# Patient Record
Sex: FEMALE | Race: WHITE | URBAN - METROPOLITAN AREA
[De-identification: names, ages, dates, MRNs, and addresses within clinical notes are randomized per-mention and may not be internally consistent; named-entity substitution may affect disease eponyms.]

---

## 2017-06-19 ENCOUNTER — OUTPATIENT (OUTPATIENT)
Dept: OUTPATIENT SERVICES | Facility: HOSPITAL | Age: 77
LOS: 1 days | Discharge: HOME | End: 2017-06-19

## 2017-06-28 DIAGNOSIS — I27.82 CHRONIC PULMONARY EMBOLISM: ICD-10-CM

## 2017-06-28 DIAGNOSIS — Z79.01 LONG TERM (CURRENT) USE OF ANTICOAGULANTS: ICD-10-CM

## 2017-07-03 ENCOUNTER — OUTPATIENT (OUTPATIENT)
Dept: OUTPATIENT SERVICES | Facility: HOSPITAL | Age: 77
LOS: 1 days | Discharge: HOME | End: 2017-07-03

## 2017-07-03 DIAGNOSIS — I27.82 CHRONIC PULMONARY EMBOLISM: ICD-10-CM

## 2017-07-03 DIAGNOSIS — Z79.01 LONG TERM (CURRENT) USE OF ANTICOAGULANTS: ICD-10-CM

## 2017-07-24 ENCOUNTER — OUTPATIENT (OUTPATIENT)
Dept: OUTPATIENT SERVICES | Facility: HOSPITAL | Age: 77
LOS: 1 days | Discharge: HOME | End: 2017-07-24

## 2017-07-24 DIAGNOSIS — I27.82 CHRONIC PULMONARY EMBOLISM: ICD-10-CM

## 2017-07-24 DIAGNOSIS — Z79.01 LONG TERM (CURRENT) USE OF ANTICOAGULANTS: ICD-10-CM

## 2017-08-14 ENCOUNTER — OUTPATIENT (OUTPATIENT)
Dept: OUTPATIENT SERVICES | Facility: HOSPITAL | Age: 77
LOS: 1 days | Discharge: HOME | End: 2017-08-14

## 2017-08-14 DIAGNOSIS — Z79.01 LONG TERM (CURRENT) USE OF ANTICOAGULANTS: ICD-10-CM

## 2017-08-14 DIAGNOSIS — I27.82 CHRONIC PULMONARY EMBOLISM: ICD-10-CM

## 2017-09-11 ENCOUNTER — OUTPATIENT (OUTPATIENT)
Dept: OUTPATIENT SERVICES | Facility: HOSPITAL | Age: 77
LOS: 1 days | Discharge: HOME | End: 2017-09-11

## 2017-09-11 DIAGNOSIS — Z79.01 LONG TERM (CURRENT) USE OF ANTICOAGULANTS: ICD-10-CM

## 2017-09-11 DIAGNOSIS — I27.82 CHRONIC PULMONARY EMBOLISM: ICD-10-CM

## 2017-10-11 ENCOUNTER — OUTPATIENT (OUTPATIENT)
Dept: OUTPATIENT SERVICES | Facility: HOSPITAL | Age: 77
LOS: 1 days | Discharge: HOME | End: 2017-10-11

## 2017-10-11 DIAGNOSIS — Z79.01 LONG TERM (CURRENT) USE OF ANTICOAGULANTS: ICD-10-CM

## 2017-10-11 DIAGNOSIS — I27.82 CHRONIC PULMONARY EMBOLISM: ICD-10-CM

## 2017-10-23 ENCOUNTER — OUTPATIENT (OUTPATIENT)
Dept: OUTPATIENT SERVICES | Facility: HOSPITAL | Age: 77
LOS: 1 days | Discharge: HOME | End: 2017-10-23

## 2017-10-23 DIAGNOSIS — Z79.01 LONG TERM (CURRENT) USE OF ANTICOAGULANTS: ICD-10-CM

## 2017-10-23 DIAGNOSIS — I27.82 CHRONIC PULMONARY EMBOLISM: ICD-10-CM

## 2017-11-13 ENCOUNTER — OUTPATIENT (OUTPATIENT)
Dept: OUTPATIENT SERVICES | Facility: HOSPITAL | Age: 77
LOS: 1 days | Discharge: HOME | End: 2017-11-13

## 2017-11-13 DIAGNOSIS — Z79.01 LONG TERM (CURRENT) USE OF ANTICOAGULANTS: ICD-10-CM

## 2017-11-13 DIAGNOSIS — I27.82 CHRONIC PULMONARY EMBOLISM: ICD-10-CM

## 2017-12-16 ENCOUNTER — OUTPATIENT (OUTPATIENT)
Dept: OUTPATIENT SERVICES | Facility: HOSPITAL | Age: 77
LOS: 1 days | Discharge: HOME | End: 2017-12-16

## 2017-12-16 DIAGNOSIS — I27.82 CHRONIC PULMONARY EMBOLISM: ICD-10-CM

## 2017-12-16 DIAGNOSIS — Z79.01 LONG TERM (CURRENT) USE OF ANTICOAGULANTS: ICD-10-CM

## 2017-12-21 ENCOUNTER — OUTPATIENT (OUTPATIENT)
Dept: OUTPATIENT SERVICES | Facility: HOSPITAL | Age: 77
LOS: 1 days | Discharge: HOME | End: 2017-12-21

## 2017-12-21 DIAGNOSIS — Z79.01 LONG TERM (CURRENT) USE OF ANTICOAGULANTS: ICD-10-CM

## 2017-12-21 DIAGNOSIS — I27.82 CHRONIC PULMONARY EMBOLISM: ICD-10-CM

## 2017-12-27 ENCOUNTER — EMERGENCY (EMERGENCY)
Facility: HOSPITAL | Age: 77
LOS: 0 days | Discharge: HOME | End: 2017-12-28
Admitting: INTERNAL MEDICINE

## 2017-12-27 DIAGNOSIS — Z86.711 PERSONAL HISTORY OF PULMONARY EMBOLISM: ICD-10-CM

## 2017-12-27 DIAGNOSIS — Z79.01 LONG TERM (CURRENT) USE OF ANTICOAGULANTS: ICD-10-CM

## 2017-12-27 DIAGNOSIS — D64.9 ANEMIA, UNSPECIFIED: ICD-10-CM

## 2017-12-27 DIAGNOSIS — I10 ESSENTIAL (PRIMARY) HYPERTENSION: ICD-10-CM

## 2018-01-03 ENCOUNTER — OUTPATIENT (OUTPATIENT)
Dept: OUTPATIENT SERVICES | Facility: HOSPITAL | Age: 78
LOS: 1 days | Discharge: HOME | End: 2018-01-03

## 2018-01-03 DIAGNOSIS — I27.82 CHRONIC PULMONARY EMBOLISM: ICD-10-CM

## 2018-01-03 DIAGNOSIS — Z79.01 LONG TERM (CURRENT) USE OF ANTICOAGULANTS: ICD-10-CM

## 2018-01-19 ENCOUNTER — OUTPATIENT (OUTPATIENT)
Dept: OUTPATIENT SERVICES | Facility: HOSPITAL | Age: 78
LOS: 1 days | Discharge: HOME | End: 2018-01-19

## 2018-01-19 DIAGNOSIS — Z79.01 LONG TERM (CURRENT) USE OF ANTICOAGULANTS: ICD-10-CM

## 2018-01-19 DIAGNOSIS — I27.82 CHRONIC PULMONARY EMBOLISM: ICD-10-CM

## 2018-01-29 ENCOUNTER — OUTPATIENT (OUTPATIENT)
Dept: OUTPATIENT SERVICES | Facility: HOSPITAL | Age: 78
LOS: 1 days | Discharge: HOME | End: 2018-01-29

## 2018-01-29 DIAGNOSIS — Z79.01 LONG TERM (CURRENT) USE OF ANTICOAGULANTS: ICD-10-CM

## 2018-01-29 DIAGNOSIS — I27.82 CHRONIC PULMONARY EMBOLISM: ICD-10-CM

## 2018-02-12 ENCOUNTER — OUTPATIENT (OUTPATIENT)
Dept: OUTPATIENT SERVICES | Facility: HOSPITAL | Age: 78
LOS: 1 days | Discharge: HOME | End: 2018-02-12

## 2018-02-12 DIAGNOSIS — I27.82 CHRONIC PULMONARY EMBOLISM: ICD-10-CM

## 2018-02-12 DIAGNOSIS — Z79.01 LONG TERM (CURRENT) USE OF ANTICOAGULANTS: ICD-10-CM

## 2018-03-05 ENCOUNTER — OUTPATIENT (OUTPATIENT)
Dept: OUTPATIENT SERVICES | Facility: HOSPITAL | Age: 78
LOS: 1 days | Discharge: HOME | End: 2018-03-05

## 2018-03-05 DIAGNOSIS — I27.82 CHRONIC PULMONARY EMBOLISM: ICD-10-CM

## 2018-03-05 DIAGNOSIS — Z79.01 LONG TERM (CURRENT) USE OF ANTICOAGULANTS: ICD-10-CM

## 2018-03-26 ENCOUNTER — OUTPATIENT (OUTPATIENT)
Dept: OUTPATIENT SERVICES | Facility: HOSPITAL | Age: 78
LOS: 1 days | Discharge: HOME | End: 2018-03-26

## 2018-03-26 DIAGNOSIS — Z79.01 LONG TERM (CURRENT) USE OF ANTICOAGULANTS: ICD-10-CM

## 2018-03-26 DIAGNOSIS — I27.82 CHRONIC PULMONARY EMBOLISM: ICD-10-CM

## 2018-04-18 ENCOUNTER — OUTPATIENT (OUTPATIENT)
Dept: OUTPATIENT SERVICES | Facility: HOSPITAL | Age: 78
LOS: 1 days | Discharge: HOME | End: 2018-04-18

## 2018-04-18 DIAGNOSIS — Z79.01 LONG TERM (CURRENT) USE OF ANTICOAGULANTS: ICD-10-CM

## 2018-04-18 DIAGNOSIS — I27.82 CHRONIC PULMONARY EMBOLISM: ICD-10-CM

## 2018-05-14 ENCOUNTER — OUTPATIENT (OUTPATIENT)
Dept: OUTPATIENT SERVICES | Facility: HOSPITAL | Age: 78
LOS: 1 days | Discharge: HOME | End: 2018-05-14

## 2018-05-14 DIAGNOSIS — I27.82 CHRONIC PULMONARY EMBOLISM: ICD-10-CM

## 2018-05-14 DIAGNOSIS — Z79.01 LONG TERM (CURRENT) USE OF ANTICOAGULANTS: ICD-10-CM

## 2018-06-11 ENCOUNTER — OUTPATIENT (OUTPATIENT)
Dept: OUTPATIENT SERVICES | Facility: HOSPITAL | Age: 78
LOS: 1 days | Discharge: HOME | End: 2018-06-11

## 2018-06-11 DIAGNOSIS — I27.82 CHRONIC PULMONARY EMBOLISM: ICD-10-CM

## 2018-06-11 DIAGNOSIS — Z79.01 LONG TERM (CURRENT) USE OF ANTICOAGULANTS: ICD-10-CM

## 2018-06-20 ENCOUNTER — OUTPATIENT (OUTPATIENT)
Dept: OUTPATIENT SERVICES | Facility: HOSPITAL | Age: 78
LOS: 1 days | Discharge: HOME | End: 2018-06-20

## 2018-06-20 DIAGNOSIS — Z79.01 LONG TERM (CURRENT) USE OF ANTICOAGULANTS: ICD-10-CM

## 2018-06-20 DIAGNOSIS — I27.82 CHRONIC PULMONARY EMBOLISM: ICD-10-CM

## 2018-07-03 ENCOUNTER — OUTPATIENT (OUTPATIENT)
Dept: OUTPATIENT SERVICES | Facility: HOSPITAL | Age: 78
LOS: 1 days | Discharge: HOME | End: 2018-07-03

## 2018-07-03 DIAGNOSIS — I27.82 CHRONIC PULMONARY EMBOLISM: ICD-10-CM

## 2018-07-03 DIAGNOSIS — Z79.01 LONG TERM (CURRENT) USE OF ANTICOAGULANTS: ICD-10-CM

## 2018-07-17 ENCOUNTER — OUTPATIENT (OUTPATIENT)
Dept: OUTPATIENT SERVICES | Facility: HOSPITAL | Age: 78
LOS: 1 days | Discharge: HOME | End: 2018-07-17

## 2018-07-17 DIAGNOSIS — Z79.01 LONG TERM (CURRENT) USE OF ANTICOAGULANTS: ICD-10-CM

## 2018-07-17 DIAGNOSIS — I27.82 CHRONIC PULMONARY EMBOLISM: ICD-10-CM

## 2018-07-31 ENCOUNTER — OUTPATIENT (OUTPATIENT)
Dept: OUTPATIENT SERVICES | Facility: HOSPITAL | Age: 78
LOS: 1 days | Discharge: HOME | End: 2018-07-31

## 2018-07-31 DIAGNOSIS — I27.82 CHRONIC PULMONARY EMBOLISM: ICD-10-CM

## 2018-07-31 DIAGNOSIS — Z79.01 LONG TERM (CURRENT) USE OF ANTICOAGULANTS: ICD-10-CM

## 2018-08-13 ENCOUNTER — OUTPATIENT (OUTPATIENT)
Dept: OUTPATIENT SERVICES | Facility: HOSPITAL | Age: 78
LOS: 1 days | Discharge: HOME | End: 2018-08-13

## 2018-08-13 DIAGNOSIS — R10.9 UNSPECIFIED ABDOMINAL PAIN: ICD-10-CM

## 2018-08-20 ENCOUNTER — OUTPATIENT (OUTPATIENT)
Dept: OUTPATIENT SERVICES | Facility: HOSPITAL | Age: 78
LOS: 1 days | Discharge: HOME | End: 2018-08-20

## 2018-08-20 DIAGNOSIS — I27.82 CHRONIC PULMONARY EMBOLISM: ICD-10-CM

## 2018-08-20 DIAGNOSIS — Z79.01 LONG TERM (CURRENT) USE OF ANTICOAGULANTS: ICD-10-CM

## 2018-09-17 ENCOUNTER — OUTPATIENT (OUTPATIENT)
Dept: OUTPATIENT SERVICES | Facility: HOSPITAL | Age: 78
LOS: 1 days | Discharge: HOME | End: 2018-09-17

## 2018-09-17 DIAGNOSIS — I27.82 CHRONIC PULMONARY EMBOLISM: ICD-10-CM

## 2018-09-17 DIAGNOSIS — Z79.01 LONG TERM (CURRENT) USE OF ANTICOAGULANTS: ICD-10-CM

## 2018-09-17 LAB
POCT INR: 2.1 RATIO — HIGH (ref 0.9–1.2)
POCT PT: 25.6 SEC — HIGH (ref 10–13.4)

## 2018-10-01 ENCOUNTER — OUTPATIENT (OUTPATIENT)
Dept: OUTPATIENT SERVICES | Facility: HOSPITAL | Age: 78
LOS: 1 days | Discharge: HOME | End: 2018-10-01

## 2018-10-01 DIAGNOSIS — I27.82 CHRONIC PULMONARY EMBOLISM: ICD-10-CM

## 2018-10-01 DIAGNOSIS — Z79.01 LONG TERM (CURRENT) USE OF ANTICOAGULANTS: ICD-10-CM

## 2018-10-01 LAB
POCT INR: 3.1 RATIO — HIGH (ref 0.9–1.2)
POCT PT: 37.5 SEC — HIGH (ref 10–13.4)

## 2018-10-22 ENCOUNTER — OUTPATIENT (OUTPATIENT)
Dept: OUTPATIENT SERVICES | Facility: HOSPITAL | Age: 78
LOS: 1 days | Discharge: HOME | End: 2018-10-22

## 2018-10-22 DIAGNOSIS — Z79.01 LONG TERM (CURRENT) USE OF ANTICOAGULANTS: ICD-10-CM

## 2018-10-22 DIAGNOSIS — I27.82 CHRONIC PULMONARY EMBOLISM: ICD-10-CM

## 2018-10-22 LAB
POCT INR: 3.5 RATIO — HIGH (ref 0.9–1.2)
POCT PT: 39 SEC — HIGH (ref 10–13.4)

## 2018-11-19 ENCOUNTER — OUTPATIENT (OUTPATIENT)
Dept: OUTPATIENT SERVICES | Facility: HOSPITAL | Age: 78
LOS: 1 days | Discharge: HOME | End: 2018-11-19

## 2018-11-19 DIAGNOSIS — Z79.01 LONG TERM (CURRENT) USE OF ANTICOAGULANTS: ICD-10-CM

## 2018-11-19 DIAGNOSIS — I27.82 CHRONIC PULMONARY EMBOLISM: ICD-10-CM

## 2018-11-19 LAB
POCT INR: 7.1 RATIO — CRITICAL HIGH (ref 0.9–1.2)
POCT PT: 85.6 SEC — HIGH (ref 10–13.4)

## 2018-11-21 ENCOUNTER — OUTPATIENT (OUTPATIENT)
Dept: OUTPATIENT SERVICES | Facility: HOSPITAL | Age: 78
LOS: 1 days | Discharge: HOME | End: 2018-11-21

## 2018-11-21 DIAGNOSIS — Z79.01 LONG TERM (CURRENT) USE OF ANTICOAGULANTS: ICD-10-CM

## 2018-11-21 DIAGNOSIS — I27.82 CHRONIC PULMONARY EMBOLISM: ICD-10-CM

## 2018-11-21 LAB
POCT INR: 6.1 RATIO — CRITICAL HIGH (ref 0.9–1.2)
POCT PT: 73.5 SEC — HIGH (ref 10–13.4)

## 2018-11-23 ENCOUNTER — OUTPATIENT (OUTPATIENT)
Dept: OUTPATIENT SERVICES | Facility: HOSPITAL | Age: 78
LOS: 1 days | Discharge: HOME | End: 2018-11-23

## 2018-11-23 DIAGNOSIS — Z79.01 LONG TERM (CURRENT) USE OF ANTICOAGULANTS: ICD-10-CM

## 2018-11-23 DIAGNOSIS — I27.82 CHRONIC PULMONARY EMBOLISM: ICD-10-CM

## 2018-11-23 LAB
POCT INR: 3.4 RATIO — HIGH (ref 0.9–1.2)
POCT PT: 40.7 SEC — HIGH (ref 10–13.4)

## 2018-11-30 ENCOUNTER — OUTPATIENT (OUTPATIENT)
Dept: OUTPATIENT SERVICES | Facility: HOSPITAL | Age: 78
LOS: 1 days | Discharge: HOME | End: 2018-11-30

## 2018-11-30 DIAGNOSIS — I27.82 CHRONIC PULMONARY EMBOLISM: ICD-10-CM

## 2018-11-30 DIAGNOSIS — Z79.01 LONG TERM (CURRENT) USE OF ANTICOAGULANTS: ICD-10-CM

## 2018-11-30 LAB
POCT INR: 3.2 RATIO — HIGH (ref 0.9–1.2)
POCT PT: 38.1 SEC — HIGH (ref 10–13.4)

## 2018-12-14 ENCOUNTER — OUTPATIENT (OUTPATIENT)
Dept: OUTPATIENT SERVICES | Facility: HOSPITAL | Age: 78
LOS: 1 days | Discharge: HOME | End: 2018-12-14

## 2018-12-14 DIAGNOSIS — Z79.01 LONG TERM (CURRENT) USE OF ANTICOAGULANTS: ICD-10-CM

## 2018-12-14 DIAGNOSIS — I27.82 CHRONIC PULMONARY EMBOLISM: ICD-10-CM

## 2018-12-14 LAB
POCT INR: 3 RATIO — HIGH (ref 0.9–1.2)
POCT PT: 36.1 SEC — HIGH (ref 10–13.4)

## 2019-01-07 ENCOUNTER — OUTPATIENT (OUTPATIENT)
Dept: OUTPATIENT SERVICES | Facility: HOSPITAL | Age: 79
LOS: 1 days | Discharge: HOME | End: 2019-01-07

## 2019-01-07 DIAGNOSIS — Z79.01 LONG TERM (CURRENT) USE OF ANTICOAGULANTS: ICD-10-CM

## 2019-01-07 DIAGNOSIS — I48.91 UNSPECIFIED ATRIAL FIBRILLATION: ICD-10-CM

## 2019-01-07 LAB
POCT INR: 2.6 RATIO — HIGH (ref 0.9–1.2)
POCT PT: 31.6 SEC — HIGH (ref 10–13.4)

## 2019-02-04 ENCOUNTER — OUTPATIENT (OUTPATIENT)
Dept: OUTPATIENT SERVICES | Facility: HOSPITAL | Age: 79
LOS: 1 days | Discharge: HOME | End: 2019-02-04

## 2019-02-04 DIAGNOSIS — I48.91 UNSPECIFIED ATRIAL FIBRILLATION: ICD-10-CM

## 2019-02-04 DIAGNOSIS — Z79.01 LONG TERM (CURRENT) USE OF ANTICOAGULANTS: ICD-10-CM

## 2019-02-04 LAB
POCT INR: 2.6 RATIO — HIGH (ref 0.9–1.2)
POCT PT: 30.9 SEC — HIGH (ref 10–13.4)

## 2019-03-04 ENCOUNTER — OUTPATIENT (OUTPATIENT)
Dept: OUTPATIENT SERVICES | Facility: HOSPITAL | Age: 79
LOS: 1 days | Discharge: HOME | End: 2019-03-04

## 2019-03-04 DIAGNOSIS — Z79.01 LONG TERM (CURRENT) USE OF ANTICOAGULANTS: ICD-10-CM

## 2019-03-04 DIAGNOSIS — I48.91 UNSPECIFIED ATRIAL FIBRILLATION: ICD-10-CM

## 2019-03-04 LAB
POCT INR: 3.3 RATIO — HIGH (ref 0.9–1.2)
POCT PT: 39.7 SEC — HIGH (ref 10–13.4)

## 2019-04-01 ENCOUNTER — OUTPATIENT (OUTPATIENT)
Dept: OUTPATIENT SERVICES | Facility: HOSPITAL | Age: 79
LOS: 1 days | Discharge: HOME | End: 2019-04-01

## 2019-04-01 DIAGNOSIS — Z79.01 LONG TERM (CURRENT) USE OF ANTICOAGULANTS: ICD-10-CM

## 2019-04-01 DIAGNOSIS — I48.91 UNSPECIFIED ATRIAL FIBRILLATION: ICD-10-CM

## 2019-04-01 LAB
POCT INR: 2.9 RATIO — HIGH (ref 0.9–1.2)
POCT PT: 35.4 SEC — HIGH (ref 10–13.4)

## 2019-04-29 ENCOUNTER — OUTPATIENT (OUTPATIENT)
Dept: OUTPATIENT SERVICES | Facility: HOSPITAL | Age: 79
LOS: 1 days | Discharge: HOME | End: 2019-04-29

## 2019-04-29 DIAGNOSIS — Z79.01 LONG TERM (CURRENT) USE OF ANTICOAGULANTS: ICD-10-CM

## 2019-04-29 DIAGNOSIS — I48.91 UNSPECIFIED ATRIAL FIBRILLATION: ICD-10-CM

## 2019-04-29 LAB
POCT INR: 2.3 RATIO — HIGH (ref 0.9–1.2)
POCT PT: 27.8 SEC — HIGH (ref 10–13.4)

## 2019-05-22 ENCOUNTER — OUTPATIENT (OUTPATIENT)
Dept: OUTPATIENT SERVICES | Facility: HOSPITAL | Age: 79
LOS: 1 days | Discharge: HOME | End: 2019-05-22

## 2019-05-22 DIAGNOSIS — I48.91 UNSPECIFIED ATRIAL FIBRILLATION: ICD-10-CM

## 2019-05-22 DIAGNOSIS — Z79.01 LONG TERM (CURRENT) USE OF ANTICOAGULANTS: ICD-10-CM

## 2019-06-17 ENCOUNTER — OUTPATIENT (OUTPATIENT)
Dept: OUTPATIENT SERVICES | Facility: HOSPITAL | Age: 79
LOS: 1 days | Discharge: HOME | End: 2019-06-17

## 2019-06-17 DIAGNOSIS — I48.91 UNSPECIFIED ATRIAL FIBRILLATION: ICD-10-CM

## 2019-06-17 DIAGNOSIS — Z79.01 LONG TERM (CURRENT) USE OF ANTICOAGULANTS: ICD-10-CM

## 2019-06-17 LAB
POCT INR: 2.4 RATIO — HIGH (ref 0.9–1.2)
POCT PT: 29 SEC — HIGH (ref 10–13.4)

## 2019-07-17 ENCOUNTER — OUTPATIENT (OUTPATIENT)
Dept: OUTPATIENT SERVICES | Facility: HOSPITAL | Age: 79
LOS: 1 days | Discharge: HOME | End: 2019-07-17

## 2019-07-17 DIAGNOSIS — Z79.01 LONG TERM (CURRENT) USE OF ANTICOAGULANTS: ICD-10-CM

## 2019-07-17 DIAGNOSIS — I48.91 UNSPECIFIED ATRIAL FIBRILLATION: ICD-10-CM

## 2019-08-14 ENCOUNTER — OUTPATIENT (OUTPATIENT)
Dept: OUTPATIENT SERVICES | Facility: HOSPITAL | Age: 79
LOS: 1 days | Discharge: HOME | End: 2019-08-14

## 2019-08-14 DIAGNOSIS — I48.91 UNSPECIFIED ATRIAL FIBRILLATION: ICD-10-CM

## 2019-08-14 DIAGNOSIS — Z79.01 LONG TERM (CURRENT) USE OF ANTICOAGULANTS: ICD-10-CM

## 2019-08-14 LAB
POCT INR: 2.3 RATIO — HIGH (ref 0.9–1.2)
POCT PT: 27.3 SEC — HIGH (ref 10–13.4)

## 2019-09-11 ENCOUNTER — OUTPATIENT (OUTPATIENT)
Dept: OUTPATIENT SERVICES | Facility: HOSPITAL | Age: 79
LOS: 1 days | Discharge: HOME | End: 2019-09-11

## 2019-09-11 DIAGNOSIS — Z79.01 LONG TERM (CURRENT) USE OF ANTICOAGULANTS: ICD-10-CM

## 2019-09-11 DIAGNOSIS — I48.91 UNSPECIFIED ATRIAL FIBRILLATION: ICD-10-CM

## 2019-09-23 ENCOUNTER — OUTPATIENT (OUTPATIENT)
Dept: OUTPATIENT SERVICES | Facility: HOSPITAL | Age: 79
LOS: 1 days | Discharge: HOME | End: 2019-09-23

## 2019-09-23 DIAGNOSIS — I48.91 UNSPECIFIED ATRIAL FIBRILLATION: ICD-10-CM

## 2019-09-23 DIAGNOSIS — Z79.01 LONG TERM (CURRENT) USE OF ANTICOAGULANTS: ICD-10-CM

## 2019-09-23 LAB
POCT INR: 3.8 RATIO — HIGH (ref 0.9–1.2)
POCT PT: 45.9 SEC — HIGH (ref 10–13.4)

## 2019-10-09 ENCOUNTER — OUTPATIENT (OUTPATIENT)
Dept: OUTPATIENT SERVICES | Facility: HOSPITAL | Age: 79
LOS: 1 days | Discharge: HOME | End: 2019-10-09

## 2019-10-09 DIAGNOSIS — I48.91 UNSPECIFIED ATRIAL FIBRILLATION: ICD-10-CM

## 2019-10-09 DIAGNOSIS — Z79.01 LONG TERM (CURRENT) USE OF ANTICOAGULANTS: ICD-10-CM

## 2019-11-06 ENCOUNTER — OUTPATIENT (OUTPATIENT)
Dept: OUTPATIENT SERVICES | Facility: HOSPITAL | Age: 79
LOS: 1 days | Discharge: HOME | End: 2019-11-06

## 2019-11-06 DIAGNOSIS — I48.91 UNSPECIFIED ATRIAL FIBRILLATION: ICD-10-CM

## 2019-11-06 DIAGNOSIS — Z79.01 LONG TERM (CURRENT) USE OF ANTICOAGULANTS: ICD-10-CM

## 2019-12-09 ENCOUNTER — OUTPATIENT (OUTPATIENT)
Dept: OUTPATIENT SERVICES | Facility: HOSPITAL | Age: 79
LOS: 1 days | Discharge: HOME | End: 2019-12-09

## 2019-12-09 DIAGNOSIS — Z79.01 LONG TERM (CURRENT) USE OF ANTICOAGULANTS: ICD-10-CM

## 2019-12-09 DIAGNOSIS — I48.91 UNSPECIFIED ATRIAL FIBRILLATION: ICD-10-CM

## 2019-12-09 LAB
POCT INR: 3.2 RATIO — HIGH (ref 0.9–1.2)
POCT PT: 38.5 SEC — HIGH (ref 10–13.4)

## 2019-12-27 ENCOUNTER — OUTPATIENT (OUTPATIENT)
Dept: OUTPATIENT SERVICES | Facility: HOSPITAL | Age: 79
LOS: 1 days | Discharge: HOME | End: 2019-12-27
Payer: MEDICARE

## 2019-12-27 DIAGNOSIS — R05 COUGH: ICD-10-CM

## 2019-12-27 PROCEDURE — 71046 X-RAY EXAM CHEST 2 VIEWS: CPT | Mod: 26

## 2019-12-30 ENCOUNTER — OUTPATIENT (OUTPATIENT)
Dept: OUTPATIENT SERVICES | Facility: HOSPITAL | Age: 79
LOS: 1 days | Discharge: HOME | End: 2019-12-30

## 2019-12-30 DIAGNOSIS — I48.91 UNSPECIFIED ATRIAL FIBRILLATION: ICD-10-CM

## 2019-12-30 DIAGNOSIS — Z79.01 LONG TERM (CURRENT) USE OF ANTICOAGULANTS: ICD-10-CM

## 2019-12-30 LAB
POCT INR: 5 RATIO — HIGH (ref 0.9–1.2)
POCT PT: 60.8 SEC — HIGH (ref 10–13.4)

## 2020-01-02 ENCOUNTER — OUTPATIENT (OUTPATIENT)
Dept: OUTPATIENT SERVICES | Facility: HOSPITAL | Age: 80
LOS: 1 days | Discharge: HOME | End: 2020-01-02

## 2020-01-02 DIAGNOSIS — I48.91 UNSPECIFIED ATRIAL FIBRILLATION: ICD-10-CM

## 2020-01-02 DIAGNOSIS — Z79.01 LONG TERM (CURRENT) USE OF ANTICOAGULANTS: ICD-10-CM

## 2020-01-02 LAB
POCT INR: 3.1 RATIO — HIGH (ref 0.9–1.2)
POCT PT: 37 SEC — HIGH (ref 10–13.4)

## 2020-01-10 ENCOUNTER — OUTPATIENT (OUTPATIENT)
Dept: OUTPATIENT SERVICES | Facility: HOSPITAL | Age: 80
LOS: 1 days | Discharge: HOME | End: 2020-01-10

## 2020-01-10 DIAGNOSIS — I48.91 UNSPECIFIED ATRIAL FIBRILLATION: ICD-10-CM

## 2020-01-10 DIAGNOSIS — Z79.01 LONG TERM (CURRENT) USE OF ANTICOAGULANTS: ICD-10-CM

## 2020-01-10 LAB
POCT INR: 4.2 RATIO — HIGH (ref 0.9–1.2)
POCT PT: 50.4 SEC — HIGH (ref 10–13.4)

## 2020-01-14 ENCOUNTER — OUTPATIENT (OUTPATIENT)
Dept: OUTPATIENT SERVICES | Facility: HOSPITAL | Age: 80
LOS: 1 days | Discharge: HOME | End: 2020-01-14

## 2020-01-14 DIAGNOSIS — I48.91 UNSPECIFIED ATRIAL FIBRILLATION: ICD-10-CM

## 2020-01-14 DIAGNOSIS — Z79.01 LONG TERM (CURRENT) USE OF ANTICOAGULANTS: ICD-10-CM

## 2020-01-14 LAB
POCT INR: 2.9 RATIO — HIGH (ref 0.9–1.2)
POCT PT: 34.9 SEC — HIGH (ref 10–13.4)

## 2020-01-27 ENCOUNTER — OUTPATIENT (OUTPATIENT)
Dept: OUTPATIENT SERVICES | Facility: HOSPITAL | Age: 80
LOS: 1 days | Discharge: HOME | End: 2020-01-27

## 2020-01-27 DIAGNOSIS — I48.91 UNSPECIFIED ATRIAL FIBRILLATION: ICD-10-CM

## 2020-01-27 DIAGNOSIS — Z79.01 LONG TERM (CURRENT) USE OF ANTICOAGULANTS: ICD-10-CM

## 2020-01-27 LAB
POCT INR: 3.8 RATIO — HIGH (ref 0.9–1.2)
POCT PT: 45.7 SEC — HIGH (ref 10–13.4)

## 2020-02-03 ENCOUNTER — OUTPATIENT (OUTPATIENT)
Dept: OUTPATIENT SERVICES | Facility: HOSPITAL | Age: 80
LOS: 1 days | Discharge: HOME | End: 2020-02-03

## 2020-02-03 DIAGNOSIS — Z79.01 LONG TERM (CURRENT) USE OF ANTICOAGULANTS: ICD-10-CM

## 2020-02-03 DIAGNOSIS — I48.91 UNSPECIFIED ATRIAL FIBRILLATION: ICD-10-CM

## 2020-02-03 LAB
POCT INR: 3.7 RATIO — HIGH (ref 0.9–1.2)
POCT PT: 43.9 SEC — HIGH (ref 10–13.4)

## 2020-02-14 ENCOUNTER — OUTPATIENT (OUTPATIENT)
Dept: OUTPATIENT SERVICES | Facility: HOSPITAL | Age: 80
LOS: 1 days | Discharge: HOME | End: 2020-02-14

## 2020-02-14 DIAGNOSIS — I48.91 UNSPECIFIED ATRIAL FIBRILLATION: ICD-10-CM

## 2020-02-14 DIAGNOSIS — Z79.01 LONG TERM (CURRENT) USE OF ANTICOAGULANTS: ICD-10-CM

## 2020-02-14 LAB
POCT INR: 3.1 RATIO — HIGH (ref 0.9–1.2)
POCT PT: 37.5 SEC — HIGH (ref 10–13.4)

## 2020-03-02 ENCOUNTER — OUTPATIENT (OUTPATIENT)
Dept: OUTPATIENT SERVICES | Facility: HOSPITAL | Age: 80
LOS: 1 days | Discharge: HOME | End: 2020-03-02

## 2020-03-02 DIAGNOSIS — Z79.01 LONG TERM (CURRENT) USE OF ANTICOAGULANTS: ICD-10-CM

## 2020-03-02 DIAGNOSIS — I48.91 UNSPECIFIED ATRIAL FIBRILLATION: ICD-10-CM

## 2020-03-02 LAB
POCT INR: 2.9 RATIO — HIGH (ref 0.9–1.2)
POCT PT: 25.2 SEC — HIGH (ref 10–13.4)

## 2020-04-02 ENCOUNTER — OUTPATIENT (OUTPATIENT)
Dept: OUTPATIENT SERVICES | Facility: HOSPITAL | Age: 80
LOS: 1 days | Discharge: HOME | End: 2020-04-02

## 2020-04-02 DIAGNOSIS — Z79.01 LONG TERM (CURRENT) USE OF ANTICOAGULANTS: ICD-10-CM

## 2020-04-02 DIAGNOSIS — I48.91 UNSPECIFIED ATRIAL FIBRILLATION: ICD-10-CM

## 2020-04-02 LAB
POCT INR: 3.9 RATIO — HIGH (ref 0.9–1.2)
POCT PT: 47.3 SEC — HIGH (ref 10–13.4)

## 2020-04-16 ENCOUNTER — OUTPATIENT (OUTPATIENT)
Dept: OUTPATIENT SERVICES | Facility: HOSPITAL | Age: 80
LOS: 1 days | Discharge: HOME | End: 2020-04-16

## 2020-04-16 DIAGNOSIS — I48.91 UNSPECIFIED ATRIAL FIBRILLATION: ICD-10-CM

## 2020-04-16 DIAGNOSIS — Z79.01 LONG TERM (CURRENT) USE OF ANTICOAGULANTS: ICD-10-CM

## 2020-04-16 LAB
POCT INR: 2.6 RATIO — HIGH (ref 0.9–1.2)
POCT PT: 31.7 SEC — HIGH (ref 10–13.4)

## 2020-05-18 ENCOUNTER — OUTPATIENT (OUTPATIENT)
Dept: OUTPATIENT SERVICES | Facility: HOSPITAL | Age: 80
LOS: 1 days | Discharge: HOME | End: 2020-05-18

## 2020-05-18 DIAGNOSIS — I48.91 UNSPECIFIED ATRIAL FIBRILLATION: ICD-10-CM

## 2020-05-18 DIAGNOSIS — Z79.01 LONG TERM (CURRENT) USE OF ANTICOAGULANTS: ICD-10-CM

## 2020-05-18 LAB
POCT INR: 3.2 RATIO — HIGH (ref 0.9–1.2)
POCT PT: 28.9 SEC — HIGH (ref 10–13.4)

## 2020-06-22 ENCOUNTER — OUTPATIENT (OUTPATIENT)
Dept: OUTPATIENT SERVICES | Facility: HOSPITAL | Age: 80
LOS: 1 days | Discharge: HOME | End: 2020-06-22

## 2020-06-22 DIAGNOSIS — I48.91 UNSPECIFIED ATRIAL FIBRILLATION: ICD-10-CM

## 2020-06-22 DIAGNOSIS — Z79.01 LONG TERM (CURRENT) USE OF ANTICOAGULANTS: ICD-10-CM

## 2020-06-22 LAB
POCT INR: 3.5 RATIO — HIGH (ref 0.9–1.2)
POCT PT: 42.3 SEC — HIGH (ref 10–13.4)

## 2020-07-20 ENCOUNTER — OUTPATIENT (OUTPATIENT)
Dept: OUTPATIENT SERVICES | Facility: HOSPITAL | Age: 80
LOS: 1 days | Discharge: HOME | End: 2020-07-20

## 2020-07-20 DIAGNOSIS — I48.91 UNSPECIFIED ATRIAL FIBRILLATION: ICD-10-CM

## 2020-07-20 DIAGNOSIS — Z79.01 LONG TERM (CURRENT) USE OF ANTICOAGULANTS: ICD-10-CM

## 2020-07-20 LAB
POCT INR: 4.3 RATIO — HIGH (ref 0.9–1.2)
POCT PT: 51.5 SEC — HIGH (ref 10–13.4)

## 2020-08-03 ENCOUNTER — OUTPATIENT (OUTPATIENT)
Dept: OUTPATIENT SERVICES | Facility: HOSPITAL | Age: 80
LOS: 1 days | Discharge: HOME | End: 2020-08-03

## 2020-08-03 DIAGNOSIS — Z79.01 LONG TERM (CURRENT) USE OF ANTICOAGULANTS: ICD-10-CM

## 2020-08-03 DIAGNOSIS — I48.91 UNSPECIFIED ATRIAL FIBRILLATION: ICD-10-CM

## 2020-08-03 PROBLEM — Z00.00 ENCOUNTER FOR PREVENTIVE HEALTH EXAMINATION: Status: ACTIVE | Noted: 2020-08-03

## 2020-08-03 LAB
INR PPP: 4.5 RATIO
POCT INR: 4.5 RATIO — HIGH (ref 0.9–1.2)
POCT PT: 53.5 SEC — HIGH (ref 10–13.4)
POCT-PROTHROMBIN TIME: 53.5 SECS
QUALITY CONTROL: YES

## 2020-08-07 ENCOUNTER — RECORD ABSTRACTING (OUTPATIENT)
Age: 80
End: 2020-08-07

## 2020-08-07 DIAGNOSIS — Z78.9 OTHER SPECIFIED HEALTH STATUS: ICD-10-CM

## 2020-08-07 DIAGNOSIS — Z86.718 PERSONAL HISTORY OF OTHER VENOUS THROMBOSIS AND EMBOLISM: ICD-10-CM

## 2020-08-07 DIAGNOSIS — Z86.711 PERSONAL HISTORY OF PULMONARY EMBOLISM: ICD-10-CM

## 2020-08-07 DIAGNOSIS — M06.9 RHEUMATOID ARTHRITIS, UNSPECIFIED: ICD-10-CM

## 2020-08-07 DIAGNOSIS — I10 ESSENTIAL (PRIMARY) HYPERTENSION: ICD-10-CM

## 2020-08-07 RX ORDER — METOPROLOL TARTRATE 100 MG/1
100 TABLET, FILM COATED ORAL
Refills: 0 | Status: ACTIVE | COMMUNITY

## 2020-08-07 RX ORDER — CICLOPIROX 80 MG/ML
SOLUTION TOPICAL
Refills: 0 | Status: ACTIVE | COMMUNITY

## 2020-08-07 RX ORDER — HYDROCHLOROTHIAZIDE 12.5 MG/1
12.5 TABLET ORAL DAILY
Refills: 0 | Status: ACTIVE | COMMUNITY

## 2020-08-07 RX ORDER — WARFARIN 2.5 MG/1
2.5 TABLET ORAL
Refills: 0 | Status: ACTIVE | COMMUNITY

## 2020-08-07 RX ORDER — LACTOBACILLUS RHAMNOSUS GG 10B CELL
CAPSULE ORAL
Refills: 0 | Status: ACTIVE | COMMUNITY

## 2020-08-07 RX ORDER — FERROUS SULFATE 325(65) MG
325 (65 FE) TABLET ORAL
Refills: 0 | Status: ACTIVE | COMMUNITY

## 2020-08-07 RX ORDER — LOSARTAN POTASSIUM 100 MG/1
100 TABLET, FILM COATED ORAL
Refills: 0 | Status: ACTIVE | COMMUNITY

## 2020-08-17 ENCOUNTER — OUTPATIENT (OUTPATIENT)
Dept: OUTPATIENT SERVICES | Facility: HOSPITAL | Age: 80
LOS: 1 days | Discharge: HOME | End: 2020-08-17

## 2020-08-17 ENCOUNTER — APPOINTMENT (OUTPATIENT)
Dept: MEDICATION MANAGEMENT | Facility: CLINIC | Age: 80
End: 2020-08-17

## 2020-08-17 VITALS — HEART RATE: 64 BPM | OXYGEN SATURATION: 96 % | RESPIRATION RATE: 16 BRPM

## 2020-08-17 DIAGNOSIS — Z79.01 LONG TERM (CURRENT) USE OF ANTICOAGULANTS: ICD-10-CM

## 2020-08-17 DIAGNOSIS — I48.91 UNSPECIFIED ATRIAL FIBRILLATION: ICD-10-CM

## 2020-08-17 LAB
INR PPP: 2.2 RATIO
POCT-PROTHROMBIN TIME: 26.4 SECS
QUALITY CONTROL: YES

## 2020-09-08 ENCOUNTER — APPOINTMENT (OUTPATIENT)
Dept: MEDICATION MANAGEMENT | Facility: CLINIC | Age: 80
End: 2020-09-08

## 2020-09-08 ENCOUNTER — OUTPATIENT (OUTPATIENT)
Dept: OUTPATIENT SERVICES | Facility: HOSPITAL | Age: 80
LOS: 1 days | Discharge: HOME | End: 2020-09-08

## 2020-09-08 VITALS — BODY MASS INDEX: 29.88 KG/M2 | HEIGHT: 64 IN | TEMPERATURE: 97 F | WEIGHT: 175 LBS

## 2020-09-08 DIAGNOSIS — I48.91 UNSPECIFIED ATRIAL FIBRILLATION: ICD-10-CM

## 2020-09-08 DIAGNOSIS — Z79.01 LONG TERM (CURRENT) USE OF ANTICOAGULANTS: ICD-10-CM

## 2020-09-08 LAB
INR PPP: 3 RATIO
POCT-PROTHROMBIN TIME: 42.8 SECS
QUALITY CONTROL: YES

## 2020-09-29 ENCOUNTER — OUTPATIENT (OUTPATIENT)
Dept: OUTPATIENT SERVICES | Facility: HOSPITAL | Age: 80
LOS: 1 days | Discharge: HOME | End: 2020-09-29

## 2020-09-29 ENCOUNTER — APPOINTMENT (OUTPATIENT)
Dept: MEDICATION MANAGEMENT | Facility: CLINIC | Age: 80
End: 2020-09-29

## 2020-09-29 VITALS — BODY MASS INDEX: 29.88 KG/M2 | WEIGHT: 175 LBS | TEMPERATURE: 97.3 F | HEIGHT: 64 IN

## 2020-09-29 DIAGNOSIS — Z79.01 LONG TERM (CURRENT) USE OF ANTICOAGULANTS: ICD-10-CM

## 2020-09-29 DIAGNOSIS — I48.91 UNSPECIFIED ATRIAL FIBRILLATION: ICD-10-CM

## 2020-09-29 LAB
INR PPP: 2.6 RATIO
POCT-PROTHROMBIN TIME: 31.7 SECS
QUALITY CONTROL: YES

## 2020-10-27 ENCOUNTER — OUTPATIENT (OUTPATIENT)
Dept: OUTPATIENT SERVICES | Facility: HOSPITAL | Age: 80
LOS: 1 days | Discharge: HOME | End: 2020-10-27

## 2020-10-27 ENCOUNTER — APPOINTMENT (OUTPATIENT)
Dept: MEDICATION MANAGEMENT | Facility: CLINIC | Age: 80
End: 2020-10-27

## 2020-10-27 VITALS — HEART RATE: 66 BPM | RESPIRATION RATE: 16 BRPM | OXYGEN SATURATION: 96 %

## 2020-10-27 DIAGNOSIS — I48.91 UNSPECIFIED ATRIAL FIBRILLATION: ICD-10-CM

## 2020-10-27 DIAGNOSIS — Z79.01 LONG TERM (CURRENT) USE OF ANTICOAGULANTS: ICD-10-CM

## 2020-10-27 LAB
INR PPP: 2.4 RATIO
POCT-PROTHROMBIN TIME: 28.2 SECS
QUALITY CONTROL: YES

## 2020-11-24 ENCOUNTER — APPOINTMENT (OUTPATIENT)
Dept: MEDICATION MANAGEMENT | Facility: CLINIC | Age: 80
End: 2020-11-24

## 2020-11-24 ENCOUNTER — OUTPATIENT (OUTPATIENT)
Dept: OUTPATIENT SERVICES | Facility: HOSPITAL | Age: 80
LOS: 1 days | Discharge: HOME | End: 2020-11-24

## 2020-11-24 VITALS — HEART RATE: 66 BPM | OXYGEN SATURATION: 96 % | RESPIRATION RATE: 16 BRPM

## 2020-11-24 DIAGNOSIS — Z79.01 LONG TERM (CURRENT) USE OF ANTICOAGULANTS: ICD-10-CM

## 2020-11-24 DIAGNOSIS — I48.91 UNSPECIFIED ATRIAL FIBRILLATION: ICD-10-CM

## 2020-11-24 LAB
INR PPP: 2.5 RATIO
POCT-PROTHROMBIN TIME: 30.2 SECS
QUALITY CONTROL: YES

## 2020-12-29 ENCOUNTER — OUTPATIENT (OUTPATIENT)
Dept: OUTPATIENT SERVICES | Facility: HOSPITAL | Age: 80
LOS: 1 days | Discharge: HOME | End: 2020-12-29

## 2020-12-29 ENCOUNTER — APPOINTMENT (OUTPATIENT)
Dept: MEDICATION MANAGEMENT | Facility: CLINIC | Age: 80
End: 2020-12-29

## 2020-12-29 VITALS — RESPIRATION RATE: 16 BRPM | OXYGEN SATURATION: 97 %

## 2020-12-29 DIAGNOSIS — Z79.01 LONG TERM (CURRENT) USE OF ANTICOAGULANTS: ICD-10-CM

## 2020-12-29 DIAGNOSIS — I48.91 UNSPECIFIED ATRIAL FIBRILLATION: ICD-10-CM

## 2020-12-29 LAB
INR PPP: 2.1 RATIO
POCT-PROTHROMBIN TIME: 25.3 SECS
QUALITY CONTROL: YES

## 2021-01-05 ENCOUNTER — APPOINTMENT (OUTPATIENT)
Dept: MEDICATION MANAGEMENT | Facility: CLINIC | Age: 81
End: 2021-01-05

## 2021-01-05 ENCOUNTER — OUTPATIENT (OUTPATIENT)
Dept: OUTPATIENT SERVICES | Facility: HOSPITAL | Age: 81
LOS: 1 days | Discharge: HOME | End: 2021-01-05

## 2021-01-05 VITALS — OXYGEN SATURATION: 97 % | RESPIRATION RATE: 16 BRPM | HEART RATE: 66 BPM

## 2021-01-05 DIAGNOSIS — Z79.01 LONG TERM (CURRENT) USE OF ANTICOAGULANTS: ICD-10-CM

## 2021-01-05 DIAGNOSIS — I48.91 UNSPECIFIED ATRIAL FIBRILLATION: ICD-10-CM

## 2021-01-05 LAB
INR PPP: 2.6 RATIO
POCT-PROTHROMBIN TIME: 31.8 SECS
QUALITY CONTROL: YES

## 2021-02-08 ENCOUNTER — OUTPATIENT (OUTPATIENT)
Dept: OUTPATIENT SERVICES | Facility: HOSPITAL | Age: 81
LOS: 1 days | Discharge: HOME | End: 2021-02-08

## 2021-02-08 ENCOUNTER — APPOINTMENT (OUTPATIENT)
Dept: MEDICATION MANAGEMENT | Facility: CLINIC | Age: 81
End: 2021-02-08

## 2021-02-08 VITALS — RESPIRATION RATE: 16 BRPM | HEART RATE: 64 BPM | OXYGEN SATURATION: 94 %

## 2021-02-08 DIAGNOSIS — I48.91 UNSPECIFIED ATRIAL FIBRILLATION: ICD-10-CM

## 2021-02-08 DIAGNOSIS — Z79.01 LONG TERM (CURRENT) USE OF ANTICOAGULANTS: ICD-10-CM

## 2021-02-08 LAB
INR PPP: 2.3 RATIO
POCT-PROTHROMBIN TIME: 27.8 SECS
QUALITY CONTROL: YES

## 2021-03-03 ENCOUNTER — APPOINTMENT (OUTPATIENT)
Dept: MEDICATION MANAGEMENT | Facility: CLINIC | Age: 81
End: 2021-03-03

## 2021-03-03 ENCOUNTER — OUTPATIENT (OUTPATIENT)
Dept: OUTPATIENT SERVICES | Facility: HOSPITAL | Age: 81
LOS: 1 days | Discharge: HOME | End: 2021-03-03

## 2021-03-03 VITALS — RESPIRATION RATE: 16 BRPM | HEART RATE: 64 BPM | OXYGEN SATURATION: 95 %

## 2021-03-03 DIAGNOSIS — Z79.01 LONG TERM (CURRENT) USE OF ANTICOAGULANTS: ICD-10-CM

## 2021-03-03 DIAGNOSIS — I48.91 UNSPECIFIED ATRIAL FIBRILLATION: ICD-10-CM

## 2021-03-03 LAB
INR PPP: 3.1 RATIO
POCT-PROTHROMBIN TIME: 37.5 SECS
QUALITY CONTROL: YES

## 2021-03-31 ENCOUNTER — APPOINTMENT (OUTPATIENT)
Dept: MEDICATION MANAGEMENT | Facility: CLINIC | Age: 81
End: 2021-03-31

## 2021-03-31 ENCOUNTER — OUTPATIENT (OUTPATIENT)
Dept: OUTPATIENT SERVICES | Facility: HOSPITAL | Age: 81
LOS: 1 days | Discharge: HOME | End: 2021-03-31

## 2021-03-31 VITALS — HEART RATE: 68 BPM | RESPIRATION RATE: 16 BRPM | OXYGEN SATURATION: 98 %

## 2021-03-31 DIAGNOSIS — I48.91 UNSPECIFIED ATRIAL FIBRILLATION: ICD-10-CM

## 2021-03-31 DIAGNOSIS — Z79.01 LONG TERM (CURRENT) USE OF ANTICOAGULANTS: ICD-10-CM

## 2021-03-31 LAB
INR PPP: 2.8 RATIO
POCT-PROTHROMBIN TIME: 34 SECS
QUALITY CONTROL: YES

## 2021-05-04 ENCOUNTER — APPOINTMENT (OUTPATIENT)
Dept: MEDICATION MANAGEMENT | Facility: CLINIC | Age: 81
End: 2021-05-04

## 2021-05-04 ENCOUNTER — OUTPATIENT (OUTPATIENT)
Dept: OUTPATIENT SERVICES | Facility: HOSPITAL | Age: 81
LOS: 1 days | Discharge: HOME | End: 2021-05-04

## 2021-05-04 DIAGNOSIS — Z79.01 LONG TERM (CURRENT) USE OF ANTICOAGULANTS: ICD-10-CM

## 2021-05-04 DIAGNOSIS — R79.1 ABNORMAL COAGULATION PROFILE: ICD-10-CM

## 2021-05-04 DIAGNOSIS — I48.91 UNSPECIFIED ATRIAL FIBRILLATION: ICD-10-CM

## 2021-05-04 LAB
INR PPP: 3 RATIO
POCT-PROTHROMBIN TIME: 35.7 SECS
QUALITY CONTROL: YES

## 2021-06-15 ENCOUNTER — OUTPATIENT (OUTPATIENT)
Dept: OUTPATIENT SERVICES | Facility: HOSPITAL | Age: 81
LOS: 1 days | Discharge: HOME | End: 2021-06-15

## 2021-06-15 ENCOUNTER — APPOINTMENT (OUTPATIENT)
Age: 81
End: 2021-06-15

## 2021-06-15 VITALS — HEART RATE: 68 BPM | OXYGEN SATURATION: 98 % | RESPIRATION RATE: 16 BRPM

## 2021-06-15 DIAGNOSIS — I48.91 UNSPECIFIED ATRIAL FIBRILLATION: ICD-10-CM

## 2021-06-15 DIAGNOSIS — Z79.01 LONG TERM (CURRENT) USE OF ANTICOAGULANTS: ICD-10-CM

## 2021-06-15 LAB
INR PPP: 3.9 RATIO
POCT-PROTHROMBIN TIME: 46.8 SECS
QUALITY CONTROL: YES

## 2021-06-15 RX ORDER — WARFARIN 2.5 MG/1
2.5 TABLET ORAL DAILY
Qty: 90 | Refills: 3 | Status: ACTIVE | COMMUNITY
Start: 2021-06-15 | End: 1900-01-01

## 2021-07-20 ENCOUNTER — APPOINTMENT (OUTPATIENT)
Dept: MEDICATION MANAGEMENT | Facility: CLINIC | Age: 81
End: 2021-07-20

## 2021-07-20 ENCOUNTER — OUTPATIENT (OUTPATIENT)
Dept: OUTPATIENT SERVICES | Facility: HOSPITAL | Age: 81
LOS: 1 days | Discharge: HOME | End: 2021-07-20

## 2021-07-20 VITALS — HEART RATE: 76 BPM | RESPIRATION RATE: 16 BRPM | OXYGEN SATURATION: 94 %

## 2021-07-20 DIAGNOSIS — Z79.01 LONG TERM (CURRENT) USE OF ANTICOAGULANTS: ICD-10-CM

## 2021-07-20 DIAGNOSIS — I48.91 UNSPECIFIED ATRIAL FIBRILLATION: ICD-10-CM

## 2021-07-20 LAB
INR PPP: 3.6 RATIO
POCT-PROTHROMBIN TIME: 43.1 SECS
QUALITY CONTROL: YES

## 2021-08-23 ENCOUNTER — OUTPATIENT (OUTPATIENT)
Dept: OUTPATIENT SERVICES | Facility: HOSPITAL | Age: 81
LOS: 1 days | Discharge: HOME | End: 2021-08-23

## 2021-08-23 ENCOUNTER — APPOINTMENT (OUTPATIENT)
Dept: MEDICATION MANAGEMENT | Facility: CLINIC | Age: 81
End: 2021-08-23

## 2021-08-23 VITALS — OXYGEN SATURATION: 94 % | HEART RATE: 65 BPM | RESPIRATION RATE: 18 BRPM

## 2021-08-23 DIAGNOSIS — I48.91 UNSPECIFIED ATRIAL FIBRILLATION: ICD-10-CM

## 2021-08-23 DIAGNOSIS — M32.9 SYSTEMIC LUPUS ERYTHEMATOSUS, UNSPECIFIED: ICD-10-CM

## 2021-08-23 DIAGNOSIS — D68.52 PROTHROMBIN GENE MUTATION: ICD-10-CM

## 2021-08-23 DIAGNOSIS — Z79.01 ENCOUNTER FOR THERAPEUTIC DRUG LVL MONITORING: ICD-10-CM

## 2021-08-23 DIAGNOSIS — Z79.01 LONG TERM (CURRENT) USE OF ANTICOAGULANTS: ICD-10-CM

## 2021-08-23 DIAGNOSIS — Z51.81 ENCOUNTER FOR THERAPEUTIC DRUG LVL MONITORING: ICD-10-CM

## 2021-08-23 LAB
INR PPP: 3.6 RATIO
POCT-PROTHROMBIN TIME: 43.7 SECS
QUALITY CONTROL: YES

## 2021-09-03 ENCOUNTER — INPATIENT (INPATIENT)
Facility: HOSPITAL | Age: 81
LOS: 18 days | Discharge: NURSING, REHAB OR LONG-TERM CARE | End: 2021-09-22
Attending: INTERNAL MEDICINE | Admitting: INTERNAL MEDICINE
Payer: MEDICARE

## 2021-09-03 VITALS
RESPIRATION RATE: 18 BRPM | SYSTOLIC BLOOD PRESSURE: 105 MMHG | TEMPERATURE: 103 F | DIASTOLIC BLOOD PRESSURE: 57 MMHG | OXYGEN SATURATION: 96 % | HEART RATE: 115 BPM

## 2021-09-03 DIAGNOSIS — Z90.710 ACQUIRED ABSENCE OF BOTH CERVIX AND UTERUS: Chronic | ICD-10-CM

## 2021-09-03 DIAGNOSIS — Z90.89 ACQUIRED ABSENCE OF OTHER ORGANS: Chronic | ICD-10-CM

## 2021-09-03 LAB
ALBUMIN SERPL ELPH-MCNC: 3.3 G/DL — LOW (ref 3.5–5.2)
ALP SERPL-CCNC: 85 U/L — SIGNIFICANT CHANGE UP (ref 30–115)
ALT FLD-CCNC: 12 U/L — SIGNIFICANT CHANGE UP (ref 0–41)
ANION GAP SERPL CALC-SCNC: 19 MMOL/L — HIGH (ref 7–14)
APTT BLD: 47.9 SEC — HIGH (ref 27–39.2)
AST SERPL-CCNC: 46 U/L — HIGH (ref 0–41)
BASE EXCESS BLDV CALC-SCNC: -5.1 MMOL/L — LOW (ref -2–3)
BASOPHILS # BLD AUTO: 0.02 K/UL — SIGNIFICANT CHANGE UP (ref 0–0.2)
BASOPHILS NFR BLD AUTO: 0.1 % — SIGNIFICANT CHANGE UP (ref 0–1)
BILIRUB SERPL-MCNC: 0.8 MG/DL — SIGNIFICANT CHANGE UP (ref 0.2–1.2)
BUN SERPL-MCNC: 55 MG/DL — HIGH (ref 10–20)
CA-I SERPL-SCNC: 1.06 MMOL/L — LOW (ref 1.15–1.33)
CALCIUM SERPL-MCNC: 7.7 MG/DL — LOW (ref 8.5–10.1)
CHLORIDE SERPL-SCNC: 97 MMOL/L — LOW (ref 98–110)
CO2 SERPL-SCNC: 17 MMOL/L — SIGNIFICANT CHANGE UP (ref 17–32)
CREAT SERPL-MCNC: 3.8 MG/DL — HIGH (ref 0.7–1.5)
EOSINOPHIL # BLD AUTO: 0 K/UL — SIGNIFICANT CHANGE UP (ref 0–0.7)
EOSINOPHIL NFR BLD AUTO: 0 % — SIGNIFICANT CHANGE UP (ref 0–8)
GAS PNL BLDV: 130 MMOL/L — LOW (ref 136–145)
GAS PNL BLDV: SIGNIFICANT CHANGE UP
GLUCOSE SERPL-MCNC: 105 MG/DL — HIGH (ref 70–99)
HCO3 BLDV-SCNC: 19 MMOL/L — LOW (ref 22–29)
HCT VFR BLD CALC: 36.8 % — LOW (ref 37–47)
HCT VFR BLDA CALC: 40 % — SIGNIFICANT CHANGE UP (ref 34.5–46.5)
HGB BLD CALC-MCNC: 13.4 G/DL — SIGNIFICANT CHANGE UP (ref 11.7–16.1)
HGB BLD-MCNC: 11.5 G/DL — LOW (ref 12–16)
IMM GRANULOCYTES NFR BLD AUTO: 0.9 % — HIGH (ref 0.1–0.3)
INR BLD: 5.51 RATIO — CRITICAL HIGH (ref 0.65–1.3)
LACTATE BLDV-MCNC: 1.5 MMOL/L — SIGNIFICANT CHANGE UP (ref 0.5–2)
LYMPHOCYTES # BLD AUTO: 19.2 % — LOW (ref 20.5–51.1)
LYMPHOCYTES # BLD AUTO: 3.13 K/UL — SIGNIFICANT CHANGE UP (ref 1.2–3.4)
MCHC RBC-ENTMCNC: 28.6 PG — SIGNIFICANT CHANGE UP (ref 27–31)
MCHC RBC-ENTMCNC: 31.3 G/DL — LOW (ref 32–37)
MCV RBC AUTO: 91.5 FL — SIGNIFICANT CHANGE UP (ref 81–99)
MONOCYTES # BLD AUTO: 1.39 K/UL — HIGH (ref 0.1–0.6)
MONOCYTES NFR BLD AUTO: 8.5 % — SIGNIFICANT CHANGE UP (ref 1.7–9.3)
NEUTROPHILS # BLD AUTO: 11.61 K/UL — HIGH (ref 1.4–6.5)
NEUTROPHILS NFR BLD AUTO: 71.3 % — SIGNIFICANT CHANGE UP (ref 42.2–75.2)
NRBC # BLD: 0 /100 WBCS — SIGNIFICANT CHANGE UP (ref 0–0)
NT-PROBNP SERPL-SCNC: HIGH PG/ML (ref 0–300)
PCO2 BLDV: 31 MMHG — LOW (ref 39–42)
PH BLDV: 7.39 — SIGNIFICANT CHANGE UP (ref 7.32–7.43)
PLATELET # BLD AUTO: 147 K/UL — SIGNIFICANT CHANGE UP (ref 130–400)
PO2 BLDV: 25 MMHG — SIGNIFICANT CHANGE UP
POTASSIUM BLDV-SCNC: 4.2 MMOL/L — SIGNIFICANT CHANGE UP (ref 3.5–5.1)
POTASSIUM SERPL-MCNC: 4.3 MMOL/L — SIGNIFICANT CHANGE UP (ref 3.5–5)
POTASSIUM SERPL-SCNC: 4.3 MMOL/L — SIGNIFICANT CHANGE UP (ref 3.5–5)
PROT SERPL-MCNC: 5.8 G/DL — LOW (ref 6–8)
PROTHROM AB SERPL-ACNC: >40 SEC — HIGH (ref 9.95–12.87)
RBC # BLD: 4.02 M/UL — LOW (ref 4.2–5.4)
RBC # FLD: 13.4 % — SIGNIFICANT CHANGE UP (ref 11.5–14.5)
SAO2 % BLDV: 41.9 % — SIGNIFICANT CHANGE UP
SODIUM SERPL-SCNC: 133 MMOL/L — LOW (ref 135–146)
WBC # BLD: 16.3 K/UL — HIGH (ref 4.8–10.8)
WBC # FLD AUTO: 16.3 K/UL — HIGH (ref 4.8–10.8)

## 2021-09-03 PROCEDURE — 71045 X-RAY EXAM CHEST 1 VIEW: CPT | Mod: 26

## 2021-09-03 PROCEDURE — 93010 ELECTROCARDIOGRAM REPORT: CPT

## 2021-09-03 PROCEDURE — 99285 EMERGENCY DEPT VISIT HI MDM: CPT | Mod: GC

## 2021-09-03 RX ORDER — SODIUM CHLORIDE 9 MG/ML
1000 INJECTION INTRAMUSCULAR; INTRAVENOUS; SUBCUTANEOUS ONCE
Refills: 0 | Status: COMPLETED | OUTPATIENT
Start: 2021-09-03 | End: 2021-09-03

## 2021-09-03 RX ORDER — METRONIDAZOLE 500 MG
500 TABLET ORAL ONCE
Refills: 0 | Status: COMPLETED | OUTPATIENT
Start: 2021-09-03 | End: 2021-09-03

## 2021-09-03 RX ORDER — VANCOMYCIN HCL 1 G
1000 VIAL (EA) INTRAVENOUS ONCE
Refills: 0 | Status: COMPLETED | OUTPATIENT
Start: 2021-09-03 | End: 2021-09-03

## 2021-09-03 RX ORDER — ACETAMINOPHEN 500 MG
650 TABLET ORAL ONCE
Refills: 0 | Status: COMPLETED | OUTPATIENT
Start: 2021-09-03 | End: 2021-09-03

## 2021-09-03 RX ORDER — CEFEPIME 1 G/1
1000 INJECTION, POWDER, FOR SOLUTION INTRAMUSCULAR; INTRAVENOUS ONCE
Refills: 0 | Status: COMPLETED | OUTPATIENT
Start: 2021-09-03 | End: 2021-09-03

## 2021-09-03 RX ORDER — SODIUM CHLORIDE 9 MG/ML
1000 INJECTION, SOLUTION INTRAVENOUS ONCE
Refills: 0 | Status: COMPLETED | OUTPATIENT
Start: 2021-09-03 | End: 2021-09-03

## 2021-09-03 RX ADMIN — SODIUM CHLORIDE 1000 MILLILITER(S): 9 INJECTION, SOLUTION INTRAVENOUS at 23:50

## 2021-09-03 RX ADMIN — Medication 650 MILLIGRAM(S): at 21:35

## 2021-09-03 RX ADMIN — CEFEPIME 100 MILLIGRAM(S): 1 INJECTION, POWDER, FOR SOLUTION INTRAMUSCULAR; INTRAVENOUS at 23:50

## 2021-09-03 RX ADMIN — SODIUM CHLORIDE 1000 MILLILITER(S): 9 INJECTION INTRAMUSCULAR; INTRAVENOUS; SUBCUTANEOUS at 22:40

## 2021-09-03 RX ADMIN — Medication 100 MILLIGRAM(S): at 23:50

## 2021-09-03 RX ADMIN — Medication 250 MILLIGRAM(S): at 23:30

## 2021-09-03 NOTE — ED PROVIDER NOTE - OBJECTIVE STATEMENT
80 y/o F pmhx significant for Essential HTN & hx of PE s/p Warfarin presents to ED from Home (lives alone) w/ x3 days of diarrhea & Weakness. Pt denying f/c/n/v. No chest pain. +Exertional PEDERSEN. Left sided abdominal pain, constant, x3days. No hx of abdominal surgeries.

## 2021-09-03 NOTE — ED PROVIDER NOTE - ATTENDING CONTRIBUTION TO CARE
diarrhea with weakness for 3 days intermittent 3 episodes daily watery stool no blood, no pain with diarrhea. no vomiting but nausea, today weakness was worse so was sent to the ed. found to be febrile in ed. on exam she has soft abd, lungs cta, heart nml. plan is to obtain labs to evaluate for spesis. obtain ct scan.

## 2021-09-03 NOTE — ED ADULT NURSE NOTE - OBJECTIVE STATEMENT
Patient reports she stared feeling weak, tired, diarrhea with some pain to the left flank radiating up to the back , chills on Sunday, slight HA on Monday, denies cough, PEDERSEN, - nausea, - vomiting. Family concerned, patient live alone and brought to the ED. Patient did not realize she was febrile ubntil triage.

## 2021-09-03 NOTE — ED ADULT TRIAGE NOTE - CHIEF COMPLAINT QUOTE
pt c/o diarrhea and weakness for past 3 days. pt c/o diarrhea and weakness, for past 3 days. fever in ED pt c/o diarrhea and weakness, cough for past 3 days. fever in ED pt c/o diarrhea and weakness, cough for past 3 days. fever in ED    yuli (daughter) [phone]

## 2021-09-03 NOTE — ED ADULT NURSE NOTE - NSICDXPASTSURGICALHX_GEN_ALL_CORE_FT
PAST SURGICAL HISTORY:  H/O: hysterectomy     S/P tonsillectomy      PAST SURGICAL HISTORY:  H/O: hysterectomy     History of cholecystectomy     S/P tonsillectomy

## 2021-09-03 NOTE — ED PROVIDER NOTE - PHYSICAL EXAMINATION
CONSTITUTIONAL: Well-developed; well-nourished;   SKIN: warm, dry  HEAD: Normocephalic; atraumatic.  EYES: no conjunctival injection. PERRL.   ENT: No nasal discharge; airway clear.  NECK: Supple; non tender.  CARD: Afib  RESP: No wheezes, rales or rhonchi.  ABD: LLQ ttp no guarding  EXT: Normal ROM.  No clubbing, cyanosis or edema.   LYMPH: No acute cervical adenopathy.  NEURO: Alert, oriented, grossly unremarkable  PSYCH: Cooperative, appropriate.

## 2021-09-03 NOTE — ED ADULT NURSE NOTE - NSICDXPASTMEDICALHX_GEN_ALL_CORE_FT
PAST MEDICAL HISTORY:  Anemia requiring transfusions     H/O prothrombin mutation     HTN (hypertension)     Pulmonary embolism      PAST MEDICAL HISTORY:  Anemia requiring transfusions     Deep vein thrombosis (DVT)     H/O prothrombin mutation     HTN (hypertension)     Pulmonary embolism

## 2021-09-03 NOTE — ED PROVIDER NOTE - NS ED ROS FT
Eyes:  No visual changes, eye pain or discharge.  ENMT:  No hearing changes, pain, no sore throat or runny nose, no difficulty swallowing  Cardiac:  No chest pain, SOB or edema. No chest pain with exertion.  Respiratory:  No cough or respiratory distress. No hemoptysis. No history of asthma or RAD.  GI:  see HPI  :  No dysuria, frequency or burning.  MS:  see HPI  Neuro:  No headache.  No LOC.  Skin:  No skin rash.   Endocrine: No history of thyroid disease or diabetes.

## 2021-09-03 NOTE — ED PROVIDER NOTE - CARE PLAN
Principal Discharge DX:	Sepsis secondary to UTI  Secondary Diagnosis:	MAGDALENO (acute kidney injury)  Secondary Diagnosis:	Supratherapeutic INR   1

## 2021-09-03 NOTE — ED ADULT NURSE NOTE - NSIMPLEMENTINTERV_GEN_ALL_ED
Implemented All Fall with Harm Risk Interventions:  [place] to call system. Call bell, personal items and telephone within reach. Instruct patient to call for assistance. Room bathroom lighting operational. Non-slip footwear when patient is off stretcher. Physically safe environment: no spills, clutter or unnecessary equipment. Stretcher in lowest position, wheels locked, appropriate side rails in place. Provide visual cue, wrist band, yellow gown, etc. Monitor gait and stability. Monitor for mental status changes and reorient to person, place, and time. Review medications for side effects contributing to fall risk. Reinforce activity limits and safety measures with patient and family. Provide visual clues: red socks.

## 2021-09-03 NOTE — ED PROVIDER NOTE - NSICDXPASTMEDICALHX_GEN_ALL_CORE_FT
PAST MEDICAL HISTORY:  Anemia requiring transfusions     H/O prothrombin mutation     HTN (hypertension)     Pulmonary embolism

## 2021-09-04 DIAGNOSIS — Z90.49 ACQUIRED ABSENCE OF OTHER SPECIFIED PARTS OF DIGESTIVE TRACT: Chronic | ICD-10-CM

## 2021-09-04 DIAGNOSIS — I26.99 OTHER PULMONARY EMBOLISM WITHOUT ACUTE COR PULMONALE: ICD-10-CM

## 2021-09-04 LAB
APPEARANCE UR: ABNORMAL
APTT BLD: 55.6 SEC — HIGH (ref 27–39.2)
BACTERIA # UR AUTO: ABNORMAL
BILIRUB UR-MCNC: NEGATIVE — SIGNIFICANT CHANGE UP
COLOR SPEC: YELLOW — SIGNIFICANT CHANGE UP
COMMENT - URINE: SIGNIFICANT CHANGE UP
DIFF PNL FLD: ABNORMAL
EPI CELLS # UR: 4 /HPF — SIGNIFICANT CHANGE UP (ref 0–5)
GLUCOSE UR QL: NEGATIVE — SIGNIFICANT CHANGE UP
HYALINE CASTS # UR AUTO: 5 /LPF — SIGNIFICANT CHANGE UP (ref 0–7)
KETONES UR-MCNC: NEGATIVE — SIGNIFICANT CHANGE UP
LEUKOCYTE ESTERASE UR-ACNC: ABNORMAL
NITRITE UR-MCNC: NEGATIVE — SIGNIFICANT CHANGE UP
OSMOLALITY SERPL: 302 MOS/KG — HIGH (ref 280–301)
PH UR: 6 — SIGNIFICANT CHANGE UP (ref 5–8)
PROT UR-MCNC: ABNORMAL
RAPID RVP RESULT: SIGNIFICANT CHANGE UP
RBC CASTS # UR COMP ASSIST: 40 /HPF — HIGH (ref 0–4)
SARS-COV-2 RNA SPEC QL NAA+PROBE: SIGNIFICANT CHANGE UP
SP GR SPEC: 1.01 — SIGNIFICANT CHANGE UP (ref 1.01–1.03)
TROPONIN T SERPL-MCNC: 0.04 NG/ML — CRITICAL HIGH
TROPONIN T SERPL-MCNC: 0.07 NG/ML — CRITICAL HIGH
UROBILINOGEN FLD QL: SIGNIFICANT CHANGE UP
WBC UR QL: 224 /HPF — HIGH (ref 0–5)

## 2021-09-04 PROCEDURE — 74176 CT ABD & PELVIS W/O CONTRAST: CPT | Mod: 26,MA

## 2021-09-04 PROCEDURE — 71250 CT THORAX DX C-: CPT | Mod: 26,MA

## 2021-09-04 RX ORDER — ONDANSETRON 8 MG/1
4 TABLET, FILM COATED ORAL EVERY 8 HOURS
Refills: 0 | Status: DISCONTINUED | OUTPATIENT
Start: 2021-09-04 | End: 2021-09-22

## 2021-09-04 RX ORDER — HEPARIN SODIUM 5000 [USP'U]/ML
1400 INJECTION INTRAVENOUS; SUBCUTANEOUS
Qty: 25000 | Refills: 0 | Status: DISCONTINUED | OUTPATIENT
Start: 2021-09-04 | End: 2021-09-05

## 2021-09-04 RX ORDER — CEFEPIME 1 G/1
1000 INJECTION, POWDER, FOR SOLUTION INTRAMUSCULAR; INTRAVENOUS ONCE
Refills: 0 | Status: COMPLETED | OUTPATIENT
Start: 2021-09-04 | End: 2021-09-04

## 2021-09-04 RX ORDER — SODIUM CHLORIDE 9 MG/ML
500 INJECTION, SOLUTION INTRAVENOUS ONCE
Refills: 0 | Status: COMPLETED | OUTPATIENT
Start: 2021-09-04 | End: 2021-09-04

## 2021-09-04 RX ORDER — PANTOPRAZOLE SODIUM 20 MG/1
40 TABLET, DELAYED RELEASE ORAL
Refills: 0 | Status: DISCONTINUED | OUTPATIENT
Start: 2021-09-04 | End: 2021-09-22

## 2021-09-04 RX ORDER — CEFEPIME 1 G/1
1000 INJECTION, POWDER, FOR SOLUTION INTRAMUSCULAR; INTRAVENOUS EVERY 24 HOURS
Refills: 0 | Status: DISCONTINUED | OUTPATIENT
Start: 2021-09-05 | End: 2021-09-05

## 2021-09-04 RX ORDER — CHLORHEXIDINE GLUCONATE 213 G/1000ML
1 SOLUTION TOPICAL
Refills: 0 | Status: DISCONTINUED | OUTPATIENT
Start: 2021-09-04 | End: 2021-09-22

## 2021-09-04 RX ORDER — HEPARIN SODIUM 5000 [USP'U]/ML
5000 INJECTION INTRAVENOUS; SUBCUTANEOUS EVERY 8 HOURS
Refills: 0 | Status: DISCONTINUED | OUTPATIENT
Start: 2021-09-04 | End: 2021-09-04

## 2021-09-04 RX ORDER — CEFEPIME 1 G/1
INJECTION, POWDER, FOR SOLUTION INTRAMUSCULAR; INTRAVENOUS
Refills: 0 | Status: DISCONTINUED | OUTPATIENT
Start: 2021-09-04 | End: 2021-09-05

## 2021-09-04 RX ORDER — VANCOMYCIN HCL 1 G
VIAL (EA) INTRAVENOUS
Refills: 0 | Status: DISCONTINUED | OUTPATIENT
Start: 2021-09-04 | End: 2021-09-05

## 2021-09-04 RX ORDER — SODIUM CHLORIDE 9 MG/ML
1000 INJECTION INTRAMUSCULAR; INTRAVENOUS; SUBCUTANEOUS
Refills: 0 | Status: DISCONTINUED | OUTPATIENT
Start: 2021-09-04 | End: 2021-09-05

## 2021-09-04 RX ORDER — NOREPINEPHRINE BITARTRATE/D5W 8 MG/250ML
0.03 PLASTIC BAG, INJECTION (ML) INTRAVENOUS
Qty: 8 | Refills: 0 | Status: DISCONTINUED | OUTPATIENT
Start: 2021-09-04 | End: 2021-09-05

## 2021-09-04 RX ORDER — PHYTONADIONE (VIT K1) 5 MG
1 TABLET ORAL ONCE
Refills: 0 | Status: COMPLETED | OUTPATIENT
Start: 2021-09-04 | End: 2021-09-04

## 2021-09-04 RX ORDER — ACETAMINOPHEN 500 MG
650 TABLET ORAL EVERY 6 HOURS
Refills: 0 | Status: DISCONTINUED | OUTPATIENT
Start: 2021-09-04 | End: 2021-09-22

## 2021-09-04 RX ORDER — VANCOMYCIN HCL 1 G
1000 VIAL (EA) INTRAVENOUS ONCE
Refills: 0 | Status: COMPLETED | OUTPATIENT
Start: 2021-09-04 | End: 2021-09-04

## 2021-09-04 RX ORDER — NOREPINEPHRINE BITARTRATE/D5W 8 MG/250ML
0.05 PLASTIC BAG, INJECTION (ML) INTRAVENOUS
Qty: 8 | Refills: 0 | Status: DISCONTINUED | OUTPATIENT
Start: 2021-09-04 | End: 2021-09-04

## 2021-09-04 RX ORDER — LANOLIN ALCOHOL/MO/W.PET/CERES
3 CREAM (GRAM) TOPICAL AT BEDTIME
Refills: 0 | Status: DISCONTINUED | OUTPATIENT
Start: 2021-09-04 | End: 2021-09-22

## 2021-09-04 RX ORDER — SENNA PLUS 8.6 MG/1
2 TABLET ORAL AT BEDTIME
Refills: 0 | Status: DISCONTINUED | OUTPATIENT
Start: 2021-09-04 | End: 2021-09-06

## 2021-09-04 RX ADMIN — Medication 650 MILLIGRAM(S): at 23:45

## 2021-09-04 RX ADMIN — CEFEPIME 100 MILLIGRAM(S): 1 INJECTION, POWDER, FOR SOLUTION INTRAMUSCULAR; INTRAVENOUS at 15:01

## 2021-09-04 RX ADMIN — Medication 250 MILLIGRAM(S): at 15:02

## 2021-09-04 RX ADMIN — Medication 1 MILLIGRAM(S): at 02:03

## 2021-09-04 RX ADMIN — SODIUM CHLORIDE 500 MILLILITER(S): 9 INJECTION, SOLUTION INTRAVENOUS at 02:10

## 2021-09-04 RX ADMIN — Medication 7.44 MICROGRAM(S)/KG/MIN: at 00:29

## 2021-09-04 RX ADMIN — HEPARIN SODIUM 5000 UNIT(S): 5000 INJECTION INTRAVENOUS; SUBCUTANEOUS at 13:32

## 2021-09-04 NOTE — H&P ADULT - ASSESSMENT
IMPRESSION:  Septic Shock 2/2 UTI vs PNA s/p Levophed  Generalized Lymphadenopathy  Elevated Troponin  MAGDALENO on CKD  Hyponatremia  HO PE  HO Essential HTN    PLAN:    CNS: Avoid CNS depressants    HEENT: Oral care    PULMONARY: HOB @ 45 degrees.  3L NC. Wean O2 as tolerated.       CARDIOVASCULAR: Avoid volume overload.  FU Echo. Trend Troponin. Holding Home BP meds.     GI: GI prophylaxis.  Bowel regimen.     RENAL: Follow up lytes. Correct as needed. MAGDALENO on CKD. NS @ 75/cc. Monitor for volume overload    INFECTIOUS DISEASE: FU Ucx & Bcx. FU ID    HEMATOLOGICAL:  Hold home Warfarin. Start on Heparin gtt. Monitor PTT.     ENDOCRINE:  Follow up FS.    Step down monitoring    FULL CODE    PT OT    IMPRESSION:  Septic Shock 2/2 UTI vs PNA s/p Levophed  Generalized Lymphadenopathy  Elevated Troponin  CHF??  MAGDALENO on CKD  Hyponatremia  HO PE  HO Essential HTN    PLAN:    CNS: Avoid CNS depressants    HEENT: Oral care    PULMONARY: HOB @ 45 degrees.  3L NC. Wean O2 as tolerated.       CARDIOVASCULAR: Avoid volume overload.  FU Echo. Trend Troponin. Holding Home BP meds.     GI: GI prophylaxis.  Bowel regimen.     RENAL: Follow up lytes. Correct as needed. MAGDALENO on CKD. FU US Renal. NS @100/cc. Monitor for volume overload    INFECTIOUS DISEASE: FU Procal, Ucx & Bcx. FU ID. Start Vanc/Cefepime (renally dosed)    HEMATOLOGICAL:  Hold home Warfarin. Start on Heparin gtt. Monitor PTT.     ENDOCRINE:  Follow up FS.    Monitor in Stepdown    FULL CODE    PT

## 2021-09-04 NOTE — H&P ADULT - NSHPPHYSICALEXAM_GEN_ALL_CORE
PHYSICAL EXAM:  GENERAL: NAD, lying in bed comfortably  HEAD:  Atraumatic, Normocephalic  EYES: Sclera clear  NECK: Supple, No JVD  CHEST/LUNG: Clear to auscultation bilaterally; Unlabored respirations  HEART: Regular rate and rhythm; No murmurs, rubs, or gallops  ABDOMEN: Soft, Nontender, Nondistended.   EXTREMITIES: No edema  NERVOUS SYSTEM:  Alert & Oriented X3, speech clear. No deficits   MSK: FROM all 4 extremities, full and equal strength  SKIN: No rashes or lesions

## 2021-09-04 NOTE — H&P ADULT - HISTORY OF PRESENT ILLNESS
82 y/o F pmhx significant for Essential HTN & hx of PE s/p Warfarin presents to ED from Home (lives alone) w/ x3 days of diarrhea & Weakness;     ED:   - Triage: BP was 75/49 that did not respond to IV boluses (LR 2.5L total) s/p Levophed;  - VS: , Temp 103F, BP 75/49  - Labs: WBC 16, INR 5.5, Na 133, Cr 3.8, Tn 0.07, BNP 32888, UA(+)  - CXR: Bilateral opacities/left pleural effusion. Bilateral hilar adenopathy.   - CT Chest/Abd/Pelvis:   ·	New mediastinal, axillary, hilar, supraclavicular, retroperitoneal, and bilateral inguinal lymphadenopathy as above. Findings highly suspicious for neoplastic process.Left lower lobe lobe bronchus filling defects, with left lung lower lobe consolidative opacification.   ·	Additional bilateral bilateral patchy opacities. Findings consistent with pneumonia;  ·	Bilateral pulmonary nodular opacities.     Admit to Stepdown for Septic Shock;

## 2021-09-04 NOTE — H&P ADULT - NSHPLABSRESULTS_GEN_ALL_CORE
11.5   16.30 )-----------( 147      ( 03 Sep 2021 22:30 )             36.8       09-03    133<L>  |  97<L>  |  55<H>  ----------------------------<  105<H>  4.3   |  17  |  3.8<H>    Ca    7.7<L>      03 Sep 2021 22:30    TPro  5.8<L>  /  Alb  3.3<L>  /  TBili  0.8  /  DBili  x   /  AST  46<H>  /  ALT  12  /  AlkPhos  85  09-03              PT/INR - ( 03 Sep 2021 22:30 )   PT: >40.00 sec;   INR: 5.51 ratio         PTT - ( 03 Sep 2021 22:30 )  PTT:47.9 sec

## 2021-09-04 NOTE — H&P ADULT - ATTENDING COMMENTS
Pt seen and examined independently. Daughter at bedside  82 y/o woman with PMH of prothrombin gene mutation and PE on lifelong anticoagulation with coumadin, HTN and chronic diarrhea (possibly due to IBS) presented with weakness and worsening watery diarrhea. In the ED, she was diagnosed with septic shock, MAGDALENO, pneumonia and extensive LAD.  She feels better today and is now off levophed. Denies SOB at rest, chest pain, abdominal pain, N/V  + mild cough.  No fever now    PE VS /73 (off levophed) P95 R20 T afebrile Tmax 100.5  T in   pulse ox 97% on 2L NC  general - NAD in bed  HEENT - anicteric, MMM  chest - decreased bs b/l  CVS - RRR  abdomen - soft, NT, ND, +BS  extremities - no edema LE  left arm with hematoma  neuro - awake, alert, cooperative    INR: 4.64:   Magnesium, Serum: 2.1 mg/dL (09.05.21 @ 04:30)   Comprehensive Metabolic Panel (09.05.21 @ 04:30)   Sodium, Serum: 139 mmol/L   Potassium, Serum: 3.8 mmol/L   Chloride, Serum: 107 mmol/L   Carbon Dioxide, Serum: 16 mmol/L   Anion Gap, Serum: 16 mmol/L   Blood Urea Nitrogen, Serum: 55 mg/dL   Creatinine, Serum: 3.3 mg/dL   Glucose, Serum: 106 mg/dL   Calcium, Total Serum: 7.5 mg/dL   Protein Total, Serum: 4.6 g/dL   Albumin, Serum: 2.5 g/dL   Bilirubin Total, Serum: 0.7 mg/dL   Alkaline Phosphatase, Serum: 93 U/L   Aspartate Aminotransferase (AST/SGOT): 42 U/L   Alanine Aminotransferase (ALT/SGPT): 17 U/L   eGFR if Non : 12: Interpretative comment     Complete Blood Count + Automated Diff (09.05.21 @ 04:30)   WBC Count: 12.97 K/uL   RBC Count: 3.55 M/uL   Hemoglobin: 10.1 g/dL   Hematocrit: 32.7 %   Mean Cell Volume: 92.1 fL   Mean Cell Hemoglobin: 28.5 pg   Mean Cell Hemoglobin Conc: 30.9 g/dL   Red Cell Distrib Width: 13.7 %   Platelet Count - Automated: 141 K/uL   Troponin T, Serum: 0.04: Critical value: ng/mL (09.05.21 @ 00:25)   Procalcitonin, Serum: 6.63: Note:   Serum Pro-Brain Natriuretic Peptide: 95289 pg/mL (09.03.21 @ 22:30)     CT scan report reviewed    Septic shock present on admission due to pneumonia (coverage for GNR and aspiration)  UTI  - ID and pulmonary evals appreciated  - Zosyn per Pulm  - f/u blood and urine cultures, nasal MRSA swab, urine for Legionella and Strep  - now off levophed - monitor BP  - check LDH, HIV  - continue IVF  - possible bronch later this week  - stepdown monitoring  - guarded prognosis/ full code status    Extensive LAD on CT scan - rule out lymphoma, other malignancy - HemeOnc eval    MAGDALENO - ?baseline renal function - Cr 1.23 in 2017 - find out from PMD  possibly prerenal due to diarrhea and sepsis - rule out ATN due to shock  no hydronephrosis on CT scan  I's and O's   continue IVF, calculate FeUrea (pt on HCTZ at home)  renal eval if not improving    h/o PE and prothrombin gene mutation - hold INR - supratherapeutic - start heparin IV when INR <2    Chronic diarrhea - worsened recently - check stool for C diff and GI PCR - if negative, then can give Imodium prn    Elevated troponin could be due to demand ischemia and/or MAGDALENO - trending down - check ECHO      PROGRESS NOTE HANDOFF    Pending: f/u all cultures, labs in AM, HemeOnc eval, possible bronchoscopy this week    Family discussion: with daughter at bedside today    Disposition: from home Pt seen and examined independently. Daughter at bedside  80 y/o woman with PMH of prothrombin gene mutation and PE on lifelong anticoagulation with coumadin, HTN and chronic diarrhea (possibly due to IBS) presented with weakness and worsening watery diarrhea. In the ED, she was diagnosed with septic shock, MAGDALENO, pneumonia and extensive LAD.  She feels better today and is now off levophed. Denies SOB at rest, chest pain, abdominal pain, N/V  + mild cough.  No fever now    PE VS /73 (off levophed) P95 R20 T afebrile Tmax 100.5  T in   pulse ox 97% on 2L NC  general - NAD in bed  HEENT - anicteric, MMM  chest - decreased bs b/l  CVS - RRR  abdomen - soft, NT, ND, +BS  extremities - no edema LE  left arm with hematoma  neuro - awake, alert, cooperative    INR: 4.64:   Magnesium, Serum: 2.1 mg/dL (09.05.21 @ 04:30)   Comprehensive Metabolic Panel (09.05.21 @ 04:30)   Sodium, Serum: 139 mmol/L   Potassium, Serum: 3.8 mmol/L   Chloride, Serum: 107 mmol/L   Carbon Dioxide, Serum: 16 mmol/L   Anion Gap, Serum: 16 mmol/L   Blood Urea Nitrogen, Serum: 55 mg/dL   Creatinine, Serum: 3.3 mg/dL   Glucose, Serum: 106 mg/dL   Calcium, Total Serum: 7.5 mg/dL   Protein Total, Serum: 4.6 g/dL   Albumin, Serum: 2.5 g/dL   Bilirubin Total, Serum: 0.7 mg/dL   Alkaline Phosphatase, Serum: 93 U/L   Aspartate Aminotransferase (AST/SGOT): 42 U/L   Alanine Aminotransferase (ALT/SGPT): 17 U/L   eGFR if Non : 12: Interpretative comment     Complete Blood Count + Automated Diff (09.05.21 @ 04:30)   WBC Count: 12.97 K/uL   RBC Count: 3.55 M/uL   Hemoglobin: 10.1 g/dL   Hematocrit: 32.7 %   Mean Cell Volume: 92.1 fL   Mean Cell Hemoglobin: 28.5 pg   Mean Cell Hemoglobin Conc: 30.9 g/dL   Red Cell Distrib Width: 13.7 %   Platelet Count - Automated: 141 K/uL   Troponin T, Serum: 0.04: Critical value: ng/mL (09.05.21 @ 00:25)   Procalcitonin, Serum: 6.63: Note:   Serum Pro-Brain Natriuretic Peptide: 23666 pg/mL (09.03.21 @ 22:30)     CT scan report reviewed    Septic shock present on admission due to pneumonia (coverage for GNR and aspiration)  UTI  - ID and pulmonary evals appreciated  - Zosyn per Pulm  - f/u blood and urine cultures, nasal MRSA swab, urine for Legionella and Strep  - now off levophed - monitor BP  - check LDH, HIV  - continue IVF  - possible bronch later this week  - stepdown monitoring  - guarded prognosis/ full code status    Extensive LAD on CT scan - rule out lymphoma, other malignancy - HemeOnc eval  ?IR biopsy of left axillary Lymph node    MAGDALENO - ?baseline renal function - Cr 1.23 in 2017 - find out from PMD  possibly prerenal due to diarrhea and sepsis - rule out ATN due to shock  no hydronephrosis on CT scan  I's and O's   continue IVF, calculate FeUrea (pt on HCTZ at home)  renal eval if not improving    h/o PE and prothrombin gene mutation - hold INR - supratherapeutic - start heparin IV when INR <2    Chronic diarrhea - worsened recently - check stool for C diff and GI PCR - if negative, then can give Imodium prn    Elevated troponin could be due to demand ischemia and/or MAGDALENO - trending down - check ECHO      PROGRESS NOTE HANDOFF    Pending: f/u all cultures, labs in AM, HemeOnc eval, possible bronchoscopy this week    Family discussion: with daughter at bedside today    Disposition: from home

## 2021-09-05 LAB
ALBUMIN SERPL ELPH-MCNC: 2.5 G/DL — LOW (ref 3.5–5.2)
ALP SERPL-CCNC: 93 U/L — SIGNIFICANT CHANGE UP (ref 30–115)
ALT FLD-CCNC: 17 U/L — SIGNIFICANT CHANGE UP (ref 0–41)
ANION GAP SERPL CALC-SCNC: 16 MMOL/L — HIGH (ref 7–14)
APTT BLD: 143.3 SEC — CRITICAL HIGH (ref 27–39.2)
APTT BLD: 96.2 SEC — CRITICAL HIGH (ref 27–39.2)
AST SERPL-CCNC: 42 U/L — HIGH (ref 0–41)
BASOPHILS # BLD AUTO: 0.01 K/UL — SIGNIFICANT CHANGE UP (ref 0–0.2)
BASOPHILS NFR BLD AUTO: 0.1 % — SIGNIFICANT CHANGE UP (ref 0–1)
BILIRUB SERPL-MCNC: 0.7 MG/DL — SIGNIFICANT CHANGE UP (ref 0.2–1.2)
BUN SERPL-MCNC: 55 MG/DL — HIGH (ref 10–20)
CALCIUM SERPL-MCNC: 7.5 MG/DL — LOW (ref 8.5–10.1)
CHLORIDE SERPL-SCNC: 107 MMOL/L — SIGNIFICANT CHANGE UP (ref 98–110)
CO2 SERPL-SCNC: 16 MMOL/L — LOW (ref 17–32)
CREAT SERPL-MCNC: 3.3 MG/DL — HIGH (ref 0.7–1.5)
CULTURE RESULTS: SIGNIFICANT CHANGE UP
EOSINOPHIL # BLD AUTO: 0.06 K/UL — SIGNIFICANT CHANGE UP (ref 0–0.7)
EOSINOPHIL NFR BLD AUTO: 0.5 % — SIGNIFICANT CHANGE UP (ref 0–8)
GLUCOSE SERPL-MCNC: 106 MG/DL — HIGH (ref 70–99)
HCT VFR BLD CALC: 32.7 % — LOW (ref 37–47)
HGB BLD-MCNC: 10.1 G/DL — LOW (ref 12–16)
IMM GRANULOCYTES NFR BLD AUTO: 0.8 % — HIGH (ref 0.1–0.3)
LYMPHOCYTES # BLD AUTO: 19.7 % — LOW (ref 20.5–51.1)
LYMPHOCYTES # BLD AUTO: 2.55 K/UL — SIGNIFICANT CHANGE UP (ref 1.2–3.4)
MAGNESIUM SERPL-MCNC: 2.1 MG/DL — SIGNIFICANT CHANGE UP (ref 1.8–2.4)
MCHC RBC-ENTMCNC: 28.5 PG — SIGNIFICANT CHANGE UP (ref 27–31)
MCHC RBC-ENTMCNC: 30.9 G/DL — LOW (ref 32–37)
MCV RBC AUTO: 92.1 FL — SIGNIFICANT CHANGE UP (ref 81–99)
MONOCYTES # BLD AUTO: 1.01 K/UL — HIGH (ref 0.1–0.6)
MONOCYTES NFR BLD AUTO: 7.8 % — SIGNIFICANT CHANGE UP (ref 1.7–9.3)
MRSA PCR RESULT.: SIGNIFICANT CHANGE UP
NEUTROPHILS # BLD AUTO: 9.24 K/UL — HIGH (ref 1.4–6.5)
NEUTROPHILS NFR BLD AUTO: 71.1 % — SIGNIFICANT CHANGE UP (ref 42.2–75.2)
NRBC # BLD: 0 /100 WBCS — SIGNIFICANT CHANGE UP (ref 0–0)
PLATELET # BLD AUTO: 141 K/UL — SIGNIFICANT CHANGE UP (ref 130–400)
POTASSIUM SERPL-MCNC: 3.8 MMOL/L — SIGNIFICANT CHANGE UP (ref 3.5–5)
POTASSIUM SERPL-SCNC: 3.8 MMOL/L — SIGNIFICANT CHANGE UP (ref 3.5–5)
PROCALCITONIN SERPL-MCNC: 6.63 NG/ML — HIGH (ref 0.02–0.1)
PROT SERPL-MCNC: 4.6 G/DL — LOW (ref 6–8)
RBC # BLD: 3.55 M/UL — LOW (ref 4.2–5.4)
RBC # FLD: 13.7 % — SIGNIFICANT CHANGE UP (ref 11.5–14.5)
S AUREUS DNA NOSE QL NAA+PROBE: SIGNIFICANT CHANGE UP
SODIUM SERPL-SCNC: 139 MMOL/L — SIGNIFICANT CHANGE UP (ref 135–146)
SPECIMEN SOURCE: SIGNIFICANT CHANGE UP
TROPONIN T SERPL-MCNC: 0.04 NG/ML — CRITICAL HIGH
WBC # BLD: 12.97 K/UL — HIGH (ref 4.8–10.8)
WBC # FLD AUTO: 12.97 K/UL — HIGH (ref 4.8–10.8)

## 2021-09-05 PROCEDURE — 99223 1ST HOSP IP/OBS HIGH 75: CPT

## 2021-09-05 PROCEDURE — 76770 US EXAM ABDO BACK WALL COMP: CPT | Mod: 26

## 2021-09-05 PROCEDURE — 99222 1ST HOSP IP/OBS MODERATE 55: CPT

## 2021-09-05 RX ORDER — PIPERACILLIN AND TAZOBACTAM 4; .5 G/20ML; G/20ML
3.38 INJECTION, POWDER, LYOPHILIZED, FOR SOLUTION INTRAVENOUS ONCE
Refills: 0 | Status: COMPLETED | OUTPATIENT
Start: 2021-09-05 | End: 2021-09-05

## 2021-09-05 RX ORDER — PIPERACILLIN AND TAZOBACTAM 4; .5 G/20ML; G/20ML
3.38 INJECTION, POWDER, LYOPHILIZED, FOR SOLUTION INTRAVENOUS EVERY 12 HOURS
Refills: 0 | Status: DISCONTINUED | OUTPATIENT
Start: 2021-09-05 | End: 2021-09-15

## 2021-09-05 RX ORDER — SODIUM CHLORIDE 9 MG/ML
1000 INJECTION, SOLUTION INTRAVENOUS
Refills: 0 | Status: DISCONTINUED | OUTPATIENT
Start: 2021-09-05 | End: 2021-09-07

## 2021-09-05 RX ADMIN — PANTOPRAZOLE SODIUM 40 MILLIGRAM(S): 20 TABLET, DELAYED RELEASE ORAL at 06:19

## 2021-09-05 RX ADMIN — CHLORHEXIDINE GLUCONATE 1 APPLICATION(S): 213 SOLUTION TOPICAL at 06:19

## 2021-09-05 RX ADMIN — SODIUM CHLORIDE 75 MILLILITER(S): 9 INJECTION, SOLUTION INTRAVENOUS at 12:42

## 2021-09-05 RX ADMIN — Medication 650 MILLIGRAM(S): at 00:01

## 2021-09-05 RX ADMIN — PIPERACILLIN AND TAZOBACTAM 200 GRAM(S): 4; .5 INJECTION, POWDER, LYOPHILIZED, FOR SOLUTION INTRAVENOUS at 16:36

## 2021-09-05 RX ADMIN — PIPERACILLIN AND TAZOBACTAM 25 GRAM(S): 4; .5 INJECTION, POWDER, LYOPHILIZED, FOR SOLUTION INTRAVENOUS at 21:06

## 2021-09-05 NOTE — CONSULT NOTE ADULT - ASSESSMENT
Impression:  Septic Shock due to UTI vs PNA Levophed  Generalized Lymphadenopathy  MAGDALENO on CKD  HO PE on coumadin      PLAN:    CNS: Avoid CNS depressants    HEENT: Oral care    PULMONARY: HOB @ 45 degrees.  2L NC. Wean O2 as tolerated.       CARDIOVASCULAR: Avoid volume overload.  FU Echo. hold Home BP meds.     GI: GI prophylaxis.  Bowel regimen.     RENAL: Follow up lytes. Correct as needed. MAGDALENO on CKD. FU US Renal. NS @100/cc. Monitor for volume overload    INFECTIOUS DISEASE:  pending Ucx . FU ID. c/w vancomycin and cefepime. nasal MRSA, urine legionella and streptococcal antigen    HEMATOLOGICAL:  Hold home Warfarin. Start on Heparin gtt. Monitor PTT. Hem-onc eval    ENDOCRINE:  Follow up FS.    Monitor in Stepdown    FULL CODE     Impression:  Septic Shock due to UTI vs PNA Levophed  Generalized Lymphadenopathy  MAGDALENO on CKD  HO PE on coumadin  Supratherapeutic INR    PLAN:    CNS: Avoid CNS depressants    HEENT: Oral care    PULMONARY: HOB @ 45 degrees.  2L NC. Wean O2 as tolerated.       CARDIOVASCULAR: Avoid volume overload.  FU Echo. hold Home BP meds.     GI: GI prophylaxis.  send stool for cdiff     RENAL: Follow up lytes. Correct as needed. MAGDALENO on CKD. FU US Renal. NS @100/cc. Monitor for volume overload    INFECTIOUS DISEASE:  pending Ucx . FU ID. c/w vancomycin and cefepime. nasal MRSA, urine legionella and streptococcal antigen    HEMATOLOGICAL:  Hold home Warfarin. Hold heparin. Repeat INR in AM. Hem-onc eval    ENDOCRINE:  Follow up FS.    Monitor in Stepdown    FULL CODE     Impression:  Sepsis POA   Septic Shocks   UTI / PNA  Generalized Lymphadenopathy possible lymphoma   MAGDALENO on CKD  HO PE on coumadin  Supra therapeutic INR  LLL atelectasis infiltrate with possible endobronchial filling defect      PLAN:    CNS: Avoid CNS depressants    HEENT: Oral care    PULMONARY: HOB @ 45 degrees.  2L NC. Wean O2 as tolerated.  Pulmonary toilet.  Nebs Q4 PRN.  Possible bronchoscopy next week when stable     CARDIOVASCULAR: Avoid volume overload.  FU Echo. Hold Home BP meds. Wean Levophed     GI: GI prophylaxis.  Send stool for cdiff     RENAL: Follow up lytes. Correct as needed. MAGDALENO on CKD. FU US Renal. NS @75/cc. Monitor for volume overload    INFECTIOUS DISEASE:  pending Ucx . FU ID. Zosyn for now . nasal MRSA, urine legionella and streptococcal antigen    HEMATOLOGICAL:  Hold home Warfarin. Hold heparin. Repeat INR in AM. Hem-onc eval    ENDOCRINE:  Follow up FS.    Monitor in Stepdown    FULL CODE

## 2021-09-05 NOTE — CONSULT NOTE ADULT - SUBJECTIVE AND OBJECTIVE BOX
Patient is a 81y old  Female who presents with a chief complaint of 3 days of Weakness & Diarrhea; (04 Sep 2021 13:30)      HPI:  80 y/o F pmhx significant for Essential HTN & hx of PE s/p Warfarin presents to ED from Home (lives alone) w/ x3 days of diarrhea & Weakness;     ED:   - Triage: BP was 75/49 that did not respond to IV boluses (LR 2.5L total) s/p Levophed;  - VS: , Temp 103F, BP 75/49  - Labs: WBC 16, INR 5.5, Na 133, Cr 3.8, Tn 0.07, BNP 50361, UA(+)  - CXR: Bilateral opacities/left pleural effusion. Bilateral hilar adenopathy.   - CT Chest/Abd/Pelvis:   ·	New mediastinal, axillary, hilar, supraclavicular, retroperitoneal, and bilateral inguinal lymphadenopathy as above. Findings highly suspicious for neoplastic process.Left lower lobe lobe bronchus filling defects, with left lung lower lobe consolidative opacification.   ·	Additional bilateral bilateral patchy opacities. Findings consistent with pneumonia;  ·	Bilateral pulmonary nodular opacities.     Admit to Stepdown for Septic Shock;      (04 Sep 2021 13:30)      PAST MEDICAL & SURGICAL HISTORY:  H/O prothrombin mutation    Pulmonary embolism    HTN (hypertension)    Anemia requiring transfusions    Deep vein thrombosis (DVT)    S/P tonsillectomy    H/O: hysterectomy    History of cholecystectomy        Occupational hx:    Social hx:    FAMILY HISTORY:  No pertinent family history in first degree relatives    :  No known cardiovacular family hisotry     ROS:  See HPI     Allergies    No Known Allergies    Intolerances          PHYSICAL EXAM    ICU Vital Signs Last 24 Hrs  T(C): 36 (05 Sep 2021 04:00), Max: 38.1 (04 Sep 2021 17:51)  T(F): 96.8 (05 Sep 2021 04:00), Max: 100.5 (04 Sep 2021 17:51)  HR: 75 (05 Sep 2021 08:07) (67 - 104)  BP: 104/61 (05 Sep 2021 08:07) (97/62 - 124/51)  BP(mean): 76 (04 Sep 2021 15:43) (74 - 76)  ABP: --  ABP(mean): --  RR: 18 (05 Sep 2021 08:07) (18 - 18)  SpO2: 98% (05 Sep 2021 08:41) (96% - 99%)      CONSTITUTIONAL:  Well nourished.  NAD    ENT:   Airway patent,   No thrush    EYES:   Clear bilaterally,   pupils equal,   round and reactive to light.    CARDIAC:   Normal rate,   regular rhythm.    no edema      CAROTID:   normal systolic impulse  no bruits    RESPIRATORY:   No wheezing  Normal chest expansion  Not tachypneic,  No use of accessory muscles    GASTROINTESTINAL:  Abdomen soft,   non-tender,   no guarding,   + BS    MUSCULOSKELETAL:   range of motion is not limited,  no clubbing, cyanosis    NEUROLOGICAL:   Alert and oriented   no motor deficits.    SKIN:   Skin normal color for race,   No evidence of rash.    PSYCHIATRIC:   normal mood and affect.   no apparent risk to self or others.      21 @ 07:01  -  21 @ 07:00  --------------------------------------------------------  IN:    Heparin: 126 mL    IV PiggyBack: 300 mL    Norepinephrine: 0.2 mL    sodium chloride 0.9%: 800 mL  Total IN: 1226.2 mL    OUT:  Total OUT: 0 mL    Total NET: 1226.2 mL          LABS:                          11.5   16.30 )-----------( 147      ( 03 Sep 2021 22:30 )             36.8                                               03    133<L>  |  97<L>  |  55<H>  ----------------------------<  105<H>  4.3   |  17  |  3.8<H>    Ca    7.7<L>      03 Sep 2021 22:30    TPro  5.8<L>  /  Alb  3.3<L>  /  TBili  0.8  /  DBili  x   /  AST  46<H>  /  ALT  12  /  AlkPhos  85  0903      PT/INR - ( 03 Sep 2021 22:30 )   PT: >40.00 sec;   INR: 5.51 ratio         PTT - ( 04 Sep 2021 22:37 )  PTT:96.2 sec                                       Urinalysis Basic - ( 04 Sep 2021 02:30 )    Color: Yellow / Appearance: Turbid / S.014 / pH: x  Gluc: x / Ketone: Negative  / Bili: Negative / Urobili: <2 mg/dL   Blood: x / Protein: 100 mg/dL / Nitrite: Negative   Leuk Esterase: Large / RBC: 40 /HPF /  /HPF   Sq Epi: x / Non Sq Epi: 4 /HPF / Bacteria: Moderate        CARDIAC MARKERS ( 05 Sep 2021 00:25 )  x     / 0.04 ng/mL / x     / x     / x      CARDIAC MARKERS ( 04 Sep 2021 16:50 )  x     / 0.04 ng/mL / x     / x     / x      CARDIAC MARKERS ( 03 Sep 2021 22:30 )  x     / 0.07 ng/mL / x     / x     / x                                                LIVER FUNCTIONS - ( 03 Sep 2021 22:30 )  Alb: 3.3 g/dL / Pro: 5.8 g/dL / ALK PHOS: 85 U/L / ALT: 12 U/L / AST: 46 U/L / GGT: x                                                  Culture - Blood (collected 03 Sep 2021 21:30)  Source: .Blood Blood-Peripheral  Preliminary Report (05 Sep 2021 02:01):    No growth to date.    Culture - Blood (collected 03 Sep 2021 21:30)  Source: .Blood Blood-Peripheral  Preliminary Report (05 Sep 2021 02:01):    No growth to date.                                                                                           X-Rays                                                                                     ECHO    MEDICATIONS  (STANDING):  cefepime   IVPB      cefepime   IVPB 1000 milliGRAM(s) IV Intermittent every 24 hours  chlorhexidine 4% Liquid 1 Application(s) Topical <User Schedule>  heparin  Infusion 1400 Unit(s)/Hr (14 mL/Hr) IV Continuous <Continuous>  norepinephrine Infusion 0.03 MICROgram(s)/kG/Min (4.47 mL/Hr) IV Continuous <Continuous>  pantoprazole    Tablet 40 milliGRAM(s) Oral before breakfast  senna 2 Tablet(s) Oral at bedtime  sodium chloride 0.9%. 1000 milliLiter(s) (100 mL/Hr) IV Continuous <Continuous>  vancomycin  IVPB        MEDICATIONS  (PRN):  acetaminophen   Tablet .. 650 milliGRAM(s) Oral every 6 hours PRN Temp greater or equal to 38.5C (101.3F), Mild Pain (1 - 3)  aluminum hydroxide/magnesium hydroxide/simethicone Suspension 30 milliLiter(s) Oral every 4 hours PRN Dyspepsia  melatonin 3 milliGRAM(s) Oral at bedtime PRN Insomnia  ondansetron Injectable 4 milliGRAM(s) IV Push every 8 hours PRN Nausea and/or Vomiting         Patient is a 81y old  Female who presents with a chief complaint of 3 days of Weakness & Diarrhea; (04 Sep 2021 13:30)      HPI:  82 y/o F pmhx significant for Essential HTN & hx of PE s/p Warfarin presents to ED from Home (lives alone) w/ x3 days of diarrhea & Weakness;     ED:   - Triage: BP was 75/49 that did not respond to IV boluses (LR 2.5L total) s/p Levophed;  - VS: , Temp 103F, BP 75/49  - Labs: WBC 16, INR 5.5, Na 133, Cr 3.8, Tn 0.07, BNP 88265, UA(+)  - CXR: Bilateral opacities/left pleural effusion. Bilateral hilar adenopathy.   - CT Chest/Abd/Pelvis:   ·	New mediastinal, axillary, hilar, supraclavicular, retroperitoneal, and bilateral inguinal lymphadenopathy as above. Findings highly suspicious for neoplastic process.Left lower lobe lobe bronchus filling defects, with left lung lower lobe consolidative opacification.   ·	Additional bilateral bilateral patchy opacities. Findings consistent with pneumonia;  ·	Bilateral pulmonary nodular opacities.     Admit to Stepdown for Septic Shock;      (04 Sep 2021 13:30)      PAST MEDICAL & SURGICAL HISTORY:  H/O prothrombin mutation    Pulmonary embolism    HTN (hypertension)    Anemia requiring transfusions    Deep vein thrombosis (DVT)    S/P tonsillectomy    H/O: hysterectomy    History of cholecystectomy        Occupational hx:    Social hx: ex smoker. 1 ppd for 12 year, 12 py. quit 47 years ago. no drug or alcohol use    FAMILY HISTORY:  No pertinent family history in first degree relatives    :  No known cardiovacular family hisotry     ROS:  See HPI     Allergies    No Known Allergies    Intolerances          PHYSICAL EXAM    ICU Vital Signs Last 24 Hrs  T(C): 36 (05 Sep 2021 04:00), Max: 38.1 (04 Sep 2021 17:51)  T(F): 96.8 (05 Sep 2021 04:00), Max: 100.5 (04 Sep 2021 17:51)  HR: 75 (05 Sep 2021 08:07) (67 - 104)  BP: 104/61 (05 Sep 2021 08:07) (97/62 - 124/51)  BP(mean): 76 (04 Sep 2021 15:43) (74 - 76)  ABP: --  ABP(mean): --  RR: 18 (05 Sep 2021 08:07) (18 - 18)  SpO2: 98% (05 Sep 2021 08:41) (96% - 99%)      CONSTITUTIONAL:  Well nourished.  NAD    ENT:   Airway patent,   No thrush    EYES:   Clear bilaterally,   pupils equal,   round and reactive to light.    CARDIAC:   Normal rate,   regular rhythm.    no edema      CAROTID:   normal systolic impulse  no bruits    RESPIRATORY:   No wheezing  Normal chest expansion  Not tachypneic,  No use of accessory muscles    GASTROINTESTINAL:  Abdomen soft,   non-tender,   no guarding,   + BS    MUSCULOSKELETAL:   range of motion is not limited,  no clubbing, cyanosis    NEUROLOGICAL:   Alert and oriented   no motor deficits.    SKIN:   Skin normal color for race,   No evidence of rash.    PSYCHIATRIC:   normal mood and affect.   no apparent risk to self or others.      21 @ 07:01  -  21 @ 07:00  --------------------------------------------------------  IN:    Heparin: 126 mL    IV PiggyBack: 300 mL    Norepinephrine: 0.2 mL    sodium chloride 0.9%: 800 mL  Total IN: 1226.2 mL    OUT:  Total OUT: 0 mL    Total NET: 1226.2 mL          LABS:                          11.5   16.30 )-----------( 147      ( 03 Sep 2021 22:30 )             36.8                                                   133<L>  |  97<L>  |  55<H>  ----------------------------<  105<H>  4.3   |  17  |  3.8<H>    Ca    7.7<L>      03 Sep 2021 22:30    TPro  5.8<L>  /  Alb  3.3<L>  /  TBili  0.8  /  DBili  x   /  AST  46<H>  /  ALT  12  /  AlkPhos  85  09-03      PT/INR - ( 03 Sep 2021 22:30 )   PT: >40.00 sec;   INR: 5.51 ratio         PTT - ( 04 Sep 2021 22:37 )  PTT:96.2 sec                                       Urinalysis Basic - ( 04 Sep 2021 02:30 )    Color: Yellow / Appearance: Turbid / S.014 / pH: x  Gluc: x / Ketone: Negative  / Bili: Negative / Urobili: <2 mg/dL   Blood: x / Protein: 100 mg/dL / Nitrite: Negative   Leuk Esterase: Large / RBC: 40 /HPF /  /HPF   Sq Epi: x / Non Sq Epi: 4 /HPF / Bacteria: Moderate        CARDIAC MARKERS ( 05 Sep 2021 00:25 )  x     / 0.04 ng/mL / x     / x     / x      CARDIAC MARKERS ( 04 Sep 2021 16:50 )  x     / 0.04 ng/mL / x     / x     / x      CARDIAC MARKERS ( 03 Sep 2021 22:30 )  x     / 0.07 ng/mL / x     / x     / x                                                LIVER FUNCTIONS - ( 03 Sep 2021 22:30 )  Alb: 3.3 g/dL / Pro: 5.8 g/dL / ALK PHOS: 85 U/L / ALT: 12 U/L / AST: 46 U/L / GGT: x                                                  Culture - Blood (collected 03 Sep 2021 21:30)  Source: .Blood Blood-Peripheral  Preliminary Report (05 Sep 2021 02:01):    No growth to date.    Culture - Blood (collected 03 Sep 2021 21:30)  Source: .Blood Blood-Peripheral  Preliminary Report (05 Sep 2021 02:01):    No growth to date.                                                                                           X-Rays                                                                                     ECHO    MEDICATIONS  (STANDING):  cefepime   IVPB      cefepime   IVPB 1000 milliGRAM(s) IV Intermittent every 24 hours  chlorhexidine 4% Liquid 1 Application(s) Topical <User Schedule>  heparin  Infusion 1400 Unit(s)/Hr (14 mL/Hr) IV Continuous <Continuous>  norepinephrine Infusion 0.03 MICROgram(s)/kG/Min (4.47 mL/Hr) IV Continuous <Continuous>  pantoprazole    Tablet 40 milliGRAM(s) Oral before breakfast  senna 2 Tablet(s) Oral at bedtime  sodium chloride 0.9%. 1000 milliLiter(s) (100 mL/Hr) IV Continuous <Continuous>  vancomycin  IVPB        MEDICATIONS  (PRN):  acetaminophen   Tablet .. 650 milliGRAM(s) Oral every 6 hours PRN Temp greater or equal to 38.5C (101.3F), Mild Pain (1 - 3)  aluminum hydroxide/magnesium hydroxide/simethicone Suspension 30 milliLiter(s) Oral every 4 hours PRN Dyspepsia  melatonin 3 milliGRAM(s) Oral at bedtime PRN Insomnia  ondansetron Injectable 4 milliGRAM(s) IV Push every 8 hours PRN Nausea and/or Vomiting

## 2021-09-05 NOTE — CONSULT NOTE ADULT - ATTENDING COMMENTS
Impression:  Sepsis POA   Septic Shocks   UTI / PNA   Generalized Lymphadenopathy possible lymphoma   MAGDALENO on CKD  HO PE on coumadin  Supra therapeutic INR  LLL atelectasis infiltrate with possible endobronchial filling defect    Plan as outlined above

## 2021-09-05 NOTE — CONSULT NOTE ADULT - SUBJECTIVE AND OBJECTIVE BOX
JOEY KNAPP  81y, Female  Allergy: No Known Allergies      CHIEF COMPLAINT: 3 days of Weakness & Diarrhea; (04 Sep 2021 13:30)      LOS  1d    HPI:  82 y/o F pmhx significant for Essential HTN & hx of PE s/p Warfarin presents to ED from Home (lives alone) w/ x3 days of diarrhea & Weakness;    ED:  - Triage: BP was 75/49 that did not respond to IV boluses (LR 2.5L total) s/p Levophed;  - VS: , Temp 103F, BP 75/49  - Labs: WBC 16, INR 5.5, Na 133, Cr 3.8, Tn 0.07, BNP 37994, UA(+)  - CXR: Bilateral opacities/left pleural effusion. Bilateral hilar adenopathy.  - CT Chest/Abd/Pelvis:  · New mediastinal, axillary, hilar, supraclavicular, retroperitoneal, and bilateral inguinal lymphadenopathy as above. Findings highly suspicious for neoplastic process.Left lower lobe lobe bronchus filling defects, with left lung lower lobe consolidative opacification.  · Additional bilateral bilateral patchy opacities. Findings consistent with pneumonia;  · Bilateral pulmonary nodular opacities.    Admit to Stepdown for Septic Shock;    INFECTIOUS DISEASE HISTORY:  History as above.  CT Abd/pelvis with mediastinal, axilar, hillar, RP, and inguinal LAD  Also noted left lower lobe bromchus filling defect with left lung consolidation.  Presents with weakness. She has been having diarrhea for about a week, but says she gets diarrhea on and off.   Denies any night sweats or weight loss.   Currently with dry cough. Denies any hemoptysis or pleuritic chest pain.   Denies any abdominal pain, nausea, vomiting.   No new rashes or joint pains.   Was recently seen by primary care provider and was told she was in good health.     Born in [place], denies any recent travel.   No pets at home.         PAST MEDICAL & SURGICAL HISTORY:  H/O prothrombin mutation    Pulmonary embolism    HTN (hypertension)    Anemia requiring transfusions    Deep vein thrombosis (DVT)    S/P tonsillectomy    H/O: hysterectomy    History of cholecystectomy        FAMILY HISTORY  No pertinent family history in first degree relatives        SOCIAL HISTORY  Social History:        ROS  General: Denies rigors, nightsweats  HEENT: Denies headache, rhinorrhea, sore throat, eye pain  CV: Denies CP, palpitations  PULM: Denies wheezing, hemoptysis  GI: Denies hematemesis, hematochezia, melena  : Denies discharge, hematuria  MSK: Denies arthralgias, myalgias  SKIN: Denies rash, lesions  NEURO: Denies paresthesias, weakness  PSYCH: Denies depression, anxiety    VITALS:  T(F): 100.5, Max: 100.5 (21 @ 17:51)  HR: 101  BP: 105/58  RR: 18Vital Signs Last 24 Hrs  T(C): 38.1 (04 Sep 2021 23:56), Max: 38.1 (04 Sep 2021 17:51)  T(F): 100.5 (04 Sep 2021 23:56), Max: 100.5 (04 Sep 2021 17:51)  HR: 101 (04 Sep 2021 23:56) (67 - 104)  BP: 105/58 (04 Sep 2021 23:56) (97/62 - 126/66)  BP(mean): 76 (04 Sep 2021 15:43) (74 - 76)  RR: 18 (04 Sep 2021 23:56) (18 - 18)  SpO2: 96% (04 Sep 2021 23:56) (96% - 99%)    PHYSICAL EXAM:  Gen: NAD, resting in bed  HEENT: Normocephalic, atraumatic  Neck: supple, no lymphadenopathy  CV: Regular rate & regular rhythm  Lungs: decreased BS at bases, no fremitus  Abdomen: Soft, BS present  Ext: Warm, well perfused  Neuro: non focal, awake  Skin: no rash, no erythema  Lines: no phlebitis    TESTS & MEASUREMENTS:                        11.5  16.30 )-----------( 147      ( 03 Sep 2021 22:30 )             36.8        133<L>  |  97<L>  |  55<H>  ----------------------------<  105<H>  4.3   |  17  |  3.8<H>    Ca    7.7<L>      03 Sep 2021 22:30    TPro  5.8<L>  /  Alb  3.3<L>  /  TBili  0.8  /  DBili  x   /  AST  46<H>  /  ALT  12  /  AlkPhos  85  09-03      LIVER FUNCTIONS - ( 03 Sep 2021 22:30 )  Alb: 3.3 g/dL / Pro: 5.8 g/dL / ALK PHOS: 85 U/L / ALT: 12 U/L / AST: 46 U/L / GGT: x          Urinalysis Basic - ( 04 Sep 2021 02:30 )    Color: Yellow / Appearance: Turbid / S.014 / pH: x  Gluc: x / Ketone: Negative  / Bili: Negative / Urobili: <2 mg/dL  Blood: x / Protein: 100 mg/dL / Nitrite: Negative  Leuk Esterase: Large / RBC: 40 /HPF /  /HPF  Sq Epi: x / Non Sq Epi: 4 /HPF / Bacteria: Moderate        Culture - Blood (collected 21 @ 21:30)  Source: .Blood Blood-Peripheral  Preliminary Report (21 @ 02:01):    No growth to date.    Culture - Blood (collected 21 @ 21:30)  Source: .Blood Blood-Peripheral  Preliminary Report (21 @ 02:01):    No growth to date.        Blood Gas Venous - Lactate: 1.50 mmol/L (21 @ 20:20)      INFECTIOUS DISEASES TESTING  Procalcitonin, Serum: 6.63 ng/mL (21 @ 16:50)      RADIOLOGY & ADDITIONAL TESTS:  I have personally reviewed the last Chest xray  CXR      CT  CT Abdomen and Pelvis No Cont:  EXAM:  CT CHEST        EXAM:  CT ABDOMEN AND PELVIS            PROCEDURE DATE:  2021            INTERPRETATION:  CLINICAL HISTORY / REASON FOR EXAM: Left lower quadrant abdominal pain, weakness, fever.    TECHNIQUE: Contiguous axial CT imageswere obtained from the thoracic inlet to the pubic symphysis without intravenous contrast. Oral contrast was not administered. Coronal, sagittal and 3D/MIP reformatted images are also submitted.    COMPARISON CT: CT abdomen and pelvis 2013, CT chest 10/15/2015.      FINDINGS:    CHEST:    LUNGS, PLEURA, AND AIRWAYS: Small left pleural effusion. Left lower lobar bronchus filling defects, with consolidative opacification of the left lower lobe. Bilateral upper lobe and right lower lobe mosaic attenuation. Bilateral pulmonary nodular opacities, measuring up to 0.4 cm (4-288), with additional larger opacities/nodules measuring 1.3 cm in the right lower lobe. Unchanged left lung calcifications. No pneumothorax. Evaluation for pulmonary nodules limited by patient condition.    MEDIASTINUM/THORACIC NODES: Bilateral axillary, supraclavicular, and mediastinal lymphadenopathy, measuring up to 1.3 x 2.6 cm in the left axilla. Bilateral hilar bulky lymphadenopathy. Moderate hiatal hernia    HEART/GREAT VESSELS: No pericardial effusion. Heart size unremarkable. Coronary artery and thoracic aorta atherosclerotic calcifications. Normal caliber thoracic aorta.      ABDOMEN/PELVIS:    HEPATOBILIARY: Postcholecystectomy.    SPLEEN: Unremarkable.    PANCREAS: Unremarkable.    ADRENAL GLANDS: Left adrenal gland thickening. Right adrenal gland is unremarkable.    KIDNEYS: No hydronephrosis bilaterally.    ABDOMINOPELVIC NODES: Enlarged retroperitoneal and bilateral inguinal lymph nodes, for example 2.1 x 2.5 cm left retroperitoneal para-aortic lymph node (3-64), and 1.5 x 2.2 cm left inguinal lymph node (3-113).    PELVIC ORGANS: Post hysterectomy. Limited evaluation of nondistended urinary bladder.    PERITONEUM/MESENTERY/BOWEL: Scattered colonic diverticula without diverticulitis. No bowel obstruction, ascites or intraperitoneal free air. Normal caliber appendix.    BONES/SOFT TISSUES: No acute osseous abnormality. Multilevel degenerative changes of spine. Trace L1-L2 retrolisthesis, trace L2-3 retrolisthesis, grade 1 L4-5 anterolisthesis.    VASCULAR: Calcified atherosclerotic disease of normal caliber abdominal aorta.      IMPRESSION:    New mediastinal, axillary, hilar, supraclavicular, retroperitoneal, and bilateral inguinal lymphadenopathy as above. Findings highly suspicious for neoplastic process such as lymphoma, however consider less likely but possible inflammatory etiologies in patient with history of rheumatoid arthritis. Oncology workup recommended.    Left lower lobe lobe bronchus filling defects, with left lung lower lobe consolidative opacification. Additional bilateral bilateral patchy opacities. Findings consistent with pneumonia; correlate for aspiration.    Bilateral pulmonary nodular opacities. Findings may be inflammatory, infectious or neoplastic in etiology. Follow-up CT after treatment recommended.    --- End of Report ---            BENJI CONNELLY MD; Resident Radiologist  This document has been electronically signed.  AYDE GONZALEZ MD; Attending Radiologist  This document has been electronically signed. Sep  4 2021  6:31AM (21 @ 04:11)      CARDIOLOGY TESTING  12 Lead ECG:  Ventricular Rate 98 BPM    Atrial Rate 90 BPM    QRS Duration 88 ms    Q-T Interval 372 ms    QTC Calculation(Bazett) 474 ms    R Axis 240 degrees    T Axis 50 degrees    Diagnosis Line Sinus rhythm with 1st degree A-V block  Left axis deviation  Right ventricular hypertrophy  Abnormal ECG    Confirmed by Kim Jang MD ([id number]) on 2021 7:48:48 AM (21 @ 20:25)      MEDICATIONS  cefepime   IVPB    cefepime   IVPB 1000 IV Intermittent every 24 hours  chlorhexidine 4% Liquid 1 Topical <User Schedule>  heparin  Infusion 1400 IV Continuous <Continuous>  norepinephrine Infusion 0.03 IV Continuous <Continuous>  pantoprazole    Tablet 40 Oral before breakfast  senna 2 Oral at bedtime  sodium chloride 0.9%. 1000 IV Continuous <Continuous>  vancomycin  IVPB        Weight  Weight (kg): 79.4 (21 @ 20:49)    ANTIBIOTICS:  cefepime   IVPB      cefepime   IVPB 1000 milliGRAM(s) IV Intermittent every 24 hours  vancomycin  IVPB          ALLERGIES:  No Known Allergies

## 2021-09-05 NOTE — CONSULT NOTE ADULT - ASSESSMENT
ASSESSMENT  80 y/o F pmhx significant for Essential HTN & hx of PE s/p Warfarin presents to ED from Home (lives alone) w/ x3 days of diarrhea & Weakness who presents with septic shock    IMPRESSION  #Septic Shock secondary to UTI/Aspiration vs GN pneumonia  - < from: CT Abdomen and Pelvis No Cont (09.04.21 @ 04:11):  New mediastinal, axillary, hilar, supraclavicular, retroperitoneal, and bilateral inguinal lymphadenopathy as above. Findings highly suspicious for neoplastic process such as lymphoma, however consider less likely but possible inflammatory etiologies in patient with history of rheumatoid arthritis. Oncology workup recommended.  Left lower lobe lobe bronchus filling defects, with left lung lower lobe consolidative opacification. Additional bilateral bilateral patchy opacities. Findings consistent with pneumonia; correlate for aspiration. Bilateral pulmonary nodular opacities. Findings may be inflammatory, infectious or neoplastic in etiology. Follow-up CT after treatment recommended.    #MAGDALENO  #Diarrhea  #Diffuse Lymphadenopathy     #Hx of PE on Warfarin  #Hyponatremia  #Obesity BMI (kg/m2): 30  #DM  #Abx allergy:      RECOMMENDATIONS  - follow-up blood cultures   - check MRSA nares, if negative can stop vancomycin   - continue cefepime 1g q 24 hours for now although no known pseudomonas risk factors   - check urine legionella  - check LDH   - screen for HIV   - if with diarrhea, check GI PCR   - work-up of diffuse LAD per primary -- infectious differential includes TB, NTM, Salmonellosis, bartonellosis, endemic fungi although low suspicion for these entities     Please call or message on Microsoft Teams if with any questions.  Spectra [phone]     ASSESSMENT  80 y/o F pmhx significant for Essential HTN & hx of PE s/p Warfarin presents to ED from Home (lives alone) w/ x3 days of diarrhea & Weakness who presents with septic shock    IMPRESSION  #Septic Shock secondary to Aspiration vs GN pneumonia  - < from: CT Abdomen and Pelvis No Cont (09.04.21 @ 04:11):  New mediastinal, axillary, hilar, supraclavicular, retroperitoneal, and bilateral inguinal lymphadenopathy as above. Findings highly suspicious for neoplastic process such as lymphoma, however consider less likely but possible inflammatory etiologies in patient with history of rheumatoid arthritis. Oncology workup recommended.  Left lower lobe lobe bronchus filling defects, with left lung lower lobe consolidative opacification. Additional bilateral bilateral patchy opacities. Findings consistent with pneumonia; correlate for aspiration. Bilateral pulmonary nodular opacities. Findings may be inflammatory, infectious or neoplastic in etiology. Follow-up CT after treatment recommended.    #MAGDALENO  #Diarrhea  #Diffuse Lymphadenopathy     #Hx of PE on Warfarin  #Hyponatremia  #Obesity BMI (kg/m2): 30  #DM  #Abx allergy:      RECOMMENDATIONS  - follow-up blood cultures   - check MRSA nares, if negative can stop vancomycin   - continue cefepime 1g q 24 hours for now although no known pseudomonas risk factors   - check urine legionella  - check LDH   - screen for HIV   - if with diarrhea, check GI PCR   - work-up of diffuse LAD per primary -- infectious differential includes TB, NTM, Salmonellosis, bartonellosis, endemic fungi although low suspicion for these entities     Please call or message on Microsoft Teams if with any questions.  Spectra [phone]

## 2021-09-06 LAB
ALBUMIN SERPL ELPH-MCNC: 2.4 G/DL — LOW (ref 3.5–5.2)
ALP SERPL-CCNC: 93 U/L — SIGNIFICANT CHANGE UP (ref 30–115)
ALT FLD-CCNC: 13 U/L — SIGNIFICANT CHANGE UP (ref 0–41)
ANION GAP SERPL CALC-SCNC: 14 MMOL/L — SIGNIFICANT CHANGE UP (ref 7–14)
APTT BLD: 37.8 SEC — SIGNIFICANT CHANGE UP (ref 27–39.2)
APTT BLD: 39 SEC — SIGNIFICANT CHANGE UP (ref 27–39.2)
AST SERPL-CCNC: 22 U/L — SIGNIFICANT CHANGE UP (ref 0–41)
BASOPHILS # BLD AUTO: 0.01 K/UL — SIGNIFICANT CHANGE UP (ref 0–0.2)
BASOPHILS NFR BLD AUTO: 0.1 % — SIGNIFICANT CHANGE UP (ref 0–1)
BILIRUB SERPL-MCNC: 0.8 MG/DL — SIGNIFICANT CHANGE UP (ref 0.2–1.2)
BUN SERPL-MCNC: 54 MG/DL — HIGH (ref 10–20)
CALCIUM SERPL-MCNC: 7.7 MG/DL — LOW (ref 8.5–10.1)
CHLORIDE SERPL-SCNC: 107 MMOL/L — SIGNIFICANT CHANGE UP (ref 98–110)
CO2 SERPL-SCNC: 17 MMOL/L — SIGNIFICANT CHANGE UP (ref 17–32)
CREAT SERPL-MCNC: 2.9 MG/DL — HIGH (ref 0.7–1.5)
EOSINOPHIL # BLD AUTO: 0.23 K/UL — SIGNIFICANT CHANGE UP (ref 0–0.7)
EOSINOPHIL NFR BLD AUTO: 2.5 % — SIGNIFICANT CHANGE UP (ref 0–8)
GLUCOSE SERPL-MCNC: 99 MG/DL — SIGNIFICANT CHANGE UP (ref 70–99)
HCT VFR BLD CALC: 30.3 % — LOW (ref 37–47)
HGB BLD-MCNC: 9.7 G/DL — LOW (ref 12–16)
IMM GRANULOCYTES NFR BLD AUTO: 0.5 % — HIGH (ref 0.1–0.3)
INR BLD: 4.72 RATIO — HIGH (ref 0.65–1.3)
INR BLD: 4.8 RATIO — HIGH (ref 0.65–1.3)
LDH SERPL L TO P-CCNC: 210 — SIGNIFICANT CHANGE UP (ref 50–242)
LYMPHOCYTES # BLD AUTO: 1.86 K/UL — SIGNIFICANT CHANGE UP (ref 1.2–3.4)
LYMPHOCYTES # BLD AUTO: 20.2 % — LOW (ref 20.5–51.1)
MAGNESIUM SERPL-MCNC: 1.9 MG/DL — SIGNIFICANT CHANGE UP (ref 1.8–2.4)
MCHC RBC-ENTMCNC: 28.7 PG — SIGNIFICANT CHANGE UP (ref 27–31)
MCHC RBC-ENTMCNC: 32 G/DL — SIGNIFICANT CHANGE UP (ref 32–37)
MCV RBC AUTO: 89.6 FL — SIGNIFICANT CHANGE UP (ref 81–99)
MONOCYTES # BLD AUTO: 0.79 K/UL — HIGH (ref 0.1–0.6)
MONOCYTES NFR BLD AUTO: 8.6 % — SIGNIFICANT CHANGE UP (ref 1.7–9.3)
NEUTROPHILS # BLD AUTO: 6.28 K/UL — SIGNIFICANT CHANGE UP (ref 1.4–6.5)
NEUTROPHILS NFR BLD AUTO: 68.1 % — SIGNIFICANT CHANGE UP (ref 42.2–75.2)
NRBC # BLD: 0 /100 WBCS — SIGNIFICANT CHANGE UP (ref 0–0)
PLATELET # BLD AUTO: 160 K/UL — SIGNIFICANT CHANGE UP (ref 130–400)
POTASSIUM SERPL-MCNC: 3.3 MMOL/L — LOW (ref 3.5–5)
POTASSIUM SERPL-SCNC: 3.3 MMOL/L — LOW (ref 3.5–5)
PROT SERPL-MCNC: 4.3 G/DL — LOW (ref 6–8)
PROTHROM AB SERPL-ACNC: >40 SEC — HIGH (ref 9.95–12.87)
PROTHROM AB SERPL-ACNC: >40 SEC — HIGH (ref 9.95–12.87)
RBC # BLD: 3.38 M/UL — LOW (ref 4.2–5.4)
RBC # FLD: 13.6 % — SIGNIFICANT CHANGE UP (ref 11.5–14.5)
S PNEUM AG UR QL: NEGATIVE — SIGNIFICANT CHANGE UP
SODIUM SERPL-SCNC: 138 MMOL/L — SIGNIFICANT CHANGE UP (ref 135–146)
WBC # BLD: 9.22 K/UL — SIGNIFICANT CHANGE UP (ref 4.8–10.8)
WBC # FLD AUTO: 9.22 K/UL — SIGNIFICANT CHANGE UP (ref 4.8–10.8)

## 2021-09-06 PROCEDURE — 93010 ELECTROCARDIOGRAM REPORT: CPT

## 2021-09-06 PROCEDURE — 99232 SBSQ HOSP IP/OBS MODERATE 35: CPT

## 2021-09-06 PROCEDURE — 99233 SBSQ HOSP IP/OBS HIGH 50: CPT

## 2021-09-06 RX ORDER — POTASSIUM CHLORIDE 20 MEQ
40 PACKET (EA) ORAL ONCE
Refills: 0 | Status: COMPLETED | OUTPATIENT
Start: 2021-09-06 | End: 2021-09-06

## 2021-09-06 RX ORDER — SODIUM CHLORIDE 9 MG/ML
500 INJECTION, SOLUTION INTRAVENOUS ONCE
Refills: 0 | Status: COMPLETED | OUTPATIENT
Start: 2021-09-06 | End: 2021-09-06

## 2021-09-06 RX ADMIN — Medication 40 MILLIEQUIVALENT(S): at 11:59

## 2021-09-06 RX ADMIN — PIPERACILLIN AND TAZOBACTAM 25 GRAM(S): 4; .5 INJECTION, POWDER, LYOPHILIZED, FOR SOLUTION INTRAVENOUS at 06:09

## 2021-09-06 RX ADMIN — PIPERACILLIN AND TAZOBACTAM 25 GRAM(S): 4; .5 INJECTION, POWDER, LYOPHILIZED, FOR SOLUTION INTRAVENOUS at 17:05

## 2021-09-06 RX ADMIN — SODIUM CHLORIDE 75 MILLILITER(S): 9 INJECTION, SOLUTION INTRAVENOUS at 02:48

## 2021-09-06 RX ADMIN — CHLORHEXIDINE GLUCONATE 1 APPLICATION(S): 213 SOLUTION TOPICAL at 05:33

## 2021-09-06 RX ADMIN — SODIUM CHLORIDE 1000 MILLILITER(S): 9 INJECTION, SOLUTION INTRAVENOUS at 11:27

## 2021-09-06 RX ADMIN — PANTOPRAZOLE SODIUM 40 MILLIGRAM(S): 20 TABLET, DELAYED RELEASE ORAL at 07:22

## 2021-09-06 NOTE — PROGRESS NOTE ADULT - SUBJECTIVE AND OBJECTIVE BOX
Patient is a 81y old  Female who presents with a chief complaint of 3 days of Weakness & Diarrhea; (05 Sep 2021 09:44)        Over Night Events: off pressors. on NC. Endorses improvement         ROS:     All ROS are negative except HPI         PHYSICAL EXAM    ICU Vital Signs Last 24 Hrs  T(C): 36.1 (06 Sep 2021 07:46), Max: 37.7 (05 Sep 2021 17:14)  T(F): 97 (06 Sep 2021 07:46), Max: 99.9 (05 Sep 2021 17:14)  HR: 110 (06 Sep 2021 07:46) (92 - 110)  BP: 92/58 (06 Sep 2021 07:46) (87/51 - 113/73)  BP(mean): 72 (06 Sep 2021 05:00) (72 - 79)  ABP: --  ABP(mean): --  RR: 18 (06 Sep 2021 07:46) (18 - 18)  SpO2: 97% (06 Sep 2021 09:10) (97% - 100%)      CONSTITUTIONAL:  Well nourished.  NAD    ENT:   Airway patent,   Mouth with normal mucosa.   No thrush    EYES:   Pupils equal,   Round and reactive to light.    CARDIAC:   Normal rate,   Regular rhythm.    No edema        RESPIRATORY:   mild wheeze    GASTROINTESTINAL:  Abdomen soft,   Non-tender,   No guarding,   + BS    MUSCULOSKELETAL:   Range of motion is not limited,  No clubbing, cyanosis    NEUROLOGICAL:   Alert and oriented   No motor  deficits.    SKIN:   Skin normal color for race,   Warm and dry and intact.   No evidence of rash.          09-05-21 @ 07:01  -  09-06-21 @ 07:00  --------------------------------------------------------  IN:    Lactated Ringers: 525 mL    Oral Fluid: 560 mL  Total IN: 1085 mL    OUT:    Voided (mL): 500 mL  Total OUT: 500 mL    Total NET: 585 mL          LABS:                            9.7    9.22  )-----------( 160      ( 06 Sep 2021 05:49 )             30.3                                               09-06    138  |  107  |  54<H>  ----------------------------<  99  3.3<L>   |  17  |  2.9<H>    Ca    7.7<L>      06 Sep 2021 05:49  Mg     1.9     09-06    TPro  4.3<L>  /  Alb  2.4<L>  /  TBili  0.8  /  DBili  x   /  AST  22  /  ALT  13  /  AlkPhos  93  09-06      PT/INR - ( 05 Sep 2021 20:00 )   PT: >40.00 sec;   INR: 4.72 ratio         PTT - ( 06 Sep 2021 05:49 )  PTT:37.8 sec                                           CARDIAC MARKERS ( 05 Sep 2021 00:25 )  x     / 0.04 ng/mL / x     / x     / x      CARDIAC MARKERS ( 04 Sep 2021 16:50 )  x     / 0.04 ng/mL / x     / x     / x                                                LIVER FUNCTIONS - ( 06 Sep 2021 05:49 )  Alb: 2.4 g/dL / Pro: 4.3 g/dL / ALK PHOS: 93 U/L / ALT: 13 U/L / AST: 22 U/L / GGT: x                                                  Culture - Urine (collected 04 Sep 2021 02:30)  Source: Clean Catch Clean Catch (Midstream)  Final Report (05 Sep 2021 14:54):    >=3 organisms. Probable collection contamination.    Culture - Blood (collected 03 Sep 2021 21:30)  Source: .Blood Blood-Peripheral  Preliminary Report (05 Sep 2021 02:01):    No growth to date.    Culture - Blood (collected 03 Sep 2021 21:30)  Source: .Blood Blood-Peripheral  Preliminary Report (05 Sep 2021 02:01):    No growth to date.                                                                                           MEDICATIONS  (STANDING):  chlorhexidine 4% Liquid 1 Application(s) Topical <User Schedule>  lactated ringers. 1000 milliLiter(s) (75 mL/Hr) IV Continuous <Continuous>  pantoprazole    Tablet 40 milliGRAM(s) Oral before breakfast  piperacillin/tazobactam IVPB.. 3.375 Gram(s) IV Intermittent every 12 hours  senna 2 Tablet(s) Oral at bedtime    MEDICATIONS  (PRN):  acetaminophen   Tablet .. 650 milliGRAM(s) Oral every 6 hours PRN Temp greater or equal to 38.5C (101.3F), Mild Pain (1 - 3)  aluminum hydroxide/magnesium hydroxide/simethicone Suspension 30 milliLiter(s) Oral every 4 hours PRN Dyspepsia  melatonin 3 milliGRAM(s) Oral at bedtime PRN Insomnia  ondansetron Injectable 4 milliGRAM(s) IV Push every 8 hours PRN Nausea and/or Vomiting      New X-rays reviewed:                                                                                  ECHO    CXR interpreted by me:  B/L infiltrates . Left > Right

## 2021-09-06 NOTE — CONSULT NOTE ADULT - SUBJECTIVE AND OBJECTIVE BOX
Patient is a 81y old  Female who presents with a chief complaint of 3 days of Weakness & Diarrhea; (05 Sep 2021 09:44)    HPI:  82 y/o F pmhx significant for Essential HTN & hx of PE s/p Warfarin presents to ED from Home (lives alone) w/ x3 days of diarrhea & Weakness;   ED:   - Triage: BP was 75/49 that did not respond to IV boluses (LR 2.5L total) s/p Levophed;  - VS: , Temp 103F, BP 75/49  - Labs: WBC 16, INR 5.5, Na 133, Cr 3.8, Tn 0.07, BNP 37366, UA(+)  - CXR: Bilateral opacities/left pleural effusion. Bilateral hilar adenopathy.   - CT Chest/Abd/Pelvis:   ·	New mediastinal, axillary, hilar, supraclavicular, retroperitoneal, and bilateral inguinal lymphadenopathy as above. Findings highly suspicious for neoplastic process.Left lower lobe lobe bronchus filling defects, with left lung lower lobe consolidative opacification.   ·	Additional bilateral bilateral patchy opacities. Findings consistent with pneumonia;  ·	Bilateral pulmonary nodular opacities.   Admit to Stepdown for Septic Shock;    (04 Sep 2021 13:30)     HEMATOLOGY HISTORY:       PAST MEDICAL & SURGICAL HISTORY:  H/O prothrombin mutation  Pulmonary embolism  HTN (hypertension)  Anemia requiring transfusions  Deep vein thrombosis (DVT)  S/P tonsillectomy  H/O: hysterectomy  History of cholecystectomy    SOCIAL HISTORY:    FAMILY HISTORY:  No pertinent family history in first degree relatives      Allergies  No Known Allergies  Intolerances      ROS:  Negative except for:      HOME MEDICATIONS:  hydroCHLOROthiazide 12.5 mg oral tablet: 1 tab(s) orally once a day (03 Sep 2021 21:05)  losartan 100 mg oral tablet: 1 tab(s) orally once a day (03 Sep 2021 21:05)  metoprolol succinate 100 mg oral tablet, extended release: 1 tab(s) orally once a day (03 Sep 2021 21:05)  warfarin 2.5 mg oral tablet:  (03 Sep 2021 21:05)      Vital Signs Last 24 Hrs  T(C): 37.1 (06 Sep 2021 05:00), Max: 37.7 (05 Sep 2021 17:14)  T(F): 98.8 (06 Sep 2021 05:00), Max: 99.9 (05 Sep 2021 17:14)  HR: 100 (06 Sep 2021 05:00) (75 - 100)  BP: 96/59 (06 Sep 2021 05:00) (87/51 - 113/73)  BP(mean): 72 (06 Sep 2021 05:00) (72 - 79)  RR: 18 (05 Sep 2021 08:07) (18 - 18)  SpO2: 98% (06 Sep 2021 05:00) (97% - 100%)    PHYSICAL EXAM  General: adult in NAD  HEENT: clear oropharynx, anicteric sclera, pink conjunctiva  Neck: supple  CV: normal S1/S2 with no murmur rubs or gallops  Lungs: positive air movement b/l ant lungs,clear to auscultation, no wheezes, no rales  Abdomen: soft non-tender non-distended, no hepatosplenomegaly  Ext: no clubbing cyanosis or edema  Skin: no rashes and no petechiae  Neuro: alert and oriented X 4, no focal deficits    MEDICATIONS  (STANDING):  chlorhexidine 4% Liquid 1 Application(s) Topical <User Schedule>  lactated ringers. 1000 milliLiter(s) (75 mL/Hr) IV Continuous <Continuous>  pantoprazole    Tablet 40 milliGRAM(s) Oral before breakfast  piperacillin/tazobactam IVPB.. 3.375 Gram(s) IV Intermittent every 12 hours  senna 2 Tablet(s) Oral at bedtime    MEDICATIONS  (PRN):  acetaminophen   Tablet .. 650 milliGRAM(s) Oral every 6 hours PRN Temp greater or equal to 38.5C (101.3F), Mild Pain (1 - 3)  aluminum hydroxide/magnesium hydroxide/simethicone Suspension 30 milliLiter(s) Oral every 4 hours PRN Dyspepsia  melatonin 3 milliGRAM(s) Oral at bedtime PRN Insomnia  ondansetron Injectable 4 milliGRAM(s) IV Push every 8 hours PRN Nausea and/or Vomiting      LABS:                          10.1   12.97 )-----------( 141      ( 05 Sep 2021 04:30 )             32.7         Mean Cell Volume : 92.1 fL  Mean Cell Hemoglobin : 28.5 pg  Mean Cell Hemoglobin Concentration : 30.9 g/dL  Auto Neutrophil # : 9.24 K/uL  Auto Lymphocyte # : 2.55 K/uL  Auto Monocyte # : 1.01 K/uL  Auto Eosinophil # : 0.06 K/uL  Auto Basophil # : 0.01 K/uL  Auto Neutrophil % : 71.1 %  Auto Lymphocyte % : 19.7 %  Auto Monocyte % : 7.8 %  Auto Eosinophil % : 0.5 %  Auto Basophil % : 0.1 %      Serial CBC's  09-05 @ 04:30  Hct-32.7 / Hgb-10.1 / Plat-141 / RBC-3.55 / WBC-12.97  Serial CBC's  09-03 @ 22:30  Hct-36.8 / Hgb-11.5 / Plat-147 / RBC-4.02 / WBC-16.30      09-05    139  |  107  |  55<H>  ----------------------------<  106<H>  3.8   |  16<L>  |  3.3<H>    Ca    7.5<L>      05 Sep 2021 04:30  Mg     2.1     09-05    TPro  4.6<L>  /  Alb  2.5<L>  /  TBili  0.7  /  DBili  x   /  AST  42<H>  /  ALT  17  /  AlkPhos  93  09-05      PT/INR - ( 05 Sep 2021 20:00 )   PT: >40.00 sec;   INR: 4.72 ratio    PTT - ( 05 Sep 2021 20:00 )  PTT:39.0 sec      Culture - Urine (collected 04 Sep 2021 02:30)  Source: Clean Catch Clean Catch (Midstream)  Final Report (05 Sep 2021 14:54):    >=3 organisms. Probable collection contamination.    Culture - Blood (collected 03 Sep 2021 21:30)  Source: .Blood Blood-Peripheral  Preliminary Report (05 Sep 2021 02:01):    No growth to date.    Culture - Blood (collected 03 Sep 2021 21:30)  Source: .Blood Blood-Peripheral  Preliminary Report (05 Sep 2021 02:01):    No growth to date.      RADIOLOGY & ADDITIONAL STUDIES:    < from: CT Chest, Abdomen and Pelvis No Cont (09.04.21 @ 04:11) >  COMPARISON CT: CT abdomen and pelvis 4/30/2013, CT chest 10/15/2015.      FINDINGS:    CHEST:    LUNGS, PLEURA, AND AIRWAYS: Small left pleural effusion. Left lower lobar bronchus filling defects, with consolidative opacification of the left lower lobe. Bilateral upper lobe and right lower lobe mosaic attenuation. Bilateral pulmonary nodular opacities, measuring up to 0.4 cm (4-288), with additional larger opacities/nodules measuring 1.3 cm in the right lower lobe. Unchanged left lung calcifications. No pneumothorax. Evaluation for pulmonary nodules limited by patient condition.    MEDIASTINUM/THORACIC NODES: Bilateral axillary, supraclavicular, and mediastinal lymphadenopathy, measuring up to 1.3 x 2.6 cm in the left axilla. Bilateral hilar bulky lymphadenopathy. Moderate hiatal hernia    HEART/GREAT VESSELS: No pericardial effusion. Heart size unremarkable. Coronary artery and thoracic aorta atherosclerotic calcifications. Normal caliber thoracic aorta.      ABDOMEN/PELVIS:    HEPATOBILIARY: Postcholecystectomy.    SPLEEN: Unremarkable.    PANCREAS: Unremarkable.    ADRENAL GLANDS: Left adrenal gland thickening. Right adrenal gland is unremarkable.    KIDNEYS: No hydronephrosis bilaterally.    ABDOMINOPELVIC NODES: Enlarged retroperitoneal and bilateral inguinal lymph nodes, for example 2.1 x 2.5 cm left retroperitoneal para-aortic lymph node (3-64), and 1.5 x 2.2 cm left inguinal lymph node (3-113).    PELVIC ORGANS: Post hysterectomy. Limited evaluation of nondistended urinary bladder.    PERITONEUM/MESENTERY/BOWEL: Scattered colonic diverticula without diverticulitis. No bowel obstruction, ascites or intraperitoneal free air. Normal caliber appendix.    BONES/SOFT TISSUES: No acute osseous abnormality. Multilevel degenerative changes of spine. Trace L1-L2 retrolisthesis, trace L2-3 retrolisthesis, grade 1 L4-5 anterolisthesis.    VASCULAR: Calcified atherosclerotic disease of normal caliber abdominal aorta.      IMPRESSION:    New mediastinal, axillary, hilar, supraclavicular, retroperitoneal, and bilateral inguinal lymphadenopathy as above. Findings highly suspicious for neoplastic process such as lymphoma, however consider less likely but possible inflammatory etiologies in patient with history of rheumatoid arthritis. Oncology workup recommended.    Left lower lobe lobe bronchus filling defects, with left lung lower lobe consolidative opacification. Additional bilateral bilateral patchy opacities. Findings consistent with pneumonia; correlate for aspiration.    Bilateral pulmonary nodular opacities. Findings may be inflammatory, infectious or neoplastic in etiology. Follow-up CT after treatment recommended.    --- End of Report ---

## 2021-09-06 NOTE — PROGRESS NOTE ADULT - ASSESSMENT
Septic shock present on admission due to pneumonia (coverage for GNR and aspiration)  UTI  - ID and pulmonary evals appreciated  - Zosyn per Pulm  - f/u blood and urine cultures, nasal MRSA swab, urine for Legionella and Strep  - now off levophed - monitor BP  - check LDH, HIV  - continue IVF  - possible bronch later this week  - stepdown monitoring  - guarded prognosis/ full code status    Extensive LAD on CT scan - rule out lymphoma, other malignancy - HemeOnc eval  ?IR biopsy of left axillary Lymph node    MAGDALENO - ?baseline renal function - Cr 1.23 in 2017 - find out from PMD  possibly prerenal due to diarrhea and sepsis - rule out ATN due to shock  no hydronephrosis on CT scan  I's and O's   continue IVF, calculate FeUrea (pt on HCTZ at home)  renal eval if not improving    h/o PE and prothrombin gene mutation - hold INR - supratherapeutic - start heparin IV when INR <2    Chronic diarrhea - worsened recently - check stool for C diff and GI PCR - if negative, then can give Imodium prn    Elevated troponin could be due to demand ischemia and/or MAGDALENO - trending down - check ECHO      PROGRESS NOTE HANDOFF    Pending: f/u all cultures, labs in AM, HemeOnc eval, possible bronchoscopy this week    Family discussion: with daughter at bedside today    Disposition: from home.      #Septic shock present on admission likely due to pneumonia   -CXR on admission showed b/l opacities with L pleural effusion   - ID and pulmonary evals appreciated   - Zosyn per Pulm  - f/u blood and urine cultures, nasal MRSA swab, urine for Legionella and Strep  - now off levophed - monitor BP  - check LDH, HIV  - continue IVF  - possible bronch later this week  - stepdown monitoring  - guarded prognosis/ full code status    Extensive LAD on CT scan - rule out lymphoma, other malignancy - HemeOnc eval  ?IR biopsy of left axillary Lymph node    MAGDALENO - ?baseline renal function - Cr 1.23 in 2017 - find out from PMD  possibly prerenal due to diarrhea and sepsis - rule out ATN due to shock  no hydronephrosis on CT scan  I's and O's   continue IVF, calculate FeUrea (pt on HCTZ at home)  renal eval if not improving    h/o PE and prothrombin gene mutation - hold INR - supratherapeutic - start heparin IV when INR <2    Chronic diarrhea - worsened recently - check stool for C diff and GI PCR - if negative, then can give Imodium prn    Elevated troponin could be due to demand ischemia and/or MAGDALENO - trending down - check ECHO      PROGRESS NOTE HANDOFF    Pending: f/u all cultures, labs in AM, HemeOn Dr STARKS- (private), possible bronchoscopy this week        Disposition: from home.      #Septic shock present on admission likely due to pneumonia   -CXR on admission showed b/l opacities with L pleural effusion   - ID and pulmonary evals appreciated   - Zosyn per Pulm  - f/u blood and urine cultures, nasal MRSA swab, urine for Legionella and Strep  - now off levophed - monitor BP  - check LDH, HIV  - continue IVF  - possible bronch later this week  - stepdown monitoring  - guarded prognosis/ full code status    Extensive LAD on CT scan -  - rule out lymphoma, other malignancy - HemeOnc eval(Rossi Garcia)  ?IR biopsy of left axillary Lymph node    MAGDALENO - ?baseline renal function - Cr 1.23 in 2017 - find out from PMD  possibly prerenal due to diarrhea and sepsis - rule out ATN due to shock  no hydronephrosis on CT scan  I's and O's   continue IVF, calculate FeUrea (pt on HCTZ at home)  renal eval if not improving    #h/o PE and prothrombin gene mutation   - hold INR - supratherapeutic - start heparin IV when INR <2    #Chronic diarrhea   - worsened recently - check stool for C diff and GI PCR - if negative, then can give Imodium prn    #Troponinemia   - could be due to demand ischemia and/or MAGDALENO - trending down   - check ECHO      PROGRESS NOTE HANDOFF    Pending: f/u all cultures, labs in AM, HemeOnnatali STARKS- (private), possible bronchoscopy this week        Disposition: from home.

## 2021-09-06 NOTE — PROGRESS NOTE ADULT - ASSESSMENT
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 In the ED, she was diagnosed with septic shock, MAGDALENO, pneumonia and extensive LAD.    1. Septic shock present on admission due to pneumonia (coverage for GNR and aspiration)  UTI  - ID and pulmonary evals appreciated  - Zosyn per Pulm  - blood culture so far negative, urine culture > 3 organisms  - nasal MRSA swab - negative  - f/u urine for Legionella and Strep  - now off levophed - monitor BP - responded to fluid bolus today - continue IVF  - keep MAP > 60  - check LDH, HIV  - possible bronch later this week  - stepdown monitoring  - guarded prognosis/ full code status    2. New onset Afib  - HR improved with fluid bolus - keep hydrated and replete electrolytes  - lopressor prn  - check TSH level  - ECHO  - cardio consult  - pt already anticoagulated for h/o PE - INR supratherapeutic    3. Extensive LAD on CT scan - rule out lymphoma, other malignancy   - HemeOnc eval Dr. Grant  consider IR biopsy of left axillary Lymph node    4. MAGDALENO - ?baseline renal function - Cr 1.23 in 2017 - find out from PMD  possibly prerenal due to diarrhea and sepsis - rule out ATN due to shock  no hydronephrosis on CT scan  I's and O's   continue IVF, calculate FeUrea (pt on HCTZ at home)  renal eval if not improving    5. h/o PE and prothrombin gene mutation - hold coumadin - still has supratherapeutic INR  - start heparin IV when INR <2    6. Chronic diarrhea - worsened recently - check stool for C diff and GI PCR - if negative, then can give Imodium prn  stop senna    7. Elevated troponin could be due to demand ischemia and/or MAGDALENO - trending down   - check ECHO      PROGRESS NOTE HANDOFF    Pending: f/u all cultures, labs in AM, cardio eval, HemeOnc eval, ECHO  possible bronchoscopy this week    pt informed of plan of care    Disposition: from home

## 2021-09-06 NOTE — PROGRESS NOTE ADULT - ASSESSMENT
Impression:  Sepsis POA   Septic Shock improved    UTI / PNA  Generalized Lymphadenopathy possible lymphoma   MAGDALENO on CKD  HO PE on coumadin  Supra therapeutic INR  LLL atelectasis infiltrate with possible endobronchial filling defect      PLAN:    CNS: Avoid CNS depressants    HEENT: Oral care    PULMONARY: HOB @ 45 degrees.  2L NC. Wean O2 as tolerated.  Pulmonary toilet.  Nebs Q4 PRN.  Possible bronchoscopy next week when stable     CARDIOVASCULAR: Avoid volume overload.  FU Echo. Hold Home BP meds. Wean Levophed     GI: GI prophylaxis.  Send stool for cdiff     RENAL: Follow up lytes. Correct as needed. MAGDALENO on CKD. FU US Renal. NS @75/cc. Monitor for volume overload    INFECTIOUS DISEASE:  pending Ucx . FU ID. Zosyn for now . nasal MRSA, urine legionella and streptococcal antigen    HEMATOLOGICAL:  Hold home Warfarin. Hold heparin. Repeat INR in AM. Hem-onc eval    ENDOCRINE:  Follow up FS.    Monitor in Stepdown    FULL CODE   Impression:  Sepsis POA   Septic Shock improved    UTI / PNA  Generalized Lymphadenopathy possible lymphoma   MAGDALENO on CKD  HO PE on coumadin  Supra therapeutic INR  LLL atelectasis infiltrate with possible endobronchial filling defect      PLAN:    CNS: Avoid CNS depressants    HEENT: Oral care    PULMONARY: HOB @ 45 degrees.  2L NC. Wean O2 as tolerated.  Pulmonary toilet.  Nebs Q4 PRN.  Possible bronchoscopy this week when stable     CARDIOVASCULAR: Avoid volume overload.  FU Echo. Hold Home BP meds.     GI: GI prophylaxis.  Send stool for cdiff.  Feeding    RENAL: Follow up lytes. Correct as needed. MAGDALENO on CKD. FU US Renal. NS @75/cc. Monitor for volume overload    INFECTIOUS DISEASE:  pending Ucx . FU ID. Zosyn for now . nasal MRSA, urine legionella and streptococcal antigen    HEMATOLOGICAL:  Hold home Warfarin. Hold heparin. Repeat INR in AM. Hem-onc eval    ENDOCRINE:  Follow up FS.    Monitor in Stepdown    FULL CODE

## 2021-09-06 NOTE — PROGRESS NOTE ADULT - SUBJECTIVE AND OBJECTIVE BOX
RAJOEY  81y Female    INTERVAL HPI/OVERNIGHT EVENTS:    Pt seen on rounds today. She feels the same  no diarrhea  denies chest pain, SOB, abdominal pain, N/V  BP dropped and she now has rapid Afib  BP improved after fluid bolus today    T(F): 97 (09-06-21 @ 14:15), Max: 99.9 (09-05-21 @ 17:14)  HR: 107 (09-06-21 @ 14:15) (92 - 132)  BP: 90/51 (09-06-21 @ 14:15) (79/50 - 109/60)  RR: 18 (09-06-21 @ 14:15) (16 - 18)  SpO2: 96% (09-06-21 @ 09:30) (96% - 100%) on 2L NC    I&O's Summary    05 Sep 2021 07:01  -  06 Sep 2021 07:00  --------------------------------------------------------  IN: 1085 mL / OUT: 500 mL / NET: 585 mL    06 Sep 2021 07:01  -  06 Sep 2021 15:06  --------------------------------------------------------  IN: 300 mL / OUT: 600 mL / NET: -300 mL      PHYSICAL EXAM:  GENERAL: NAD  HEAD:  Normocephalic  EYES:  conjunctiva and sclera clear  ENMT: Moist mucous membranes  NERVOUS SYSTEM:  Alert, awake, Good concentration  CHEST/LUNG: decreased BS b/l but clear  HEART: tachy, irregular  ABDOMEN: Soft, Nontender, Nondistended; Bowel sounds present  EXTREMITIES:   No edema  SKIN: ecchymoses    Consultant(s) Notes Reviewed:  [x ] YES  [ ] NO  Care Discussed with Consultants/Other Providers [ x] YES  [ ] NO    MEDICATIONS  (STANDING):  chlorhexidine 4% Liquid 1 Application(s) Topical <User Schedule>  lactated ringers. 1000 milliLiter(s) (75 mL/Hr) IV Continuous <Continuous>  pantoprazole    Tablet 40 milliGRAM(s) Oral before breakfast  piperacillin/tazobactam IVPB.. 3.375 Gram(s) IV Intermittent every 12 hours  senna 2 Tablet(s) Oral at bedtime    MEDICATIONS  (PRN):  acetaminophen   Tablet .. 650 milliGRAM(s) Oral every 6 hours PRN Temp greater or equal to 38.5C (101.3F), Mild Pain (1 - 3)  aluminum hydroxide/magnesium hydroxide/simethicone Suspension 30 milliLiter(s) Oral every 4 hours PRN Dyspepsia  melatonin 3 milliGRAM(s) Oral at bedtime PRN Insomnia  ondansetron Injectable 4 milliGRAM(s) IV Push every 8 hours PRN Nausea and/or Vomiting      LABS:                        9.7    9.22  )-----------( 160      ( 06 Sep 2021 05:49 )             30.3     09-06    138  |  107  |  54<H>  ----------------------------<  99  3.3<L>   |  17  |  2.9<H>    Ca    7.7<L>      06 Sep 2021 05:49  Mg     1.9     09-06    TPro  4.3<L>  /  Alb  2.4<L>  /  TBili  0.8  /  DBili  x   /  AST  22  /  ALT  13  /  AlkPhos  93  09-06    PT/INR - ( 06 Sep 2021 11:00 )   PT: >40.00 sec;   INR: 4.80 ratio         PTT - ( 06 Sep 2021 05:49 )  PTT:37.8 sec    Culture - Urine (collected 04 Sep 2021 02:30)  Source: Clean Catch Clean Catch (Midstream)  Final Report (05 Sep 2021 14:54):    >=3 organisms. Probable collection contamination.    Culture - Blood (collected 03 Sep 2021 21:30)  Source: .Blood Blood-Peripheral  Preliminary Report (05 Sep 2021 02:01):    No growth to date.    Culture - Blood (collected 03 Sep 2021 21:30)  Source: .Blood Blood-Peripheral  Preliminary Report (05 Sep 2021 02:01):    No growth to date.        telemetry reviewed by me: Afib with RVR    Case discussed with residents and RN on rounds today    Care discussed with pt

## 2021-09-06 NOTE — PROGRESS NOTE ADULT - SUBJECTIVE AND OBJECTIVE BOX
JOEY KNAPP  81y  Female      Patient is a 81y old  Female who presents with a chief complaint of 3 days of Weakness & Diarrhea; (06 Sep 2021 09:42)      INTERVAL HPI:  80 y/o F pmhx significant for Essential HTN & hx of PE s/p Warfarin presents to ED from Home (lives alone) w/ x3 days of diarrhea & Weakness.   ED:   - Triage: BP was 75/49 that did not respond to IV boluses (LR 2.5L total) s/p Levophed;  - VS: , Temp 103F, BP 75/49  - Labs: WBC 16, INR 5.5, Na 133, Cr 3.8, Tn 0.07, BNP 01754, UA(+)  - CXR: Bilateral opacities/left pleural effusion. Bilateral hilar adenopathy.   - CT Chest/Abd/Pelvis:   New mediastinal, axillary, hilar, supraclavicular, retroperitoneal, and bilateral inguinal lymphadenopathy as above. Findings highly suspicious for neoplastic process.Left lower lobe lobe bronchus filling defects, with left lung lower lobe consolidative opacification.   Additional bilateral bilateral patchy opacities. Findings consistent with pneumonia;  Bilateral pulmonary nodular opacities.     Admit to Stepdown for Septic Shock;     OVERNIGHT EVENTS:  No acute events overnight. Patient is noticed to be tachycardic up to 130s and hypotensive SBP ~80s on the monitor. Not on pressors.   Denies chest pain, palpitations, SOB, lightheadedness   0.5 L bolus given. Will continue to monitor .  Complains of b/l heel pain         Vital Signs Last 24 Hrs  T(C): 36.1 (06 Sep 2021 07:46), Max: 37.7 (05 Sep 2021 17:14)  T(F): 97 (06 Sep 2021 07:46), Max: 99.9 (05 Sep 2021 17:14)  HR: 114 (06 Sep 2021 10:00) (92 - 132)  BP: 87/52 (06 Sep 2021 10:00) (79/50 - 113/73)  BP(mean): 64 (06 Sep 2021 10:00) (59 - 79)  RR: 16 (06 Sep 2021 09:30) (16 - 18)  SpO2: 96% (06 Sep 2021 09:30) (96% - 100%)    PHYSICAL EXAM:  GENERAL: NAD  HEAD:  Atraumatic, Normocephalic  EYES: EOMI, PERRLA  ENMT: Moist mucous   NECK: Supple, No JVD  NERVOUS SYSTEM:  Alert & Oriented X3, Good concentration  CHEST/LUNG: Clear to auscultation bilaterally  HEART: tachycardic   ABDOMEN: Soft, Nontender, Nondistended; Bowel sounds present  EXTREMITIES:  No edema   LYMPH: No lymphadenopathy noted  SKIN: No rashes or lesions    Consultant(s) Notes Reviewed:  [x ] YES  [ ] NO  Care Discussed with Consultants/Other Providers [ x] YES  [ ] NO    LABS:                        9.7    9.22  )-----------( 160      ( 06 Sep 2021 05:49 )             30.3     09-06    138  |  107  |  54<H>  ----------------------------<  99  3.3<L>   |  17  |  2.9<H>    Ca    7.7<L>      06 Sep 2021 05:49  Mg     1.9     09-06    TPro  4.3<L>  /  Alb  2.4<L>  /  TBili  0.8  /  DBili  x   /  AST  22  /  ALT  13  /  AlkPhos  93  09-06    PT/INR - ( 05 Sep 2021 20:00 )   PT: >40.00 sec;   INR: 4.72 ratio         PTT - ( 06 Sep 2021 05:49 )  PTT:37.8 sec      CAPILLARY BLOOD GLUCOSE          RADIOLOGY & ADDITIONAL TESTS:    Imaging Personally Reviewed:  [ ] YES  [ ] NO JOEY KNAPP  81y  Female      Patient is a 81y old  Female who presents with a chief complaint of 3 days of Weakness & Diarrhea; (06 Sep 2021 09:42)      INTERVAL HPI:  80 y/o F pmhx significant for Essential HTN & hx of PE s/p Warfarin presents to ED from Home (lives alone) w/ x3 days of diarrhea & Weakness.   ED:   - Triage: BP was 75/49 that did not respond to IV boluses (LR 2.5L total) s/p Levophed;  - VS: , Temp 103F, BP 75/49  - Labs: WBC 16, INR 5.5, Na 133, Cr 3.8, Tn 0.07, BNP 81277, UA(+)  - CXR: Bilateral opacities/left pleural effusion. Bilateral hilar adenopathy.   - CT Chest/Abd/Pelvis:   New mediastinal, axillary, hilar, supraclavicular, retroperitoneal, and bilateral inguinal lymphadenopathy as above. Findings highly suspicious for neoplastic process.Left lower lobe lobe bronchus filling defects, with left lung lower lobe consolidative opacification.   Additional bilateral bilateral patchy opacities. Findings consistent with pneumonia;  Bilateral pulmonary nodular opacities.     Admit to Stepdown for Septic Shock;     OVERNIGHT EVENTS:  No acute events overnight. Patient is noticed to be tachycardic up to 130s and hypotensive SBP ~80s on the monitor. Not on pressors.   Denies chest pain, palpitations, SOB, lightheadedness   0.5 L bolus given. Will continue to monitor .  Complains of b/l heel pain         Vital Signs Last 24 Hrs  T(C): 36.1 (06 Sep 2021 07:46), Max: 37.7 (05 Sep 2021 17:14)  T(F): 97 (06 Sep 2021 07:46), Max: 99.9 (05 Sep 2021 17:14)  HR: 114 (06 Sep 2021 10:00) (92 - 132)  BP: 87/52 (06 Sep 2021 10:00) (79/50 - 113/73)  BP(mean): 64 (06 Sep 2021 10:00) (59 - 79)  RR: 16 (06 Sep 2021 09:30) (16 - 18)  SpO2: 96% (06 Sep 2021 09:30) (96% - 100%)    PHYSICAL EXAM:  GENERAL: NAD  HEAD:  Atraumatic, Normocephalic  EYES: EOMI, PERRLA  ENMT: Moist mucous   NECK: Supple, No JVD  NERVOUS SYSTEM:  Alert & Oriented X3, Good concentration  CHEST/LUNG: Clear to auscultation bilaterally  HEART: tachycardic   ABDOMEN: Soft, Nontender, Nondistended; Bowel sounds present  EXTREMITIES:  No edema   LYMPH: No lymphadenopathy noted  SKIN: rash likely fungal in abdomen crease     Consultant(s) Notes Reviewed:  [x ] YES  [ ] NO  Care Discussed with Consultants/Other Providers [ x] YES  [ ] NO    LABS:                        9.7    9.22  )-----------( 160      ( 06 Sep 2021 05:49 )             30.3     09-06    138  |  107  |  54<H>  ----------------------------<  99  3.3<L>   |  17  |  2.9<H>    Ca    7.7<L>      06 Sep 2021 05:49  Mg     1.9     09-06    TPro  4.3<L>  /  Alb  2.4<L>  /  TBili  0.8  /  DBili  x   /  AST  22  /  ALT  13  /  AlkPhos  93  09-06    PT/INR - ( 05 Sep 2021 20:00 )   PT: >40.00 sec;   INR: 4.72 ratio         PTT - ( 06 Sep 2021 05:49 )  PTT:37.8 sec      CAPILLARY BLOOD GLUCOSE          RADIOLOGY & ADDITIONAL TESTS:    Imaging Personally Reviewed:  [ ] YES  [ ] NO

## 2021-09-07 LAB
ANION GAP SERPL CALC-SCNC: 10 MMOL/L — SIGNIFICANT CHANGE UP (ref 7–14)
BUN SERPL-MCNC: 42 MG/DL — HIGH (ref 10–20)
CALCIUM SERPL-MCNC: 7.7 MG/DL — LOW (ref 8.5–10.1)
CHLORIDE SERPL-SCNC: 106 MMOL/L — SIGNIFICANT CHANGE UP (ref 98–110)
CO2 SERPL-SCNC: 19 MMOL/L — SIGNIFICANT CHANGE UP (ref 17–32)
CREAT SERPL-MCNC: 2.2 MG/DL — HIGH (ref 0.7–1.5)
GLUCOSE SERPL-MCNC: 119 MG/DL — HIGH (ref 70–99)
HCT VFR BLD CALC: 30.8 % — LOW (ref 37–47)
HGB BLD-MCNC: 10 G/DL — LOW (ref 12–16)
HIV 1+2 AB+HIV1 P24 AG SERPL QL IA: SIGNIFICANT CHANGE UP
INR BLD: 5.16 RATIO — CRITICAL HIGH (ref 0.65–1.3)
LEGIONELLA AG UR QL: NEGATIVE — SIGNIFICANT CHANGE UP
MAGNESIUM SERPL-MCNC: 1.6 MG/DL — LOW (ref 1.8–2.4)
MCHC RBC-ENTMCNC: 29.2 PG — SIGNIFICANT CHANGE UP (ref 27–31)
MCHC RBC-ENTMCNC: 32.5 G/DL — SIGNIFICANT CHANGE UP (ref 32–37)
MCV RBC AUTO: 90.1 FL — SIGNIFICANT CHANGE UP (ref 81–99)
NRBC # BLD: 0 /100 WBCS — SIGNIFICANT CHANGE UP (ref 0–0)
NT-PROBNP SERPL-SCNC: 6971 PG/ML — HIGH (ref 0–300)
PLATELET # BLD AUTO: 191 K/UL — SIGNIFICANT CHANGE UP (ref 130–400)
POTASSIUM SERPL-MCNC: 3.2 MMOL/L — LOW (ref 3.5–5)
POTASSIUM SERPL-SCNC: 3.2 MMOL/L — LOW (ref 3.5–5)
PROTHROM AB SERPL-ACNC: >40 SEC — HIGH (ref 9.95–12.87)
RBC # BLD: 3.42 M/UL — LOW (ref 4.2–5.4)
RBC # FLD: 13.8 % — SIGNIFICANT CHANGE UP (ref 11.5–14.5)
SODIUM SERPL-SCNC: 135 MMOL/L — SIGNIFICANT CHANGE UP (ref 135–146)
TSH SERPL-MCNC: 1.18 UIU/ML — SIGNIFICANT CHANGE UP (ref 0.27–4.2)
WBC # BLD: 8.17 K/UL — SIGNIFICANT CHANGE UP (ref 4.8–10.8)
WBC # FLD AUTO: 8.17 K/UL — SIGNIFICANT CHANGE UP (ref 4.8–10.8)

## 2021-09-07 PROCEDURE — 93306 TTE W/DOPPLER COMPLETE: CPT | Mod: 26

## 2021-09-07 PROCEDURE — 99233 SBSQ HOSP IP/OBS HIGH 50: CPT

## 2021-09-07 RX ORDER — METOPROLOL TARTRATE 50 MG
12.5 TABLET ORAL THREE TIMES A DAY
Refills: 0 | Status: DISCONTINUED | OUTPATIENT
Start: 2021-09-07 | End: 2021-09-08

## 2021-09-07 RX ORDER — POTASSIUM CHLORIDE 20 MEQ
20 PACKET (EA) ORAL
Refills: 0 | Status: COMPLETED | OUTPATIENT
Start: 2021-09-07 | End: 2021-09-07

## 2021-09-07 RX ORDER — MAGNESIUM SULFATE 500 MG/ML
2 VIAL (ML) INJECTION
Refills: 0 | Status: COMPLETED | OUTPATIENT
Start: 2021-09-07 | End: 2021-09-07

## 2021-09-07 RX ADMIN — Medication 12.5 MILLIGRAM(S): at 14:45

## 2021-09-07 RX ADMIN — PANTOPRAZOLE SODIUM 40 MILLIGRAM(S): 20 TABLET, DELAYED RELEASE ORAL at 05:14

## 2021-09-07 RX ADMIN — SODIUM CHLORIDE 75 MILLILITER(S): 9 INJECTION, SOLUTION INTRAVENOUS at 06:55

## 2021-09-07 RX ADMIN — Medication 25 GRAM(S): at 14:46

## 2021-09-07 RX ADMIN — Medication 20 MILLIEQUIVALENT(S): at 18:53

## 2021-09-07 RX ADMIN — Medication 25 GRAM(S): at 12:03

## 2021-09-07 RX ADMIN — Medication 12.5 MILLIGRAM(S): at 21:07

## 2021-09-07 RX ADMIN — PIPERACILLIN AND TAZOBACTAM 25 GRAM(S): 4; .5 INJECTION, POWDER, LYOPHILIZED, FOR SOLUTION INTRAVENOUS at 05:13

## 2021-09-07 RX ADMIN — PIPERACILLIN AND TAZOBACTAM 25 GRAM(S): 4; .5 INJECTION, POWDER, LYOPHILIZED, FOR SOLUTION INTRAVENOUS at 18:53

## 2021-09-07 RX ADMIN — Medication 20 MILLIEQUIVALENT(S): at 18:52

## 2021-09-07 NOTE — PROGRESS NOTE ADULT - ASSESSMENT
ASSESSMENT  82 y/o F pmhx significant for Essential HTN & hx of PE s/p Warfarin presents to ED from Home (lives alone) w/ x3 days of diarrhea & Weakness who presents with septic shock    IMPRESSION  #Septic Shock secondary to Aspiration vs GN pneumonia  - < from: CT Abdomen and Pelvis No Cont (09.04.21 @ 04:11):  New mediastinal, axillary, hilar, supraclavicular, retroperitoneal, and bilateral inguinal lymphadenopathy as above. Findings highly suspicious for neoplastic process such as lymphoma, however consider less likely but possible inflammatory etiologies in patient with history of rheumatoid arthritis. Oncology workup recommended.  Left lower lobe lobe bronchus filling defects, with left lung lower lobe consolidative opacification. Additional bilateral bilateral patchy opacities. Findings consistent with pneumonia; correlate for aspiration. Bilateral pulmonary nodular opacities. Findings may be inflammatory, infectious or neoplastic in etiology. Follow-up CT after treatment recommended.    #MAGDALENO  #Diarrhea  #Diffuse Lymphadenopathy on CT - Lactate Dehydrogenase, Serum: 210 (09.06.21 @ 05:49)    #Hx of PE on Warfarin  #Hyponatremia  #Obesity BMI (kg/m2): 30  #DM  #Abx allergy:      RECOMMENDATIONS  - continue zosyn for now -- will follow any plans for bronch   - follow-up urine legioenlla  - heme-onc follow-up     Please call or message on [employer] Teams if with any questions.  Spectra [phone]

## 2021-09-07 NOTE — PROGRESS NOTE ADULT - SUBJECTIVE AND OBJECTIVE BOX
JOEY KNAPP  81y  Female      Patient is a 81y old  Female who presents with a chief complaint of 3 days of Weakness & Diarrhea; (07 Sep 2021 09:28)      INTERVAL HPI:  80 y/o F pmhx significant for Essential HTN & hx of PE s/p Warfarin presents to ED from Home (lives alone) w/ x3 days of diarrhea & Weakness.   ED:   - Triage: BP was 75/49 that did not respond to IV boluses (LR 2.5L total) s/p Levophed;  - VS: , Temp 103F, BP 75/49  - Labs: WBC 16, INR 5.5, Na 133, Cr 3.8, Tn 0.07, BNP 59563, UA(+)  - CXR: Bilateral opacities/left pleural effusion. Bilateral hilar adenopathy.   - CT Chest/Abd/Pelvis:   New mediastinal, axillary, hilar, supraclavicular, retroperitoneal, and bilateral inguinal lymphadenopathy as above. Findings highly suspicious for neoplastic process.Left lower lobe lobe bronchus filling defects, with left lung lower lobe consolidative opacification.   Additional bilateral bilateral patchy opacities. Findings consistent with pneumonia;  Bilateral pulmonary nodular opacities.     OVERNIGHT EVENTS:  No acute events overnight. Reports passing gas but no diarrhea overnight. Denies fevers and chills.  Reports slight cough.   Patient's hematologist Dr Grant will see her tomorrow.       Vital Signs Last 24 Hrs  T(C): 36.2 (07 Sep 2021 04:00), Max: 36.6 (06 Sep 2021 20:00)  T(F): 97.2 (07 Sep 2021 04:00), Max: 97.9 (06 Sep 2021 20:00)  HR: 109 (07 Sep 2021 04:00) (101 - 113)  BP: 113/69 (07 Sep 2021 07:00) (87/62 - 113/69)  BP(mean): 86 (07 Sep 2021 07:00) (70 - 86)  RR: 18 (07 Sep 2021 07:00) (16 - 18)  SpO2: 97% (07 Sep 2021 10:34) (95% - 100%)    PHYSICAL EXAM:  GENERAL: NAD  HEAD:  Atraumatic, Normocephalic  EYES: EOMI, PERRLA   ENMT: Moist mucous membranes   NECK: Supple  NERVOUS SYSTEM:  Alert & Oriented X3  CHEST/LUNG: Clear to auscultation bilaterally  HEART: IRR , tachycardia   ABDOMEN: Soft, Nontender, Nondistended; Bowel sounds present  EXTREMITIES:  2+ Peripheral Pulses, No clubbing, cyanosis, or edema    Consultant(s) Notes Reviewed:  [x ] YES  [ ] NO  Care Discussed with Consultants/Other Providers [ x] YES  [ ] NO    LABS:                        10.0   8.17  )-----------( 191      ( 07 Sep 2021 04:30 )             30.8     09-07    135  |  106  |  42<H>  ----------------------------<  119<H>  3.2<L>   |  19  |  2.2<H>    Ca    7.7<L>      07 Sep 2021 04:30  Mg     1.6     09-07    TPro  4.3<L>  /  Alb  2.4<L>  /  TBili  0.8  /  DBili  x   /  AST  22  /  ALT  13  /  AlkPhos  93  09-06    PT/INR - ( 07 Sep 2021 04:30 )   PT: >40.00 sec;   INR: 5.16 ratio         PTT - ( 06 Sep 2021 05:49 )  PTT:37.8 sec      CAPILLARY BLOOD GLUCOSE          RADIOLOGY & ADDITIONAL TESTS:  Echo     Summary:   1. Left ventricular ejection fraction, by visual estimation, is 60 to 65%.   2. Spectral Doppler shows impaired relaxation pattern of left ventricular myocardial filling (Grade I diastolic dysfunction).   3. Normal right ventricular size and function.   4. Mild to moderately enlarged left atrium.   5. Moderately enlarged right atrium.   6. Trivial pericardial effusion.   7. Mild mitral annular calcification.   8. No evidence of mitral valve regurgitation.   9. Moderate tricuspid regurgitation.  10. Pulmonary hypertension is present.      Imaging Personally Reviewed:  [ ] YES  [ ] NO

## 2021-09-07 NOTE — PROGRESS NOTE ADULT - ASSESSMENT
#Septic shock present on admission likely due to pneumonia - Resolved   -CXR on admission showed b/l opacities with L pleural effusion   - Zosyn per Pulm  - f/u blood and urine cultures, nasal MRSA swab, urine for Legionella and Strep  - now off levophed - monitor BP  - LDH, HIV- wnl   - d/c fluids today   - possible bronch later this week  - stepdown monitoring  - guarded prognosis/ full code status    Extensive LAD on CT scan -  - rule out lymphoma, other malignancy - HemeOnc eval(Rossi Garcia)  ?IR biopsy of left axillary Lymph node  -Dr Grant will see patient tomorrow     Acute Kidney Injury   - possibly prerenal due to diarrhea and sepsis - rule out ATN due to shock  -no hydronephrosis on CT scan  -I- 's and O's prima fit in place   -cr improving 3.8>3.3>>2.2  -IVF d/c today -      #h/o PE and prothrombin gene mutation   - hold INR - supratherapeutic - start heparin IV when INR <2    #New onset afib w/ RVR   -cardiology recs appreciated  - Currently with supratherapeutic INR 4.8 --  resume a/c with home coumadin once within therapeutic range.   -lopressor 1.5 q8H, Hold for SBP <95

## 2021-09-07 NOTE — PROGRESS NOTE ADULT - SUBJECTIVE AND OBJECTIVE BOX
JOEY KNAPP  81y Female    INTERVAL HPI/OVERNIGHT EVENTS:    still feels weak and has a cough  remains in Afib  denies chest pain, abdominal pain, bleeding, N/V    T(F): 97.2 (09-07-21 @ 04:00), Max: 97.9 (09-06-21 @ 20:00)  HR: 109 (09-07-21 @ 04:00) (101 - 113)  BP: 113/69 (09-07-21 @ 07:00) (87/62 - 113/69)  RR: 18 (09-07-21 @ 07:00) (16 - 18)  SpO2: 97% (09-07-21 @ 10:34) (95% - 100%) on 2L NC    I&O's Summary    06 Sep 2021 07:01  -  07 Sep 2021 07:00  --------------------------------------------------------  IN: 2615 mL / OUT: 1750 mL / NET: 865 mL        PHYSICAL EXAM:  GENERAL: NAD  HEAD:  Normocephalic  EYES:  conjunctiva and sclera clear  ENMT: Moist mucous membranes  NECK: Supple  NERVOUS SYSTEM:  Alert, awake, Good concentration  CHEST/LUNG: decreased BS at bases  HEART: IRR, tachy  ABDOMEN: Soft, Nontender, Nondistended; Bowel sounds present  EXTREMITIES: No edema    Consultant(s) Notes Reviewed:  [x ] YES  [ ] NO  Care Discussed with Consultants/Other Providers [ x] YES  [ ] NO    MEDICATIONS  (STANDING):  chlorhexidine 4% Liquid 1 Application(s) Topical <User Schedule>  lactated ringers. 1000 milliLiter(s) (75 mL/Hr) IV Continuous <Continuous>  magnesium sulfate  IVPB 2 Gram(s) IV Intermittent every 2 hours  metoprolol tartrate 12.5 milliGRAM(s) Oral three times a day  pantoprazole    Tablet 40 milliGRAM(s) Oral before breakfast  piperacillin/tazobactam IVPB.. 3.375 Gram(s) IV Intermittent every 12 hours  potassium chloride   Powder 20 milliEquivalent(s) Oral every 2 hours    MEDICATIONS  (PRN):  acetaminophen   Tablet .. 650 milliGRAM(s) Oral every 6 hours PRN Temp greater or equal to 38.5C (101.3F), Mild Pain (1 - 3)  aluminum hydroxide/magnesium hydroxide/simethicone Suspension 30 milliLiter(s) Oral every 4 hours PRN Dyspepsia  melatonin 3 milliGRAM(s) Oral at bedtime PRN Insomnia  ondansetron Injectable 4 milliGRAM(s) IV Push every 8 hours PRN Nausea and/or Vomiting      Telemetry reviewed by me    LABS:                        10.0   8.17  )-----------( 191      ( 07 Sep 2021 04:30 )             30.8     09-07    135  |  106  |  42<H>  ----------------------------<  119<H>  3.2<L>   |  19  |  2.2<H>    Ca    7.7<L>      07 Sep 2021 04:30  Mg     1.6     09-07    TPro  4.3<L>  /  Alb  2.4<L>  /  TBili  0.8  /  DBili  x   /  AST  22  /  ALT  13  /  AlkPhos  93  09-06    PT/INR - ( 07 Sep 2021 04:30 )   PT: >40.00 sec;   INR: 5.16 ratio         PTT - ( 06 Sep 2021 05:49 )  PTT:37.8 sec      < from: TTE Echo Complete w/o Contrast w/ Doppler (09.07.21 @ 08:06) >  Summary:   1. Left ventricular ejection fraction, by visual estimation, is 60 to 65%.   2. Spectral Doppler shows impaired relaxation pattern of left ventricular myocardial filling (Grade I diastolic dysfunction).   3. Normal right ventricular size and function.   4. Mild to moderately enlarged left atrium.   5. Moderately enlarged right atrium.   6. Trivial pericardial effusion.   7. Mild mitral annular calcification.   8. No evidence of mitral valve regurgitation.   9. Moderate tricuspid regurgitation.  10. Pulmonary hypertension is present.    < end of copied text >        Case discussed with residents and RN on rounds today    Care discussed with pt and family

## 2021-09-07 NOTE — PROGRESS NOTE ADULT - SUBJECTIVE AND OBJECTIVE BOX
JOEY KNAPP  81y, Female  Allergy: No Known Allergies      LOS  3d    CHIEF COMPLAINT: 3 days of Weakness & Diarrhea; (07 Sep 2021 13:44)      INTERVAL EVENTS/HPI  - No acute events overnight  - T(F): , Max: 97.9 (09-06-21 @ 20:00)  - Denies any worsening symptoms  - Tolerating medication  - WBC Count: 8.17 (09-07-21 @ 04:30)  WBC Count: 9.22 (09-06-21 @ 05:49)     - Creatinine, Serum: 2.2 (09-07-21 @ 04:30)  Creatinine, Serum: 2.9 (09-06-21 @ 05:49)       ROS  General: Denies rigors, nightsweats  HEENT: Denies headache, rhinorrhea, sore throat, eye pain  CV: Denies CP, palpitations  PULM: Denies wheezing, hemoptysis  GI: Denies hematemesis, hematochezia, melena  : Denies discharge, hematuria  MSK: Denies arthralgias, myalgias  SKIN: Denies rash, lesions  NEURO: Denies paresthesias, weakness  PSYCH: Denies depression, anxiety    VITALS:  T(F): 97.2, Max: 97.9 (09-06-21 @ 20:00)  HR: 109  BP: 113/69  RR: 18Vital Signs Last 24 Hrs  T(C): 36.2 (07 Sep 2021 04:00), Max: 36.6 (06 Sep 2021 20:00)  T(F): 97.2 (07 Sep 2021 04:00), Max: 97.9 (06 Sep 2021 20:00)  HR: 109 (07 Sep 2021 04:00) (101 - 113)  BP: 113/69 (07 Sep 2021 07:00) (87/62 - 113/69)  BP(mean): 86 (07 Sep 2021 07:00) (70 - 86)  RR: 18 (07 Sep 2021 07:00) (16 - 18)  SpO2: 97% (07 Sep 2021 10:34) (95% - 100%)    PHYSICAL EXAM:  Gen: NAD, resting in bed  HEENT: Normocephalic, atraumatic  Neck: supple, no lymphadenopathy  CV: Regular rate & regular rhythm  Lungs: decreased BS at bases, no fremitus  Abdomen: Soft, BS present  Ext: Warm, well perfused  Neuro: non focal, awake  Skin: no rash, no erythema  Lines: no phlebitis    FH: Non-contributory  Social Hx: Non-contributory    TESTS & MEASUREMENTS:                        10.0   8.17  )-----------( 191      ( 07 Sep 2021 04:30 )             30.8     09-07    135  |  106  |  42<H>  ----------------------------<  119<H>  3.2<L>   |  19  |  2.2<H>    Ca    7.7<L>      07 Sep 2021 04:30  Mg     1.6     09-07    TPro  4.3<L>  /  Alb  2.4<L>  /  TBili  0.8  /  DBili  x   /  AST  22  /  ALT  13  /  AlkPhos  93  09-06    eGFR if Non African American: 20 mL/min/1.73M2 (09-07-21 @ 04:30)  eGFR if African American: 23 mL/min/1.73M2 (09-07-21 @ 04:30)    LIVER FUNCTIONS - ( 06 Sep 2021 05:49 )  Alb: 2.4 g/dL / Pro: 4.3 g/dL / ALK PHOS: 93 U/L / ALT: 13 U/L / AST: 22 U/L / GGT: x               Culture - Urine (collected 09-04-21 @ 02:30)  Source: Clean Catch Clean Catch (Midstream)  Final Report (09-05-21 @ 14:54):    >=3 organisms. Probable collection contamination.    Culture - Blood (collected 09-03-21 @ 21:30)  Source: .Blood Blood-Peripheral  Preliminary Report (09-05-21 @ 02:01):    No growth to date.    Culture - Blood (collected 09-03-21 @ 21:30)  Source: .Blood Blood-Peripheral  Preliminary Report (09-05-21 @ 02:01):    No growth to date.        Blood Gas Venous - Lactate: 1.50 mmol/L (09-03-21 @ 20:20)      INFECTIOUS DISEASES TESTING  HIV-1/2 Combo Result: Nonreact (09-06-21 @ 05:49)  MRSA PCR Result.: NotDetec (09-05-21 @ 11:40)  Procalcitonin, Serum: 6.63 (09-04-21 @ 16:50)  Rapid RVP Result: NotDetec (09-04-21 @ 02:30)      INFLAMMATORY MARKERS      RADIOLOGY & ADDITIONAL TESTS:  I have personally reviewed the last available Chest xray  CXR      CT      CARDIOLOGY TESTING  12 Lead ECG:   Ventricular Rate 109 BPM    Atrial Rate 357 BPM    QRS Duration 76 ms    Q-T Interval 336 ms    QTC Calculation(Bazett) 452 ms    R Axis 246 degrees    T Axis -13 degrees    Diagnosis Line Atrial fibrillation with rapid ventricular response  Low voltage QRS  Possible Anterolateral infarct , age undetermined  Abnormal ECG    Confirmed by KELVIN ROLLE MDFIM ([id number]) on 9/6/2021 10:14:19 PM (09-06-21 @ 11:40)  12 Lead ECG:   Ventricular Rate 98 BPM    Atrial Rate 90 BPM    QRS Duration 88 ms    Q-T Interval 372 ms    QTC Calculation(Bazett) 474 ms    R Axis 240 degrees    T Axis 50 degrees    Diagnosis Line Sinus rhythm with 1st degree A-V block  Left axis deviation  Right ventricular hypertrophy  Abnormal ECG    Confirmed by Kim Jang MD ([id number]) on 9/4/2021 7:48:48 AM (09-03-21 @ 20:25)      MEDICATIONS  chlorhexidine 4% Liquid 1 Topical <User Schedule>  lactated ringers. 1000 IV Continuous <Continuous>  magnesium sulfate  IVPB 2 IV Intermittent every 2 hours  metoprolol tartrate 12.5 Oral three times a day  pantoprazole    Tablet 40 Oral before breakfast  piperacillin/tazobactam IVPB.. 3.375 IV Intermittent every 12 hours  potassium chloride   Powder 20 Oral every 2 hours      WEIGHT  Weight (kg): 79.4 (09-03-21 @ 20:49)  Creatinine, Serum: 2.2 mg/dL (09-07-21 @ 04:30)      ANTIBIOTICS:  piperacillin/tazobactam IVPB.. 3.375 Gram(s) IV Intermittent every 12 hours      All available historical records have been reviewed

## 2021-09-07 NOTE — PROGRESS NOTE ADULT - ASSESSMENT
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 In the ED, she was diagnosed with septic shock, MAGDALENO, pneumonia and extensive LAD.    1. Septic shock present on admission due to pneumonia (coverage for GNR and aspiration)  UTI  - ID and pulmonary evals appreciated  - Zosyn per Pulm  - blood culture so far negative, urine culture > 3 organisms  - nasal MRSA swab - negative  - f/u urine for Legionella and Strep  - now off levophed - monitor BP - responded to fluid bolus 9/6 - continue IVF  - keep MAP > 60  - , HIV - nonreactive  - pulm f/u: ?possible bronch later this week  - stepdown monitoring  - guarded prognosis/ full code status    2. New onset Afib  - HR improved with fluid bolus - keep hydrated and replete electrolytes  - lopressor 12.5mg PO q8hr and monitor HR and BP  - TSH 1.18  - ECHO 9/7: EF 60-65%, grade 1 diastolic dysfunction, enlarged atria, PulmHTN  - cardio consult in progress  - pt already anticoagulated for h/o PE - INR supratherapeutic    3. Extensive LAD on CT scan - rule out lymphoma, other malignancy   - HemeOnc eval Dr. Grant  consider IR biopsy of left axillary Lymph node when INR improved    4. MAGDALENO - ?baseline renal function - Cr 1.23 in 2017 - find out from PMD  possibly prerenal due to diarrhea and sepsis  less likely due to ATN from shock since improving daily  no hydronephrosis on CT scan  I's and O's   continue IVF, calculate FeUrea (pt on HCTZ at home)  renal eval if not improving    5. h/o PE and prothrombin gene mutation - hold coumadin - still has supratherapeutic INR  - start heparin IV when INR <2 if procedures are planned    6. Chronic diarrhea - worsened recently but currently pt without diarrhea  check stool for C diff and GI PCR - if recurs  off isolation for now    7. Elevated troponin could be due to demand ischemia and/or MAGDALENO - trending down     8. Hypokalemia and hypomagnesemia - repleted today    PROGRESS NOTE HANDOFF    Pending: monitoring HR and BP on metoprolol, f/u all cultures, labs in AM, cardio eval, HemeOnc eval, pulm f/u, PT f/u  possible bronchoscopy this week    pt and daughter informed of plan of care at bedside    Disposition: from home

## 2021-09-07 NOTE — CONSULT NOTE ADULT - SUBJECTIVE AND OBJECTIVE BOX
Outpt cardiologist:  Dr. Bolden    HPI:  80 y/o F pmhx significant for Essential HTN & hx of PE s/p Warfarin presents to ED from Home (lives alone) w/ x3 days of diarrhea & Weakness;     ED:   - Triage: BP was 75/49 that did not respond to IV boluses (LR 2.5L total) s/p Levophed;  - VS: , Temp 103F, BP 75/49  - Labs: WBC 16, INR 5.5, Na 133, Cr 3.8, Tn 0.07, BNP 88663, UA(+)  - CXR: Bilateral opacities/left pleural effusion. Bilateral hilar adenopathy.   - CT Chest/Abd/Pelvis:   ·	New mediastinal, axillary, hilar, supraclavicular, retroperitoneal, and bilateral inguinal lymphadenopathy as above. Findings highly suspicious for neoplastic process.Left lower lobe lobe bronchus filling defects, with left lung lower lobe consolidative opacification.   ·	Additional bilateral bilateral patchy opacities. Findings consistent with pneumonia;  ·	Bilateral pulmonary nodular opacities.     Admit to Stepdown for Septic Shock;      (04 Sep 2021 13:30)      ---  cardio fellow additional notes:    still in AF/FL rvr.    never been dx with AF in past  does not feel palpitations  unknown duration of AF this episode.   on lifelong coumadin due to coagulopathy / AT gene mutation / hx PE.       PAST MEDICAL & SURGICAL HISTORY  H/O prothrombin mutation    Pulmonary embolism    HTN (hypertension)    Anemia requiring transfusions    Deep vein thrombosis (DVT)    S/P tonsillectomy    H/O: hysterectomy    History of cholecystectomy        FAMILY HISTORY:  FAMILY HISTORY:  No pertinent family history in first degree relatives        SOCIAL HISTORY:  Social History:      ALLERGIES:  No Known Allergies      MEDICATIONS:  chlorhexidine 4% Liquid 1 Application(s) Topical <User Schedule>  lactated ringers. 1000 milliLiter(s) (75 mL/Hr) IV Continuous <Continuous>  magnesium sulfate  IVPB 2 Gram(s) IV Intermittent every 2 hours  pantoprazole    Tablet 40 milliGRAM(s) Oral before breakfast  piperacillin/tazobactam IVPB.. 3.375 Gram(s) IV Intermittent every 12 hours  potassium chloride   Powder 20 milliEquivalent(s) Oral every 2 hours    PRN:  acetaminophen   Tablet .. 650 milliGRAM(s) Oral every 6 hours PRN  aluminum hydroxide/magnesium hydroxide/simethicone Suspension 30 milliLiter(s) Oral every 4 hours PRN  melatonin 3 milliGRAM(s) Oral at bedtime PRN  ondansetron Injectable 4 milliGRAM(s) IV Push every 8 hours PRN      HOME MEDICATIONS:  Home Medications:  hydroCHLOROthiazide 12.5 mg oral tablet: 1 tab(s) orally once a day (03 Sep 2021 21:05)  losartan 100 mg oral tablet: 1 tab(s) orally once a day (03 Sep 2021 21:05)  metoprolol succinate 100 mg oral tablet, extended release: 1 tab(s) orally once a day (03 Sep 2021 21:05)  warfarin 2.5 mg oral tablet:  (03 Sep 2021 21:05)      VITALS:   T(F): 97.2 ( @ 04:00), Max: 100.5 ( @ 17:51)  HR: 109 ( @ 04:00) (67 - 132)  BP: 113/69 ( @ 07:00) (79/50 - 126/66)  BP(mean): 86 ( @ 07:00) (59 - 86)  RR: 18 ( @ 07:00) (15 - 18)  SpO2: 98% ( @ 07:00) (95% - 100%)    I&O's Summary    06 Sep 2021 07:01  -  07 Sep 2021 07:00  --------------------------------------------------------  IN: 2615 mL / OUT: 1750 mL / NET: 865 mL        REVIEW OF SYSTEMS:  CONSTITUTIONAL: +weakness  HEENT: No visual changes, neck/ear pain  RESPIRATORY: + cough,   CARDIOVASCULAR: See HPI  GASTROINTESTINAL: No abdominal pain. + chronic diarrhea.   GENITOURINARY: No dysuria, frequency or hematuria  NEUROLOGICAL: No new focal deficits  SKIN: No new rashes    PHYSICAL EXAM:  General: Not in distress.  Non-toxic appearing.   HEENT: EOMI  Cardio: irregularly irregular, tachycardic, S1, S2  Pulm: B/L BS.  B/L crackles.   Abdomen: Soft, non-tender, non-distended. Normoactive bowel sounds  Extremities: No edema b/l le  Neuro: A&O x3. No focal deficits    LABS:                        10.0   8.17  )-----------( 191      ( 07 Sep 2021 04:30 )             30.8         135  |  106  |  42<H>  ----------------------------<  119<H>  3.2<L>   |  19  |  2.2<H>    Ca    7.7<L>      07 Sep 2021 04:30  Mg     1.6         TPro  4.3<L>  /  Alb  2.4<L>  /  TBili  0.8  /  DBili  x   /  AST  22  /  ALT  13  /  AlkPhos  93      PT/INR - ( 07 Sep 2021 04:30 )   PT: >40.00 sec;   INR: 5.16 ratio         PTT - ( 06 Sep 2021 05:49 )  PTT:37.8 sec          Troponin trend:    Serum Pro-Brain Natriuretic Peptide: 6971 pg/mL (21 @ 04:30)      SARS-CoV-2: NotDetec (04 Sep 2021 02:30)      RADIOLOGY:  -CXR:  -TTE:  -CCTA:  -STRESS TEST:  -CATHETERIZATION:  -OTHER:< from: CT Chest No Cont (21 @ 04:10) >  CHEST:    LUNGS, PLEURA, AND AIRWAYS: Small left pleural effusion. Left lower lobar bronchus filling defects, with consolidative opacification of the left lower lobe. Bilateral upper lobe and right lower lobe mosaic attenuation. Bilateral pulmonary nodular opacities, measuring up to 0.4 cm (4-288), with additional larger opacities/nodules measuring 1.3 cm in the right lower lobe. Unchanged left lung calcifications. No pneumothorax. Evaluation for pulmonary nodules limited by patient condition.    MEDIASTINUM/THORACIC NODES: Bilateral axillary, supraclavicular, and mediastinal lymphadenopathy, measuring up to 1.3 x 2.6 cm in the left axilla. Bilateral hilar bulky lymphadenopathy. Moderate hiatal hernia    HEART/GREAT VESSELS: No pericardial effusion. Heart size unremarkable. Coronary artery and thoracic aorta atherosclerotic calcifications. Normal caliber thoracic aorta.      ABDOMEN/PELVIS:    HEPATOBILIARY: Postcholecystectomy.    SPLEEN: Unremarkable.    PANCREAS: Unremarkable.    ADRENAL GLANDS: Left adrenal gland thickening. Right adrenal gland is unremarkable.    KIDNEYS: No hydronephrosis bilaterally.    ABDOMINOPELVIC NODES: Enlarged retroperitoneal and bilateral inguinal lymph nodes, for example 2.1 x 2.5 cm left retroperitoneal para-aortic lymph node (3-64), and 1.5 x 2.2 cm left inguinal lymph node (3-113).    PELVIC ORGANS: Post hysterectomy. Limited evaluation of nondistended urinary bladder.    PERITONEUM/MESENTERY/BOWEL: Scattered colonic diverticula without diverticulitis. No bowel obstruction, ascites or intraperitoneal free air. Normal caliber appendix.    BONES/SOFT TISSUES: No acute osseous abnormality. Multilevel degenerative changes of spine. Trace L1-L2 retrolisthesis, trace L2-3 retrolisthesis, grade 1 L4-5 anterolisthesis.    VASCULAR: Calcified atherosclerotic disease of normal caliber abdominal aorta.      IMPRESSION:    New mediastinal, axillary, hilar, supraclavicular, retroperitoneal, and bilateral inguinal lymphadenopathy as above. Findings highly suspicious for neoplastic process such as lymphoma, however consider less likely but possible inflammatory etiologies in patient with history of rheumatoid arthritis. Oncology workup recommended.    Left lower lobe lobe bronchus filling defects, with left lung lower lobe consolidative opacification. Additional bilateral bilateral patchy opacities. Findings consistent with pneumonia; correlate for aspiration.    Bilateral pulmonary nodular opacities. Findings may be inflammatory, infectious or neoplastic in etiology. Follow-up CT after treatment recommended.    < end of copied text >    EC Lead ECG:   Ventricular Rate 109 BPM    Atrial Rate 357 BPM    QRS Duration 76 ms    Q-T Interval 336 ms    QTC Calculation(Bazett) 452 ms    R Axis 246 degrees    T Axis -13 degrees    Diagnosis Line Atrial fibrillation with rapid ventricular response  Low voltage QRS  Possible Anterolateral infarct , age undetermined  Abnormal ECG    Confirmed by SARIAH RAMIREZ, SUZANNE ([id number]) on 2021 10:14:19 PM ( @ 11:40)      TELEMETRY EVENTS:

## 2021-09-07 NOTE — CONSULT NOTE ADULT - ASSESSMENT
IMPRESSION  ·	AF/FL - new onset,  chadsvasc 3 (age, htn), already on a/c at home.   ·	Septic Shock 2/2 PNA/UTI - improved, off pressors, on abx.   ·	New generalized lymphadenopathy (mediastinal, axillary, hilar, supraclavicular, retroperitoneal, and bilateral inguinal) - suspicious for malignancy/lymphoma  ·	MAGDALENO (baseline unknown,  peak 3.8 on 9/3) - currently 2.9, likely hypovolemic in setting of worsening chronic diarrhea, improving with IVF.   ·	HTN,  PT gene mutation and Hx DVT/PE on lifelong warfarin at home, Chronic diarrhea,  former smoker  ---  Ekg:	AFL variable block. Low voltage limb/precordial   Tele:     Still with AF RVR rate 130s.     PLAN  ·	Currently with supratherapeutic INR 4.8 --  resume a/c with home coumadin once within therapeautic range.   ·	Daily INR checks  ·	Replete K (3.3), replete Mg (1.9)  ·	F/U Tte  ·	Rate control with bb: can start lopressor 12.5q8 for now (hold if sbp < 95)  ·	Investigate underlying lymphadenopathy,  coagulopathy with consistently elevated INR despite vit K and witholding coumadin over past 4 days.

## 2021-09-08 LAB
ANION GAP SERPL CALC-SCNC: 13 MMOL/L — SIGNIFICANT CHANGE UP (ref 7–14)
BUN SERPL-MCNC: 34 MG/DL — HIGH (ref 10–20)
CALCIUM SERPL-MCNC: 8.1 MG/DL — LOW (ref 8.5–10.1)
CHLORIDE SERPL-SCNC: 109 MMOL/L — SIGNIFICANT CHANGE UP (ref 98–110)
CO2 SERPL-SCNC: 21 MMOL/L — SIGNIFICANT CHANGE UP (ref 17–32)
CREAT SERPL-MCNC: 1.6 MG/DL — HIGH (ref 0.7–1.5)
CULTURE RESULTS: SIGNIFICANT CHANGE UP
GLUCOSE SERPL-MCNC: 178 MG/DL — HIGH (ref 70–99)
HCT VFR BLD CALC: 33.8 % — LOW (ref 37–47)
HGB BLD-MCNC: 10.6 G/DL — LOW (ref 12–16)
INR BLD: 4.92 RATIO — HIGH (ref 0.65–1.3)
MAGNESIUM SERPL-MCNC: 1.5 MG/DL — LOW (ref 1.8–2.4)
MCHC RBC-ENTMCNC: 28.6 PG — SIGNIFICANT CHANGE UP (ref 27–31)
MCHC RBC-ENTMCNC: 31.4 G/DL — LOW (ref 32–37)
MCV RBC AUTO: 91.4 FL — SIGNIFICANT CHANGE UP (ref 81–99)
NRBC # BLD: 0 /100 WBCS — SIGNIFICANT CHANGE UP (ref 0–0)
PLATELET # BLD AUTO: 279 K/UL — SIGNIFICANT CHANGE UP (ref 130–400)
POTASSIUM SERPL-MCNC: 3 MMOL/L — LOW (ref 3.5–5)
POTASSIUM SERPL-SCNC: 3 MMOL/L — LOW (ref 3.5–5)
PROTHROM AB SERPL-ACNC: >40 SEC — HIGH (ref 9.95–12.87)
RBC # BLD: 3.7 M/UL — LOW (ref 4.2–5.4)
RBC # FLD: 13.8 % — SIGNIFICANT CHANGE UP (ref 11.5–14.5)
SODIUM SERPL-SCNC: 143 MMOL/L — SIGNIFICANT CHANGE UP (ref 135–146)
SPECIMEN SOURCE: SIGNIFICANT CHANGE UP
WBC # BLD: 10.84 K/UL — HIGH (ref 4.8–10.8)
WBC # FLD AUTO: 10.84 K/UL — HIGH (ref 4.8–10.8)

## 2021-09-08 PROCEDURE — 99233 SBSQ HOSP IP/OBS HIGH 50: CPT

## 2021-09-08 PROCEDURE — 99232 SBSQ HOSP IP/OBS MODERATE 35: CPT

## 2021-09-08 PROCEDURE — 71045 X-RAY EXAM CHEST 1 VIEW: CPT | Mod: 26

## 2021-09-08 RX ORDER — METOPROLOL TARTRATE 50 MG
25 TABLET ORAL EVERY 8 HOURS
Refills: 0 | Status: DISCONTINUED | OUTPATIENT
Start: 2021-09-08 | End: 2021-09-09

## 2021-09-08 RX ORDER — MAGNESIUM SULFATE 500 MG/ML
2 VIAL (ML) INJECTION
Refills: 0 | Status: DISCONTINUED | OUTPATIENT
Start: 2021-09-08 | End: 2021-09-08

## 2021-09-08 RX ORDER — POTASSIUM CHLORIDE 20 MEQ
20 PACKET (EA) ORAL
Refills: 0 | Status: COMPLETED | OUTPATIENT
Start: 2021-09-08 | End: 2021-09-08

## 2021-09-08 RX ADMIN — PIPERACILLIN AND TAZOBACTAM 25 GRAM(S): 4; .5 INJECTION, POWDER, LYOPHILIZED, FOR SOLUTION INTRAVENOUS at 05:21

## 2021-09-08 RX ADMIN — Medication 25 MILLIGRAM(S): at 21:36

## 2021-09-08 RX ADMIN — Medication 25 MILLIGRAM(S): at 13:43

## 2021-09-08 RX ADMIN — Medication 25 GRAM(S): at 13:42

## 2021-09-08 RX ADMIN — Medication 20 MILLIEQUIVALENT(S): at 11:42

## 2021-09-08 RX ADMIN — PANTOPRAZOLE SODIUM 40 MILLIGRAM(S): 20 TABLET, DELAYED RELEASE ORAL at 05:22

## 2021-09-08 RX ADMIN — PIPERACILLIN AND TAZOBACTAM 25 GRAM(S): 4; .5 INJECTION, POWDER, LYOPHILIZED, FOR SOLUTION INTRAVENOUS at 17:58

## 2021-09-08 RX ADMIN — Medication 12.5 MILLIGRAM(S): at 05:22

## 2021-09-08 RX ADMIN — Medication 20 MILLIEQUIVALENT(S): at 17:58

## 2021-09-08 RX ADMIN — Medication 25 GRAM(S): at 11:42

## 2021-09-08 RX ADMIN — Medication 20 MILLIEQUIVALENT(S): at 13:43

## 2021-09-08 NOTE — PROGRESS NOTE ADULT - SUBJECTIVE AND OBJECTIVE BOX
JOEY KNAPP  81y Female    CHIEF COMPLAINT:    Patient is a 81y old  Female who presents with a chief complaint of mediastinal adenopathy (08 Sep 2021 14:26)      INTERVAL HPI/OVERNIGHT EVENTS:    Patient seen and examined. No acute events overnight. HR uncontrolled today     ROS: All other systems are negative.    Vital Signs:    T(F): 99.8 (09-08-21 @ 12:00), Max: 99.8 (09-08-21 @ 12:00)  HR: 118 (09-08-21 @ 12:00) (102 - 136)  BP: 125/66 (09-08-21 @ 12:00) (110/69 - 132/71)  RR: 18 (09-08-21 @ 12:00) (18 - 18)  SpO2: 99% (09-08-21 @ 12:00) (95% - 100%)    07 Sep 2021 07:01  -  08 Sep 2021 07:00  --------------------------------------------------------  IN: 0 mL / OUT: 900 mL / NET: -900 mL    08 Sep 2021 07:01  -  08 Sep 2021 16:40  --------------------------------------------------------  IN: 0 mL / OUT: 700 mL / NET: -700 mL  PHYSICAL EXAM:    GENERAL:  NAD  SKIN: No rashes or lesions  HEENT: Atraumatic. Normocephalic.   NECK: Supple, No JVD.   PULMONARY: CTA B/L. No wheezing. No rales  CVS: Normal S1, S2. Rate and Rhythm are regular.  ABDOMEN/GI: Soft, Nontender, Nondistended  MSK:  No clubbing or cyanosis   NEUROLOGIC: moves all extremities   PSYCH: Awake and alert     Consultant(s) Notes Reviewed:  [x ] YES  [ ] NO  Care Discussed with Consultants/Other Providers [ x] YES  [ ] NO    LABS:                        10.6   10.84 )-----------( 279      ( 08 Sep 2021 04:30 )             33.8     143  |  109  |  34<H>  ----------------------------<  178<H>  3.0<L>   |  21  |  1.6<H>    Ca    8.1<L>      08 Sep 2021 04:30  Mg     1.5     09-08    PT/INR - ( 08 Sep 2021 04:30 )   PT: >40.00 sec;   INR: 4.92 ratio      Serum Pro-Brain Natriuretic Peptide: 6971 pg/mL (09-07-21 @ 04:30)  Serum Pro-Brain Natriuretic Peptide: 72638 pg/mL (09-03-21 @ 22:30)    RADIOLOGY & ADDITIONAL TESTS:  Imaging or report Personally Reviewed:  [x] YES  [ ] NO  EKG reviewed: [x] YES  [ ] NO    Medications:  Standing  chlorhexidine 4% Liquid 1 Application(s) Topical <User Schedule>  magnesium sulfate  IVPB 2 Gram(s) IV Intermittent every 2 hours  metoprolol tartrate 25 milliGRAM(s) Oral every 8 hours  pantoprazole    Tablet 40 milliGRAM(s) Oral before breakfast  piperacillin/tazobactam IVPB.. 3.375 Gram(s) IV Intermittent every 12 hours  potassium chloride    Tablet ER 20 milliEquivalent(s) Oral every 2 hours    PRN Meds  acetaminophen   Tablet .. 650 milliGRAM(s) Oral every 6 hours PRN  aluminum hydroxide/magnesium hydroxide/simethicone Suspension 30 milliLiter(s) Oral every 4 hours PRN  melatonin 3 milliGRAM(s) Oral at bedtime PRN  ondansetron Injectable 4 milliGRAM(s) IV Push every 8 hours PRN

## 2021-09-08 NOTE — PROGRESS NOTE ADULT - ASSESSMENT
#s/p Septic Shock secondary to Aspiration vs GN pneumonia  stable now.  antbx    #Diffuse Lymphadenopathy  including chest/abd/groin  hx of sarcoidosis... check for ACE  r/o reactive in lung due to PNA but abd LN?  send for LDH     #Hx of PE on Warfarin..   Also DVT.  hx of PT gene 2 mutation     #DM    # BARBY..            #s/p Septic Shock secondary to Aspiration vs GN pneumonia  stable now.  antbx    #Diffuse Lymphadenopathy  including chest/abd/groin  hx of sarcoidosis... check for ACE  r/o reactive in lung due to PNA but abd LN?  send for LDH     #Hx of PE on Warfarin.. currently getting iv heparin  Also DVT.  hx of PT gene 2 mutation     #DM    # BARBY..

## 2021-09-08 NOTE — PROGRESS NOTE ADULT - SUBJECTIVE AND OBJECTIVE BOX
HPI:  80 y/o F pmhx significant for Essential HTN & hx of PE s/p Warfarin presents to ED from Home (lives alone) w/ x3 days of diarrhea & Weakness  Pt ;s hospital course was complicated by PNA and Septic shock   Pt has recovered and stablized with a course of iv antibx.  CT of chest however showed bronchoobst opacity , mediastinal/hilar/SC /axillary adenopathy and lung nodules.  Pt's CT of abd also showed RP and inguinal LAD.    In addition to above past medical hx , pt is also known for sarcoidosis invol lung.  Last CT as oupt was done in 2013 and 2016 with stable findings.          PAST MEDICAL & SURGICAL HISTORY:  H/O prothrombin mutation    Pulmonary embolism    HTN (hypertension)    Anemia requiring transfusions    Deep vein thrombosis (DVT)    S/P tonsillectomy    H/O: hysterectomy    History of cholecystectomy        FAMILY HISTORY  No pertinent family history in first degree relatives        SOCIAL HISTORY  Social History:        ROS  General: Denies rigors, nightsweats  HEENT: Denies headache, rhinorrhea, sore throat, eye pain  CV: Denies CP, palpitations  PULM: Denies wheezing, hemoptysis  GI: Denies hematemesis, hematochezia, melena  : Denies discharge, hematuria  MSK: Denies arthralgias, myalgias  SKIN: Denies rash, lesions  NEURO: Denies paresthesias, weakness  PSYCH: Denies depression, anxiety    VITALS:  T(F): 100.5, Max: 100.5 (09-04-21 @ 17:51)  VSS    PHYSICAL EXAM:  Gen: NAD, resting in bed  HEENT: Normocephalic, atraumatic  Neck: supple, no lymphadenopathy  CV: Regular rate & regular rhythm  left breast without breast mass.   Lungs: decreased BS at bases, no fremitus  Abdomen: Soft, BS present  Ext: Warm, well perfused  Neuro: non focal, awake  Skin: no rash, no erythema  Lines: no phlebitis        TESTS & MEASUREMENTS:                        11.5  16.30 )-----------( 147      ( 03 Sep 2021 22:30 )             36.8    09-03    133<L>  |  97<L>  |  55<H>  ----------------------------<  105<H>  4.3   |  17  |  3.8<H>    Ca    7.7<L>      03 Sep 2021 22:30    TPro  5.8<L>  /  Alb  3.3<L>  /  TBili  0.8  /  DBili  x   /  AST  46<H>  /  ALT  12  /  AlkPhos  85  09-03      LIVER FUNCTIONS - ( 03 Sep 2021 22:30 )  Alb: 3.3 g/dL / Pro: 5.8 g/dL / ALK PHOS: 85 U/L / ALT: 12 U/L / AST: 46 U/L / GGT: x                Blood Gas Venous - Lactate: 1.50 mmol/L (09-03-21 @ 20:20)      INFECTIOUS DISEASES TESTING  Procalcitonin, Serum: 6.63 ng/mL (09-04-21 @ 16:50)      RADIOLOGY & ADDITIONAL TESTS:  I have personally reviewed the last Chest xray  CXR      CT  CT Abdomen and Pelvis No Cont:  EXAM:  CT CHEST        EXAM:  CT ABDOMEN AND PELVIS            PROCEDURE DATE:  09/04/2021            INTERPRETATION:  CLINICAL HISTORY / REASON FOR EXAM: Left lower quadrant abdominal pain, weakness, fever.    TECHNIQUE: Contiguous axial CT imageswere obtained from the thoracic inlet to the pubic symphysis without intravenous contrast. Oral contrast was not administered. Coronal, sagittal and 3D/MIP reformatted images are also submitted.    COMPARISON CT: CT abdomen and pelvis 4/30/2013, CT chest 10/15/2015.        Confirmed by Kim Jang MD ([id number]) on 9/4/2021 7:48:48 AM (09-03-21 @ 20:25)      MEDICATIONS  cefepime   IVPB    cefepime   IVPB 1000 IV Intermittent every 24 hours  chlorhexidine 4% Liquid 1 Topical <User Schedule>  heparin  Infusion 1400 IV Continuous <Continuous>  norepinephrine Infusion 0.03 IV Continuous <Continuous>  pantoprazole    Tablet 40 Oral before breakfast  senna 2 Oral at bedtime  sodium chloride 0.9%. 1000 IV Continuous <Continuous>  vancomycin  IVPB            ALLERGIES:  No Known Allergies             HPI:  82 y/o F pmhx significant for Essential HTN & hx of PE s/p Warfarin presents to ED from Home (lives alone) w/ x3 days of diarrhea & Weakness  Pt ;s hospital course was complicated by PNA and Septic shock   Pt has recovered and stablized with a course of iv antibx.  CT of chest however showed bronchoobst opacity , mediastinal/hilar/SC /axillary adenopathy and lung nodules.  Pt's CT of abd also showed RP and inguinal LAD.    In addition to above past medical hx , pt is also known for sarcoidosis invol lung.  Last CT as oupt was done in 2013 and 2016 with stable findings.          PAST MEDICAL & SURGICAL HISTORY:  H/O prothrombin mutation    Pulmonary embolism    HTN (hypertension)    Anemia requiring transfusions    Deep vein thrombosis (DVT)    S/P tonsillectomy    H/O: hysterectomy    History of cholecystectomy        FAMILY HISTORY  No pertinent family history in first degree relatives        SOCIAL HISTORY  Social History:        ROS  General: Denies rigors, nightsweats  HEENT: Denies headache, rhinorrhea, sore throat, eye pain  CV: Denies CP, palpitations  PULM: Denies wheezing, hemoptysis  GI: Denies hematemesis, hematochezia, melena  : Denies discharge, hematuria  MSK: Denies arthralgias, myalgias  SKIN: Denies rash, lesions  NEURO: Denies paresthesias, weakness  PSYCH: Denies depression, anxiety    VITALS:  T(F): 100.5, Max: 100.5 (09-04-21 @ 17:51)  VSS    PHYSICAL EXAM:  Gen: NAD, resting in bed  HEENT: Normocephalic, atraumatic  Neck: supple, no lymphadenopathy  CV: Regular rate & regular rhythm  left breast without breast mass.   Lungs: decreased BS at bases, no fremitus  Abdomen: Soft, BS present  Ext: Warm, well perfused  Neuro: non focal, awake  Skin: no rash, no erythema  Lines: no phlebitis        TESTS & MEASUREMENTS:                        11.5  16.30 )-----------( 147      ( 03 Sep 2021 22:30 )             36.8  >>>wbc nl        hb 10    09-03    133<L>  |  97<L>  |  55<H>  ----------------------------<  105<H>  4.3   |  17  |  3.8<H>      >> creat <2 now     Ca    7.7<L>      03 Sep 2021 22:30    TPro  5.8<L>  /  Alb  3.3<L>  /  TBili  0.8  /  DBili  x   /  AST  46<H>  /  ALT  12  /  AlkPhos  85  09-03      LIVER FUNCTIONS - ( 03 Sep 2021 22:30 )  Alb: 3.3 g/dL / Pro: 5.8 g/dL / ALK PHOS: 85 U/L / ALT: 12 U/L / AST: 46 U/L / GGT: x                      ALLERGIES:  No Known Allergies

## 2021-09-08 NOTE — PROGRESS NOTE ADULT - ASSESSMENT
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 In the ED, she was diagnosed with septic shock, MAGDALENO, pneumonia and extensive LAD.    Septic shock present on admission due to suspected GNR versus aspiration pneumonia (coverage for GNR and aspiration)  History of Sarcoidosis  Saturating well on RA   off pressors  Urine strep and legionella negative, Blood cx NGTD, Urine Cx contaminated  continue with Zosyn for now  Possible bronch later this week if INR improved  Check ACE and LDH    New onset Afib  H/o PE on coumadin, with elevated INR   - HR improved with fluid bolus - keep hydrated and replete electrolytes  - increase lopressor to 25mg PO q8hr and monitor HR and BP  - TSH 1.18  - ECHO 9/7: EF 60-65%, grade 1 diastolic dysfunction, enlarged atria, PulmHTN  - pt already anticoagulated for h/o PE - INR supratherapeutic  - cardiology Dr Gupta following    Extensive LAD on CT scan - rule out lymphoma, other malignancy   - HemeOnc eval Dr. Grant following, patient with a history of sarcoid    MAGDALENO - ?baseline renal function - Cr 1.23 in 2017   possibly prerenal due to diarrhea and sepsis  less likely due to ATN from shock since improving daily  no hydronephrosis on CT scan  I's and O's   holding IVF for now   renal eval if not improving     Chronic diarrhea - worsened recently but currently pt without diarrhea  check stool for C diff and GI PCR - if persistent   off isolation for now    Elevated troponin could be due to demand ischemia and/or MAGDALENO - trending down     Hypokalemia and hypomagnesemia - repleting today    PROGRESS NOTE HANDOFF    Pending: monitoring HR and BP on metoprolol, f/u all cultures, labs in AM, HemeOnc f/u, pulm f/u, PT f/u  possible bronchoscopy this week    Disposition: from home

## 2021-09-08 NOTE — PROGRESS NOTE ADULT - ASSESSMENT
Impression:  Sepsis POA   Septic Shock resolved   UTI / PNA on ABX therapy   Generalized Lymphadenopathy possible lymphoma   MAGDALENO on CKD improving   HO PE was on coumadin  Supra therapeutic INR  LLL atelectasis infiltrate with possible endobronchial filling defect      PLAN:    CNS: Avoid CNS depressants    HEENT: Oral care    PULMONARY: HOB @ 45 degrees.  Wean O2 as tolerated.  Pulmonary toilet.  Nebs Q4 PRN.  Possible bronchoscopy when INR OK.  Repeat CXR      CARDIOVASCULAR: Avoid volume overload.  Echo noted. Gentle hydration with LR.  Avoid volume overload     GI: GI prophylaxis.  Feeding    RENAL: Follow up lytes. Correct as needed.     INFECTIOUS DISEASE:  Continue ABX.      HEMATOLOGICAL:  Monitor CBC and COags     ENDOCRINE:  Follow up FS.    Monitor in Stepdown    FULL CODE

## 2021-09-08 NOTE — PROGRESS NOTE ADULT - SUBJECTIVE AND OBJECTIVE BOX
Patient is a 81y old  Female who presents with a chief complaint of 3 days of Weakness & Diarrhea; (07 Sep 2021 14:22)          Over Night Events:  Feels better respiratory wise.  Off pressors.  Rate better controlled.  on NC O2         ROS:     All ROS are negative except HPI         PHYSICAL EXAM    ICU Vital Signs Last 24 Hrs  T(C): 36.1 (08 Sep 2021 05:12), Max: 36.6 (07 Sep 2021 20:00)  T(F): 96.9 (08 Sep 2021 05:12), Max: 97.9 (07 Sep 2021 20:00)  HR: 125 (08 Sep 2021 05:12) (102 - 125)  BP: 132/71 (08 Sep 2021 05:12) (110/69 - 132/71)  BP(mean): 97 (08 Sep 2021 05:12) (86 - 97)  ABP: --  ABP(mean): --  RR: 18 (08 Sep 2021 05:12) (18 - 18)  SpO2: 95% (08 Sep 2021 05:12) (95% - 97%)      CONSTITUTIONAL:  Well nourished.  NAD    ENT:   Airway patent,   Mouth with normal mucosa.   No thrush    EYES:   Pupils equal,   Round and reactive to light.    CARDIAC:   Normal rate,   Irregular rhythm.    No edema      Vascular:  Normal systolic impulse  No Carotid bruits    RESPIRATORY:   No wheezing  Bilateral BS  Normal chest expansion  Not tachypneic,  No use of accessory muscles    GASTROINTESTINAL:  Abdomen soft,   Non-tender,   No guarding,   + BS    MUSCULOSKELETAL:   Range of motion is not limited,  No clubbing, cyanosis    NEUROLOGICAL:   Alert and oriented   No motor  deficits.    SKIN:   Skin normal color for race,   Warm and dry   No evidence of rash.    PSYCHIATRIC:   Normal mood and affect.   No apparent risk to self or others.    HEMATOLOGICAL:  No cervical  lymphadenopathy.  no inguinal lymphadenopathy      09-07-21 @ 07:01  -  09-08-21 @ 07:00  --------------------------------------------------------  IN:  Total IN: 0 mL    OUT:    Voided (mL): 900 mL  Total OUT: 900 mL    Total NET: -900 mL          LABS:                            10.0   8.17  )-----------( 191      ( 07 Sep 2021 04:30 )             30.8                                               09-07    135  |  106  |  42<H>  ----------------------------<  119<H>  3.2<L>   |  19  |  2.2<H>    Creatinine Trend  BUN 42, Cr 2.2, (09-07-21 @ 04:30)  Creatinine Trend  BUN 54, Cr 2.9, (09-06-21 @ 05:49)  Creatinine Trend  BUN 55, Cr 3.3, (09-05-21 @ 04:30)  Creatinine Trend  BUN 55, Cr 3.8, (09-03-21 @ 22:30)      Ca    7.7<L>      07 Sep 2021 04:30  Mg     1.6     09-07        PT/INR - ( 07 Sep 2021 04:30 )   PT: >40.00 sec;   INR: 5.16 ratio                                                                                                                                                                                                                            MEDICATIONS  (STANDING):  chlorhexidine 4% Liquid 1 Application(s) Topical <User Schedule>  metoprolol tartrate 12.5 milliGRAM(s) Oral three times a day  pantoprazole    Tablet 40 milliGRAM(s) Oral before breakfast  piperacillin/tazobactam IVPB.. 3.375 Gram(s) IV Intermittent every 12 hours    MEDICATIONS  (PRN):  acetaminophen   Tablet .. 650 milliGRAM(s) Oral every 6 hours PRN Temp greater or equal to 38.5C (101.3F), Mild Pain (1 - 3)  aluminum hydroxide/magnesium hydroxide/simethicone Suspension 30 milliLiter(s) Oral every 4 hours PRN Dyspepsia  melatonin 3 milliGRAM(s) Oral at bedtime PRN Insomnia  ondansetron Injectable 4 milliGRAM(s) IV Push every 8 hours PRN Nausea and/or Vomiting      New X-rays reviewed:                                                                                  ECHO    CXR interpreted by me:

## 2021-09-08 NOTE — PROGRESS NOTE ADULT - SUBJECTIVE AND OBJECTIVE BOX
JOEY KNAPP  81y  Female      Patient is a 81y old  Female who presents with a chief complaint of 3 days of Weakness & Diarrhea; (08 Sep 2021 07:56)      INTERVAL HPI:  80 y/o F pmhx significant for Essential HTN & hx of PE s/p Warfarin presents to ED from Home (lives alone) w/ x3 days of diarrhea & Weakness.   ED:   - Triage: BP was 75/49 that did not respond to IV boluses (LR 2.5L total) s/p Levophed;  - VS: , Temp 103F, BP 75/49  - Labs: WBC 16, INR 5.5, Na 133, Cr 3.8, Tn 0.07, BNP 57642, UA(+)  - CXR: Bilateral opacities/left pleural effusion. Bilateral hilar adenopathy.   - CT Chest/Abd/Pelvis:   New mediastinal, axillary, hilar, supraclavicular, retroperitoneal, and bilateral inguinal lymphadenopathy as above. Findings highly suspicious for neoplastic process.Left lower lobe lobe bronchus filling defects, with left lung lower lobe consolidative opacification.   Additional bilateral bilateral patchy opacities. Findings consistent with pneumonia;  Bilateral pulmonary nodular opacities.       OVERNIGHT EVENTS:  No acute events overnight. Patient resting in bed, no acute distress. States that she's mostly passing gas, with intermittent loose stools.         Vital Signs Last 24 Hrs  T(C): 36.1 (08 Sep 2021 05:12), Max: 36.6 (07 Sep 2021 20:00)  T(F): 96.9 (08 Sep 2021 05:12), Max: 97.9 (07 Sep 2021 20:00)  HR: 125 (08 Sep 2021 05:12) (102 - 125)  BP: 132/71 (08 Sep 2021 05:12) (110/69 - 132/71)  BP(mean): 97 (08 Sep 2021 05:12) (86 - 97)  RR: 18 (08 Sep 2021 05:12) (18 - 18)  SpO2: 95% (08 Sep 2021 05:12) (95% - 97%)    PHYSICAL EXAM:  GENERAL: NAD  HEAD:  Atraumatic, Normocephalic  EYES: EOMI, PERRLA   ENMT: Moist mucous membranes   NECK: Supple  NERVOUS SYSTEM:  Alert & Oriented X3  CHEST/LUNG: Clear to auscultation bilaterally  HEART: IRR , tachycardia   ABDOMEN: Soft, Nontender, Nondistended; Bowel sounds present  EXTREMITIES:  2+ Peripheral Pulses, No clubbing, cyanosis, or edema    Consultant(s) Notes Reviewed:  [x ] YES  [ ] NO  Care Discussed with Consultants/Other Providers [ x] YES  [ ] NO    LABS:                        10.0   8.17  )-----------( 191      ( 07 Sep 2021 04:30 )             30.8     09-07    135  |  106  |  42<H>  ----------------------------<  119<H>  3.2<L>   |  19  |  2.2<H>    Ca    7.7<L>      07 Sep 2021 04:30  Mg     1.6     09-07      PT/INR - ( 07 Sep 2021 04:30 )   PT: >40.00 sec;   INR: 5.16 ratio               CAPILLARY BLOOD GLUCOSE          RADIOLOGY & ADDITIONAL TESTS:    Imaging Personally Reviewed:  [ ] YES  [ ] NO

## 2021-09-08 NOTE — PROGRESS NOTE ADULT - ASSESSMENT
#Septic shock present on admission likely due to pneumonia - Resolved   -CXR on admission showed b/l opacities with L pleural effusion   - Zosyn per Pulm  - blood cx negative, nasal MRSA swab negative.  F/u urine for Legionella and Strep  - now off levophed - monitor BP  - LDH, HIV- wnl   - d/c fluids today   - possible bronch later this week  - stepdown monitoring  - guarded prognosis/ full code status    Extensive LAD on CT scan -  - rule out lymphoma, other malignancy - HemeOnc eval(Rossi Garcia)  ?IR biopsy of left axillary Lymph node  -Dr Grant on board - will see patient     Acute Kidney Injury   - possibly prerenal due to diarrhea and sepsis - rule out ATN due to shock  -no hydronephrosis on CT scan  -I- 's and O's prima fit in place   -cr improving 3.8>3.3>>2.2  -IVF d/c today -      #h/o PE and prothrombin gene mutation   - hold INR - supratherapeutic - start heparin IV when INR <2    #New onset afib w/ RVR   -cardiology recs appreciated  - Currently with supratherapeutic INR 4.8 --  resume a/c with home coumadin once within therapeutic range.   -lopressor 12.5 q8H, Hold for SBP <95      #Septic shock present on admission likely due to pneumonia - Resolved   -CXR on admission showed b/l opacities with L pleural effusion   - Zosyn per Pulm  - blood cx negative, nasal MRSA swab negative.  F/u urine for Legionella and Strep  - now off levophed - monitor BP  - LDH, HIV- wnl   - d/c fluids today   - possible bronch later this week  - stepdown monitoring  - guarded prognosis/ full code status    Extensive LAD on CT scan -  - rule out lymphoma, other malignancy - HemeOnc eval(Rossi Garcia)  ?IR biopsy of left axillary Lymph node  -Dr Grant on board - will see patient     Acute Kidney Injury   - possibly prerenal due to diarrhea and sepsis - rule out ATN due to shock  -no hydronephrosis on CT scan  -I- 's and O's prima fit in place   -cr improving 3.8>3.3>>2.2  -IVF d/c today -      #h/o PE and prothrombin gene mutation   - hold INR - supratherapeutic - start heparin IV when INR <2    #New onset afib w/ RVR   -cardiology recs appreciated  - Currently with supratherapeutic INR 4.8 --  resume a/c with home coumadin once within therapeutic range.   -lopressor 25 q8H, Hold for SBP <95

## 2021-09-09 LAB
ANION GAP SERPL CALC-SCNC: 14 MMOL/L — SIGNIFICANT CHANGE UP (ref 7–14)
BUN SERPL-MCNC: 25 MG/DL — HIGH (ref 10–20)
CALCIUM SERPL-MCNC: 7.9 MG/DL — LOW (ref 8.5–10.1)
CHLORIDE SERPL-SCNC: 107 MMOL/L — SIGNIFICANT CHANGE UP (ref 98–110)
CO2 SERPL-SCNC: 21 MMOL/L — SIGNIFICANT CHANGE UP (ref 17–32)
CREAT SERPL-MCNC: 1.3 MG/DL — SIGNIFICANT CHANGE UP (ref 0.7–1.5)
CULTURE RESULTS: SIGNIFICANT CHANGE UP
CULTURE RESULTS: SIGNIFICANT CHANGE UP
GLUCOSE SERPL-MCNC: 133 MG/DL — HIGH (ref 70–99)
HCT VFR BLD CALC: 30.1 % — LOW (ref 37–47)
HGB BLD-MCNC: 9.5 G/DL — LOW (ref 12–16)
INR BLD: 4.87 RATIO — HIGH (ref 0.65–1.3)
LDH SERPL L TO P-CCNC: 239 — SIGNIFICANT CHANGE UP (ref 50–242)
MAGNESIUM SERPL-MCNC: 1.6 MG/DL — LOW (ref 1.8–2.4)
MCHC RBC-ENTMCNC: 28.8 PG — SIGNIFICANT CHANGE UP (ref 27–31)
MCHC RBC-ENTMCNC: 31.6 G/DL — LOW (ref 32–37)
MCV RBC AUTO: 91.2 FL — SIGNIFICANT CHANGE UP (ref 81–99)
NRBC # BLD: 0 /100 WBCS — SIGNIFICANT CHANGE UP (ref 0–0)
PLATELET # BLD AUTO: 293 K/UL — SIGNIFICANT CHANGE UP (ref 130–400)
POTASSIUM SERPL-MCNC: 3.7 MMOL/L — SIGNIFICANT CHANGE UP (ref 3.5–5)
POTASSIUM SERPL-SCNC: 3.7 MMOL/L — SIGNIFICANT CHANGE UP (ref 3.5–5)
PROTHROM AB SERPL-ACNC: >40 SEC — HIGH (ref 9.95–12.87)
RBC # BLD: 3.3 M/UL — LOW (ref 4.2–5.4)
RBC # FLD: 13.8 % — SIGNIFICANT CHANGE UP (ref 11.5–14.5)
SODIUM SERPL-SCNC: 142 MMOL/L — SIGNIFICANT CHANGE UP (ref 135–146)
SPECIMEN SOURCE: SIGNIFICANT CHANGE UP
SPECIMEN SOURCE: SIGNIFICANT CHANGE UP
WBC # BLD: 12.49 K/UL — HIGH (ref 4.8–10.8)
WBC # FLD AUTO: 12.49 K/UL — HIGH (ref 4.8–10.8)

## 2021-09-09 PROCEDURE — 99233 SBSQ HOSP IP/OBS HIGH 50: CPT

## 2021-09-09 RX ORDER — MAGNESIUM SULFATE 500 MG/ML
2 VIAL (ML) INJECTION ONCE
Refills: 0 | Status: COMPLETED | OUTPATIENT
Start: 2021-09-09 | End: 2021-09-09

## 2021-09-09 RX ORDER — METOPROLOL TARTRATE 50 MG
50 TABLET ORAL
Refills: 0 | Status: DISCONTINUED | OUTPATIENT
Start: 2021-09-09 | End: 2021-09-10

## 2021-09-09 RX ORDER — PHYTONADIONE (VIT K1) 5 MG
2.5 TABLET ORAL ONCE
Refills: 0 | Status: COMPLETED | OUTPATIENT
Start: 2021-09-09 | End: 2021-09-09

## 2021-09-09 RX ADMIN — Medication 650 MILLIGRAM(S): at 17:08

## 2021-09-09 RX ADMIN — PANTOPRAZOLE SODIUM 40 MILLIGRAM(S): 20 TABLET, DELAYED RELEASE ORAL at 05:24

## 2021-09-09 RX ADMIN — PIPERACILLIN AND TAZOBACTAM 25 GRAM(S): 4; .5 INJECTION, POWDER, LYOPHILIZED, FOR SOLUTION INTRAVENOUS at 17:00

## 2021-09-09 RX ADMIN — Medication 25 MILLIGRAM(S): at 05:25

## 2021-09-09 RX ADMIN — Medication 25 MILLIGRAM(S): at 14:30

## 2021-09-09 RX ADMIN — CHLORHEXIDINE GLUCONATE 1 APPLICATION(S): 213 SOLUTION TOPICAL at 05:24

## 2021-09-09 RX ADMIN — Medication 650 MILLIGRAM(S): at 17:51

## 2021-09-09 RX ADMIN — Medication 25 GRAM(S): at 17:02

## 2021-09-09 RX ADMIN — Medication 2.5 MILLIGRAM(S): at 11:45

## 2021-09-09 RX ADMIN — Medication 50 MILLIGRAM(S): at 21:34

## 2021-09-09 RX ADMIN — PIPERACILLIN AND TAZOBACTAM 25 GRAM(S): 4; .5 INJECTION, POWDER, LYOPHILIZED, FOR SOLUTION INTRAVENOUS at 05:23

## 2021-09-09 NOTE — PROGRESS NOTE ADULT - SUBJECTIVE AND OBJECTIVE BOX
JOEY KNAPP  81y  Female      Patient is a 81y old  Female who presents with a chief complaint of 3 days of Weakness & Diarrhea; (08 Sep 2021 16:39)      INTERVAL HPI:  82 y/o F pmhx significant for Essential HTN & hx of PE s/p Warfarin presents to ED from Home (lives alone) w/ x3 days of diarrhea & Weakness.   ED:   - Triage: BP was 75/49 that did not respond to IV boluses (LR 2.5L total) s/p Levophed;  - VS: , Temp 103F, BP 75/49  - Labs: WBC 16, INR 5.5, Na 133, Cr 3.8, Tn 0.07, BNP 66075, UA(+)  - CXR: Bilateral opacities/left pleural effusion. Bilateral hilar adenopathy.   - CT Chest/Abd/Pelvis:   New mediastinal, axillary, hilar, supraclavicular, retroperitoneal, and bilateral inguinal lymphadenopathy as above. Findings highly suspicious for neoplastic process.Left lower lobe lobe bronchus filling defects, with left lung lower lobe consolidative opacification.   Additional bilateral bilateral patchy opacities. Findings consistent with pneumonia;  Bilateral pulmonary nodular opacities.     OVERNIGHT EVENTS:  No acute events overnihgt. Patient on 2L NC. Denies SOB, fevers, chills ans palpitations.   Patient's metoprolol increased to 25 q8h yesterday. HR is still not well controlled. Will try metoprolol 50 BID.         Vital Signs Last 24 Hrs  T(C): 36.8 (09 Sep 2021 05:34), Max: 37.7 (08 Sep 2021 12:00)  T(F): 98.2 (09 Sep 2021 05:34), Max: 99.8 (08 Sep 2021 12:00)  HR: 122 (09 Sep 2021 05:34) (76 - 122)  BP: 130/73 (09 Sep 2021 05:34) (106/67 - 135/63)  BP(mean): 84 (09 Sep 2021 04:33) (84 - 90)  RR: 18 (09 Sep 2021 05:34) (18 - 18)  SpO2: 98% (09 Sep 2021 05:34) (96% - 100%)    Physical Exam:  GENERAL: NAD  HEAD:  Atraumatic, Normocephalic  EYES: EOMI, PERRLA   ENMT: Moist mucous membranes   NECK: Supple  NERVOUS SYSTEM:  Alert & Oriented X3  CHEST/LUNG: Clear to auscultation bilaterally  HEART: IRR , tachycardia   ABDOMEN: Soft, Nontender, Nondistended; Bowel sounds present  EXTREMITIES:  2+ Peripheral Pulses, No clubbing, cyanosis, or edema    Consultant(s) Notes Reviewed:  [x ] YES  [ ] NO  Care Discussed with Consultants/Other Providers [ x] YES  [ ] NO    LABS:                        9.5    12.49 )-----------( 293      ( 09 Sep 2021 08:24 )             30.1     09-09    142  |  107  |  25<H>  ----------------------------<  133<H>  3.7   |  21  |  1.3    Ca    7.9<L>      09 Sep 2021 08:24  Mg     1.6     09-09      PT/INR - ( 09 Sep 2021 08:24 )   PT: >40.00 sec;   INR: 4.87 ratio               CAPILLARY BLOOD GLUCOSE          RADIOLOGY & ADDITIONAL TESTS:    EXAM:  XR CHEST PORTABLE IMMED 1V            PROCEDURE DATE:  09/08/2021            INTERPRETATION:  Clinical History / Reason for exam: Aspiration pneumonia.    Comparison : Chest radiograph 9/3/2021.    Technique/Positioning: AP radiograph of thechest    Findings:    Support devices: None.    Cardiac/mediastinum/hilum: Unchanged.    Lung parenchyma/Pleura: Minimal blunting, right costophrenic angle.. Stable left opacity/effusion. No pneumothorax.    Skeleton/soft tissues: Unchanged.    Impression:        Stable left opacity/effusion  Minimal blunting right costophrenic angle  . No pneumothorax.        --- End of Report ---      Imaging Personally Reviewed:  [ ] YES  [ ] NO

## 2021-09-09 NOTE — PROGRESS NOTE ADULT - SUBJECTIVE AND OBJECTIVE BOX
HPI:  82 y/o F pmhx significant for Essential HTN & hx of PE s/p Warfarin presents to ED from Home (lives alone) w/ x3 days of diarrhea & Weakness  Pt ;s hospital course was complicated by PNA and Septic shock   Pt has recovered and stablized with a course of iv antibx.  CT of chest however showed bronchoobst opacity , mediastinal/hilar/SC /axillary adenopathy and lung nodules.  Pt's CT of abd also showed RP and inguinal LAD.    In addition to above past medical hx , pt is also known for sarcoidosis invol lung.  Last CT as oupt was done in 2013 and 2016 with stable findings.          PAST MEDICAL & SURGICAL HISTORY:  H/O prothrombin mutation    Pulmonary embolism    HTN (hypertension)    Anemia requiring transfusions    Deep vein thrombosis (DVT)    S/P tonsillectomy    H/O: hysterectomy    History of cholecystectomy        FAMILY HISTORY  No pertinent family history in first degree relatives        SOCIAL HISTORY  Social History:        ROS  General: Denies rigors, nightsweats  HEENT: Denies headache, rhinorrhea, sore throat, eye pain  CV: Denies CP, palpitations  PULM: Denies wheezing, hemoptysis  GI: Denies hematemesis, hematochezia, melena  : Denies discharge, hematuria  MSK: Denies arthralgias, myalgias  SKIN: Denies rash, lesions  NEURO: Denies paresthesias, weakness  PSYCH: Denies depression, anxiety    VITALS:    VSS    PHYSICAL EXAM:  Gen: NAD, resting in bed  HEENT: Normocephalic, atraumatic  Neck: supple, no lymphadenopathy  CV: Regular rate & regular rhythm  left breast without breast mass.   Lungs: decreased BS at bases, no fremitus  Abdomen: Soft, BS present  Ext: Warm, well perfused  Neuro: non focal, awake  Skin: no rash, no erythema  Lines: no phlebitis        TESTS & MEASUREMENTS:                        11.5  16.30 )-----------( 147      ( 03 Sep 2021 22:30 )             36.8  >>>wbc nl>>12K        hb 10>>9    09-03    133<L>  |  97<L>  |  55<H>  ----------------------------<  105<H>  4.3   |  17  |  3.8<H>      >> creat <2 now     Ca    7.7<L>      03 Sep 2021 22:30    TPro  5.8<L>  /  Alb  3.3<L>  /  TBili  0.8  /  DBili  x   /  AST  46<H>  /  ALT  12  /  AlkPhos  85  09-03      LIVER FUNCTIONS - ( 03 Sep 2021 22:30 )  Alb: 3.3 g/dL / Pro: 5.8 g/dL / ALK PHOS: 85 U/L / ALT: 12 U/L / AST: 46 U/L / GGT: x                      ALLERGIES:  No Known Allergies

## 2021-09-09 NOTE — PROGRESS NOTE ADULT - ASSESSMENT
#s/p Septic Shock secondary to Aspiration vs GN pneumonia  stable now.  antbx    #Diffuse Lymphadenopathy  including chest/abd/groin  hx of sarcoidosis... check for ACE  r/o reactive in lung due to PNA but abd LN?  send for LDH     #Hx of PE on Warfarin.. currently getting iv heparin  Also DVT.  hx of PT gene 2 mutation     # afib .. on meds    # renal insuff.. improving    # anemia of chronic dx     # weakness/debiltation.. PT eval

## 2021-09-09 NOTE — PROGRESS NOTE ADULT - ASSESSMENT
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 In the ED, she was diagnosed with septic shock, MAGDALENO, pneumonia and extensive LAD.    Septic shock present on admission due to suspected GNR versus aspiration pneumonia (coverage for GNR and aspiration)  History of Sarcoidosis  Saturating well on RA   off pressors  Urine strep and legionella negative, Blood cx NGTD, Urine Cx contaminated  continue with Zosyn for now  Possible bronch later this week if INR improved  Check ACE and LDH    New onset Afib  H/o PE on coumadin, with elevated INR   - HR improved with fluid bolus - keep hydrated and replete electrolytes  - increase lopressor to 50 Q12H and monitor HR and BP  - TSH 1.18  - ECHO 9/7: EF 60-65%, grade 1 diastolic dysfunction, enlarged atria, PulmHTN  - pt already anticoagulated for h/o PE - INR supratherapeutic  - gve vitK 2.5mg PO and monitor INR  - cardiology Dr Gupta following    Extensive LAD on CT scan - rule out lymphoma, other malignancy   - HemeOnc eval Dr. Grant following, patient with a history of sarcoid    MAGDALENO - ?baseline renal function - Cr 1.23 in 2017   possibly prerenal due to diarrhea and sepsis  less likely due to ATN from shock since improving daily  no hydronephrosis on CT scan  I's and O's   holding IVF for now   renal eval if not improving     Chronic diarrhea - worsened recently but currently pt without diarrhea  check stool for C diff and GI PCR - if persistent   off isolation for now    Elevated troponin could be due to demand ischemia and/or MAGDALENO - trending down     Hypokalemia and hypomagnesemia - repleting today    PROGRESS NOTE HANDOFF    Pending: monitoring HR and BP on metoprolol, f/u all cultures, labs in AM, HemeOnc f/u, pulm f/u for possible bronch once INR therapeutic, PT f/u  possible bronchoscopy this week    Disposition: from home

## 2021-09-09 NOTE — PROGRESS NOTE ADULT - ASSESSMENT
#Septic shock present on admission likely due to pneumonia - Resolved   -CXR on admission showed b/l opacities with L pleural effusion   - Zosyn per Pulm  - blood cx negative, nasal MRSA swab negative.  urine legionella negative   - now off levophed - monitor BP  - LDH, HIV- wnl   - d/c fluids   - possible bronch later this week  - stepdown monitoring  - guarded prognosis/ full code status    Extensive LAD on CT scan -  - rule out lymphoma, other malignancy - HemeOnc eval(Rossi Garcia)  ?IR biopsy of left axillary Lymph node  -Dr Grant on board   -Hx of sarcoidoisis   - Pending ACE and LDH     Acute Kidney Injury   - possibly prerenal due to diarrhea and sepsis - rule out ATN due to shock  -no hydronephrosis on CT scan  -I- 's and O's prima fit in place   -cr improving 3.8>3.3>>2.2  -IVF d/c      #h/o PE and prothrombin gene mutation   - hold INR - supratherapeutic - start heparin IV when INR <2  - Vit K 2.5 PO once today     #New onset afib w/ RVR   -cardiology recs appreciated  - Currently with supratherapeutic INR 4.8 --  resume a/c with home coumadin once within therapeutic range.   -lopressor 50 BID , Hold for SBP <95       Diet: Dash/TLC  Avtivity: IAT  Dispo: from home   Pending bronchoscopy

## 2021-09-09 NOTE — PROGRESS NOTE ADULT - SUBJECTIVE AND OBJECTIVE BOX
JOEY KNAPP  81y Female    CHIEF COMPLAINT:    Patient is a 81y old  Female who presents with a chief complaint of 3 days of Weakness & Diarrhea; (09 Sep 2021 13:03)    INTERVAL HPI/OVERNIGHT EVENTS:    Patient seen and examined. No acute events overnight. Clinically improving     ROS: All other systems are negative.    Vital Signs:    T(F): 98 (09-09-21 @ 08:00), Max: 98.3 (09-08-21 @ 22:59)  HR: 106 (09-09-21 @ 08:00) (76 - 122)  BP: 126/70 (09-09-21 @ 08:00) (106/67 - 135/63)  RR: 18 (09-09-21 @ 08:00) (18 - 18)  SpO2: 97% (09-09-21 @ 08:00) (96% - 100%)    08 Sep 2021 07:01  -  09 Sep 2021 07:00  --------------------------------------------------------  IN: 100 mL / OUT: 1100 mL / NET: -1000 mL    PHYSICAL EXAM:    GENERAL:  NAD  SKIN: No rashes or lesions  HEENT: Atraumatic. Normocephalic.   NECK: Supple, No JVD.   PULMONARY: Coarse breath sounds. No wheezing. No rales  CVS: Normal S1, S2. Rate and Rhythm are regular.   ABDOMEN/GI: Soft, Nontender, Nondistended  MSK:  No clubbing or cyanosis   NEUROLOGIC: moves all extremities   PSYCH: Awake and alert    Consultant(s) Notes Reviewed:  [x ] YES  [ ] NO  Care Discussed with Consultants/Other Providers [ x] YES  [ ] NO    LABS:                        9.5    12.49 )-----------( 293      ( 09 Sep 2021 08:24 )             30.1     142  |  107  |  25<H>  ----------------------------<  133<H>  3.7   |  21  |  1.3    Ca    7.9<L>      09 Sep 2021 08:24  Mg     1.6     09-09    PT/INR - ( 09 Sep 2021 08:24 )   PT: >40.00 sec;   INR: 4.87 ratio      Serum Pro-Brain Natriuretic Peptide: 6971 pg/mL (09-07-21 @ 04:30)  Serum Pro-Brain Natriuretic Peptide: 92826 pg/mL (09-03-21 @ 22:30)    GI PCR Panel, Stool (collected 08 Sep 2021 11:17)  Source: .Stool Feces  Final Report (08 Sep 2021 20:40):    GI PCR Results: NOT detected    *******Please Note:*******    GI panel PCR evaluates for:    Campylobacter, Plesiomonas shigelloides, Salmonella,    Vibrio, Yersinia enterocolitica, Enteroaggregative    Escherichia coli (EAEC), Enteropathogenic E.coli (EPEC),    Enterotoxigenic E. coli (ETEC) lt/st, Shiga-like    toxin-producing E. coli (STEC) stx1/stx2,    Shigella/ Enteroinvasive E. coli (EIEC), Cryptosporidium,    Cyclospora cayetanensis, Entamoeba histolytica,    Giardia lamblia, Adenovirus F 40/41, Astrovirus,    Norovirus GI/GII, Rotavirus A, Sapovirus    RADIOLOGY & ADDITIONAL TESTS:  Imaging or report Personally Reviewed:  [x] YES  [ ] NO  EKG reviewed: [x] YES  [ ] NO    Medications:  Standing  chlorhexidine 4% Liquid 1 Application(s) Topical <User Schedule>  magnesium sulfate  IVPB 2 Gram(s) IV Intermittent once  magnesium sulfate  IVPB 2 Gram(s) IV Intermittent once  metoprolol tartrate 25 milliGRAM(s) Oral every 8 hours  pantoprazole    Tablet 40 milliGRAM(s) Oral before breakfast  piperacillin/tazobactam IVPB.. 3.375 Gram(s) IV Intermittent every 12 hours    PRN Meds  acetaminophen   Tablet .. 650 milliGRAM(s) Oral every 6 hours PRN  aluminum hydroxide/magnesium hydroxide/simethicone Suspension 30 milliLiter(s) Oral every 4 hours PRN  melatonin 3 milliGRAM(s) Oral at bedtime PRN  ondansetron Injectable 4 milliGRAM(s) IV Push every 8 hours PRN

## 2021-09-10 LAB
ALBUMIN SERPL ELPH-MCNC: 2.5 G/DL — LOW (ref 3.5–5.2)
ALP SERPL-CCNC: 132 U/L — HIGH (ref 30–115)
ALT FLD-CCNC: 22 U/L — SIGNIFICANT CHANGE UP (ref 0–41)
ANION GAP SERPL CALC-SCNC: 12 MMOL/L — SIGNIFICANT CHANGE UP (ref 7–14)
AST SERPL-CCNC: 48 U/L — HIGH (ref 0–41)
BASOPHILS # BLD AUTO: 0.02 K/UL — SIGNIFICANT CHANGE UP (ref 0–0.2)
BASOPHILS NFR BLD AUTO: 0.1 % — SIGNIFICANT CHANGE UP (ref 0–1)
BILIRUB SERPL-MCNC: 0.6 MG/DL — SIGNIFICANT CHANGE UP (ref 0.2–1.2)
BUN SERPL-MCNC: 29 MG/DL — HIGH (ref 10–20)
CALCIUM SERPL-MCNC: 7.7 MG/DL — LOW (ref 8.5–10.1)
CHLORIDE SERPL-SCNC: 106 MMOL/L — SIGNIFICANT CHANGE UP (ref 98–110)
CO2 SERPL-SCNC: 21 MMOL/L — SIGNIFICANT CHANGE UP (ref 17–32)
CREAT SERPL-MCNC: 1.4 MG/DL — SIGNIFICANT CHANGE UP (ref 0.7–1.5)
EOSINOPHIL # BLD AUTO: 0.16 K/UL — SIGNIFICANT CHANGE UP (ref 0–0.7)
EOSINOPHIL NFR BLD AUTO: 1 % — SIGNIFICANT CHANGE UP (ref 0–8)
GLUCOSE SERPL-MCNC: 145 MG/DL — HIGH (ref 70–99)
HCT VFR BLD CALC: 27.8 % — LOW (ref 37–47)
HGB BLD-MCNC: 8.7 G/DL — LOW (ref 12–16)
IMM GRANULOCYTES NFR BLD AUTO: 1.3 % — HIGH (ref 0.1–0.3)
INR BLD: 1.35 RATIO — HIGH (ref 0.65–1.3)
LYMPHOCYTES # BLD AUTO: 14.6 % — LOW (ref 20.5–51.1)
LYMPHOCYTES # BLD AUTO: 2.23 K/UL — SIGNIFICANT CHANGE UP (ref 1.2–3.4)
MAGNESIUM SERPL-MCNC: 1.6 MG/DL — LOW (ref 1.8–2.4)
MCHC RBC-ENTMCNC: 29 PG — SIGNIFICANT CHANGE UP (ref 27–31)
MCHC RBC-ENTMCNC: 31.3 G/DL — LOW (ref 32–37)
MCV RBC AUTO: 92.7 FL — SIGNIFICANT CHANGE UP (ref 81–99)
MONOCYTES # BLD AUTO: 0.95 K/UL — HIGH (ref 0.1–0.6)
MONOCYTES NFR BLD AUTO: 6.2 % — SIGNIFICANT CHANGE UP (ref 1.7–9.3)
NEUTROPHILS # BLD AUTO: 11.73 K/UL — HIGH (ref 1.4–6.5)
NEUTROPHILS NFR BLD AUTO: 76.8 % — HIGH (ref 42.2–75.2)
NRBC # BLD: 0 /100 WBCS — SIGNIFICANT CHANGE UP (ref 0–0)
PLATELET # BLD AUTO: 319 K/UL — SIGNIFICANT CHANGE UP (ref 130–400)
POTASSIUM SERPL-MCNC: 4.5 MMOL/L — SIGNIFICANT CHANGE UP (ref 3.5–5)
POTASSIUM SERPL-SCNC: 4.5 MMOL/L — SIGNIFICANT CHANGE UP (ref 3.5–5)
PROT SERPL-MCNC: 4.4 G/DL — LOW (ref 6–8)
PROTHROM AB SERPL-ACNC: 15.5 SEC — HIGH (ref 9.95–12.87)
RBC # BLD: 3 M/UL — LOW (ref 4.2–5.4)
RBC # FLD: 13.8 % — SIGNIFICANT CHANGE UP (ref 11.5–14.5)
SODIUM SERPL-SCNC: 139 MMOL/L — SIGNIFICANT CHANGE UP (ref 135–146)
WBC # BLD: 15.29 K/UL — HIGH (ref 4.8–10.8)
WBC # FLD AUTO: 15.29 K/UL — HIGH (ref 4.8–10.8)

## 2021-09-10 PROCEDURE — 99233 SBSQ HOSP IP/OBS HIGH 50: CPT

## 2021-09-10 RX ORDER — HEPARIN SODIUM 5000 [USP'U]/ML
1100 INJECTION INTRAVENOUS; SUBCUTANEOUS
Qty: 25000 | Refills: 0 | Status: DISCONTINUED | OUTPATIENT
Start: 2021-09-10 | End: 2021-09-11

## 2021-09-10 RX ORDER — METOPROLOL TARTRATE 50 MG
50 TABLET ORAL ONCE
Refills: 0 | Status: COMPLETED | OUTPATIENT
Start: 2021-09-10 | End: 2021-09-10

## 2021-09-10 RX ORDER — METOPROLOL TARTRATE 50 MG
50 TABLET ORAL
Refills: 0 | Status: DISCONTINUED | OUTPATIENT
Start: 2021-09-10 | End: 2021-09-22

## 2021-09-10 RX ORDER — HEPARIN SODIUM 5000 [USP'U]/ML
1200 INJECTION INTRAVENOUS; SUBCUTANEOUS
Qty: 25000 | Refills: 0 | Status: DISCONTINUED | OUTPATIENT
Start: 2021-09-10 | End: 2021-09-10

## 2021-09-10 RX ADMIN — PANTOPRAZOLE SODIUM 40 MILLIGRAM(S): 20 TABLET, DELAYED RELEASE ORAL at 07:58

## 2021-09-10 RX ADMIN — Medication 50 MILLIGRAM(S): at 15:18

## 2021-09-10 RX ADMIN — Medication 50 MILLIGRAM(S): at 17:53

## 2021-09-10 RX ADMIN — PIPERACILLIN AND TAZOBACTAM 25 GRAM(S): 4; .5 INJECTION, POWDER, LYOPHILIZED, FOR SOLUTION INTRAVENOUS at 17:53

## 2021-09-10 RX ADMIN — CHLORHEXIDINE GLUCONATE 1 APPLICATION(S): 213 SOLUTION TOPICAL at 05:29

## 2021-09-10 NOTE — PROGRESS NOTE ADULT - ASSESSMENT
#Septic shock present on admission likely due to pneumonia - Resolved   -CXR on admission showed b/l opacities with L pleural effusion   - Zosyn per Pulm  - blood cx negative, nasal MRSA swab negative.  urine legionella negative   - now off levophed - monitor BP  - LDH, HIV- wnl   - d/c fluids   - possible bronch later this week  - stepdown monitoring  - guarded prognosis/ full code status    Extensive LAD on CT scan -  - rule out lymphoma, other malignancy - HemeOnc eval(Rossi Garcia)  ?IR biopsy of left axillary Lymph node  -Dr Grant on board   -Hx of sarcoidoisis   - Pending ACE. LDH wnl     Acute Kidney Injury   - possibly prerenal due to diarrhea and sepsis - rule out ATN due to shock  -no hydronephrosis on CT scan  -I- 's and O's prima fit in place   -cr improving 3.8>3.3>>2.2  -IVF d/c      #h/o PE and prothrombin gene mutation   - hold INR - supratherapeutic - start heparin IV when INR <2  - Vit K 2.5 PO once yesterday     #New onset afib w/ RVR   -cardiology recs appreciated  - Currently with supratherapeutic INR 4.8 --  resume a/c with home coumadin once within therapeutic range.   -lopressor 50 BID , Hold for SBP <95       Diet: Dash/TLC  Avtivity: IAT  Dispo: from home   Pending bronchoscopy

## 2021-09-10 NOTE — PROGRESS NOTE ADULT - SUBJECTIVE AND OBJECTIVE BOX
JOEY KNAPP  81y, Female  Allergy: No Known Allergies      LOS  6d    CHIEF COMPLAINT: 3 days of Weakness & Diarrhea; (10 Sep 2021 10:24)      INTERVAL EVENTS/HPI  - No acute events overnight  - T(F): , Max: 100.8 (09-09-21 @ 17:00)  - Febrile last night -- on room air   - Denies any worsening symptoms  - Tolerating medication  - WBC Count: 12.49 (09-09-21 @ 08:24)  WBC Count: 10.84 (09-08-21 @ 04:30)     - Creatinine, Serum: 1.3 (09-09-21 @ 08:24)       ROS  General: Denies rigors, nightsweats  HEENT: Denies headache, rhinorrhea, sore throat, eye pain  CV: Denies CP, palpitations  PULM: Denies wheezing, hemoptysis  GI: Denies hematemesis, hematochezia, melena  : Denies discharge, hematuria  MSK: Denies arthralgias, myalgias  SKIN: Denies rash, lesions  NEURO: Denies paresthesias, weakness  PSYCH: Denies depression, anxiety    VITALS:  T(F): 98, Max: 100.8 (09-09-21 @ 17:00)  HR: 110  BP: 100/66  RR: 18Vital Signs Last 24 Hrs  T(C): 36.7 (10 Sep 2021 10:53), Max: 38.2 (09 Sep 2021 17:00)  T(F): 98 (10 Sep 2021 10:53), Max: 100.8 (09 Sep 2021 17:00)  HR: 110 (10 Sep 2021 10:53) (75 - 111)  BP: 100/66 (10 Sep 2021 10:53) (98/56 - 122/76)  BP(mean): 78 (10 Sep 2021 10:53) (71 - 82)  RR: 18 (10 Sep 2021 10:53) (18 - 18)  SpO2: 100% (10 Sep 2021 05:00) (100% - 100%)    PHYSICAL EXAM:  Gen: NAD, resting in bed  HEENT: Normocephalic, atraumatic  Neck: supple, no lymphadenopathy  CV: Regular rate & regular rhythm  Lungs: decreased BS at bases, no fremitus  Abdomen: Soft, BS present  Ext: Warm, well perfused  Neuro: non focal, awake  Skin: no rash, no erythema  Lines: no phlebitis    FH: Non-contributory  Social Hx: Non-contributory    TESTS & MEASUREMENTS:                        9.5    12.49 )-----------( 293      ( 09 Sep 2021 08:24 )             30.1     09-09    142  |  107  |  25<H>  ----------------------------<  133<H>  3.7   |  21  |  1.3    Ca    7.9<L>      09 Sep 2021 08:24  Mg     1.6     09-09              GI PCR Panel, Stool (collected 09-08-21 @ 11:17)  Source: .Stool Feces  Final Report (09-08-21 @ 20:40):    GI PCR Results: NOT detected    *******Please Note:*******    GI panel PCR evaluates for:    Campylobacter, Plesiomonas shigelloides, Salmonella,    Vibrio, Yersinia enterocolitica, Enteroaggregative    Escherichia coli (EAEC), Enteropathogenic E.coli (EPEC),    Enterotoxigenic E. coli (ETEC) lt/st, Shiga-like    toxin-producing E. coli (STEC) stx1/stx2,    Shigella/ Enteroinvasive E. coli (EIEC), Cryptosporidium,    Cyclospora cayetanensis, Entamoeba histolytica,    Giardia lamblia, Adenovirus F 40/41, Astrovirus,    Norovirus GI/GII, Rotavirus A, Sapovirus    Culture - Urine (collected 09-04-21 @ 02:30)  Source: Clean Catch Clean Catch (Midstream)  Final Report (09-05-21 @ 14:54):    >=3 organisms. Probable collection contamination.    Culture - Blood (collected 09-03-21 @ 21:30)  Source: .Blood Blood-Peripheral  Final Report (09-09-21 @ 02:01):    No Growth Final    Culture - Blood (collected 09-03-21 @ 21:30)  Source: .Blood Blood-Peripheral  Final Report (09-09-21 @ 02:01):    No Growth Final            INFECTIOUS DISEASES TESTING  HIV-1/2 Combo Result: Nonreact (09-06-21 @ 05:49)  MRSA PCR Result.: NotDetec (09-05-21 @ 11:40)  Legionella Antigen, Urine: Negative (09-05-21 @ 11:40)  Procalcitonin, Serum: 6.63 (09-04-21 @ 16:50)  Rapid RVP Result: NotDetec (09-04-21 @ 02:30)      INFLAMMATORY MARKERS      RADIOLOGY & ADDITIONAL TESTS:  I have personally reviewed the last available Chest xray  CXR      CT      CARDIOLOGY TESTING  12 Lead ECG:   Ventricular Rate 109 BPM    Atrial Rate 357 BPM    QRS Duration 76 ms    Q-T Interval 336 ms    QTC Calculation(Bazett) 452 ms    R Axis 246 degrees    T Axis -13 degrees    Diagnosis Line Atrial fibrillation with rapid ventricular response  Low voltage QRS  Possible Anterolateral infarct , age undetermined  Abnormal ECG    Confirmed by KELVIN ROLLE MDFIM [id number]) on 9/6/2021 10:14:19 PM (09-06-21 @ 11:40)  12 Lead ECG:   Ventricular Rate 98 BPM    Atrial Rate 90 BPM    QRS Duration 88 ms    Q-T Interval 372 ms    QTC Calculation(Bazett) 474 ms    R Axis 240 degrees    T Axis 50 degrees    Diagnosis Line Sinus rhythm with 1st degree A-V block  Left axis deviation  Right ventricular hypertrophy  Abnormal ECG    Confirmed by Kim Jang MD ([id number]) on 9/4/2021 7:48:48 AM (09-03-21 @ 20:25)      MEDICATIONS  chlorhexidine 4% Liquid 1 Topical <User Schedule>  metoprolol tartrate 50 Oral two times a day  pantoprazole    Tablet 40 Oral before breakfast  piperacillin/tazobactam IVPB.. 3.375 IV Intermittent every 12 hours      WEIGHT  Weight (kg): 79.4 (09-03-21 @ 20:49)      ANTIBIOTICS:  piperacillin/tazobactam IVPB.. 3.375 Gram(s) IV Intermittent every 12 hours      All available historical records have been reviewed

## 2021-09-10 NOTE — PROGRESS NOTE ADULT - ASSESSMENT
#s/p Septic Shock secondary to Aspiration vs GN pneumonia  stable now.  antbx, zosyn    #Diffuse Lymphadenopathy  including chest/abd/groin  hx of sarcoidosis... check for ACE  r/o reactive in lung due to PNA but abd LN?  send for LDH     #Hx of PE on Warfarin.. currently getting iv heparin  Also DVT.  hx of PT gene 2 mutation     # afib .. on meds    # renal insuff.. improving    # anemia of chronic dx     # weakness/debiltation.. PT eval

## 2021-09-10 NOTE — PROGRESS NOTE ADULT - SUBJECTIVE AND OBJECTIVE BOX
JOEY KNAPP  81y Female    CHIEF COMPLAINT:    Patient is a 81y old  Female who presents with a chief complaint of 3 days of Weakness & Diarrhea; (10 Sep 2021 12:57)      INTERVAL HPI/OVERNIGHT EVENTS:    Patient seen and examined. Clinically unchanged but febrile overnight 100.8    ROS: All other systems are negative.    Vital Signs:    T(F): 98.6 (09-10-21 @ 16:00), Max: 100.8 (09-09-21 @ 17:00)  HR: 140 (09-10-21 @ 16:00) (75 - 140)  BP: 94/58 (09-10-21 @ 16:00) (94/58 - 122/76)  RR: 20 (09-10-21 @ 16:00) (18 - 20)  SpO2: 100% (09-10-21 @ 16:00) (100% - 100%)  I&O's Summary    09 Sep 2021 07:01  -  10 Sep 2021 07:00  --------------------------------------------------------  IN: 500 mL / OUT: 600 mL / NET: -100 mL    PHYSICAL EXAM:    GENERAL:  NAD  SKIN: No rashes or lesions  HEENT: Atraumatic. Normocephalic.   NECK: Supple, No JVD.  PULMONARY: Coarse vreath sounds b/l No wheezing. No rales  CVS: Normal S1, S2. Rate and Rhythm are regular.   ABDOMEN/GI: Soft, Nontender, Nondistended; BS present  MSK:  No clubbing or cyanosis   NEUROLOGIC: Moves all extremities  PSYCH: Alert & oriented    Consultant(s) Notes Reviewed:  [x ] YES  [ ] NO  Care Discussed with Consultants/Other Providers [ x] YES  [ ] NO    LABS:                        9.5    12.49 )-----------( 293      ( 09 Sep 2021 08:24 )             30.1     142  |  107  |  25<H>  ----------------------------<  133<H>  3.7   |  21  |  1.3    Ca    7.9<L>      09 Sep 2021 08:24  Mg     1.6     09-09    PT/INR - ( 09 Sep 2021 08:24 )   PT: >40.00 sec;   INR: 4.87 ratio      Serum Pro-Brain Natriuretic Peptide: 6971 pg/mL (09-07-21 @ 04:30)  Serum Pro-Brain Natriuretic Peptide: 20238 pg/mL (09-03-21 @ 22:30)    GI PCR Panel, Stool (collected 08 Sep 2021 11:17)  Source: .Stool Feces  Final Report (08 Sep 2021 20:40):    GI PCR Results: NOT detected    *******Please Note:*******    GI panel PCR evaluates for:    Campylobacter, Plesiomonas shigelloides, Salmonella,    Vibrio, Yersinia enterocolitica, Enteroaggregative    Escherichia coli (EAEC), Enteropathogenic E.coli (EPEC),    Enterotoxigenic E. coli (ETEC) lt/st, Shiga-like    toxin-producing E. coli (STEC) stx1/stx2,    Shigella/ Enteroinvasive E. coli (EIEC), Cryptosporidium,    Cyclospora cayetanensis, Entamoeba histolytica,    Giardia lamblia, Adenovirus F 40/41, Astrovirus,    Norovirus GI/GII, Rotavirus A, Sapovirus    RADIOLOGY & ADDITIONAL TESTS:  Imaging or report Personally Reviewed:  [x] YES  [ ] NO  EKG reviewed: [x] YES  [ ] NO    Medications:  Standing  chlorhexidine 4% Liquid 1 Application(s) Topical <User Schedule>  metoprolol tartrate 50 milliGRAM(s) Oral two times a day  pantoprazole    Tablet 40 milliGRAM(s) Oral before breakfast  piperacillin/tazobactam IVPB.. 3.375 Gram(s) IV Intermittent every 12 hours    PRN Meds  acetaminophen   Tablet .. 650 milliGRAM(s) Oral every 6 hours PRN  aluminum hydroxide/magnesium hydroxide/simethicone Suspension 30 milliLiter(s) Oral every 4 hours PRN  melatonin 3 milliGRAM(s) Oral at bedtime PRN  ondansetron Injectable 4 milliGRAM(s) IV Push every 8 hours PRN

## 2021-09-10 NOTE — PROGRESS NOTE ADULT - ASSESSMENT
ASSESSMENT  80 y/o F pmhx significant for Essential HTN & hx of PE s/p Warfarin presents to ED from Home (lives alone) w/ x3 days of diarrhea & Weakness who presents with septic shock    IMPRESSION  #Septic Shock secondary to Aspiration vs GN pneumonia  - < from: CT Abdomen and Pelvis No Cont (09.04.21 @ 04:11):  New mediastinal, axillary, hilar, supraclavicular, retroperitoneal, and bilateral inguinal lymphadenopathy as above. Findings highly suspicious for neoplastic process such as lymphoma, however consider less likely but possible inflammatory etiologies in patient with history of rheumatoid arthritis. Oncology workup recommended.  Left lower lobe lobe bronchus filling defects, with left lung lower lobe consolidative opacification. Additional bilateral bilateral patchy opacities. Findings consistent with pneumonia; correlate for aspiration. Bilateral pulmonary nodular opacities. Findings may be inflammatory, infectious or neoplastic in etiology. Follow-up CT after treatment recommended.    #MAGDALENO  #Diarrhea  #Diffuse Lymphadenopathy on CT - Lactate Dehydrogenase, Serum: 210 (09.06.21 @ 05:49)    #Hx of PE on Warfarin  #Hyponatremia  #Obesity BMI (kg/m2): 30  #DM  #Abx allergy:      RECOMMENDATIONS  - repeat blood cultures as patient febrile last night  - continue zosyn 3.375 mg q 8 hours  - Check C diff if with diarrhea  - follow-up heme-onc work-up     Please call or message on Microsoft Teams if with any questions.  Spectra [id number]

## 2021-09-10 NOTE — PROGRESS NOTE ADULT - ASSESSMENT
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 In the ED, she was diagnosed with septic shock, MAGDALENO, pneumonia and extensive LAD.    Septic shock present on admission due to suspected GNR versus aspiration pneumonia (coverage for GNR and aspiration)  History of Sarcoidosis  Saturating well on RA but febrile overnight  off pressors  Urine strep and legionella negative, Blood cx NGTD, Urine Cx contaminated  continue with Zosyn for now, add vancomycin if hemodynamic instability  repeat blood cx and check Cdiff   Possible bronch next week if INR improved  Check ACE and LDH    New onset Afib  H/o PE on coumadin, with elevated INR   - HR improved with fluid bolus - keep hydrated and replete electrolytes  - increase lopressor to 50 Q12H and give 50mg x1 now, and monitor HR and BP  - TSH 1.18  - ECHO 9/7: EF 60-65%, grade 1 diastolic dysfunction, enlarged atria, PulmHTN  - pt already anticoagulated for h/o PE - INR supratherapeutic  - pending INR today  - cardiology Dr Gupta following    Extensive LAD on CT scan - rule out lymphoma, other malignancy   - HemeOnc eval Dr. Grant following, patient with a history of sarcoid    MAGDALENO - ?baseline renal function - Cr 1.23 in 2017   possibly prerenal due to diarrhea and sepsis  less likely due to ATN from shock since improving daily  no hydronephrosis on CT scan  I's and O's   holding IVF for now   renal eval if not improving     Chronic diarrhea -   check Cdiff given fevers     Elevated troponin could be due to demand ischemia and/or MAGDALENO - trending down     Hypokalemia and hypomagnesemia - pending todays labs    PROGRESS NOTE HANDOFF    Pending: monitoring HR and BP on metoprolol, f/u all cultures, labs in AM, HemeOnc f/u, pulm f/u for possible bronch once INR therapeutic, PT f/u  Disposition: from home

## 2021-09-10 NOTE — PROGRESS NOTE ADULT - SUBJECTIVE AND OBJECTIVE BOX
JOEY KNAPP  81y  Female      Patient is a 81y old  Female who presents with a chief complaint of 3 days of Weakness & Diarrhea; (09 Sep 2021 15:43)      INTERVAL HPI:  82 y/o F pmhx significant for Essential HTN & hx of PE s/p Warfarin, Sarcoidosis,  presents to ED from Home (lives alone) w/ x3 days of diarrhea & Weakness. IN the ED patient was  found to be hypotensive, tachycardic and febrile. Patient did not respond to fluid rescucitation and levophed was started. Labs were significant for leucocytosis, elevated INR 5.5, and Cr 3.8. UA was positive.  CXR: Bilateral opacities/left pleural effusion. Bilateral hilar adenopathy.   -   CT Chest/Abd/Pelvis:   New mediastinal, axillary, hilar, supraclavicular, retroperitoneal, and bilateral inguinal lymphadenopathy as above. Findings highly suspicious for neoplastic process.Left lower lobe lobe bronchus filling defects, with left lung lower lobe consolidative opacification.   Additional bilateral bilateral patchy opacities. Findings consistent with pneumonia;  Bilateral pulmonary nodular opacities.     OVERNIGHT EVENTS:  No acute events overnight. Patient on RA. Denies SOB, chest pain, and palpitations.       Vital Signs Last 24 Hrs  T(C): 36.2 (09 Sep 2021 20:20), Max: 38.2 (09 Sep 2021 17:00)  T(F): 97.1 (09 Sep 2021 20:20), Max: 100.8 (09 Sep 2021 17:00)  HR: 97 (10 Sep 2021 05:00) (75 - 111)  BP: 104/64 (10 Sep 2021 05:00) (98/56 - 122/76)  BP(mean): 79 (10 Sep 2021 05:00) (71 - 82)  RR: 18 (10 Sep 2021 05:00) (18 - 18)  SpO2: 100% (10 Sep 2021 05:00) (100% - 100%)    Physical Exam:  GENERAL: NAD  HEAD:  Atraumatic, Normocephalic  EYES: EOMI, PERRLA   ENMT: Moist mucous membranes   NECK: Supple  NERVOUS SYSTEM:  Alert & Oriented X3  CHEST/LUNG: Clear to auscultation bilaterally  HEART: IRR , tachycardia   ABDOMEN: Soft, Nontender, Nondistended; Bowel sounds present  EXTREMITIES:  2+ Peripheral Pulses, No clubbing, cyanosis, or edema    Consultant(s) Notes Reviewed:  [x ] YES  [ ] NO  Care Discussed with Consultants/Other Providers [ x] YES  [ ] NO    LABS:                        9.5    12.49 )-----------( 293      ( 09 Sep 2021 08:24 )             30.1     09-09    142  |  107  |  25<H>  ----------------------------<  133<H>  3.7   |  21  |  1.3    Ca    7.9<L>      09 Sep 2021 08:24  Mg     1.6     09-09      PT/INR - ( 09 Sep 2021 08:24 )   PT: >40.00 sec;   INR: 4.87 ratio               CAPILLARY BLOOD GLUCOSE          RADIOLOGY & ADDITIONAL TESTS:      Imaging Personally Reviewed:  [ ] YES  [ ] NO

## 2021-09-10 NOTE — ADVANCED PRACTICE NURSE CONSULT - ASSESSMENT
82 y/o woman with PMH  Essential HTN, prothrombin gene mutation and PE on lifelong anticoagulation with coumadin, HTN and chronic diarrhea (possibly due to IBS) presented to ED from home (9/3) with weakness and worsening watery diarrhea x 3days. In the ED, she was diagnosed with septic shock, MAGDALENO, pneumonia and extensive LAD.  Currently admitted to medicine, today is hospital day -6d, in CEU, being managed for Septic shock present on admission due to suspected GNR versus aspiration pneumonia (coverage for GNR and aspiration); History of Sarcoidosis; New onset Afib; H/o PE on coumadin, with elevated INR; Extensive LAD on CT scan - rule out lymphoma, other malignancy; MAGDALENO - ?baseline renal function - Cr 1.23 in 2017; Chronic diarrhea ; Elevated troponin could be due to demand ischemia and/or MAGDALENO; Hypokalemia and hypomagnesemia.       Received patient in CEU, laying supine in bed, HOB elevated 30 degrees. Pt awake, A&Ox3, made aware of purpose of WOCN visit, agreeable to consult. With assistance, patient turned to right side for skin assessment.     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 Multiple small circular skin openings (~ 0.5cm x 0.5cm) present to intergluteal cleft area.    Turned patient to left side. Area of blanchable purple hyperpigmentation to upper right posterior thigh.      Patient mostly bedbound, very limited mobility in bed. Incontinent of urine and stool, + PrimaFit. Ordered for sodium & cholesterol restricted diet, inadequate intake as per reported Beni score.

## 2021-09-11 LAB
ANION GAP SERPL CALC-SCNC: 10 MMOL/L — SIGNIFICANT CHANGE UP (ref 7–14)
APTT BLD: 27.3 SEC — SIGNIFICANT CHANGE UP (ref 27–39.2)
APTT BLD: 28.4 SEC — SIGNIFICANT CHANGE UP (ref 27–39.2)
APTT BLD: 28.5 SEC — SIGNIFICANT CHANGE UP (ref 27–39.2)
APTT BLD: 33.4 SEC — SIGNIFICANT CHANGE UP (ref 27–39.2)
APTT BLD: 35.7 SEC — SIGNIFICANT CHANGE UP (ref 27–39.2)
BUN SERPL-MCNC: 27 MG/DL — HIGH (ref 10–20)
C DIFF BY PCR RESULT: NEGATIVE — SIGNIFICANT CHANGE UP
C DIFF TOX GENS STL QL NAA+PROBE: SIGNIFICANT CHANGE UP
CALCIUM SERPL-MCNC: 7.8 MG/DL — LOW (ref 8.5–10.1)
CHLORIDE SERPL-SCNC: 108 MMOL/L — SIGNIFICANT CHANGE UP (ref 98–110)
CO2 SERPL-SCNC: 22 MMOL/L — SIGNIFICANT CHANGE UP (ref 17–32)
CREAT SERPL-MCNC: 1.3 MG/DL — SIGNIFICANT CHANGE UP (ref 0.7–1.5)
CRP SERPL-MCNC: 158 MG/L — HIGH
GLUCOSE SERPL-MCNC: 121 MG/DL — HIGH (ref 70–99)
HCT VFR BLD CALC: 28.6 % — LOW (ref 37–47)
HGB BLD-MCNC: 8.8 G/DL — LOW (ref 12–16)
INR BLD: 1.33 RATIO — HIGH (ref 0.65–1.3)
MAGNESIUM SERPL-MCNC: 1.7 MG/DL — LOW (ref 1.8–2.4)
MCHC RBC-ENTMCNC: 28.8 PG — SIGNIFICANT CHANGE UP (ref 27–31)
MCHC RBC-ENTMCNC: 30.8 G/DL — LOW (ref 32–37)
MCV RBC AUTO: 93.5 FL — SIGNIFICANT CHANGE UP (ref 81–99)
NRBC # BLD: 0 /100 WBCS — SIGNIFICANT CHANGE UP (ref 0–0)
PLATELET # BLD AUTO: 311 K/UL — SIGNIFICANT CHANGE UP (ref 130–400)
POTASSIUM SERPL-MCNC: 4.1 MMOL/L — SIGNIFICANT CHANGE UP (ref 3.5–5)
POTASSIUM SERPL-SCNC: 4.1 MMOL/L — SIGNIFICANT CHANGE UP (ref 3.5–5)
PROCALCITONIN SERPL-MCNC: 0.46 NG/ML — HIGH (ref 0.02–0.1)
PROTHROM AB SERPL-ACNC: 15.2 SEC — HIGH (ref 9.95–12.87)
RBC # BLD: 3.06 M/UL — LOW (ref 4.2–5.4)
RBC # FLD: 14 % — SIGNIFICANT CHANGE UP (ref 11.5–14.5)
SODIUM SERPL-SCNC: 140 MMOL/L — SIGNIFICANT CHANGE UP (ref 135–146)
WBC # BLD: 14.22 K/UL — HIGH (ref 4.8–10.8)
WBC # FLD AUTO: 14.22 K/UL — HIGH (ref 4.8–10.8)

## 2021-09-11 PROCEDURE — 99233 SBSQ HOSP IP/OBS HIGH 50: CPT

## 2021-09-11 PROCEDURE — 99232 SBSQ HOSP IP/OBS MODERATE 35: CPT

## 2021-09-11 RX ORDER — ENOXAPARIN SODIUM 100 MG/ML
80 INJECTION SUBCUTANEOUS
Refills: 0 | Status: DISCONTINUED | OUTPATIENT
Start: 2021-09-11 | End: 2021-09-16

## 2021-09-11 RX ORDER — MAGNESIUM SULFATE 500 MG/ML
2 VIAL (ML) INJECTION
Refills: 0 | Status: COMPLETED | OUTPATIENT
Start: 2021-09-11 | End: 2021-09-11

## 2021-09-11 RX ADMIN — PIPERACILLIN AND TAZOBACTAM 25 GRAM(S): 4; .5 INJECTION, POWDER, LYOPHILIZED, FOR SOLUTION INTRAVENOUS at 17:22

## 2021-09-11 RX ADMIN — PIPERACILLIN AND TAZOBACTAM 25 GRAM(S): 4; .5 INJECTION, POWDER, LYOPHILIZED, FOR SOLUTION INTRAVENOUS at 06:21

## 2021-09-11 RX ADMIN — PANTOPRAZOLE SODIUM 40 MILLIGRAM(S): 20 TABLET, DELAYED RELEASE ORAL at 06:21

## 2021-09-11 RX ADMIN — Medication 25 GRAM(S): at 12:39

## 2021-09-11 RX ADMIN — Medication 50 MILLIGRAM(S): at 06:21

## 2021-09-11 RX ADMIN — Medication 50 MILLIGRAM(S): at 17:22

## 2021-09-11 RX ADMIN — Medication 25 GRAM(S): at 15:00

## 2021-09-11 RX ADMIN — HEPARIN SODIUM 1100 UNIT(S)/HR: 5000 INJECTION INTRAVENOUS; SUBCUTANEOUS at 01:17

## 2021-09-11 RX ADMIN — CHLORHEXIDINE GLUCONATE 1 APPLICATION(S): 213 SOLUTION TOPICAL at 06:20

## 2021-09-11 NOTE — PHYSICAL THERAPY INITIAL EVALUATION ADULT - LEVEL OF INDEPENDENCE: SIT/STAND, REHAB EVAL
unable to proceed due to pain on B/L feet and generalized eakness; will re assess next tx/unable to perform

## 2021-09-11 NOTE — PROGRESS NOTE ADULT - ASSESSMENT
82 y/o female with    #s/p Septic Shock secondary to Aspiration vs GN pneumonia  stable now.  on abx, zosyn    #Diffuse Lymphadenopathy  including chest/abd/groin  hx of sarcoidosis... check for ACE/LDH  r/o reactive in lung due to PNA but abd LN?    #Hx of PE on Warfarin.. currently getting iv heparin  Also DVT.  hx of PT gene 2 mutation     # afib .. on meds    # renal insuff.. improving    # anemia of chronic dx   check retic count,iron profile/ferritin      PT eval    cont other supportive care  will f/u

## 2021-09-11 NOTE — PROGRESS NOTE ADULT - SUBJECTIVE AND OBJECTIVE BOX
Patient is a 81y old  Female who presents with a chief complaint of 3 days of Weakness & Diarrhea; (11 Sep 2021 12:41)       Pt is seen and examined  pt is awake and lying in bed  pt seems comfortable and denies any complaints at this time          ROS:  Negative except for:generalized wekaness      MEDICATIONS  (STANDING):  chlorhexidine 4% Liquid 1 Application(s) Topical <User Schedule>  enoxaparin Injectable 80 milliGRAM(s) SubCutaneous two times a day  magnesium sulfate  IVPB 2 Gram(s) IV Intermittent every 2 hours  metoprolol tartrate 50 milliGRAM(s) Oral two times a day  pantoprazole    Tablet 40 milliGRAM(s) Oral before breakfast  piperacillin/tazobactam IVPB.. 3.375 Gram(s) IV Intermittent every 12 hours    MEDICATIONS  (PRN):  acetaminophen   Tablet .. 650 milliGRAM(s) Oral every 6 hours PRN Temp greater or equal to 38.5C (101.3F), Mild Pain (1 - 3)  aluminum hydroxide/magnesium hydroxide/simethicone Suspension 30 milliLiter(s) Oral every 4 hours PRN Dyspepsia  melatonin 3 milliGRAM(s) Oral at bedtime PRN Insomnia  ondansetron Injectable 4 milliGRAM(s) IV Push every 8 hours PRN Nausea and/or Vomiting      Allergies    No Known Allergies    Intolerances        Vital Signs Last 24 Hrs  T(C): 37.3 (11 Sep 2021 07:30), Max: 37.3 (11 Sep 2021 07:30)  T(F): 99.1 (11 Sep 2021 07:30), Max: 99.1 (11 Sep 2021 07:30)  HR: 86 (11 Sep 2021 07:30) (86 - 140)  BP: 122/62 (11 Sep 2021 07:30) (94/58 - 122/62)  BP(mean): --  RR: 18 (11 Sep 2021 07:30) (17 - 20)  SpO2: 98% (11 Sep 2021 07:30) (96% - 100%)    PHYSICAL EXAM  General: adult in NAD  HEENT: clear oropharynx, anicteric sclera, pink conjunctiva  Neck: supple  CV: normal S1/S2 with no murmur rubs or gallops  Lungs: positive air movement b/l ant lungs,clear to auscultation, no wheezes, no rales  Abdomen: soft non-tender non-distended, no hepatosplenomegaly  Ext: no clubbing cyanosis or edema  Skin: no rashes and no petechiae  Neuro: alert and oriented X 4, no focal deficits  LABS:                          8.8    14.22 )-----------( 311      ( 11 Sep 2021 09:10 )             28.6         Mean Cell Volume : 93.5 fL  Mean Cell Hemoglobin : 28.8 pg  Mean Cell Hemoglobin Concentration : 30.8 g/dL  Auto Neutrophil # : x  Auto Lymphocyte # : x  Auto Monocyte # : x  Auto Eosinophil # : x  Auto Basophil # : x  Auto Neutrophil % : x  Auto Lymphocyte % : x  Auto Monocyte % : x  Auto Eosinophil % : x  Auto Basophil % : x    Serial CBC's  09-11 @ 09:10  Hct-28.6 / Hgb-8.8 / Plat-311 / RBC-3.06 / WBC-14.22          Serial CBC's  09-10 @ 17:02  Hct-27.8 / Hgb-8.7 / Plat-319 / RBC-3.00 / WBC-15.29          Serial CBC's  09-09 @ 08:24  Hct-30.1 / Hgb-9.5 / Plat-293 / RBC-3.30 / WBC-12.49          Serial CBC's  09-08 @ 04:30  Hct-33.8 / Hgb-10.6 / Plat-279 / RBC-3.70 / WBC-10.84            09-11    140  |  108  |  27<H>  ----------------------------<  121<H>  4.1   |  22  |  1.3    Ca    7.8<L>      11 Sep 2021 09:10  Mg     1.7     09-11    TPro  4.4<L>  /  Alb  2.5<L>  /  TBili  0.6  /  DBili  x   /  AST  48<H>  /  ALT  22  /  AlkPhos  132<H>  09-10      PT/INR - ( 11 Sep 2021 09:10 )   PT: 15.20 sec;   INR: 1.33 ratio         PTT - ( 11 Sep 2021 13:00 )  PTT:27.3 sec    Hemoglobin: 8.8 g/dL (09-11-21 @ 09:10)  WBC Count: 14.22 K/uL (09-11-21 @ 09:10)  Hematocrit: 28.6 % (09-11-21 @ 09:10)  Platelet Count - Automated: 311 K/uL (09-11-21 @ 09:10)  Hemoglobin: 8.7 g/dL (09-10-21 @ 17:02)  WBC Count: 15.29 K/uL (09-10-21 @ 17:02)  Hematocrit: 27.8 % (09-10-21 @ 17:02)  Platelet Count - Automated: 319 K/uL (09-10-21 @ 17:02)  Hemoglobin: 9.5 g/dL (09-09-21 @ 08:24)  WBC Count: 12.49 K/uL (09-09-21 @ 08:24)  Hematocrit: 30.1 % (09-09-21 @ 08:24)  Platelet Count - Automated: 293 K/uL (09-09-21 @ 08:24)  Hematocrit: 33.8 % (09-08-21 @ 04:30)  Hemoglobin: 10.6 g/dL (09-08-21 @ 04:30)  WBC Count: 10.84 K/uL (09-08-21 @ 04:30)  Platelet Count - Automated: 279 K/uL (09-08-21 @ 04:30)  Hematocrit: 30.8 % (09-07-21 @ 04:30)  Platelet Count - Automated: 191 K/uL (09-07-21 @ 04:30)  Hemoglobin: 10.0 g/dL (09-07-21 @ 04:30)  WBC Count: 8.17 K/uL (09-07-21 @ 04:30)  Hematocrit: 30.3 % (09-06-21 @ 05:49)  Platelet Count - Automated: 160 K/uL (09-06-21 @ 05:49)  Hemoglobin: 9.7 g/dL (09-06-21 @ 05:49)  WBC Count: 9.22 K/uL (09-06-21 @ 05:49)  WBC Count: 12.97 K/uL (09-05-21 @ 04:30)  Hematocrit: 32.7 % (09-05-21 @ 04:30)  Platelet Count - Automated: 141 K/uL (09-05-21 @ 04:30)  Hemoglobin: 10.1 g/dL (09-05-21 @ 04:30)  WBC Count: 16.30 K/uL (09-03-21 @ 22:30)  Hematocrit: 36.8 % (09-03-21 @ 22:30)  Hemoglobin: 11.5 g/dL (09-03-21 @ 22:30)  Platelet Count - Automated: 147 K/uL (09-03-21 @ 22:30)                BLOOD SMEAR INTERPRETATION:       RADIOLOGY & ADDITIONAL STUDIES:

## 2021-09-11 NOTE — CHART NOTE - NSCHARTNOTEFT_GEN_A_CORE
pt will be switched from Heparin gtt to Lovenox 80 BID for new onset Afib pt will be switched from Heparin gtt to Lovenox 80 BID for new onset Afib. Heparin drip stopped at 9 am, will start Lovenox at 2pm. Order placed.

## 2021-09-11 NOTE — PROGRESS NOTE ADULT - SUBJECTIVE AND OBJECTIVE BOX
Patient is a 81y old  Female who presents with a chief complaint of 3 days of Weakness & Diarrhea; (10 Sep 2021 17:51)        Over Night Events:  Resting in bed.  On RA.  Off pressors.  Rate controlled         ROS:     All ROS are negative except HPI         PHYSICAL EXAM    ICU Vital Signs Last 24 Hrs  T(C): 37.3 (11 Sep 2021 07:30), Max: 37.3 (11 Sep 2021 07:30)  T(F): 99.1 (11 Sep 2021 07:30), Max: 99.1 (11 Sep 2021 07:30)  HR: 86 (11 Sep 2021 07:30) (86 - 140)  BP: 122/62 (11 Sep 2021 07:30) (94/58 - 122/62)  BP(mean): --  ABP: --  ABP(mean): --  RR: 18 (11 Sep 2021 07:30) (17 - 20)  SpO2: 98% (11 Sep 2021 07:30) (96% - 100%)      CONSTITUTIONAL:  In NAD    ENT:   Airway patent,   Mouth with normal mucosa.   No thrush    EYES:   Pupils equal,   Round and reactive to light.    CARDIAC:   Normal rate,   Irregular rhythm.    No edema      Vascular:  Normal systolic impulse  No Carotid bruits    RESPIRATORY:   No wheezing  Bilateral BS  Normal chest expansion  Not tachypneic,  No use of accessory muscles    GASTROINTESTINAL:  Abdomen soft,   Non-tender,   No guarding,   + BS    MUSCULOSKELETAL:   Range of motion is not limited,  No clubbing, cyanosis    NEUROLOGICAL:   Alert and oriented   No motor  deficits.    SKIN:   Skin normal color for race,   Warm and dry  No evidence of rash.    PSYCHIATRIC:   Normal mood and affect.   No apparent risk to self or others.    HEMATOLOGICAL:  No cervical  lymphadenopathy.  no inguinal lymphadenopathy      09-10-21 @ 07:01  -  09-11-21 @ 07:00  --------------------------------------------------------  IN:    Heparin Infusion: 88 mL    IV PiggyBack: 100 mL    Oral Fluid: 500 mL  Total IN: 688 mL    OUT:    Voided (mL): 900 mL  Total OUT: 900 mL    Total NET: -212 mL          LABS:                            8.8    14.22 )-----------( 311      ( 11 Sep 2021 09:10 )             28.6                                               09-11    140  |  108  |  27<H>  ----------------------------<  121<H>  4.1   |  22  |  1.3    Creatinine Trend  BUN 27, Cr 1.3, (09-11-21 @ 09:10)  Creatinine Trend  BUN 29, Cr 1.4, (09-10-21 @ 17:02)  Creatinine Trend  BUN 25, Cr 1.3, (09-09-21 @ 08:24)  Creatinine Trend  BUN 34, Cr 1.6, (09-08-21 @ 04:30)  Creatinine Trend  BUN 42, Cr 2.2, (09-07-21 @ 04:30)      Ca    7.8<L>      11 Sep 2021 09:10  Mg     1.7     09-11    TPro  4.4<L>  /  Alb  2.5<L>  /  TBili  0.6  /  DBili  x   /  AST  48<H>  /  ALT  22  /  AlkPhos  132<H>  09-10      PT/INR - ( 11 Sep 2021 09:10 )   PT: 15.20 sec;   INR: 1.33 ratio         PTT - ( 11 Sep 2021 09:10 )  PTT:35.7 sec                                                                                     LIVER FUNCTIONS - ( 10 Sep 2021 17:02 )  Alb: 2.5 g/dL / Pro: 4.4 g/dL / ALK PHOS: 132 U/L / ALT: 22 U/L / AST: 48 U/L / GGT: x                                                  GI PCR Panel, Stool (collected 08 Sep 2021 11:17)  Source: .Stool Feces  Final Report (08 Sep 2021 20:40):    GI PCR Results: NOT detected    *******Please Note:*******    GI panel PCR evaluates for:    Campylobacter, Plesiomonas shigelloides, Salmonella,    Vibrio, Yersinia enterocolitica, Enteroaggregative    Escherichia coli (EAEC), Enteropathogenic E.coli (EPEC),    Enterotoxigenic E. coli (ETEC) lt/st, Shiga-like    toxin-producing E. coli (STEC) stx1/stx2,    Shigella/ Enteroinvasive E. coli (EIEC), Cryptosporidium,    Cyclospora cayetanensis, Entamoeba histolytica,    Giardia lamblia, Adenovirus F 40/41, Astrovirus,    Norovirus GI/GII, Rotavirus A, Sapovirus                                                                                           MEDICATIONS  (STANDING):  chlorhexidine 4% Liquid 1 Application(s) Topical <User Schedule>  metoprolol tartrate 50 milliGRAM(s) Oral two times a day  pantoprazole    Tablet 40 milliGRAM(s) Oral before breakfast  piperacillin/tazobactam IVPB.. 3.375 Gram(s) IV Intermittent every 12 hours    MEDICATIONS  (PRN):  acetaminophen   Tablet .. 650 milliGRAM(s) Oral every 6 hours PRN Temp greater or equal to 38.5C (101.3F), Mild Pain (1 - 3)  aluminum hydroxide/magnesium hydroxide/simethicone Suspension 30 milliLiter(s) Oral every 4 hours PRN Dyspepsia  melatonin 3 milliGRAM(s) Oral at bedtime PRN Insomnia  ondansetron Injectable 4 milliGRAM(s) IV Push every 8 hours PRN Nausea and/or Vomiting      New X-rays reviewed:                                                                                  ECHO    CXR interpreted by me:  MORAL atelectasis

## 2021-09-11 NOTE — PROGRESS NOTE ADULT - ASSESSMENT
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 In the ED, she was diagnosed with septic shock, MAGDALENO, pneumonia and extensive LAD.    Septic shock present on admission due to suspected GNR versus aspiration pneumonia (coverage for GNR and aspiration)  History of Sarcoidosis  Saturating well on RA  off pressors  Urine strep and legionella negative, Blood cx NGTD, Urine Cx contaminated  continue with Zosyn for now to complete course (last day today)  repeat blood cx pending  Cdiff negative   Possible bronch next week  Check ACE level    New onset Afib  H/o PE on coumadin, with elevated INR   - HR improved with fluid bolus - keep hydrated and replete electrolytes  - c/w Metoprolol  50 Q12H and monitor HR and BP  - TSH 1.18  - ECHO 9/7: EF 60-65%, grade 1 diastolic dysfunction, enlarged atria, PulmHTN  - INR subtherapeutic, start Lovenox, will switch to Coumadin once closer to DC  - cardiology Dr Gupta following    Extensive LAD on CT scan - rule out lymphoma, other malignancy   - [facility] eval Dr. Grant following, patient with a history of sarcoid  - pending ACE    MAGDALENO - resolved  possibly prerenal due to diarrhea and sepsis  less likely due to ATN from shock since improving daily  no hydronephrosis on CT scan  I's and O's   holding IVF for now   renal eval if not improving     Chronic diarrhea -   Cdiff negative      Elevated troponin could be due to demand ischemia and/or MAGDALENO - trending down     Hypokalemia and hypomagnesemia - repleting     PROGRESS NOTE HANDOFF    Pending:  pulm f/u for possible bronch next week, PT f/u  Disposition: from home  Can downgrade to medical floor

## 2021-09-11 NOTE — PHYSICAL THERAPY INITIAL EVALUATION ADULT - PERTINENT HX OF CURRENT PROBLEM, REHAB EVAL
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 In the ED, she was diagnosed with septic shock, MAGDALENO, pneumonia and extensive LAD., referred to PT for eval and tx

## 2021-09-11 NOTE — PROGRESS NOTE ADULT - ASSESSMENT
Impression:  Sepsis POA   Septic Shock resolved   UTI / PNA on ABX therapy   Generalized Lymphadenopathy possible lymphoma   MAGDALENO on CKD improving   HO PE was on coumadin  Supra therapeutic INR  LLL atelectasis infiltrate with possible endobronchial filling defect      PLAN:    CNS: Avoid CNS depressants    HEENT: Oral care    PULMONARY: HOB @ 45 degrees.  Wean O2 as tolerated.  Pulmonary toilet.  Nebs Q4 PRN.  Possible bronchoscopy next week.  Repeat CXR reviewed       CARDIOVASCULAR: Avoid volume overload.      GI: GI prophylaxis.  Feeding    RENAL: Follow up lytes. Correct as needed.     INFECTIOUS DISEASE:  Can  DC ABX.      HEMATOLOGICAL:  Monitor CBC and COags     ENDOCRINE:  Follow up FS.    Downgrade to med surg     FULL CODE

## 2021-09-11 NOTE — CHART NOTE - NSCHARTNOTEFT_GEN_A_CORE
82 y/o F pmhx significant for Essential HTN & hx of PE s/p Warfarin, Sarcoidosis admitted for septic shock 2/2 UTI now on zosyn which has now resolved, found to have new onset A. Fib now on Lopressor 50 BID and was on heparin gtt now being switched to therapeutic Lovenox. She was also found to have extensive LAD with heme onc following and bronch planned for next week for poss. bx.    For Full plan refer to Dr. Montoya Note

## 2021-09-11 NOTE — PROGRESS NOTE ADULT - SUBJECTIVE AND OBJECTIVE BOX
JOEY KNAPP  81y Female    CHIEF COMPLAINT:    Patient is a 81y old  Female who presents with a chief complaint of 3 days of Weakness & Diarrhea; (11 Sep 2021 11:09)      INTERVAL HPI/OVERNIGHT EVENTS:    Patient seen and examined. No acute events overnight. No active complaints     ROS: All other systems are negative.    Vital Signs:    T(F): 99.1 (09-11-21 @ 07:30), Max: 99.1 (09-11-21 @ 07:30)  HR: 86 (09-11-21 @ 07:30) (86 - 140)  BP: 122/62 (09-11-21 @ 07:30) (94/58 - 122/62)  RR: 18 (09-11-21 @ 07:30) (17 - 20)  SpO2: 98% (09-11-21 @ 07:30) (96% - 100%)     10 Sep 2021 07:01  -  11 Sep 2021 07:00  --------------------------------------------------------  IN: 688 mL / OUT: 900 mL / NET: -212 mL     PHYSICAL EXAM:    GENERAL:  NAD  SKIN: No rashes or lesions  HEENT: Atraumatic. Normocephalic.    NECK: Supple, No JVD.   PULMONARY: Coarse breath sounds b/l. No wheezing. No rales  CVS: Normal S1, S2. Rate and Rhythm are regular.    ABDOMEN/GI: Soft, Nontender, Nondistended; BS present  MSK:  No clubbing or cyanosis   NEUROLOGIC: Moves all extremities  PSYCH: Alert & oriented x 3     Consultant(s) Notes Reviewed:  [x ] YES  [ ] NO  Care Discussed with Consultants/Other Providers [ x] YES  [ ] NO    LABS:                        8.8    14.22 )-----------( 311      ( 11 Sep 2021 09:10 )             28.6     140  |  108  |  27<H>  ----------------------------<  121<H>  4.1   |  22  |  1.3    Ca    7.8<L>      11 Sep 2021 09:10  Mg     1.7     09-11    TPro  4.4<L>  /  Alb  2.5<L>  /  TBili  0.6  /  DBili  x   /  AST  48<H>  /  ALT  22  /  AlkPhos  132<H>  09-10    PT/INR - ( 11 Sep 2021 09:10 )   PT: 15.20 sec;   INR: 1.33 ratio       PTT - ( 11 Sep 2021 09:10 )  PTT:35.7 sec  Serum Pro-Brain Natriuretic Peptide: 6971 pg/mL (09-07-21 @ 04:30)    RADIOLOGY & ADDITIONAL TESTS:  Imaging or report Personally Reviewed:  [x] YES  [ ] NO  EKG reviewed: [x] YES  [ ] NO    Medications:  Standing  chlorhexidine 4% Liquid 1 Application(s) Topical <User Schedule>  enoxaparin Injectable 80 milliGRAM(s) SubCutaneous two times a day  magnesium sulfate  IVPB 2 Gram(s) IV Intermittent every 2 hours  metoprolol tartrate 50 milliGRAM(s) Oral two times a day  pantoprazole    Tablet 40 milliGRAM(s) Oral before breakfast  piperacillin/tazobactam IVPB.. 3.375 Gram(s) IV Intermittent every 12 hours    PRN Meds  acetaminophen   Tablet .. 650 milliGRAM(s) Oral every 6 hours PRN  aluminum hydroxide/magnesium hydroxide/simethicone Suspension 30 milliLiter(s) Oral every 4 hours PRN  melatonin 3 milliGRAM(s) Oral at bedtime PRN  ondansetron Injectable 4 milliGRAM(s) IV Push every 8 hours PRN

## 2021-09-11 NOTE — PHYSICAL THERAPY INITIAL EVALUATION ADULT - GENERAL OBSERVATIONS, REHAB EVAL
13:40-Chart reviewed. Attempted to see pt for PT IE however pt refusing at this time 2* increased c/o fatigue. Pt educated on importance of participating in PT tx and OOB activities however pt continues to refuse. Pt agreeable to attempt again tomorrow in AM. PT to f/u.
[id number] am Chart reviewed. Pt. seen semirecline in bed , in No apparent distress , +O2 at 3L/min nc, IV lock , alvarado catheter , contact isolation, Pt. agreed to activity/therapy.

## 2021-09-12 LAB
ANION GAP SERPL CALC-SCNC: 9 MMOL/L — SIGNIFICANT CHANGE UP (ref 7–14)
APTT BLD: 28.3 SEC — SIGNIFICANT CHANGE UP (ref 27–39.2)
BASOPHILS # BLD AUTO: 0.01 K/UL — SIGNIFICANT CHANGE UP (ref 0–0.2)
BASOPHILS NFR BLD AUTO: 0.1 % — SIGNIFICANT CHANGE UP (ref 0–1)
BUN SERPL-MCNC: 23 MG/DL — HIGH (ref 10–20)
CALCIUM SERPL-MCNC: 7.7 MG/DL — LOW (ref 8.5–10.1)
CHLORIDE SERPL-SCNC: 107 MMOL/L — SIGNIFICANT CHANGE UP (ref 98–110)
CO2 SERPL-SCNC: 23 MMOL/L — SIGNIFICANT CHANGE UP (ref 17–32)
CREAT SERPL-MCNC: 1.1 MG/DL — SIGNIFICANT CHANGE UP (ref 0.7–1.5)
EOSINOPHIL # BLD AUTO: 0.07 K/UL — SIGNIFICANT CHANGE UP (ref 0–0.7)
EOSINOPHIL NFR BLD AUTO: 0.5 % — SIGNIFICANT CHANGE UP (ref 0–8)
GLUCOSE SERPL-MCNC: 101 MG/DL — HIGH (ref 70–99)
HCT VFR BLD CALC: 27.2 % — LOW (ref 37–47)
HGB BLD-MCNC: 8.3 G/DL — LOW (ref 12–16)
IMM GRANULOCYTES NFR BLD AUTO: 1.2 % — HIGH (ref 0.1–0.3)
LYMPHOCYTES # BLD AUTO: 18.9 % — LOW (ref 20.5–51.1)
LYMPHOCYTES # BLD AUTO: 2.44 K/UL — SIGNIFICANT CHANGE UP (ref 1.2–3.4)
MAGNESIUM SERPL-MCNC: 1.9 MG/DL — SIGNIFICANT CHANGE UP (ref 1.8–2.4)
MCHC RBC-ENTMCNC: 28.7 PG — SIGNIFICANT CHANGE UP (ref 27–31)
MCHC RBC-ENTMCNC: 30.5 G/DL — LOW (ref 32–37)
MCV RBC AUTO: 94.1 FL — SIGNIFICANT CHANGE UP (ref 81–99)
MONOCYTES # BLD AUTO: 0.82 K/UL — HIGH (ref 0.1–0.6)
MONOCYTES NFR BLD AUTO: 6.3 % — SIGNIFICANT CHANGE UP (ref 1.7–9.3)
NEUTROPHILS # BLD AUTO: 9.45 K/UL — HIGH (ref 1.4–6.5)
NEUTROPHILS NFR BLD AUTO: 73 % — SIGNIFICANT CHANGE UP (ref 42.2–75.2)
NRBC # BLD: 0 /100 WBCS — SIGNIFICANT CHANGE UP (ref 0–0)
PLATELET # BLD AUTO: 319 K/UL — SIGNIFICANT CHANGE UP (ref 130–400)
POTASSIUM SERPL-MCNC: 4.3 MMOL/L — SIGNIFICANT CHANGE UP (ref 3.5–5)
POTASSIUM SERPL-SCNC: 4.3 MMOL/L — SIGNIFICANT CHANGE UP (ref 3.5–5)
RBC # BLD: 2.89 M/UL — LOW (ref 4.2–5.4)
RBC # FLD: 13.7 % — SIGNIFICANT CHANGE UP (ref 11.5–14.5)
SODIUM SERPL-SCNC: 139 MMOL/L — SIGNIFICANT CHANGE UP (ref 135–146)
WBC # BLD: 12.94 K/UL — HIGH (ref 4.8–10.8)
WBC # FLD AUTO: 12.94 K/UL — HIGH (ref 4.8–10.8)

## 2021-09-12 PROCEDURE — 99232 SBSQ HOSP IP/OBS MODERATE 35: CPT

## 2021-09-12 RX ORDER — POLYETHYLENE GLYCOL 3350 17 G/17G
17 POWDER, FOR SOLUTION ORAL ONCE
Refills: 0 | Status: DISCONTINUED | OUTPATIENT
Start: 2021-09-12 | End: 2021-09-12

## 2021-09-12 RX ORDER — LOPERAMIDE HCL 2 MG
2 TABLET ORAL
Refills: 0 | Status: DISCONTINUED | OUTPATIENT
Start: 2021-09-12 | End: 2021-09-20

## 2021-09-12 RX ADMIN — ENOXAPARIN SODIUM 80 MILLIGRAM(S): 100 INJECTION SUBCUTANEOUS at 18:18

## 2021-09-12 RX ADMIN — PIPERACILLIN AND TAZOBACTAM 25 GRAM(S): 4; .5 INJECTION, POWDER, LYOPHILIZED, FOR SOLUTION INTRAVENOUS at 05:20

## 2021-09-12 RX ADMIN — Medication 50 MILLIGRAM(S): at 05:19

## 2021-09-12 RX ADMIN — PANTOPRAZOLE SODIUM 40 MILLIGRAM(S): 20 TABLET, DELAYED RELEASE ORAL at 05:19

## 2021-09-12 RX ADMIN — Medication 50 MILLIGRAM(S): at 17:45

## 2021-09-12 NOTE — PROGRESS NOTE ADULT - ASSESSMENT
82 y/o female with    #s/p Septic Shock secondary to Aspiration vs GN pneumonia  stable now.  on abx, zosyn    #Diffuse Lymphadenopathy  including chest/abd/groin  hx of sarcoidosis... check for ACE/LDH  r/o reactive in lung due to PNA but abd LN?  to repeat ct chest as outpt    #Hx of PE on Warfarin with hx of PT gene 2 mutation   Also DVT.  subtherapeutic INR  bridge with lovenox   monitor INR     # afib .. on meds    # renal insuff.. improving    # anemia of chronic dx   check retic count,iron profile/ferritin      PT eval    cont other supportive care  will f/u

## 2021-09-12 NOTE — PROVIDER CONTACT NOTE (MEDICATION) - BACKGROUND
Patient states "Dr. Grant told me to go home and rest instead of doing the procedure now, so she said I don't need the lovenox". Per Heme/onc attending note, patient should continue lovenox bridge.

## 2021-09-12 NOTE — PROGRESS NOTE ADULT - SUBJECTIVE AND OBJECTIVE BOX
JOEY KNAPP  81y Female    CHIEF COMPLAINT:    Patient is a 81y old  Female who presents with a chief complaint of 3 days of Weakness & Diarrhea; (11 Sep 2021 14:56)      INTERVAL HPI/OVERNIGHT EVENTS:    Patient seen and examined. No acute events overnight. Still with diarrhea     ROS: All other systems are negative.    Vital Signs:    T(F): 99.6 (09-12-21 @ 07:55), Max: 99.6 (09-12-21 @ 07:55)  HR: 101 (09-12-21 @ 07:55) (98 - 110)  BP: 119/59 (09-12-21 @ 07:55) (119/59 - 140/81)  RR: 17 (09-12-21 @ 07:55) (17 - 22)  SpO2: 98% (09-12-21 @ 03:50) (92% - 98%)    PHYSICAL EXAM:    GENERAL:  NAD  SKIN: No rashes or lesions  HEENT: Atraumatic. Normocephalic.  NECK: Supple, No JVD.   PULMONARY: CTA B/L. No wheezing. No rales  CVS: Normal S1, S2. Rate and Rhythm are regular   ABDOMEN/GI: Soft, Nontender, Nondistended; BS present  MSK:  No clubbing or cyanosis   NEUROLOGIC: moves all extremities  PSYCH: Alert & oriented x 3    Consultant(s) Notes Reviewed:  [x ] YES  [ ] NO  Care Discussed with Consultants/Other Providers [ x] YES  [ ] NO    LABS:                        8.8    14.22 )-----------( 311      ( 11 Sep 2021 09:10 )             28.6     140  |  108  |  27<H>  ----------------------------<  121<H>  4.1   |  22  |  1.3    Ca    7.8<L>      11 Sep 2021 09:10  Mg     1.7     09-11    TPro  4.4<L>  /  Alb  2.5<L>  /  TBili  0.6  /  DBili  x   /  AST  48<H>  /  ALT  22  /  AlkPhos  132<H>  09-10    PT/INR - ( 11 Sep 2021 09:10 )   PT: 15.20 sec;   INR: 1.33 ratio      PTT - ( 12 Sep 2021 01:00 )  PTT:28.3 sec  Serum Pro-Brain Natriuretic Peptide: 6971 pg/mL (09-07-21 @ 04:30)    Culture - Blood (collected 10 Sep 2021 17:02)  Source: .Blood None  Preliminary Report (12 Sep 2021 06:14):    No growth to date.    RADIOLOGY & ADDITIONAL TESTS:  Imaging or report Personally Reviewed:  [x] YES  [ ] NO  EKG reviewed: [x] YES  [ ] NO    Medications:  Standing  chlorhexidine 4% Liquid 1 Application(s) Topical <User Schedule>  enoxaparin Injectable 80 milliGRAM(s) SubCutaneous two times a day  metoprolol tartrate 50 milliGRAM(s) Oral two times a day  pantoprazole    Tablet 40 milliGRAM(s) Oral before breakfast  piperacillin/tazobactam IVPB.. 3.375 Gram(s) IV Intermittent every 12 hours  polyethylene glycol 3350 17 Gram(s) Oral once    PRN Meds  acetaminophen   Tablet .. 650 milliGRAM(s) Oral every 6 hours PRN  aluminum hydroxide/magnesium hydroxide/simethicone Suspension 30 milliLiter(s) Oral every 4 hours PRN  loperamide 2 milliGRAM(s) Oral four times a day PRN  melatonin 3 milliGRAM(s) Oral at bedtime PRN  ondansetron Injectable 4 milliGRAM(s) IV Push every 8 hours PRN

## 2021-09-12 NOTE — PROVIDER CONTACT NOTE (MEDICATION) - ASSESSMENT
Pt refusing lovenox as the needlesticks are uncomfortable and produce too much bruising in her experience.

## 2021-09-12 NOTE — PROGRESS NOTE ADULT - SUBJECTIVE AND OBJECTIVE BOX
Patient is a 81y old  Female who presents with a chief complaint of 3 days of Weakness & Diarrhea; (12 Sep 2021 10:21)       Pt is seen and examined  pt is awake and lying in bed  pt seems comfortable and denies any complaints at this time          ROS:  Negative   MEDICATIONS  (STANDING):  chlorhexidine 4% Liquid 1 Application(s) Topical <User Schedule>  enoxaparin Injectable 80 milliGRAM(s) SubCutaneous two times a day  metoprolol tartrate 50 milliGRAM(s) Oral two times a day  pantoprazole    Tablet 40 milliGRAM(s) Oral before breakfast  piperacillin/tazobactam IVPB.. 3.375 Gram(s) IV Intermittent every 12 hours    MEDICATIONS  (PRN):  acetaminophen   Tablet .. 650 milliGRAM(s) Oral every 6 hours PRN Temp greater or equal to 38.5C (101.3F), Mild Pain (1 - 3)  aluminum hydroxide/magnesium hydroxide/simethicone Suspension 30 milliLiter(s) Oral every 4 hours PRN Dyspepsia  loperamide 2 milliGRAM(s) Oral four times a day PRN Diarrhea  melatonin 3 milliGRAM(s) Oral at bedtime PRN Insomnia  ondansetron Injectable 4 milliGRAM(s) IV Push every 8 hours PRN Nausea and/or Vomiting      Allergies    No Known Allergies    Intolerances        Vital Signs Last 24 Hrs  T(C): 37.6 (12 Sep 2021 07:55), Max: 37.6 (12 Sep 2021 07:55)  T(F): 99.6 (12 Sep 2021 07:55), Max: 99.6 (12 Sep 2021 07:55)  HR: 101 (12 Sep 2021 07:55) (98 - 110)  BP: 119/59 (12 Sep 2021 07:55) (119/59 - 140/81)  BP(mean): --  RR: 17 (12 Sep 2021 07:55) (17 - 22)  SpO2: 98% (12 Sep 2021 03:50) (92% - 98%)    PHYSICAL EXAM  General: adult in NAD  HEENT: clear oropharynx, anicteric sclera, pink conjunctiva  Neck: supple  CV: normal S1/S2 with no murmur rubs or gallops  Lungs: positive air movement b/l ant lungs,clear to auscultation, no wheezes, no rales  Abdomen: soft non-tender non-distended, no hepatosplenomegaly  Ext: no clubbing cyanosis or edema  Skin: no rashes and no petechiae  Neuro: alert and oriented X 4, no focal deficits  LABS:                          8.8    14.22 )-----------( 311      ( 11 Sep 2021 09:10 )             28.6         Mean Cell Volume : 93.5 fL  Mean Cell Hemoglobin : 28.8 pg  Mean Cell Hemoglobin Concentration : 30.8 g/dL  Auto Neutrophil # : x  Auto Lymphocyte # : x  Auto Monocyte # : x  Auto Eosinophil # : x  Auto Basophil # : x  Auto Neutrophil % : x  Auto Lymphocyte % : x  Auto Monocyte % : x  Auto Eosinophil % : x  Auto Basophil % : x    Serial CBC's  09-11 @ 09:10  Hct-28.6 / Hgb-8.8 / Plat-311 / RBC-3.06 / WBC-14.22          Serial CBC's  09-10 @ 17:02  Hct-27.8 / Hgb-8.7 / Plat-319 / RBC-3.00 / WBC-15.29          Serial CBC's  09-09 @ 08:24  Hct-30.1 / Hgb-9.5 / Plat-293 / RBC-3.30 / WBC-12.49            09-11    140  |  108  |  27<H>  ----------------------------<  121<H>  4.1   |  22  |  1.3    Ca    7.8<L>      11 Sep 2021 09:10  Mg     1.7     09-11    TPro  4.4<L>  /  Alb  2.5<L>  /  TBili  0.6  /  DBili  x   /  AST  48<H>  /  ALT  22  /  AlkPhos  132<H>  09-10      PT/INR - ( 11 Sep 2021 09:10 )   PT: 15.20 sec;   INR: 1.33 ratio         PTT - ( 12 Sep 2021 01:00 )  PTT:28.3 sec    Hemoglobin: 8.8 g/dL (09-11-21 @ 09:10)  WBC Count: 14.22 K/uL (09-11-21 @ 09:10)  Hematocrit: 28.6 % (09-11-21 @ 09:10)  Platelet Count - Automated: 311 K/uL (09-11-21 @ 09:10)  Hemoglobin: 8.7 g/dL (09-10-21 @ 17:02)  WBC Count: 15.29 K/uL (09-10-21 @ 17:02)  Hematocrit: 27.8 % (09-10-21 @ 17:02)  Platelet Count - Automated: 319 K/uL (09-10-21 @ 17:02)  Hemoglobin: 9.5 g/dL (09-09-21 @ 08:24)  WBC Count: 12.49 K/uL (09-09-21 @ 08:24)  Hematocrit: 30.1 % (09-09-21 @ 08:24)  Platelet Count - Automated: 293 K/uL (09-09-21 @ 08:24)  Hematocrit: 33.8 % (09-08-21 @ 04:30)  Hemoglobin: 10.6 g/dL (09-08-21 @ 04:30)  WBC Count: 10.84 K/uL (09-08-21 @ 04:30)  Platelet Count - Automated: 279 K/uL (09-08-21 @ 04:30)  Hematocrit: 30.8 % (09-07-21 @ 04:30)  Platelet Count - Automated: 191 K/uL (09-07-21 @ 04:30)  Hemoglobin: 10.0 g/dL (09-07-21 @ 04:30)  WBC Count: 8.17 K/uL (09-07-21 @ 04:30)  Hematocrit: 30.3 % (09-06-21 @ 05:49)  Platelet Count - Automated: 160 K/uL (09-06-21 @ 05:49)  Hemoglobin: 9.7 g/dL (09-06-21 @ 05:49)  WBC Count: 9.22 K/uL (09-06-21 @ 05:49)  WBC Count: 12.97 K/uL (09-05-21 @ 04:30)  Hematocrit: 32.7 % (09-05-21 @ 04:30)  Platelet Count - Automated: 141 K/uL (09-05-21 @ 04:30)  Hemoglobin: 10.1 g/dL (09-05-21 @ 04:30)  WBC Count: 16.30 K/uL (09-03-21 @ 22:30)  Hematocrit: 36.8 % (09-03-21 @ 22:30)  Hemoglobin: 11.5 g/dL (09-03-21 @ 22:30)  Platelet Count - Automated: 147 K/uL (09-03-21 @ 22:30)                BLOOD SMEAR INTERPRETATION:       RADIOLOGY & ADDITIONAL STUDIES:

## 2021-09-12 NOTE — PROGRESS NOTE ADULT - ASSESSMENT
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 In the ED, she was diagnosed with septic shock, MAGDALENO, pneumonia and extensive LAD.    Septic shock present on admission due to suspected GNR versus aspiration pneumonia (coverage for GNR and aspiration)  History of Sarcoidosis  Saturating well on RA 98%  off pressors  Urine strep and legionella negative, Blood cx NGTD, Urine Cx contaminated  dc zosyn as completed course   repeat blood cx pending given new fevers a few days ago   Cdiff negative   Possible bronch next week, f/u pulm cc  Check ACE level    New onset Afib, HR better controlled   H/o PE on coumadin, with elevated INR   - HR improved with fluid bolus - keep hydrated and replete electrolytes  - c/w Metoprolol  50 Q12H and monitor HR and BP  - TSH 1.18  - ECHO 9/7: EF 60-65%, grade 1 diastolic dysfunction, enlarged atria, PulmHTN  - INR subtherapeutic, start Lovenox, will switch to Coumadin once closer to DC  - cardiology Dr Gupta following    Extensive LAD on CT scan  - [facility] eval Dr. Grant following, patient with a history of sarcoid  - pending ACE    MAGDALENO - resolved  possibly prerenal due to diarrhea and sepsis  less likely due to ATN from shock since improving daily  no hydronephrosis on CT scan  I's and O's   holding IVF for now   renal eval if not improving     Chronic diarrhea -   Cdiff negative, start loperamide       Elevated troponin could be due to demand ischemia and/or MAGDALENO - trending down     Hypokalemia and hypomagnesemia - repleting     PROGRESS NOTE HANDOFF    Pending:  pulm f/u for possible bronch next week, PT f/u  Disposition: from home

## 2021-09-13 LAB
ACE SERPL-CCNC: 32 U/L — SIGNIFICANT CHANGE UP (ref 14–82)
ANION GAP SERPL CALC-SCNC: 9 MMOL/L — SIGNIFICANT CHANGE UP (ref 7–14)
APTT BLD: 31.6 SEC — SIGNIFICANT CHANGE UP (ref 27–39.2)
BASOPHILS # BLD AUTO: 0.03 K/UL — SIGNIFICANT CHANGE UP (ref 0–0.2)
BASOPHILS NFR BLD AUTO: 0.2 % — SIGNIFICANT CHANGE UP (ref 0–1)
BUN SERPL-MCNC: 22 MG/DL — HIGH (ref 10–20)
CALCIUM SERPL-MCNC: 7.8 MG/DL — LOW (ref 8.5–10.1)
CHLORIDE SERPL-SCNC: 107 MMOL/L — SIGNIFICANT CHANGE UP (ref 98–110)
CO2 SERPL-SCNC: 23 MMOL/L — SIGNIFICANT CHANGE UP (ref 17–32)
CREAT SERPL-MCNC: 1.1 MG/DL — SIGNIFICANT CHANGE UP (ref 0.7–1.5)
EOSINOPHIL # BLD AUTO: 0.1 K/UL — SIGNIFICANT CHANGE UP (ref 0–0.7)
EOSINOPHIL NFR BLD AUTO: 0.7 % — SIGNIFICANT CHANGE UP (ref 0–8)
GLUCOSE SERPL-MCNC: 92 MG/DL — SIGNIFICANT CHANGE UP (ref 70–99)
HCT VFR BLD CALC: 26.7 % — LOW (ref 37–47)
HGB BLD-MCNC: 8.2 G/DL — LOW (ref 12–16)
IMM GRANULOCYTES NFR BLD AUTO: 0.9 % — HIGH (ref 0.1–0.3)
INR BLD: 1.5 RATIO — HIGH (ref 0.65–1.3)
LYMPHOCYTES # BLD AUTO: 18.1 % — LOW (ref 20.5–51.1)
LYMPHOCYTES # BLD AUTO: 2.5 K/UL — SIGNIFICANT CHANGE UP (ref 1.2–3.4)
MAGNESIUM SERPL-MCNC: 1.7 MG/DL — LOW (ref 1.8–2.4)
MCHC RBC-ENTMCNC: 29.2 PG — SIGNIFICANT CHANGE UP (ref 27–31)
MCHC RBC-ENTMCNC: 30.7 G/DL — LOW (ref 32–37)
MCV RBC AUTO: 95 FL — SIGNIFICANT CHANGE UP (ref 81–99)
MONOCYTES # BLD AUTO: 1.11 K/UL — HIGH (ref 0.1–0.6)
MONOCYTES NFR BLD AUTO: 8.1 % — SIGNIFICANT CHANGE UP (ref 1.7–9.3)
NEUTROPHILS # BLD AUTO: 9.91 K/UL — HIGH (ref 1.4–6.5)
NEUTROPHILS NFR BLD AUTO: 72 % — SIGNIFICANT CHANGE UP (ref 42.2–75.2)
NRBC # BLD: 0 /100 WBCS — SIGNIFICANT CHANGE UP (ref 0–0)
PLATELET # BLD AUTO: 326 K/UL — SIGNIFICANT CHANGE UP (ref 130–400)
POTASSIUM SERPL-MCNC: 4.1 MMOL/L — SIGNIFICANT CHANGE UP (ref 3.5–5)
POTASSIUM SERPL-SCNC: 4.1 MMOL/L — SIGNIFICANT CHANGE UP (ref 3.5–5)
PROTHROM AB SERPL-ACNC: 17.2 SEC — HIGH (ref 9.95–12.87)
RBC # BLD: 2.81 M/UL — LOW (ref 4.2–5.4)
RBC # FLD: 13.7 % — SIGNIFICANT CHANGE UP (ref 11.5–14.5)
SODIUM SERPL-SCNC: 139 MMOL/L — SIGNIFICANT CHANGE UP (ref 135–146)
WBC # BLD: 13.78 K/UL — HIGH (ref 4.8–10.8)
WBC # FLD AUTO: 13.78 K/UL — HIGH (ref 4.8–10.8)

## 2021-09-13 PROCEDURE — 99233 SBSQ HOSP IP/OBS HIGH 50: CPT

## 2021-09-13 RX ORDER — WARFARIN SODIUM 2.5 MG/1
2.5 TABLET ORAL ONCE
Refills: 0 | Status: COMPLETED | OUTPATIENT
Start: 2021-09-13 | End: 2021-09-13

## 2021-09-13 RX ADMIN — Medication 50 MILLIGRAM(S): at 17:54

## 2021-09-13 RX ADMIN — ENOXAPARIN SODIUM 80 MILLIGRAM(S): 100 INJECTION SUBCUTANEOUS at 17:54

## 2021-09-13 RX ADMIN — WARFARIN SODIUM 2.5 MILLIGRAM(S): 2.5 TABLET ORAL at 21:09

## 2021-09-13 RX ADMIN — PANTOPRAZOLE SODIUM 40 MILLIGRAM(S): 20 TABLET, DELAYED RELEASE ORAL at 06:45

## 2021-09-13 RX ADMIN — ENOXAPARIN SODIUM 80 MILLIGRAM(S): 100 INJECTION SUBCUTANEOUS at 06:45

## 2021-09-13 RX ADMIN — Medication 50 MILLIGRAM(S): at 06:45

## 2021-09-13 RX ADMIN — CHLORHEXIDINE GLUCONATE 1 APPLICATION(S): 213 SOLUTION TOPICAL at 06:45

## 2021-09-13 NOTE — PROGRESS NOTE ADULT - ASSESSMENT
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 In the ED, she was diagnosed with septic shock, MAGDALENO, pneumonia and extensive LAD.    Septic shock present on admission due to suspected GNR versus aspiration pneumonia (coverage for GNR and aspiration)  History of Sarcoidosis  Saturating well on RA 94-95%  off pressors  Urine strep and legionella negative, Blood cx NGTD, Urine Cx contaminated  repeat blood cx 9/12 NGTD, procal downtrending from 6--->0.46   Cdiff negative   discussed with patient, daughter at bedside, they do not wish to undergo bronch at this time, they will f/u with Dr Botello as outpatient  discussed with Dr Botello  on discharge, change abx to Augmentin to complete a 14 day course of antibiotics (end date 9/17), discussed with ID  Check ACE level    New onset Afib, HR better controlled   H/o PE on coumadin, with elevated INR   - c/w Metoprolol  50 Q12H and monitor HR and BP  - TSH 1.18  - ECHO 9/7: EF 60-65%, grade 1 diastolic dysfunction, enlarged atria, PulmHTN  - cardiology Dr Gupta following  - start bridging lovenox with coumadin, patient usually takes 2.5 mg at home and follows at the coumadin clinic    Extensive LAD on CT scan  - [facility] eval Dr. Grant following, patient with a history of sarcoid  - pending ACE    MAGDALENO - resolved  possibly prerenal due to diarrhea and sepsis  less likely due to ATN from shock since improving daily  no hydronephrosis on CT scan  I's and O's   holding IVF for now   renal eval if not improving     Chronic diarrhea -   Cdiff negative, on loperamide       Elevated troponin could be due to demand ischemia and/or MAGDALENO - trending down     Hypokalemia- resolved  hypomagnesemia - repleting     PROGRESS NOTE HANDOFF    Pending: lovenox to coumadin bridge, can dc once INR close to therapeutic  Family discussion: plan of care discussed with patient and daughter at bedside, all questions answered   Disposition: from home, family requesting SNF on dc, pending PT eval

## 2021-09-13 NOTE — PROGRESS NOTE ADULT - ASSESSMENT
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 In the ED, she was diagnosed with septic shock, MAGDALENO, pneumonia and extensive LAD.    #Septic shock present on admission likely 2/2 GNR vs. Aspiration pneumonia (coverage for GNR and aspiration)  #Hx of Sarcoidosis  -Complete course of Zosyn  -Urine strep and legionella negative, Blood cx NGTD, Urine Cx contaminated  -repeat blood cx pending given new fevers a few days ago   -C. diff negative   -Possible bronchoscopy next week, f/u pulm cc  -Please f/u ACE level (Ordered and Pending results)  -Saturating well on RA 98%    #New onset Atrial fib  - HR improved with fluid bolus - keep hydrated and replete electrolytes  - c/w Metoprolol 50mg q12h and monitor HR and BP  - TSH 1.18  - ECHO 9/7: EF 60-65%, grade 1 diastolic dysfunction, enlarged atria, Pulm HTN    #Hx of PE on coumadin, with elevated INR   - Started Coumadin  - Daily INR  - Dr Gupta (Cardiology) following    #Extensive LAD on CT scan  - Pt has HX of Sarcodosis  - HemeOn eval Dr. Grant following  - Follow up ACE    #MAGDALENO - resolved  -Likely prerenal MAGDALENO 2/2 to diarrhea and sepsis  -no hydronephrosis on CT scan  -I's and O's   -please hold off IVF  -obtain renal consult if not improving    #Chronic Diarrhea  -Can start Loperamide since C. 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 In the ED, she was diagnosed with septic shock, MAGDALENO, pneumonia and extensive LAD.    Septic shock 2/2 Aspiration VS GN Pneumonia  -S/p Zosyn  -D/c with Augmentin - to be completed (9/17)  -Blood c/x No Growth to Date  -Urine Legionella and Strep negative    Paroxsymal Atrial Fibrillation - new onset  -On metoprolol - please continue and monitor vitals  -Starting coumadin 2.5mg tonight  -Daily INR check  -Hx of PE on Coumadin    Sarcoidosis   -Patient refused Bronchoscopy and B/X - pt will f/u w/Dr. Botello out pt  -Please f/u ACE level  -Extensive LAD on CT    Chronic Diarrhea (possibly due to IBS)  -Can start Loperamide since C. Diff negative    Dispo: Waiting for INR to become therapeutic

## 2021-09-13 NOTE — PROGRESS NOTE ADULT - SUBJECTIVE AND OBJECTIVE BOX
Patient is a 81y old  Female who presents with a chief complaint of 3 days of Weakness & Diarrhea; (13 Sep 2021 09:44)        Over Night Events:        ROS:  See HPI    PHYSICAL EXAM    ICU Vital Signs Last 24 Hrs  T(C): 37.5 (13 Sep 2021 08:07), Max: 37.6 (13 Sep 2021 00:00)  T(F): 99.5 (13 Sep 2021 08:07), Max: 99.7 (13 Sep 2021 00:00)  HR: 72 (13 Sep 2021 08:07) (72 - 101)  BP: 116/65 (13 Sep 2021 08:07) (95/60 - 124/65)  BP(mean): --  ABP: --  ABP(mean): --  RR: 19 (13 Sep 2021 08:07) (17 - 19)  SpO2: 94% (13 Sep 2021 06:45) (94% - 95%)      09-12-21 @ 07:01  -  09-13-21 @ 07:00  --------------------------------------------------------  IN:  Total IN: 0 mL    OUT:    Voided (mL): 400 mL  Total OUT: 400 mL    Total NET: -400 mL            CONSTITUTIONAL:  Well nourished.  NAD    ENT:   Airway patent,   No thrush    EYES:   Clear bilaterally,   pupils equal,   round and reactive to light.    CARDIAC:   Normal rate,   regular rhythm.    no edema      CAROTID:   normal systolic impulse  no bruits    RESPIRATORY:   No wheezing  Normal chest expansion  Not tachypneic,  No use of accessory muscles    GASTROINTESTINAL:  Abdomen soft,   non-tender,   no guarding,   + BS    MUSCULOSKELETAL:   range of motion is not limited,  no clubbing, cyanosis    NEUROLOGICAL:   Alert and oriented   no motor deficits.    SKIN:   Skin normal color for race,   No evidence of rash.    PSYCHIATRIC:   normal mood and affect.   no apparent risk to self or others.        LABS:                            8.2    13.78 )-----------( 326      ( 13 Sep 2021 07:48 )             26.7                                               09-13    139  |  107  |  22<H>  ----------------------------<  92  4.1   |  23  |  1.1    Ca    7.8<L>      13 Sep 2021 07:48  Mg     1.7     09-13        PT/INR - ( 13 Sep 2021 07:48 )   PT: 17.20 sec;   INR: 1.50 ratio         PTT - ( 13 Sep 2021 07:48 )  PTT:31.6 sec                                                                                                Procalcitonin, Serum: 0.46 ng/mL (09-10-21 @ 17:02)  C-Reactive Protein, Serum: 158 mg/L (09-10-21 @ 17:02)  Procalcitonin, Serum: 6.63 ng/mL (09-04-21 @ 16:50)                                        Culture - Blood (collected 10 Sep 2021 17:02)  Source: .Blood None  Preliminary Report (12 Sep 2021 06:14):    No growth to date.                                                                                           MEDICATIONS  (STANDING):  chlorhexidine 4% Liquid 1 Application(s) Topical <User Schedule>  enoxaparin Injectable 80 milliGRAM(s) SubCutaneous two times a day  metoprolol tartrate 50 milliGRAM(s) Oral two times a day  pantoprazole    Tablet 40 milliGRAM(s) Oral before breakfast  warfarin 2.5 milliGRAM(s) Oral once    MEDICATIONS  (PRN):  acetaminophen   Tablet .. 650 milliGRAM(s) Oral every 6 hours PRN Temp greater or equal to 38.5C (101.3F), Mild Pain (1 - 3)  aluminum hydroxide/magnesium hydroxide/simethicone Suspension 30 milliLiter(s) Oral every 4 hours PRN Dyspepsia  loperamide 2 milliGRAM(s) Oral four times a day PRN Diarrhea  melatonin 3 milliGRAM(s) Oral at bedtime PRN Insomnia  ondansetron Injectable 4 milliGRAM(s) IV Push every 8 hours PRN Nausea and/or Vomiting      Xrays:                                                                                     ECHO     Patient is a 81y old  Female who presents with a chief complaint of 3 days of Weakness & Diarrhea; (13 Sep 2021 09:44)        Over Night Events:  Resting comfortable         ROS:  See HPI    PHYSICAL EXAM    ICU Vital Signs Last 24 Hrs  T(C): 37.5 (13 Sep 2021 08:07), Max: 37.6 (13 Sep 2021 00:00)  T(F): 99.5 (13 Sep 2021 08:07), Max: 99.7 (13 Sep 2021 00:00)  HR: 72 (13 Sep 2021 08:07) (72 - 101)  BP: 116/65 (13 Sep 2021 08:07) (95/60 - 124/65)  BP(mean): --  ABP: --  ABP(mean): --  RR: 19 (13 Sep 2021 08:07) (17 - 19)  SpO2: 94% (13 Sep 2021 06:45) (94% - 95%)      09-12-21 @ 07:01  -  09-13-21 @ 07:00  --------------------------------------------------------  IN:  Total IN: 0 mL    OUT:    Voided (mL): 400 mL  Total OUT: 400 mL    Total NET: -400 mL            CONSTITUTIONAL:  Well nourished.  NAD    ENT:   Airway patent,   No thrush    EYES:   Clear bilaterally,   pupils equal,   round and reactive to light.    CARDIAC:   Normal rate,   Irregular rhythm.    no edema      CAROTID:   normal systolic impulse  no bruits    RESPIRATORY:   No wheezing  Normal chest expansion  Not tachypneic,  No use of accessory muscles    GASTROINTESTINAL:  Abdomen soft,   non-tender,   no guarding,   + BS    MUSCULOSKELETAL:   range of motion is not limited,  no clubbing, cyanosis    NEUROLOGICAL:   Alert and oriented   no motor deficits.    SKIN:   Skin normal color for race,   No evidence of rash.    PSYCHIATRIC:   normal mood and affect.   no apparent risk to self or others.        LABS:                            8.2    13.78 )-----------( 326      ( 13 Sep 2021 07:48 )             26.7                                               09-13    139  |  107  |  22<H>  ----------------------------<  92  4.1   |  23  |  1.1    Ca    7.8<L>      13 Sep 2021 07:48  Mg     1.7     09-13        PT/INR - ( 13 Sep 2021 07:48 )   PT: 17.20 sec;   INR: 1.50 ratio         PTT - ( 13 Sep 2021 07:48 )  PTT:31.6 sec                                                                                                Procalcitonin, Serum: 0.46 ng/mL (09-10-21 @ 17:02)  C-Reactive Protein, Serum: 158 mg/L (09-10-21 @ 17:02)  Procalcitonin, Serum: 6.63 ng/mL (09-04-21 @ 16:50)                                        Culture - Blood (collected 10 Sep 2021 17:02)  Source: .Blood None  Preliminary Report (12 Sep 2021 06:14):    No growth to date.                                                                                           MEDICATIONS  (STANDING):  chlorhexidine 4% Liquid 1 Application(s) Topical <User Schedule>  enoxaparin Injectable 80 milliGRAM(s) SubCutaneous two times a day  metoprolol tartrate 50 milliGRAM(s) Oral two times a day  pantoprazole    Tablet 40 milliGRAM(s) Oral before breakfast  warfarin 2.5 milliGRAM(s) Oral once    MEDICATIONS  (PRN):  acetaminophen   Tablet .. 650 milliGRAM(s) Oral every 6 hours PRN Temp greater or equal to 38.5C (101.3F), Mild Pain (1 - 3)  aluminum hydroxide/magnesium hydroxide/simethicone Suspension 30 milliLiter(s) Oral every 4 hours PRN Dyspepsia  loperamide 2 milliGRAM(s) Oral four times a day PRN Diarrhea  melatonin 3 milliGRAM(s) Oral at bedtime PRN Insomnia  ondansetron Injectable 4 milliGRAM(s) IV Push every 8 hours PRN Nausea and/or Vomiting      Xrays:                                                                                     ECHO

## 2021-09-13 NOTE — PROGRESS NOTE ADULT - ASSESSMENT
Impression:  Sepsis POA   Septic Shock resolved   UTI / PNA on ABX therapy   Generalized Lymphadenopathy possible lymphoma , history of sarcoidosis  MAGDALENO on CKD improving   HO PE was on coumadin  Supra therapeutic INR  LLL atelectasis infiltrate with possible endobronchial filling defect      PLAN:    CNS: Avoid CNS depressants    HEENT: Oral care    PULMONARY: HOB @ 45 degrees.  Wean O2 as tolerated.  Pulmonary toilet.  Nebs Q4 PRN.  Possible bronchoscopy this week.  Repeat CXR reviewed . f/u ACE level    CARDIOVASCULAR: Avoid volume overload.      GI: GI prophylaxis.  Feeding    RENAL: Follow up lytes. Correct as needed.     INFECTIOUS DISEASE:     HEMATOLOGICAL:  Monitor CBC and Coags     ENDOCRINE:  Follow up FS.    FULL CODE     Impression:  Sepsis POA   Septic Shock resolved   UTI / PNA on ABX therapy   Generalized Lymphadenopathy possible lymphoma , history of sarcoidosis  MAGDALENO on CKD improving   HO PE was on coumadin  Supra therapeutic INR  LLL atelectasis infiltrate with possible endobronchial filling defect      PLAN:    CNS: Avoid CNS depressants    HEENT: Oral care    PULMONARY: HOB @ 45 degrees.  Wean O2 as tolerated.  Pulmonary toilet.  Nebs Q4 PRN.  Possible bronchoscopy this week.  Repeat CXR reviewed . f/u ACE level    CARDIOVASCULAR: Avoid volume overload.      GI: GI prophylaxis.  Feeding    RENAL: Follow up lytes. Correct as needed.     INFECTIOUS DISEASE:     HEMATOLOGICAL:  Monitor CBC and Coags . c/w lovenox sq. hold coumadin for possible bronch    ENDOCRINE:  Follow up FS.    FULL CODE     Impression:  Sepsis POA   Septic Shock resolved   UTI / PNA on ABX therapy   Generalized Lymphadenopathy possible lymphoma , history of sarcoidosis  MAGDALENO on CKD improving   HO PE was on coumadin  Supra therapeutic INR  LLL atelectasis / infiltrate with possible endobronchial filling defect.  Patient and daughter refusing bronchoscopy       PLAN:    CNS: Avoid CNS depressants    HEENT: Oral care    PULMONARY: HOB @ 45 degrees.  Wean O2 as tolerated.  Pulmonary toilet.  Nebs Q4 PRN.     CARDIOVASCULAR: Avoid volume overload.  Rate control     GI: GI prophylaxis.  Feeding    RENAL: Follow up lytes. Correct as needed.     INFECTIOUS DISEASE:     HEMATOLOGICAL:  Monitor CBC and Coags.  Can resume AC per primary and cardiology. TO follow up with OP hem onc for the lymphadenopathy     ENDOCRINE:  Follow up FS.    FULL CODE    DW PSP

## 2021-09-13 NOTE — PROGRESS NOTE ADULT - ASSESSMENT
#s/p Septic Shock secondary to Aspiration vs GN pneumonia  stable now.  antbx, zosyn    #Diffuse Lymphadenopathy  including chest/abd/groin  hx of sarcoidosis...  ACE..unremarkable   r/o reactive in lung due to PNA but abd LN?   LDH unremarkable     #Hx of PE on Warfarin.. currently getting iv heparin  Also DVT.  hx of PT gene 2 mutation     # afib .. on meds    # renal insuff.. improving    # anemia of chronic dx     # weakness/debiltation.. PT eval

## 2021-09-13 NOTE — PROGRESS NOTE ADULT - SUBJECTIVE AND OBJECTIVE BOX
JOEY KNAPP  81y Female    CHIEF COMPLAINT:    Patient is a 81y old  Female who presents with a chief complaint of 3 days of Weakness & Diarrhea; (13 Sep 2021 10:21)      INTERVAL HPI/OVERNIGHT EVENTS:    Patient seen and examined. No acute events overnight. Clinically feels well, denies any active complaints     ROS: All other systems are negative.    Vital Signs:    T(F): 99.5 (09-13-21 @ 08:07), Max: 99.7 (09-13-21 @ 00:00)  HR: 72 (09-13-21 @ 08:07) (72 - 101)  BP: 116/65 (09-13-21 @ 08:07) (95/60 - 124/65)  RR: 19 (09-13-21 @ 08:07) (17 - 19)  SpO2: 94% (09-13-21 @ 06:45) (94% - 95%)    12 Sep 2021 07:01  -  13 Sep 2021 07:00  --------------------------------------------------------  IN: 0 mL / OUT: 400 mL / NET: -400 mL    PHYSICAL EXAM:    GENERAL:  NAD  SKIN: No rashes or lesions  HEENT: Atraumatic. Normocephalic.   NECK: Supple, No JVD.   PULMONARY: CTA B/L. No wheezing. No rales  CVS: Normal S1, S2. Rate and Rhythm are regular.  ABDOMEN/GI: Soft, Nontender, Nondistended  MSK:  No clubbing or cyanosis   NEUROLOGIC:  No motor or sensory deficit.  PSYCH: Alert & oriented x 3, normal affect    Consultant(s) Notes Reviewed:  [x ] YES  [ ] NO  Care Discussed with Consultants/Other Providers [ x] YES  [ ] NO    LABS:                        8.2    13.78 )-----------( 326      ( 13 Sep 2021 07:48 )             26.7     139  |  107  |  22<H>  ----------------------------<  92  4.1   |  23  |  1.1    Ca    7.8<L>      13 Sep 2021 07:48  Mg     1.7     09-13    PT/INR - ( 13 Sep 2021 07:48 )   PT: 17.20 sec;   INR: 1.50 ratio        PTT - ( 13 Sep 2021 07:48 )  PTT:31.6 sec  Serum Pro-Brain Natriuretic Peptide: 6971 pg/mL (09-07-21 @ 04:30)    Culture - Blood (collected 10 Sep 2021 17:02)  Source: .Blood None  Preliminary Report (12 Sep 2021 06:14):    No growth to date.    RADIOLOGY & ADDITIONAL TESTS:  Imaging or report Personally Reviewed:  [x] YES  [ ] NO  EKG reviewed: [x] YES  [ ] NO    Medications:  Standing  chlorhexidine 4% Liquid 1 Application(s) Topical <User Schedule>  enoxaparin Injectable 80 milliGRAM(s) SubCutaneous two times a day  metoprolol tartrate 50 milliGRAM(s) Oral two times a day  pantoprazole    Tablet 40 milliGRAM(s) Oral before breakfast  piperacillin/tazobactam IVPB.. 3.375 Gram(s) IV Intermittent every 12 hours  warfarin 2.5 milliGRAM(s) Oral once    PRN Meds  acetaminophen   Tablet .. 650 milliGRAM(s) Oral every 6 hours PRN  aluminum hydroxide/magnesium hydroxide/simethicone Suspension 30 milliLiter(s) Oral every 4 hours PRN  loperamide 2 milliGRAM(s) Oral four times a day PRN  melatonin 3 milliGRAM(s) Oral at bedtime PRN  ondansetron Injectable 4 milliGRAM(s) IV Push every 8 hours PRN

## 2021-09-13 NOTE — PROGRESS NOTE ADULT - SUBJECTIVE AND OBJECTIVE BOX
JOEY KNAPP 81y Female  MRN#: [id number]   CODE STATUS: FULL      SUBJECTIVE  Patient is a 81y old Female who presents with a chief complaint of 3 days of Weakness & Diarrhea; (12 Sep 2021 12:15)    Currently admitted to medicine with the primary diagnosis of Sepsis 2/2 UTI    Today is hospital day 9d,   INTERVAL HPI/OVERNIGHT EVENTS:    Examined pt at bedside this AM. Pt had no complaints overnight and just wants to know "when she can go home". There were no acute events overnight. She still has diarrhea.    Present Today:           Cruz Catheter (x)No/ ()Yes?   Indication:             Central Line (x)No/ ()Yes?   Indication:          IV Fluids (x)No/ ()Yes? Type:  Rate:  Indication:    OBJECTIVE  PAST MEDICAL & SURGICAL HISTORY  H/O prothrombin mutation    Pulmonary embolism    HTN (hypertension)    Anemia requiring transfusions    Deep vein thrombosis (DVT)    S/P tonsillectomy    H/O: hysterectomy    History of cholecystectomy      ALLERGIES:  No Known Allergies    HOME MEDICATIONS:  Home Medications:  hydroCHLOROthiazide 12.5 mg oral tablet: 1 tab(s) orally once a day (03 Sep 2021 21:05)  losartan 100 mg oral tablet: 1 tab(s) orally once a day (03 Sep 2021 21:05)  metoprolol succinate 100 mg oral tablet, extended release: 1 tab(s) orally once a day (03 Sep 2021 21:05)  warfarin 2.5 mg oral tablet:  (03 Sep 2021 21:05)    MEDICATIONS:  STANDING MEDICATIONS  chlorhexidine 4% Liquid 1 Application(s) Topical <User Schedule>  enoxaparin Injectable 80 milliGRAM(s) SubCutaneous two times a day  metoprolol tartrate 50 milliGRAM(s) Oral two times a day  pantoprazole    Tablet 40 milliGRAM(s) Oral before breakfast  piperacillin/tazobactam IVPB.. 3.375 Gram(s) IV Intermittent every 12 hours  warfarin 2.5 milliGRAM(s) Oral once    PRN MEDICATIONS  acetaminophen   Tablet .. 650 milliGRAM(s) Oral every 6 hours PRN  aluminum hydroxide/magnesium hydroxide/simethicone Suspension 30 milliLiter(s) Oral every 4 hours PRN  loperamide 2 milliGRAM(s) Oral four times a day PRN  melatonin 3 milliGRAM(s) Oral at bedtime PRN  ondansetron Injectable 4 milliGRAM(s) IV Push every 8 hours PRN      VITAL SIGNS: Last 24 Hours  T(C): 37.5 (13 Sep 2021 08:07), Max: 37.6 (13 Sep 2021 00:00)  T(F): 99.5 (13 Sep 2021 08:07), Max: 99.7 (13 Sep 2021 00:00)  HR: 72 (13 Sep 2021 08:07) (72 - 101)  BP: 116/65 (13 Sep 2021 08:07) (95/60 - 124/65)  BP(mean): --  RR: 19 (13 Sep 2021 08:07) (17 - 19)  SpO2: 94% (13 Sep 2021 06:45) (94% - 95%)    LABS:                        8.2    13.78 )-----------( 326      ( 13 Sep 2021 07:48 )             26.7     09-13    139  |  107  |  22<H>  ----------------------------<  92  4.1   |  23  |  1.1    Ca    7.8<L>      13 Sep 2021 07:48  Mg     1.7     09-13      PT/INR - ( 13 Sep 2021 07:48 )   PT: 17.20 sec;   INR: 1.50 ratio         PTT - ( 13 Sep 2021 07:48 )  PTT:31.6 sec              Culture - Blood (collected 10 Sep 2021 17:02)  Source: .Blood None  Preliminary Report (12 Sep 2021 06:14):    No growth to date.      RADIOLOGY:    IMPRESSION:    No hydronephrosis bilaterally.  Urinary bladder prevoid volume of 534 cc. Patient unable to void during this examination.    Impression:    Stable left opacity/effusion  Minimal blunting right costophrenic angle  No pneumothorax.    PHYSICAL EXAM:    GENERAL: NAD, well-developed, AAOx3  HEENT:  Atraumatic, Normocephalic. EOMI, PERRLA, conjunctiva and sclera clear, No JVD  PULMONARY: Clear to auscultation bilaterally; No wheeze  CARDIOVASCULAR: Regular rate and rhythm; No murmurs, rubs, or gallops  GASTROINTESTINAL: Soft, Nontender, Nondistended; Bowel sounds present  MUSCULOSKELETAL:  2+ Peripheral Pulses, No clubbing, cyanosis, or edema  NEUROLOGY: non-focal  SKIN: No rashes or lesions

## 2021-09-14 LAB
ALBUMIN SERPL ELPH-MCNC: 2.3 G/DL — LOW (ref 3.5–5.2)
ALP SERPL-CCNC: 113 U/L — SIGNIFICANT CHANGE UP (ref 30–115)
ALT FLD-CCNC: 17 U/L — SIGNIFICANT CHANGE UP (ref 0–41)
ANION GAP SERPL CALC-SCNC: 13 MMOL/L — SIGNIFICANT CHANGE UP (ref 7–14)
AST SERPL-CCNC: 29 U/L — SIGNIFICANT CHANGE UP (ref 0–41)
BASOPHILS # BLD AUTO: 0.03 K/UL — SIGNIFICANT CHANGE UP (ref 0–0.2)
BASOPHILS NFR BLD AUTO: 0.2 % — SIGNIFICANT CHANGE UP (ref 0–1)
BILIRUB SERPL-MCNC: 0.6 MG/DL — SIGNIFICANT CHANGE UP (ref 0.2–1.2)
BUN SERPL-MCNC: 20 MG/DL — SIGNIFICANT CHANGE UP (ref 10–20)
CALCIUM SERPL-MCNC: 7.6 MG/DL — LOW (ref 8.5–10.1)
CHLORIDE SERPL-SCNC: 107 MMOL/L — SIGNIFICANT CHANGE UP (ref 98–110)
CO2 SERPL-SCNC: 22 MMOL/L — SIGNIFICANT CHANGE UP (ref 17–32)
CREAT SERPL-MCNC: 1 MG/DL — SIGNIFICANT CHANGE UP (ref 0.7–1.5)
EOSINOPHIL # BLD AUTO: 0.07 K/UL — SIGNIFICANT CHANGE UP (ref 0–0.7)
EOSINOPHIL NFR BLD AUTO: 0.5 % — SIGNIFICANT CHANGE UP (ref 0–8)
GLUCOSE SERPL-MCNC: 93 MG/DL — SIGNIFICANT CHANGE UP (ref 70–99)
HCT VFR BLD CALC: 26.7 % — LOW (ref 37–47)
HGB BLD-MCNC: 8.2 G/DL — LOW (ref 12–16)
IMM GRANULOCYTES NFR BLD AUTO: 0.7 % — HIGH (ref 0.1–0.3)
INR BLD: 1.7 RATIO — HIGH (ref 0.65–1.3)
LYMPHOCYTES # BLD AUTO: 16 % — LOW (ref 20.5–51.1)
LYMPHOCYTES # BLD AUTO: 2.34 K/UL — SIGNIFICANT CHANGE UP (ref 1.2–3.4)
MAGNESIUM SERPL-MCNC: 1.7 MG/DL — LOW (ref 1.8–2.4)
MCHC RBC-ENTMCNC: 28.5 PG — SIGNIFICANT CHANGE UP (ref 27–31)
MCHC RBC-ENTMCNC: 30.7 G/DL — LOW (ref 32–37)
MCV RBC AUTO: 92.7 FL — SIGNIFICANT CHANGE UP (ref 81–99)
MONOCYTES # BLD AUTO: 0.98 K/UL — HIGH (ref 0.1–0.6)
MONOCYTES NFR BLD AUTO: 6.7 % — SIGNIFICANT CHANGE UP (ref 1.7–9.3)
NEUTROPHILS # BLD AUTO: 11.14 K/UL — HIGH (ref 1.4–6.5)
NEUTROPHILS NFR BLD AUTO: 75.9 % — HIGH (ref 42.2–75.2)
NRBC # BLD: 0 /100 WBCS — SIGNIFICANT CHANGE UP (ref 0–0)
PLATELET # BLD AUTO: 330 K/UL — SIGNIFICANT CHANGE UP (ref 130–400)
POTASSIUM SERPL-MCNC: 4 MMOL/L — SIGNIFICANT CHANGE UP (ref 3.5–5)
POTASSIUM SERPL-SCNC: 4 MMOL/L — SIGNIFICANT CHANGE UP (ref 3.5–5)
PROT SERPL-MCNC: 4.5 G/DL — LOW (ref 6–8)
PROTHROM AB SERPL-ACNC: 19.4 SEC — HIGH (ref 9.95–12.87)
RBC # BLD: 2.88 M/UL — LOW (ref 4.2–5.4)
RBC # FLD: 13.5 % — SIGNIFICANT CHANGE UP (ref 11.5–14.5)
SODIUM SERPL-SCNC: 142 MMOL/L — SIGNIFICANT CHANGE UP (ref 135–146)
WBC # BLD: 14.67 K/UL — HIGH (ref 4.8–10.8)
WBC # FLD AUTO: 14.67 K/UL — HIGH (ref 4.8–10.8)

## 2021-09-14 PROCEDURE — 99233 SBSQ HOSP IP/OBS HIGH 50: CPT

## 2021-09-14 RX ORDER — MAGNESIUM SULFATE 500 MG/ML
2 VIAL (ML) INJECTION ONCE
Refills: 0 | Status: COMPLETED | OUTPATIENT
Start: 2021-09-14 | End: 2021-09-14

## 2021-09-14 RX ORDER — GABAPENTIN 400 MG/1
100 CAPSULE ORAL DAILY
Refills: 0 | Status: DISCONTINUED | OUTPATIENT
Start: 2021-09-14 | End: 2021-09-22

## 2021-09-14 RX ORDER — WARFARIN SODIUM 2.5 MG/1
2.5 TABLET ORAL ONCE
Refills: 0 | Status: COMPLETED | OUTPATIENT
Start: 2021-09-14 | End: 2021-09-14

## 2021-09-14 RX ADMIN — CHLORHEXIDINE GLUCONATE 1 APPLICATION(S): 213 SOLUTION TOPICAL at 05:15

## 2021-09-14 RX ADMIN — GABAPENTIN 100 MILLIGRAM(S): 400 CAPSULE ORAL at 17:49

## 2021-09-14 RX ADMIN — Medication 50 GRAM(S): at 17:50

## 2021-09-14 RX ADMIN — Medication 50 MILLIGRAM(S): at 17:50

## 2021-09-14 RX ADMIN — ENOXAPARIN SODIUM 80 MILLIGRAM(S): 100 INJECTION SUBCUTANEOUS at 05:15

## 2021-09-14 RX ADMIN — ENOXAPARIN SODIUM 80 MILLIGRAM(S): 100 INJECTION SUBCUTANEOUS at 17:50

## 2021-09-14 RX ADMIN — Medication 50 MILLIGRAM(S): at 05:15

## 2021-09-14 RX ADMIN — WARFARIN SODIUM 2.5 MILLIGRAM(S): 2.5 TABLET ORAL at 21:49

## 2021-09-14 RX ADMIN — PANTOPRAZOLE SODIUM 40 MILLIGRAM(S): 20 TABLET, DELAYED RELEASE ORAL at 05:15

## 2021-09-14 NOTE — PROGRESS NOTE ADULT - SUBJECTIVE AND OBJECTIVE BOX
HPI:  80 y/o F pmhx significant for Essential HTN & hx of PE s/p Warfarin presents to ED from Home (lives alone) w/ x3 days of diarrhea & Weakness  Pt ;s hospital course was complicated by PNA and Septic shock   Pt has recovered and stablized with a course of iv antibx.  CT of chest however showed bronchoobst opacity , mediastinal/hilar/SC /axillary adenopathy and lung nodules.  Pt's CT of abd also showed RP and inguinal LAD.    In addition to above past medical hx , pt is also known for sarcoidosis invol lung.  Last CT as oupt was done in 2013 and 2016 with stable findings.          PAST MEDICAL & SURGICAL HISTORY:  H/O prothrombin mutation    Pulmonary embolism    HTN (hypertension)    Anemia requiring transfusions    Deep vein thrombosis (DVT)    S/P tonsillectomy    H/O: hysterectomy    History of cholecystectomy        FAMILY HISTORY  No pertinent family history in first degree relatives        SOCIAL HISTORY  Social History:        ROS  General: Denies rigors, nightsweats  HEENT: Denies headache, rhinorrhea, sore throat, eye pain  CV: Denies CP, palpitations  PULM: Denies wheezing, hemoptysis  GI: Denies hematemesis, hematochezia, melena  : Denies discharge, hematuria  MSK: Denies arthralgias, myalgias  SKIN: Denies rash, lesions  NEURO: Denies paresthesias, weakness  PSYCH: Denies depression, anxiety    VITALS:    VSS    PHYSICAL EXAM:  Gen: NAD, resting in bed  HEENT: Normocephalic, atraumatic  Neck: supple, no lymphadenopathy  CV: Regular rate & regular rhythm  left breast without breast mass.   Lungs: decreased BS at bases, no fremitus  Abdomen: Soft, BS present  Ext: Warm, well perfused  Neuro: non focal, awake  Skin: no rash, no erythema  Lines: no phlebitis        TESTS & MEASUREMENTS:                        11.5  16.30 )-----------( 147      ( 03 Sep 2021 22:30 )             36.8  >>>wbc nl>>12K        hb 10>>9    09-03    133<L>  |  97<L>  |  55<H>  ----------------------------<  105<H>  4.3   |  17  |  3.8<H>      >> creat <2 now     Ca    7.7<L>      03 Sep 2021 22:30    TPro  5.8<L>  /  Alb  3.3<L>  /  TBili  0.8  /  DBili  x   /  AST  46<H>  /  ALT  12  /  AlkPhos  85  09-03      LIVER FUNCTIONS - ( 03 Sep 2021 22:30 )  Alb: 3.3 g/dL / Pro: 5.8 g/dL / ALK PHOS: 85 U/L / ALT: 12 U/L / AST: 46 U/L / GGT: x                      ALLERGIES:  No Known Allergies

## 2021-09-14 NOTE — PROGRESS NOTE ADULT - ASSESSMENT
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 In the ED, she was diagnosed with septic shock, MAGDALENO, pneumonia and extensive LAD.    Septic shock 2/2 Aspiration VS GN Pneumonia  -S/p Zosyn  -D/c with Augmentin - to be completed (9/17)  -Blood c/x No Growth to Date  -Urine Legionella and Strep negative  -Pt has spikes in Temp (Tmax 100.2)  -Will contact ID if Temp greater then 100.4    Paroxsymal Atrial Fibrillation - new onset  -On metoprolol - please continue and monitor vitals  -Starting coumadin 2.5mg tonight  - Contacted by pharmacy and was told pt follows with [facility] pt [facility] and takes 1.5mg of Coumadin on Sunday, Wednesday and Friday. She then takes 2.5mg on Monday, Tuesday, Thursday and Saturday.  -Will overlap Lovenox with Coumadin for 5 days until INR is therapeutic at 2.5-3.5. If not therapeutic with in 5 days, continue bridging with Lovenox until INR is 2.5-3.5 for at least 24 hours.  -Daily INR check  -Hx of PE on Coumadin    Sarcoidosis   -Patient refused Bronchoscopy and B/X - pt will f/u w/Dr. Botello out pt  -Please f/u ACE level  -Extensive LAD on CT    Chronic Diarrhea (possibly due to IBS)  -Can start Loperamide since C. Diff negative    PT eval  -PT saw the pt but she was unable to ambulate due to pain  -100mg Gabapentin for now. May increase as necessary    Dispo: Waiting for INR to become therapeutic. Pt wants to go Rehab Facility

## 2021-09-14 NOTE — PROGRESS NOTE ADULT - ASSESSMENT
#s/p Septic Shock secondary to Aspiration vs GN pneumonia  stable now.  antbx, zosyn    #Diffuse Lymphadenopathy  including chest/abd/groin  hx of sarcoidosis...  ACE..unremarkable   r/o reactive in lung due to PNA but abd LN?   LDH unremarkable     #Hx of PE on Warfarin.. currently getting iv heparin  Also DVT.  hx of PT gene 2 mutation     # afib .. on meds    # renal insuff.. improving    # anemia of chronic dx     # weakness/debiltation.. PT eval>>> pending d/c to rehab

## 2021-09-14 NOTE — PROGRESS NOTE ADULT - ASSESSMENT
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 In the ED, she was diagnosed with septic shock, MAGDALENO, pneumonia and extensive LAD.    Septic shock present on admission due to suspected GNR versus aspiration pneumonia (coverage for GNR and aspiration)  History of Sarcoidosis  Saturating well on RA 94-95%  off pressors  Urine strep and legionella negative, Blood cx NGTD, Urine Cx contaminated  repeat blood cx 9/12 NGTD, procal downtrending from 6--->0.46   Cdiff negative   discussed with patient, daughter at bedside, they do not wish to undergo bronch at this time, they will f/u with Dr Botello as outpatient  overnight, Tmax 100.2, physical exam unchanged  repeat blood cx and procal  ID f/u if febrile or worsening leukocytosis   continue with zosyn for now, on discharge can switch to aumentin to complete total 14 days of abx    New onset Afib, HR better controlled   H/o PE on coumadin, with elevated INR   - c/w Metoprolol  50 Q12H and monitor HR and BP  - TSH 1.18  - ECHO 9/7: EF 60-65%, grade 1 diastolic dysfunction, enlarged atria, PulmHTN  - cardiology Dr Gupta following  - c/w bridging lovenox with coumadin, patient usually takes 2.5 mg at home and follows at the coumadin clinic  - monitor H&H    Extensive LAD on CT scan  - [facility] eval Dr. Grant following, patient with a history of sarcoid  - LDH and ACE unremarkable  patient will f/u with oncology as outpatient     MAGDALENO - resolved  possibly prerenal due to diarrhea and sepsis  less likely due to ATN from shock since improving daily  no hydronephrosis on CT scan  I's and O's   holding IVF for now      Chronic diarrhea -   Cdiff negative, on loperamide       Elevated troponin could be due to demand ischemia and/or MAGDALENO - trending down     Hypokalemia- resolved  hypomagnesemia - repleting     PROGRESS NOTE HANDOFF    Pending: lovenox to coumadin bridge, temp borderline, f/u blood cx and procal,   Family discussion: plan of care discussed with patient and daughter at bedside, all questions answered   Disposition: from home, family requesting SNF on dc, pending PT eval

## 2021-09-14 NOTE — PROGRESS NOTE ADULT - SUBJECTIVE AND OBJECTIVE BOX
JOEY KNAPP  81y Female    CHIEF COMPLAINT:    Patient is a 81y old  Female who presents with a chief complaint of 3 days of Weakness & Diarrhea; (13 Sep 2021 17:19)      INTERVAL HPI/OVERNIGHT EVENTS:    Patient seen and examined. No acute events overnight. No active complaints    ROS: All other systems are negative.    Vital Signs:    T(F): 100.2 (09-14-21 @ 08:03), Max: 100.2 (09-14-21 @ 08:03)  HR: 89 (09-14-21 @ 08:03) (89 - 103)  BP: 128/63 (09-14-21 @ 08:03) (120/58 - 128/63)  RR: 18 (09-14-21 @ 08:03) (18 - 18)    13 Sep 2021 07:01  -  14 Sep 2021 07:00  --------------------------------------------------------  IN: 0 mL / OUT: 400 mL / NET: -400 mL    PHYSICAL EXAM:    GENERAL:  NAD  SKIN: No rashes or lesions  HEENT: Atraumatic. Normocephalic.   NECK: Supple, No JVD.   PULMONARY: CTA B/L. No wheezing. No rales  CVS: Normal S1, S2. Rate and Rhythm are regular.  ABDOMEN/GI: Soft, Nontender, Nondistended; BS present  MSK:  No edema B/L LE. No clubbing or cyanosis. B/L feet TTP  NEUROLOGIC:  moves all extremities   PSYCH: Alert & oriented x 3, normal affect    Consultant(s) Notes Reviewed:  [x ] YES  [ ] NO  Care Discussed with Consultants/Other Providers [ x] YES  [ ] NO    LABS:                        8.2    14.67 )-----------( 330      ( 14 Sep 2021 06:18 )             26.7     142  |  107  |  20  ----------------------------<  93  4.0   |  22  |  1.0    Ca    7.6<L>      14 Sep 2021 06:18  Mg     1.7     09-14    TPro  4.5<L>  /  Alb  2.3<L>  /  TBili  0.6  /  DBili  x   /  AST  29  /  ALT  17  /  AlkPhos  113  09-14    PT/INR - ( 14 Sep 2021 06:18 )   PT: 19.40 sec;   INR: 1.70 ratio       PTT - ( 13 Sep 2021 07:48 )  PTT:31.6 sec    RADIOLOGY & ADDITIONAL TESTS:  Imaging or report Personally Reviewed:  [x] YES  [ ] NO  EKG reviewed: [x] YES  [ ] NO    Medications:  Standing  chlorhexidine 4% Liquid 1 Application(s) Topical <User Schedule>  enoxaparin Injectable 80 milliGRAM(s) SubCutaneous two times a day  magnesium sulfate  IVPB 2 Gram(s) IV Intermittent once  metoprolol tartrate 50 milliGRAM(s) Oral two times a day  pantoprazole    Tablet 40 milliGRAM(s) Oral before breakfast  piperacillin/tazobactam IVPB.. 3.375 Gram(s) IV Intermittent every 12 hours  warfarin 2.5 milliGRAM(s) Oral once    PRN Meds  acetaminophen   Tablet .. 650 milliGRAM(s) Oral every 6 hours PRN  aluminum hydroxide/magnesium hydroxide/simethicone Suspension 30 milliLiter(s) Oral every 4 hours PRN  loperamide 2 milliGRAM(s) Oral four times a day PRN  melatonin 3 milliGRAM(s) Oral at bedtime PRN  ondansetron Injectable 4 milliGRAM(s) IV Push every 8 hours PRN

## 2021-09-14 NOTE — PROGRESS NOTE ADULT - SUBJECTIVE AND OBJECTIVE BOX
JOEY KNAPP 81y Female  MRN#: [id number]   CODE STATUS: FULL      SUBJECTIVE  Patient is a 81y old Female who presents with a chief complaint of 3 days of Weakness & Diarrhea; (14 Sep 2021 12:17)    Currently admitted to medicine with the primary diagnosis of     Today is hospital day 10d,   INTERVAL HPI/OVERNIGHT EVENTS:    Examined pt at bedside this AM. There were no acute events overnight. Pt complained of severe pain when evaluated by PT.      Present Today:           Cruz Catheter (x)No/ ()Yes?   Indication:             Central Line (x)No/ ()Yes?   Indication:          IV Fluids (x)No/ ()Yes? Type:  Rate:  Indication:    OBJECTIVE  PAST MEDICAL & SURGICAL HISTORY  H/O prothrombin mutation    Pulmonary embolism    HTN (hypertension)    Anemia requiring transfusions    Deep vein thrombosis (DVT)    S/P tonsillectomy    H/O: hysterectomy    History of cholecystectomy      ALLERGIES:  No Known Allergies    HOME MEDICATIONS:  Home Medications:  hydroCHLOROthiazide 12.5 mg oral tablet: 1 tab(s) orally once a day (03 Sep 2021 21:05)  losartan 100 mg oral tablet: 1 tab(s) orally once a day (03 Sep 2021 21:05)  metoprolol succinate 100 mg oral tablet, extended release: 1 tab(s) orally once a day (03 Sep 2021 21:05)  warfarin 2.5 mg oral tablet:  (03 Sep 2021 21:05)    MEDICATIONS:  STANDING MEDICATIONS  chlorhexidine 4% Liquid 1 Application(s) Topical <User Schedule>  enoxaparin Injectable 80 milliGRAM(s) SubCutaneous two times a day  magnesium sulfate  IVPB 2 Gram(s) IV Intermittent once  metoprolol tartrate 50 milliGRAM(s) Oral two times a day  pantoprazole    Tablet 40 milliGRAM(s) Oral before breakfast  piperacillin/tazobactam IVPB.. 3.375 Gram(s) IV Intermittent every 12 hours  warfarin 2.5 milliGRAM(s) Oral once    PRN MEDICATIONS  acetaminophen   Tablet .. 650 milliGRAM(s) Oral every 6 hours PRN  aluminum hydroxide/magnesium hydroxide/simethicone Suspension 30 milliLiter(s) Oral every 4 hours PRN  loperamide 2 milliGRAM(s) Oral four times a day PRN  melatonin 3 milliGRAM(s) Oral at bedtime PRN  ondansetron Injectable 4 milliGRAM(s) IV Push every 8 hours PRN      VITAL SIGNS: Last 24 Hours  T(C): 37.9 (14 Sep 2021 08:03), Max: 37.9 (14 Sep 2021 08:03)  T(F): 100.2 (14 Sep 2021 08:03), Max: 100.2 (14 Sep 2021 08:03)  HR: 89 (14 Sep 2021 08:03) (89 - 103)  BP: 128/63 (14 Sep 2021 08:03) (120/58 - 128/63)  BP(mean): --  RR: 18 (14 Sep 2021 08:03) (18 - 18)  SpO2: --    LABS:                        8.2    14.67 )-----------( 330      ( 14 Sep 2021 06:18 )             26.7     09-14    142  |  107  |  20  ----------------------------<  93  4.0   |  22  |  1.0    Ca    7.6<L>      14 Sep 2021 06:18  Mg     1.7     09-14    TPro  4.5<L>  /  Alb  2.3<L>  /  TBili  0.6  /  DBili  x   /  AST  29  /  ALT  17  /  AlkPhos  113  09-14    PT/INR - ( 14 Sep 2021 06:18 )   PT: 19.40 sec;   INR: 1.70 ratio         PTT - ( 13 Sep 2021 07:48 )  PTT:31.6 sec      RADIOLOGY:    IMPRESSION:    No hydronephrosis bilaterally.  Urinary bladder prevoid volume of 534 cc. Patient unable to void during this examination.    Impression:    Stable left opacity/effusion  Minimal blunting right costophrenic angle  No pneumothorax.      PHYSICAL EXAM:    GENERAL: NAD, well-developed, AAOx3  HEENT:  Atraumatic, Normocephalic. EOMI, PERRLA, conjunctiva and sclera clear, No JVD  PULMONARY: Clear to auscultation bilaterally; No wheeze  CARDIOVASCULAR: Regular rate and rhythm; No murmurs, rubs, or gallops  GASTROINTESTINAL: Soft, Nontender, Nondistended; Bowel sounds present  MUSCULOSKELETAL:  2+ Peripheral Pulses, trace pitting edema  NEUROLOGY: non-focal  SKIN: No rashes or lesions

## 2021-09-14 NOTE — PHARMACOTHERAPY INTERVENTION NOTE - COMMENTS
81 year old Female      Indication: Prothrombin Gene Mutation and New onset AFib  INR Goal: 2.5 - 3.5  Home Dose:                     1.25mg Sun, Wed, Fri                      2.5mg Mon, Tues, Thurs, Sat  Bridge Therapy: was bridging with heparin, now bridging with therapeutic lovenox      acetaminophen   Tablet .. 650 milliGRAM(s) Oral every 6 hours PRN  aluminum hydroxide/magnesium hydroxide/simethicone Suspension 30 milliLiter(s) Oral every 4 hours PRN  chlorhexidine 4% Liquid 1 Application(s) Topical <User Schedule>  enoxaparin Injectable 80 milliGRAM(s) SubCutaneous two times a day  loperamide 2 milliGRAM(s) Oral four times a day PRN  magnesium sulfate  IVPB 2 Gram(s) IV Intermittent once  melatonin 3 milliGRAM(s) Oral at bedtime PRN  metoprolol tartrate 50 milliGRAM(s) Oral two times a day  ondansetron Injectable 4 milliGRAM(s) IV Push every 8 hours PRN  pantoprazole    Tablet 40 milliGRAM(s) Oral before breakfast  piperacillin/tazobactam IVPB.. 3.375 Gram(s) IV Intermittent every 12 hours  warfarin 2.5 milliGRAM(s) Oral once        Drug Interactions: -      H/H: 8.2/26.7  PLT: 330  GFR: 52    Date---------------INR-----------------Dose    INR: 1.70 ratio (09-14-21 @ 06:18)  INR: 1.50 ratio (09-13-21 @ 07:48)  INR: 1.33 ratio (09-11-21 @ 09:10)  INR: 1.35 ratio (09-10-21 @ 17:02)  INR: 4.87 ratio (09-09-21 @ 08:24) **  INR: 4.92 ratio (09-08-21 @ 04:30)**      1. Discussed patient's home dose with provider, 1.25mg Sun, Wed, Fri  and 2.5mg Mon, Tues, Thurs, Sat. Patient had high INR on 9/8 and 9/9. Dose 2.5mg appropriate today and recommended bridging therapeutic enoxaparin for at least 5 days and until INR is in therapeutic range for 24 hours.   2. Obtain INR tomorrow AM

## 2021-09-15 LAB
ACE SERPL-CCNC: 32 U/L — SIGNIFICANT CHANGE UP (ref 14–82)
ALBUMIN SERPL ELPH-MCNC: 2.6 G/DL — LOW (ref 3.5–5.2)
ALP SERPL-CCNC: 119 U/L — HIGH (ref 30–115)
ALT FLD-CCNC: 15 U/L — SIGNIFICANT CHANGE UP (ref 0–41)
ANION GAP SERPL CALC-SCNC: 11 MMOL/L — SIGNIFICANT CHANGE UP (ref 7–14)
AST SERPL-CCNC: 28 U/L — SIGNIFICANT CHANGE UP (ref 0–41)
BILIRUB SERPL-MCNC: 0.6 MG/DL — SIGNIFICANT CHANGE UP (ref 0.2–1.2)
BUN SERPL-MCNC: 18 MG/DL — SIGNIFICANT CHANGE UP (ref 10–20)
CALCIUM SERPL-MCNC: 8 MG/DL — LOW (ref 8.5–10.1)
CHLORIDE SERPL-SCNC: 106 MMOL/L — SIGNIFICANT CHANGE UP (ref 98–110)
CO2 SERPL-SCNC: 23 MMOL/L — SIGNIFICANT CHANGE UP (ref 17–32)
CREAT SERPL-MCNC: 0.9 MG/DL — SIGNIFICANT CHANGE UP (ref 0.7–1.5)
GLUCOSE SERPL-MCNC: 125 MG/DL — HIGH (ref 70–99)
HCT VFR BLD CALC: 29 % — LOW (ref 37–47)
HGB BLD-MCNC: 8.9 G/DL — LOW (ref 12–16)
INR BLD: 2.02 RATIO — HIGH (ref 0.65–1.3)
MAGNESIUM SERPL-MCNC: 1.6 MG/DL — LOW (ref 1.8–2.4)
MCHC RBC-ENTMCNC: 28.6 PG — SIGNIFICANT CHANGE UP (ref 27–31)
MCHC RBC-ENTMCNC: 30.7 G/DL — LOW (ref 32–37)
MCV RBC AUTO: 93.2 FL — SIGNIFICANT CHANGE UP (ref 81–99)
NRBC # BLD: 0 /100 WBCS — SIGNIFICANT CHANGE UP (ref 0–0)
PLATELET # BLD AUTO: 345 K/UL — SIGNIFICANT CHANGE UP (ref 130–400)
POTASSIUM SERPL-MCNC: 3.5 MMOL/L — SIGNIFICANT CHANGE UP (ref 3.5–5)
POTASSIUM SERPL-SCNC: 3.5 MMOL/L — SIGNIFICANT CHANGE UP (ref 3.5–5)
PROCALCITONIN SERPL-MCNC: 0.27 NG/ML — HIGH (ref 0.02–0.1)
PROT SERPL-MCNC: 4.9 G/DL — LOW (ref 6–8)
PROTHROM AB SERPL-ACNC: 23.1 SEC — HIGH (ref 9.95–12.87)
RBC # BLD: 3.11 M/UL — LOW (ref 4.2–5.4)
RBC # FLD: 13.7 % — SIGNIFICANT CHANGE UP (ref 11.5–14.5)
SARS-COV-2 RNA SPEC QL NAA+PROBE: SIGNIFICANT CHANGE UP
SODIUM SERPL-SCNC: 140 MMOL/L — SIGNIFICANT CHANGE UP (ref 135–146)
WBC # BLD: 16.17 K/UL — HIGH (ref 4.8–10.8)
WBC # FLD AUTO: 16.17 K/UL — HIGH (ref 4.8–10.8)

## 2021-09-15 PROCEDURE — 99233 SBSQ HOSP IP/OBS HIGH 50: CPT

## 2021-09-15 PROCEDURE — 93970 EXTREMITY STUDY: CPT | Mod: 26

## 2021-09-15 RX ORDER — MAGNESIUM SULFATE 500 MG/ML
2 VIAL (ML) INJECTION ONCE
Refills: 0 | Status: COMPLETED | OUTPATIENT
Start: 2021-09-15 | End: 2021-09-15

## 2021-09-15 RX ORDER — WARFARIN SODIUM 2.5 MG/1
1.25 TABLET ORAL ONCE
Refills: 0 | Status: COMPLETED | OUTPATIENT
Start: 2021-09-15 | End: 2021-09-15

## 2021-09-15 RX ORDER — WARFARIN SODIUM 2.5 MG/1
1.5 TABLET ORAL ONCE
Refills: 0 | Status: DISCONTINUED | OUTPATIENT
Start: 2021-09-15 | End: 2021-09-15

## 2021-09-15 RX ADMIN — PANTOPRAZOLE SODIUM 40 MILLIGRAM(S): 20 TABLET, DELAYED RELEASE ORAL at 05:47

## 2021-09-15 RX ADMIN — CHLORHEXIDINE GLUCONATE 1 APPLICATION(S): 213 SOLUTION TOPICAL at 05:47

## 2021-09-15 RX ADMIN — Medication 50 MILLIGRAM(S): at 05:47

## 2021-09-15 RX ADMIN — Medication 650 MILLIGRAM(S): at 22:02

## 2021-09-15 RX ADMIN — ENOXAPARIN SODIUM 80 MILLIGRAM(S): 100 INJECTION SUBCUTANEOUS at 05:47

## 2021-09-15 RX ADMIN — GABAPENTIN 100 MILLIGRAM(S): 400 CAPSULE ORAL at 11:51

## 2021-09-15 RX ADMIN — Medication 50 GRAM(S): at 16:07

## 2021-09-15 RX ADMIN — Medication 50 MILLIGRAM(S): at 17:13

## 2021-09-15 RX ADMIN — WARFARIN SODIUM 1.25 MILLIGRAM(S): 2.5 TABLET ORAL at 22:02

## 2021-09-15 RX ADMIN — ENOXAPARIN SODIUM 80 MILLIGRAM(S): 100 INJECTION SUBCUTANEOUS at 17:13

## 2021-09-15 NOTE — PROGRESS NOTE ADULT - SUBJECTIVE AND OBJECTIVE BOX
RAJOEY  81y, Female  Allergy: No Known Allergies      LOS  11d    CHIEF COMPLAINT: 3 days of Weakness & Diarrhea; (15 Sep 2021 08:28)      INTERVAL EVENTS/HPI  - No acute events overnight  - T(F): , Max: 99.8 (09-15-21 @ 07:58)  - Denies any worsening symptoms  - Tolerating medication  - WBC Count: 16.17 (09-15-21 @ 04:30)  WBC Count: 14.67 (09-14-21 @ 06:18)     - Creatinine, Serum: 0.9 (09-15-21 @ 04:30)  Creatinine, Serum: 1.0 (09-14-21 @ 06:18)       ROS  General: Denies rigors, nightsweats  HEENT: Denies headache, rhinorrhea, sore throat, eye pain  CV: Denies CP, palpitations  PULM: Denies wheezing, hemoptysis  GI: Denies hematemesis, hematochezia, melena  : Denies discharge, hematuria  MSK: Denies arthralgias, myalgias  SKIN: Denies rash, lesions  NEURO: Denies paresthesias, weakness  PSYCH: Denies depression, anxiety    VITALS:  T(F): 99.8, Max: 99.8 (09-15-21 @ 07:58)  HR: 87  BP: 123/70  RR: 18Vital Signs Last 24 Hrs  T(C): 37.7 (15 Sep 2021 07:58), Max: 37.7 (15 Sep 2021 07:58)  T(F): 99.8 (15 Sep 2021 07:58), Max: 99.8 (15 Sep 2021 07:58)  HR: 87 (15 Sep 2021 07:58) (87 - 113)  BP: 123/70 (15 Sep 2021 07:58) (115/68 - 141/66)  BP(mean): --  RR: 18 (15 Sep 2021 07:58) (17 - 18)  SpO2: --    PHYSICAL EXAM:  Gen: NAD, resting in bed  HEENT: Normocephalic, atraumatic  Neck: supple, no lymphadenopathy  CV: Regular rate & regular rhythm  Lungs: decreased BS at bases, no fremitus  Abdomen: Soft, BS present  Ext: Warm, well perfused  Neuro: non focal, awake  Skin: no rash, no erythema  Lines: no phlebitis    FH: Non-contributory  Social Hx: Non-contributory    TESTS & MEASUREMENTS:                        8.9    16.17 )-----------( 345      ( 15 Sep 2021 04:30 )             29.0     09-15    140  |  106  |  18  ----------------------------<  125<H>  3.5   |  23  |  0.9    Ca    8.0<L>      15 Sep 2021 04:30  Mg     1.6     09-15    TPro  4.9<L>  /  Alb  2.6<L>  /  TBili  0.6  /  DBili  x   /  AST  28  /  ALT  15  /  AlkPhos  119<H>  09-15    eGFR if Non African American: 60 mL/min/1.73M2 (09-15-21 @ 04:30)  eGFR if : 69 mL/min/1.73M2 (09-15-21 @ 04:30)    LIVER FUNCTIONS - ( 15 Sep 2021 04:30 )  Alb: 2.6 g/dL / Pro: 4.9 g/dL / ALK PHOS: 119 U/L / ALT: 15 U/L / AST: 28 U/L / GGT: x               Culture - Blood (collected 09-10-21 @ 17:02)  Source: .Blood None  Preliminary Report (09-12-21 @ 06:14):    No growth to date.    GI PCR Panel, Stool (collected 09-08-21 @ 11:17)  Source: .Stool Feces  Final Report (09-08-21 @ 20:40):    GI PCR Results: NOT detected    *******Please Note:*******    GI panel PCR evaluates for:    Campylobacter, Plesiomonas shigelloides, Salmonella,    Vibrio, Yersinia enterocolitica, Enteroaggregative    Escherichia coli (EAEC), Enteropathogenic E.coli (EPEC),    Enterotoxigenic E. coli (ETEC) lt/st, Shiga-like    toxin-producing E. coli (STEC) stx1/stx2,    Shigella/ Enteroinvasive E. coli (EIEC), Cryptosporidium,    Cyclospora cayetanensis, Entamoeba histolytica,    Giardia lamblia, Adenovirus F 40/41, Astrovirus,    Norovirus GI/GII, Rotavirus A, Sapovirus    Culture - Urine (collected 09-04-21 @ 02:30)  Source: Clean Catch Clean Catch (Midstream)  Final Report (09-05-21 @ 14:54):    >=3 organisms. Probable collection contamination.    Culture - Blood (collected 09-03-21 @ 21:30)  Source: .Blood Blood-Peripheral  Final Report (09-09-21 @ 02:01):    No Growth Final    Culture - Blood (collected 09-03-21 @ 21:30)  Source: .Blood Blood-Peripheral  Final Report (09-09-21 @ 02:01):    No Growth Final            INFECTIOUS DISEASES TESTING  Procalcitonin, Serum: 0.46 (09-10-21 @ 17:02)  HIV-1/2 Combo Result: Nonreact (09-06-21 @ 05:49)  MRSA PCR Result.: NotDetec (09-05-21 @ 11:40)  Legionella Antigen, Urine: Negative (09-05-21 @ 11:40)  Procalcitonin, Serum: 6.63 (09-04-21 @ 16:50)  Rapid RVP Result: NotDetec (09-04-21 @ 02:30)      INFLAMMATORY MARKERS  C-Reactive Protein, Serum: 158 mg/L (09-10-21 @ 17:02)      RADIOLOGY & ADDITIONAL TESTS:  I have personally reviewed the last available Chest xray  CXR      CT      CARDIOLOGY TESTING  12 Lead ECG:   Ventricular Rate 109 BPM    Atrial Rate 357 BPM    QRS Duration 76 ms    Q-T Interval 336 ms    QTC Calculation(Bazett) 452 ms    R Axis 246 degrees    T Axis -13 degrees    Diagnosis Line Atrial fibrillation with rapid ventricular response  Low voltage QRS  Possible Anterolateral infarct , age undetermined  Abnormal ECG    Confirmed by SARIAH RAMIREZ, [facility] ([id number]) on 9/6/2021 10:14:19 PM (09-06-21 @ 11:40)  12 Lead ECG:   Ventricular Rate 98 BPM    Atrial Rate 90 BPM    QRS Duration 88 ms    Q-T Interval 372 ms    QTC Calculation(Bazett) 474 ms    R Axis 240 degrees    T Axis 50 degrees    Diagnosis Line Sinus rhythm with 1st degree A-V block  Left axis deviation  Right ventricular hypertrophy  Abnormal ECG    Confirmed by Kim Jang MD ([id number]) on 9/4/2021 7:48:48 AM (09-03-21 @ 20:25)      MEDICATIONS  chlorhexidine 4% Liquid 1 Topical <User Schedule>  enoxaparin Injectable 80 SubCutaneous two times a day  gabapentin 100 Oral daily  metoprolol tartrate 50 Oral two times a day  pantoprazole    Tablet 40 Oral before breakfast  piperacillin/tazobactam IVPB.. 3.375 IV Intermittent every 12 hours      WEIGHT  Weight (kg): 79.4 (09-03-21 @ 20:49)  Creatinine, Serum: 0.9 mg/dL (09-15-21 @ 04:30)      ANTIBIOTICS:  piperacillin/tazobactam IVPB.. 3.375 Gram(s) IV Intermittent every 12 hours      All available historical records have been reviewed

## 2021-09-15 NOTE — SBIRT NOTE ADULT - NSSBIRTNALRESKIT_GEN_A_CORE
After Visit Summary   5/18/2018    Jeff Ricks    MRN: [id number]           Patient Information     Date Of Birth          1944        Visit Information        Provider Department      5/18/2018 3:20 PM Guillermo Deleon MD [facility]        Today's Diagnoses     Bilateral leg edema    -  1    Essential hypertension        Type 2 diabetes mellitus with diabetic nephropathy, without long-term current use of insulin (H)        CKD (chronic kidney disease) stage 3, GFR 30-59 ml/min        Dyspnea, unspecified type            Follow-ups after your visit        Your next 10 appointments already scheduled     Jun 22, 2018  2:40 PM CDT   Office Visit with Guillermo Deleon MD   [facility]    [address]  [facility] [id number] [phone]           Bring a current list of meds and any records pertaining to this visit. For Physicals, please bring immunization records and any forms needing to be filled out. Please arrive 10 minutes early to complete paperwork.              Who to contact     If you have questions or need follow up information about today's clinic visit or your schedule please contact [facility] directly at [phone].  Normal or non-critical lab and imaging results will be communicated to you by MyChart, letter or phone within 4 business days after the clinic has received the results. If you do not hear from us within 7 days, please contact the clinic through MyChart or phone. If you have a critical or abnormal lab result, we will notify you by phone as soon as possible.  Submit refill requests through [employer] or call your pharmacy and they will forward the refill request to us. Please allow 3 business days for your refill to be completed.          Additional Information About Your Visit        MyChart Information     [employer] gives you secure access to your electronic health record. If  "you see a primary care provider, you can also send messages to your care team and make appointments. If you have questions, please call your primary care clinic.  If you do not have a primary care provider, please call [phone] and they will assist you.        Care EveryWhere ID     This is your Care EveryWhere ID. This could be used by other organizations to access your [place] medical records  [id number]        Your Vitals Were     Pulse Temperature Height Pulse Oximetry BMI (Body Mass Index)       73 98.2  F (36.8  C) (Oral) 5' 10\" (1.778 m) 98% 33.15 kg/m2        Blood Pressure from Last 3 Encounters:   05/18/18 128/60   02/27/18 130/64   12/27/17 136/68    Weight from Last 3 Encounters:   05/18/18 231 lb (104.8 kg)   02/27/18 223 lb (101.2 kg)   12/27/17 227 lb (103 kg)              We Performed the Following     *UA reflex to Microscopic     Albumin Random Urine Quantitative with Creat Ratio     BNP-N terminal pro     CBC with platelets     Comprehensive metabolic panel     Hemoglobin A1c     TSH with free T4 reflex        Primary Care Provider Office Phone # Fax #    Guillermo Deleon [phone]       [address] [facility] [address]        Equal Access to Services     DANO BELLE : Hadii aad ku hadasho Soomaali, waaxda luqadaha, qaybta kaalmada adeegyada, waxay idiin haymonaen jenifer uribe. So [facility] [phone].    ATENCIÓN: Si habla español, tiene a bishop disposición servicios gratuitos de asistencia lingüística. Llame al [phone].    We comply with applicable federal civil rights laws and [place] laws. We do not discriminate on the basis of race, color, national origin, age, disability, sex, sexual orientation, or gender identity.            Thank you!     Thank you for choosing [facility]  for your care. Our goal is always to provide you with excellent care. Hearing back from our patients is one way we can continue to improve our services. " Declined Please take a few minutes to complete the written survey that you may receive in the mail after your visit with us. Thank you!             Your Updated Medication List - Protect others around you: Learn how to safely use, store and throw away your medicines at www.disposemymeds.org.          This list is accurate as of 5/18/18  3:39 PM.  Always use your most recent med list.                   Brand Name Dispense Instructions for use Diagnosis    clopidogrel 75 MG tablet    PLAVIX    90 tablet    TAKE 1 TABLET DAILY    ASHD (arteriosclerotic heart disease)       finasteride 5 MG tablet    PROSCAR    90 tablet    Take 1 tablet (5 mg) by mouth daily    Benign prostatic hyperplasia with nocturia       FREESTYLE LITE test strip   Generic drug:  blood glucose monitoring     100 strip    Use to test blood sugars 1 times daily or as directed.    Type 2 diabetes, HbA1C goal < 8% (H)       hydrochlorothiazide 50 MG tablet    HYDRODIURIL    90 tablet    TAKE 1 TABLET DAILY    ASHD (arteriosclerotic heart disease)       hydrocortisone 2.5 % cream     30 g    Apply topically 2 times daily    Type II or unspecified type diabetes mellitus without mention of complication, not stated as uncontrolled       lisinopril 10 MG tablet    PRINIVIL/ZESTRIL    90 tablet    TAKE 1 TABLET DAILY    ASHD (arteriosclerotic heart disease)       metFORMIN 1000 MG tablet    GLUCOPHAGE    180 tablet    TAKE 1 TABLET TWICE DAILY  (WITH MEALS) WITH FOOD    Type 2 diabetes mellitus with diabetic nephropathy, without long-term current use of insulin (H)       nitroGLYcerin 0.6 MG sublingual tablet    NITROSTAT    100 tablet    DISSOLVE 1 TABLET UNDER THETONGUE AS NEEDED    ASHD (arteriosclerotic heart disease)       omega-3 acid ethyl esters 1 g capsule    Lovaza    180 capsule    Take 1 capsule (1 g) by mouth 2 times daily    Hyperlipidemia LDL goal <100       pioglitazone 30 MG tablet    ACTOS    90 tablet    TAKE 1 TABLET DAILY    Type 2 diabetes  mellitus with diabetic nephropathy, without long-term current use of insulin (H)       simvastatin 40 MG tablet    ZOCOR    90 tablet    Take 1 tablet (40 mg) by mouth At Bedtime    Hyperlipidemia LDL goal <100       tamsulosin 0.4 MG capsule    FLOMAX    90 capsule    TAKE 1 CAPSULE DAILY    BPH with obstruction/lower urinary tract symptoms       VITAMIN C PO      Take by mouth daily        VITAMIN D3 PO      Take 1,000 Units by mouth 2 times daily        VITAMIN E NATURAL PO      Take by mouth daily

## 2021-09-15 NOTE — PHARMACOTHERAPY INTERVENTION NOTE - COMMENTS
81yFemale      Indication: A fib, Prothrombin Gene Mutation  INR Goal: 2.5-3.5  Home Dose: 1.25mg Sun Wed Fri, 2.5mg Mon Tues Thurs Sat  Bridge Therapy: Lovenox 80mg BID      acetaminophen   Tablet .. 650 milliGRAM(s) Oral every 6 hours PRN  aluminum hydroxide/magnesium hydroxide/simethicone Suspension 30 milliLiter(s) Oral every 4 hours PRN  chlorhexidine 4% Liquid 1 Application(s) Topical <User Schedule>  enoxaparin Injectable 80 milliGRAM(s) SubCutaneous two times a day  gabapentin 100 milliGRAM(s) Oral daily  loperamide 2 milliGRAM(s) Oral four times a day PRN  melatonin 3 milliGRAM(s) Oral at bedtime PRN  metoprolol tartrate 50 milliGRAM(s) Oral two times a day  ondansetron Injectable 4 milliGRAM(s) IV Push every 8 hours PRN  pantoprazole    Tablet 40 milliGRAM(s) Oral before breakfast  piperacillin/tazobactam IVPB.. 3.375 Gram(s) IV Intermittent every 12 hours  warfarin 1.25 milliGRAM(s) Oral once        Drug Interactions: N/A      H/H: 8.9/29  PLT: 345  GFR: 60    Date---------------INR-----------------Dose    INR: 2.02 ratio (09-15-21 @ 04:30)  INR: 1.70 ratio (09-14-21 @ 06:18)  INR: 1.50 ratio (09-13-21 @ 07:48)  INR: 1.33 ratio (09-11-21 @ 09:10)  INR: 1.35 ratio (09-10-21 @ 17:02)  INR: 4.87 ratio (09-09-21 @ 08:24)      1. Recommend Warfarin  1.25MG    PO x 1   2. Obtain INR tomorrow AM

## 2021-09-15 NOTE — PROGRESS NOTE ADULT - SUBJECTIVE AND OBJECTIVE BOX
JOEY KNAPP 81y Female  MRN#: [id number]   CODE STATUS: FULL      SUBJECTIVE  Patient is a 81y old Female who presents with a chief complaint of 3 days of Weakness & Diarrhea; (14 Sep 2021 18:53)    Currently admitted to medicine with the primary diagnosis of GNR Pneumonia`    Today is hospital day 11d,   INTERVAL HPI/OVERNIGHT EVENTS:    Examined the pt at bedside this AM. Pt is endorsing b/l lower ext. pain since yesterday and could not work with PT. Otherwise, pt has no acute events overnight.    Present Today:           Cruz Catheter (x)No/ ()Yes?   Indication:             Central Line (x)No/ ()Yes?   Indication:          IV Fluids (x)No/ ()Yes? Type:  Rate:  Indication:    OBJECTIVE  PAST MEDICAL & SURGICAL HISTORY  H/O prothrombin mutation    Pulmonary embolism    HTN (hypertension)    Anemia requiring transfusions    Deep vein thrombosis (DVT)    S/P tonsillectomy    H/O: hysterectomy    History of cholecystectomy      ALLERGIES:  No Known Allergies    HOME MEDICATIONS:  Home Medications:  hydroCHLOROthiazide 12.5 mg oral tablet: 1 tab(s) orally once a day (03 Sep 2021 21:05)  losartan 100 mg oral tablet: 1 tab(s) orally once a day (03 Sep 2021 21:05)  metoprolol succinate 100 mg oral tablet, extended release: 1 tab(s) orally once a day (03 Sep 2021 21:05)  warfarin 2.5 mg oral tablet:  (03 Sep 2021 21:05)    MEDICATIONS:  STANDING MEDICATIONS  chlorhexidine 4% Liquid 1 Application(s) Topical <User Schedule>  enoxaparin Injectable 80 milliGRAM(s) SubCutaneous two times a day  gabapentin 100 milliGRAM(s) Oral daily  metoprolol tartrate 50 milliGRAM(s) Oral two times a day  pantoprazole    Tablet 40 milliGRAM(s) Oral before breakfast  piperacillin/tazobactam IVPB.. 3.375 Gram(s) IV Intermittent every 12 hours    PRN MEDICATIONS  acetaminophen   Tablet .. 650 milliGRAM(s) Oral every 6 hours PRN  aluminum hydroxide/magnesium hydroxide/simethicone Suspension 30 milliLiter(s) Oral every 4 hours PRN  loperamide 2 milliGRAM(s) Oral four times a day PRN  melatonin 3 milliGRAM(s) Oral at bedtime PRN  ondansetron Injectable 4 milliGRAM(s) IV Push every 8 hours PRN      VITAL SIGNS: Last 24 Hours  T(C): 37.7 (15 Sep 2021 07:58), Max: 37.7 (15 Sep 2021 07:58)  T(F): 99.8 (15 Sep 2021 07:58), Max: 99.8 (15 Sep 2021 07:58)  HR: 87 (15 Sep 2021 07:58) (87 - 113)  BP: 123/70 (15 Sep 2021 07:58) (115/68 - 141/66)  BP(mean): --  RR: 18 (15 Sep 2021 07:58) (17 - 18)  SpO2: --    LABS:                        8.2    14.67 )-----------( 330      ( 14 Sep 2021 06:18 )             26.7     09-14    142  |  107  |  20  ----------------------------<  93  4.0   |  22  |  1.0    Ca    7.6<L>      14 Sep 2021 06:18  Mg     1.7     09-14    TPro  4.5<L>  /  Alb  2.3<L>  /  TBili  0.6  /  DBili  x   /  AST  29  /  ALT  17  /  AlkPhos  113  09-14    PT/INR - ( 14 Sep 2021 06:18 )   PT: 19.40 sec;   INR: 1.70 ratio           RADIOLOGY:  < from: Xray Chest 1 View-PORTABLE IMMEDIATE (Xray Chest 1 View-PORTABLE IMMEDIATE .) (09.08.21 @ 09:53) >  Impression:        Stable left opacity/effusion  Minimal blunting right costophrenic angle  . No pneumothorax.    < end of copied text >  < from: US Kidney and Bladder (09.05.21 @ 22:27) >  IMPRESSION:    No hydronephrosis bilaterally.    Urinary bladder prevoid volume of 534 cc. Patient unable to void during this examination.    --- End of Report ---    < end of copied text >  < from: CT Abdomen and Pelvis No Cont (09.04.21 @ 04:11) >  IMPRESSION:    New mediastinal, axillary, hilar, supraclavicular, retroperitoneal, and bilateral inguinal lymphadenopathy as above. Findings highly suspicious for neoplastic process such as lymphoma, however consider less likely but possible inflammatory etiologies in patient with history of rheumatoid arthritis. Oncology workup recommended.    Left lower lobe lobe bronchus filling defects, with left lung lower lobe consolidative opacification. Additional bilateral bilateral patchy opacities. Findings consistent with pneumonia; correlate for aspiration.    Bilateral pulmonary nodular opacities. Findings may be inflammatory, infectious or neoplastic in etiology. Follow-up CT after treatment recommended.    < end of copied text >  < from: CT Chest No Cont (09.04.21 @ 04:10) >  IMPRESSION:    New mediastinal, axillary, hilar, supraclavicular, retroperitoneal, and bilateral inguinal lymphadenopathy as above. Findings highly suspicious for neoplastic process such as lymphoma, however consider less likely but possible inflammatory etiologies in patient with history of rheumatoid arthritis. Oncology workup recommended.    Left lower lobe lobe bronchus filling defects, with left lung lower lobe consolidative opacification. Additional bilateral bilateral patchy opacities. Findings consistent with pneumonia; correlate for aspiration.    Bilateral pulmonary nodular opacities. Findings may be inflammatory, infectious or neoplastic in etiology. Follow-up CT after treatment recommended.    < end of copied text >      ECHO:  PHYSICIAN INTERPRETATION:  Left Ventricle: Left ventricular ejection fraction, by visual estimation, is 60 to 65%. Spectral Doppler shows impaired relaxation pattern of left ventricular myocardial filling (Grade I diastolic dysfunction).  Right Ventricle: Normal right ventricular size and function. The right ventricular size is normal.  Left Atrium: Mild to moderately enlarged left atrium.  Right Atrium: Moderately enlarged right atrium.  Pericardium: Trivial pericardial effusion is present.  Mitral Valve: There is mild mitral annular calcification. No evidence of mitral stenosis. No evidence of mitral valve regurgitation is seen.  Tricuspid Valve: The tricuspid valve is normal in structure. Moderate tricuspid regurgitation is visualized.  Aortic Valve: The aortic valveis trileaflet. No evidence of aortic stenosis. Peak transaortic gradient equals 6.6 mmHg, mean transaortic gradient equals 4.4 mmHg, the calculated aortic valve area equals 3.98 cm² by the continuity equation consistent with normally opening aortic valve. No evidence of aortic valve regurgitation is seen.  Pulmonic Valve: Trace pulmonic valve regurgitation.  Pulmonary Artery: Pulmonary hypertension is present.      PHYSICAL EXAM:    GENERAL: NAD, well-developed, AAOx3  HEENT:  Atraumatic, Normocephalic. EOMI, PERRLA, conjunctiva and sclera clear, No JVD  PULMONARY: Clear to auscultation bilaterally; No wheeze  CARDIOVASCULAR: Regular rate and rhythm; No murmurs, rubs, or gallops  GASTROINTESTINAL: Soft, Nontender, Nondistended; Bowel sounds present  MUSCULOSKELETAL:  2+ Peripheral Pulses, +1 pitting edema, Lower Ext seem more swollen compared to yesterday  NEUROLOGY: non-focal  SKIN: No rashes or lesions

## 2021-09-15 NOTE — PROGRESS NOTE ADULT - ASSESSMENT
80 y/o woman with PMH of prothrombin gene mutation and PE on lifelong anticoagulation with coumadin, Sarcoidosis, HTN and chronic diarrhea (possibly due to IBS) presented with weakness and worsening watery diarrhea. In the ED, she was diagnosed with septic shock, MAGDALENO, pneumonia and extensive LAD.    Septic shock 2/2 Aspiration VS GN Pneumonia  -S/p Zosyn  -D/c with Augmentin - to be completed (9/17)  -Blood c/x No Growth to Date  -Urine Legionella and Strep negative  -Pt has spikes in Temp (Tmax 100.2)  -Will contact ID if Temp greater then 100.4    Lower ext pain b/l   -Pt on Therapeutic Lovenox and Coumadin  -Has hx of prothrombin gene mutation  -INR 1.7  -B/l LE duplex to r/o DVT    Paroxsymal Atrial Fibrillation - new onset  -On metoprolol - please continue and monitor vitals  -Starting coumadin 2.5mg tonight  - Contacted by pharmacy and was told pt follows with [facility] pt [facility] and takes 1.5mg of Coumadin on Sunday, Wednesday and Friday. She then takes 2.5mg on Monday, Tuesday, Thursday and Saturday.  -Will overlap Lovenox with Coumadin for 5 days until INR is therapeutic at 2.5-3.5. If not therapeutic with in 5 days, continue bridging with Lovenox until INR is 2.5-3.5 for at least 24 hours.  -Daily INR check  -Hx of PE on Coumadin    Sarcoidosis   -Patient refused Bronchoscopy and B/X - pt will f/u w/Dr. Botello out pt  -Please f/u ACE level  -Extensive LAD on CT    Chronic Diarrhea (possibly due to IBS)  -Can start Loperamide since C. Diff negative    PT eval  -PT saw the pt but she was unable to ambulate due to pain  -100mg Gabapentin for now. May increase as necessary    Dispo: Waiting for INR to become therapeutic. Pt wants to go Rehab Facility     82 y/o woman with PMH of prothrombin gene mutation and PE on lifelong anticoagulation with coumadin, Sarcoidosis, HTN and chronic diarrhea (possibly due to IBS) presented with weakness and worsening watery diarrhea. In the ED, she was diagnosed with septic shock, MAGDALENO, pneumonia and extensive LAD.    Septic shock 2/2 Aspiration VS GN Pneumonia  -S/p Zosyn  -D/c with Augmentin - to be completed (9/17)  -Blood c/x No Growth to Date  -Urine Legionella and Strep negative  -Pt has spikes in Temp (Tmax 100.2)  -Will contact ID if Temp greater then 100.4    Lower ext pain b/l   -Pt on Therapeutic Lovenox and Coumadin  -Has hx of prothrombin gene mutation  -INR 2.0  -B/l LE duplex to r/o DVT    Paroxsymal Atrial Fibrillation - new onset  -On metoprolol - please continue and monitor vitals  -Starting coumadin 2.5mg tonight  - Contacted by pharmacy and was told pt follows with out pt [facility] and takes 1.5mg of Coumadin on Sunday, Wednesday and Friday. She then takes 2.5mg on Monday, Tuesday, Thursday and Saturday.  -Will overlap Lovenox with Coumadin for 5 days until INR is therapeutic at 2.5-3.5. If not therapeutic with in 5 days, continue bridging with Lovenox until INR is 2.5-3.5 for at least 24 hours.  -Daily INR check  -Hx of PE on Coumadin    Sarcoidosis   -Patient refused Bronchoscopy and B/X - pt will f/u w/Dr. Botello out pt  -Please f/u ACE level  -Extensive LAD on CT    Chronic Diarrhea (possibly due to IBS)  -Can start Loperamide since C. Diff negative    PT eval  -PT saw the pt but she was unable to ambulate due to pain  -100mg Gabapentin for now. May increase as necessary    Dispo: Waiting for INR to become therapeutic. Pt wants to go Rehab Facility     80 y/o woman with PMH of prothrombin gene mutation and PE on lifelong anticoagulation with coumadin, Sarcoidosis, HTN and chronic diarrhea (possibly due to IBS) presented with weakness and worsening watery diarrhea. In the ED, she was diagnosed with septic shock, MAGDALENO, pneumonia and extensive LAD.    Septic shock 2/2 Aspiration VS GN Pneumonia  -S/p Zosyn  -D/c with Augmentin - to be completed (9/17)  -Blood c/x No Growth to Date  -Urine Legionella and Strep negative  -Pt has spikes in Temp (Tmax 100.2)  -f/u ID recs    Lower ext pain b/l   -Pt on Therapeutic Lovenox and Coumadin  -Has hx of prothrombin gene mutation  -INR 2.02  -f/u B/l LE duplex to r/o DVT    Paroxsymal Atrial Fibrillation - new onset  -On metoprolol - please continue and monitor vitals  -c/w coumadin   - Contacted by pharmacy and was told pt follows with out pt [facility] and takes 1.5mg of Coumadin on Sunday, Wednesday and Friday. She then takes 2.5mg on Monday, Tuesday, Thursday and Saturday.  -Will overlap Lovenox with Coumadin for 5 days until INR is therapeutic at 2.5-3.5. If not therapeutic with in 5 days, continue bridging with Lovenox until INR is 2.5-3.5 for at least 24 hours.  -Daily INR check  -Hx of PE on Coumadin    Sarcoidosis   -Patient refused Bronchoscopy and B/X - pt will f/u w/Dr. Botello out pt  -Please f/u ACE level  -Extensive LAD on CT    Chronic Diarrhea (possibly due to IBS)  -Can start Loperamide since C. Diff negative    PT eval  -PT saw the pt but she was unable to ambulate due to pain  -100mg Gabapentin for now. May increase as necessary    Dispo: Waiting for INR to become therapeutic. Pt wants to go Rehab Facility

## 2021-09-15 NOTE — PROGRESS NOTE ADULT - ASSESSMENT
ASSESSMENT  80 y/o F pmhx significant for Essential HTN & hx of PE s/p Warfarin presents to ED from Home (lives alone) w/ x3 days of diarrhea & Weakness who presents with septic shock    IMPRESSION  #Septic Shock secondary to Aspiration vs GN pneumonia  - < from: CT Abdomen and Pelvis No Cont (09.04.21 @ 04:11):  New mediastinal, axillary, hilar, supraclavicular, retroperitoneal, and bilateral inguinal lymphadenopathy as above. Findings highly suspicious for neoplastic process such as lymphoma, however consider less likely but possible inflammatory etiologies in patient with history of rheumatoid arthritis. Oncology workup recommended.  Left lower lobe lobe bronchus filling defects, with left lung lower lobe consolidative opacification. Additional bilateral bilateral patchy opacities. Findings consistent with pneumonia; correlate for aspiration. Bilateral pulmonary nodular opacities. Findings may be inflammatory, infectious or neoplastic in etiology. Follow-up CT after treatment recommended.    #MAGDALENO  #Diarrhea  #Diffuse Lymphadenopathy on CT - Lactate Dehydrogenase, Serum: 210 (09.06.21 @ 05:49)    #Hx of PE on Warfarin  #Hyponatremia  #Obesity BMI (kg/m2): 30  #DM  #Abx allergy:      RECOMMENDATIONS  This is a preliminary incomplete pended note, all final recommendations to follow after interview and examination of the patient.    Please call or message on [employer] Teams if with any questions.  Spectra [phone]     ASSESSMENT  80 y/o F pmhx significant for Essential HTN & hx of PE s/p Warfarin presents to ED from Home (lives alone) w/ x3 days of diarrhea & Weakness who presents with septic shock    IMPRESSION  #Septic Shock secondary to Aspiration vs GN pneumonia  - < from: CT Abdomen and Pelvis No Cont (09.04.21 @ 04:11):  New mediastinal, axillary, hilar, supraclavicular, retroperitoneal, and bilateral inguinal lymphadenopathy as above. Findings highly suspicious for neoplastic process such as lymphoma, however consider less likely but possible inflammatory etiologies in patient with history of rheumatoid arthritis. Oncology workup recommended.  Left lower lobe lobe bronchus filling defects, with left lung lower lobe consolidative opacification. Additional bilateral bilateral patchy opacities. Findings consistent with pneumonia; correlate for aspiration. Bilateral pulmonary nodular opacities. Findings may be inflammatory, infectious or neoplastic in etiology. Follow-up CT after treatment recommended.  - Procalcitonin, Serum: 6.63 (09.04.21 @ 16:50) --> Procalcitonin, Serum: 0.46 (09.10.21 @ 17:02)    #MAGDALENO  #Diarrhea - C diff negative   #Diffuse Lymphadenopathy on CT - Lactate Dehydrogenase, Serum: 210 (09.06.21 @ 05:49)    #Hx of PE on Warfarin  #Hyponatremia  #Obesity BMI (kg/m2): 30  #DM  #Abx allergy:      RECOMMENDATIONS  - noted fevers this afternoon -- clinically no localizing symptoms other than diarrhea which has been chronic   - please obtain blood cultures and repeat CXR   - ok with monitor off antibiotics while waiting for cultures as she has had a prolonged course of zosyn with improvement in procalcitonin   - could fevers be from sarcoid flare? -- would discuss with pulmonary     Please call or message on [employer] Teams if with any questions.  Spectra [phone]

## 2021-09-16 LAB
ALBUMIN SERPL ELPH-MCNC: 2.2 G/DL — LOW (ref 3.5–5.2)
ALP SERPL-CCNC: 106 U/L — SIGNIFICANT CHANGE UP (ref 30–115)
ALT FLD-CCNC: 13 U/L — SIGNIFICANT CHANGE UP (ref 0–41)
ANION GAP SERPL CALC-SCNC: 10 MMOL/L — SIGNIFICANT CHANGE UP (ref 7–14)
AST SERPL-CCNC: 23 U/L — SIGNIFICANT CHANGE UP (ref 0–41)
BASOPHILS # BLD AUTO: 0.03 K/UL — SIGNIFICANT CHANGE UP (ref 0–0.2)
BASOPHILS NFR BLD AUTO: 0.2 % — SIGNIFICANT CHANGE UP (ref 0–1)
BILIRUB SERPL-MCNC: 0.5 MG/DL — SIGNIFICANT CHANGE UP (ref 0.2–1.2)
BUN SERPL-MCNC: 19 MG/DL — SIGNIFICANT CHANGE UP (ref 10–20)
CALCIUM SERPL-MCNC: 7.4 MG/DL — LOW (ref 8.5–10.1)
CHLORIDE SERPL-SCNC: 107 MMOL/L — SIGNIFICANT CHANGE UP (ref 98–110)
CO2 SERPL-SCNC: 23 MMOL/L — SIGNIFICANT CHANGE UP (ref 17–32)
CREAT SERPL-MCNC: 1 MG/DL — SIGNIFICANT CHANGE UP (ref 0.7–1.5)
CULTURE RESULTS: SIGNIFICANT CHANGE UP
EOSINOPHIL # BLD AUTO: 0.14 K/UL — SIGNIFICANT CHANGE UP (ref 0–0.7)
EOSINOPHIL NFR BLD AUTO: 1.1 % — SIGNIFICANT CHANGE UP (ref 0–8)
GLUCOSE SERPL-MCNC: 104 MG/DL — HIGH (ref 70–99)
HCT VFR BLD CALC: 26.9 % — LOW (ref 37–47)
HGB BLD-MCNC: 8.2 G/DL — LOW (ref 12–16)
IMM GRANULOCYTES NFR BLD AUTO: 0.8 % — HIGH (ref 0.1–0.3)
INR BLD: 2.4 RATIO — HIGH (ref 0.65–1.3)
LYMPHOCYTES # BLD AUTO: 24.1 % — SIGNIFICANT CHANGE UP (ref 20.5–51.1)
LYMPHOCYTES # BLD AUTO: 3.02 K/UL — SIGNIFICANT CHANGE UP (ref 1.2–3.4)
MAGNESIUM SERPL-MCNC: 2 MG/DL — SIGNIFICANT CHANGE UP (ref 1.8–2.4)
MCHC RBC-ENTMCNC: 28.5 PG — SIGNIFICANT CHANGE UP (ref 27–31)
MCHC RBC-ENTMCNC: 30.5 G/DL — LOW (ref 32–37)
MCV RBC AUTO: 93.4 FL — SIGNIFICANT CHANGE UP (ref 81–99)
MONOCYTES # BLD AUTO: 1.05 K/UL — HIGH (ref 0.1–0.6)
MONOCYTES NFR BLD AUTO: 8.4 % — SIGNIFICANT CHANGE UP (ref 1.7–9.3)
NEUTROPHILS # BLD AUTO: 8.18 K/UL — HIGH (ref 1.4–6.5)
NEUTROPHILS NFR BLD AUTO: 65.4 % — SIGNIFICANT CHANGE UP (ref 42.2–75.2)
NRBC # BLD: 0 /100 WBCS — SIGNIFICANT CHANGE UP (ref 0–0)
PLATELET # BLD AUTO: 286 K/UL — SIGNIFICANT CHANGE UP (ref 130–400)
POTASSIUM SERPL-MCNC: 3.6 MMOL/L — SIGNIFICANT CHANGE UP (ref 3.5–5)
POTASSIUM SERPL-SCNC: 3.6 MMOL/L — SIGNIFICANT CHANGE UP (ref 3.5–5)
PROT SERPL-MCNC: 4.2 G/DL — LOW (ref 6–8)
PROTHROM AB SERPL-ACNC: 27.4 SEC — HIGH (ref 9.95–12.87)
RBC # BLD: 2.88 M/UL — LOW (ref 4.2–5.4)
RBC # FLD: 13.7 % — SIGNIFICANT CHANGE UP (ref 11.5–14.5)
SODIUM SERPL-SCNC: 140 MMOL/L — SIGNIFICANT CHANGE UP (ref 135–146)
SPECIMEN SOURCE: SIGNIFICANT CHANGE UP
WBC # BLD: 12.52 K/UL — HIGH (ref 4.8–10.8)
WBC # FLD AUTO: 12.52 K/UL — HIGH (ref 4.8–10.8)

## 2021-09-16 PROCEDURE — 99233 SBSQ HOSP IP/OBS HIGH 50: CPT

## 2021-09-16 PROCEDURE — 71045 X-RAY EXAM CHEST 1 VIEW: CPT | Mod: 26

## 2021-09-16 RX ORDER — WARFARIN SODIUM 2.5 MG/1
2.5 TABLET ORAL ONCE
Refills: 0 | Status: COMPLETED | OUTPATIENT
Start: 2021-09-16 | End: 2021-09-16

## 2021-09-16 RX ADMIN — CHLORHEXIDINE GLUCONATE 1 APPLICATION(S): 213 SOLUTION TOPICAL at 05:25

## 2021-09-16 RX ADMIN — WARFARIN SODIUM 2.5 MILLIGRAM(S): 2.5 TABLET ORAL at 21:28

## 2021-09-16 RX ADMIN — Medication 50 MILLIGRAM(S): at 05:25

## 2021-09-16 RX ADMIN — Medication 50 MILLIGRAM(S): at 17:53

## 2021-09-16 RX ADMIN — ENOXAPARIN SODIUM 80 MILLIGRAM(S): 100 INJECTION SUBCUTANEOUS at 05:25

## 2021-09-16 RX ADMIN — PANTOPRAZOLE SODIUM 40 MILLIGRAM(S): 20 TABLET, DELAYED RELEASE ORAL at 05:25

## 2021-09-16 RX ADMIN — GABAPENTIN 100 MILLIGRAM(S): 400 CAPSULE ORAL at 11:35

## 2021-09-16 NOTE — PROGRESS NOTE ADULT - SUBJECTIVE AND OBJECTIVE BOX
JOEY KNAPP 81y Female  MRN#: [id number]   CODE STATUS: FULL      SUBJECTIVE  Patient is a 81y old Female who presents with a chief complaint of 3 days of Weakness & Diarrhea; (15 Sep 2021 14:01)    Currently admitted to medicine with the primary diagnosis of GNR Pneumonia    Today is hospital day 12d,   INTERVAL HPI/OVERNIGHT EVENTS:    Examined the pt at bedside this AM. Pt is endorsing b/l lower ext. pain that's unchanged. Otherwise, pt has no acute events overnight.    Present Today:           Cruz Catheter (x)No/ ()Yes?   Indication:             Central Line (x)No/ ()Yes?   Indication:          IV Fluids (x)No/ ()Yes? Type:  Rate:  Indication:    OBJECTIVE  PAST MEDICAL & SURGICAL HISTORY  H/O prothrombin mutation    Pulmonary embolism    HTN (hypertension)    Anemia requiring transfusions    Deep vein thrombosis (DVT)    S/P tonsillectomy    H/O: hysterectomy    History of cholecystectomy      ALLERGIES:  No Known Allergies    HOME MEDICATIONS:  Home Medications:  hydroCHLOROthiazide 12.5 mg oral tablet: 1 tab(s) orally once a day (03 Sep 2021 21:05)  losartan 100 mg oral tablet: 1 tab(s) orally once a day (03 Sep 2021 21:05)  metoprolol succinate 100 mg oral tablet, extended release: 1 tab(s) orally once a day (03 Sep 2021 21:05)  warfarin 2.5 mg oral tablet:  (03 Sep 2021 21:05)    MEDICATIONS:  STANDING MEDICATIONS  chlorhexidine 4% Liquid 1 Application(s) Topical <User Schedule>  enoxaparin Injectable 80 milliGRAM(s) SubCutaneous two times a day  gabapentin 100 milliGRAM(s) Oral daily  metoprolol tartrate 50 milliGRAM(s) Oral two times a day  pantoprazole    Tablet 40 milliGRAM(s) Oral before breakfast    PRN MEDICATIONS  acetaminophen   Tablet .. 650 milliGRAM(s) Oral every 6 hours PRN  aluminum hydroxide/magnesium hydroxide/simethicone Suspension 30 milliLiter(s) Oral every 4 hours PRN  loperamide 2 milliGRAM(s) Oral four times a day PRN  melatonin 3 milliGRAM(s) Oral at bedtime PRN  ondansetron Injectable 4 milliGRAM(s) IV Push every 8 hours PRN      VITAL SIGNS: Last 24 Hours  T(C): 37.2 (16 Sep 2021 01:26), Max: 38.6 (15 Sep 2021 23:02)  T(F): 98.9 (16 Sep 2021 01:26), Max: 101.5 (15 Sep 2021 23:02)  HR: 76 (16 Sep 2021 05:26) (76 - 120)  BP: 121/60 (16 Sep 2021 05:26) (120/75 - 123/70)  BP(mean): --  RR: 18 (15 Sep 2021 23:02) (17 - 18)  SpO2: --    LABS:                        8.9    16.17 )-----------( 345      ( 15 Sep 2021 04:30 )             29.0     09-15    140  |  106  |  18  ----------------------------<  125<H>  3.5   |  23  |  0.9    Ca    8.0<L>      15 Sep 2021 04:30  Mg     1.6     09-15    TPro  4.9<L>  /  Alb  2.6<L>  /  TBili  0.6  /  DBili  x   /  AST  28  /  ALT  15  /  AlkPhos  119<H>  09-15    PT/INR - ( 15 Sep 2021 04:30 )   PT: 23.10 sec;   INR: 2.02 ratio                       Culture - Blood (collected 14 Sep 2021 17:23)  Source: .Blood None  Preliminary Report (16 Sep 2021 01:03):    No growth to date.      RADIOLOGY:  < from: Xray Chest 1 View-PORTABLE IMMEDIATE (Xray Chest 1 View-PORTABLE IMMEDIATE .) (09.08.21 @ 09:53) >  Impression:        Stable left opacity/effusion  Minimal blunting right costophrenic angle  . No pneumothorax.    < end of copied text >  < from: US Kidney and Bladder (09.05.21 @ 22:27) >  IMPRESSION:    No hydronephrosis bilaterally.    Urinary bladder prevoid volume of 534 cc. Patient unable to void during this examination.    --- End of Report ---    < end of copied text >  < from: CT Abdomen and Pelvis No Cont (09.04.21 @ 04:11) >  IMPRESSION:    New mediastinal, axillary, hilar, supraclavicular, retroperitoneal, and bilateral inguinal lymphadenopathy as above. Findings highly suspicious for neoplastic process such as lymphoma, however consider less likely but possible inflammatory etiologies in patient with history of rheumatoid arthritis. Oncology workup recommended.    Left lower lobe lobe bronchus filling defects, with left lung lower lobe consolidative opacification. Additional bilateral bilateral patchy opacities. Findings consistent with pneumonia; correlate for aspiration.    Bilateral pulmonary nodular opacities. Findings may be inflammatory, infectious or neoplastic in etiology. Follow-up CT after treatment recommended.    < end of copied text >  < from: CT Chest No Cont (09.04.21 @ 04:10) >  IMPRESSION:    New mediastinal, axillary, hilar, supraclavicular, retroperitoneal, and bilateral inguinal lymphadenopathy as above. Findings highly suspicious for neoplastic process such as lymphoma, however consider less likely but possible inflammatory etiologies in patient with history of rheumatoid arthritis. Oncology workup recommended.    Left lower lobe lobe bronchus filling defects, with left lung lower lobe consolidative opacification. Additional bilateral bilateral patchy opacities. Findings consistent with pneumonia; correlate for aspiration.    Bilateral pulmonary nodular opacities. Findings may be inflammatory, infectious or neoplastic in etiology. Follow-up CT after treatment recommended.    < end of copied text >      ECHO:  PHYSICIAN INTERPRETATION:  Left Ventricle: Left ventricular ejection fraction, by visual estimation, is 60 to 65%. Spectral Doppler shows impaired relaxation pattern of left ventricular myocardial filling (Grade I diastolic dysfunction).  Right Ventricle: Normal right ventricular size and function. The right ventricular size is normal.  Left Atrium: Mild to moderately enlarged left atrium.  Right Atrium: Moderately enlarged right atrium.  Pericardium: Trivial pericardial effusion is present.  Mitral Valve: There is mild mitral annular calcification. No evidence of mitral stenosis. No evidence of mitral valve regurgitation is seen.  Tricuspid Valve: The tricuspid valve is normal in structure. Moderate tricuspid regurgitation is visualized.  Aortic Valve: The aortic valveis trileaflet. No evidence of aortic stenosis. Peak transaortic gradient equals 6.6 mmHg, mean transaortic gradient equals 4.4 mmHg, the calculated aortic valve area equals 3.98 cm² by the continuity equation consistent with normally opening aortic valve. No evidence of aortic valve regurgitation is seen.  Pulmonic Valve: Trace pulmonic valve regurgitation.  Pulmonary Artery: Pulmonary hypertension is present.    PHYSICAL EXAM:  GENERAL: NAD, well-developed, AAOx3  HEENT:  Atraumatic, Normocephalic. EOMI, PERRLA, conjunctiva and sclera clear, No JVD  PULMONARY: Clear to auscultation bilaterally; No wheeze  CARDIOVASCULAR: Regular rate and rhythm; No murmurs, rubs, or gallops  GASTROINTESTINAL: Soft, Nontender, Nondistended; Bowel sounds present  MUSCULOSKELETAL:  2+ Peripheral Pulses, +1 pitting edema  NEUROLOGY: non-focal  SKIN: No rashes or lesions

## 2021-09-16 NOTE — PROGRESS NOTE ADULT - ASSESSMENT
#s/p Septic Shock secondary to Aspiration vs GN pneumonia  stable now.  antbx, zosyn>> d/neptali    #Diffuse Lymphadenopathy  including chest/abd/groin  hx of sarcoidosis...  ACE..unremarkable   r/o reactive in lung due to PNA but abd LN?   LDH unremarkable     #Hx of PE on Warfarin.. currently getting iv heparin  Also DVT.  hx of PT gene 2 mutation     # afib .. on meds    # renal insuff.. improving    # anemia of chronic dx     # weakness/debiltation.. PT eval>>> pending d/c to rehab

## 2021-09-16 NOTE — PROGRESS NOTE ADULT - ASSESSMENT
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 In the ED, she was diagnosed with septic shock, MAGDALENO, pneumonia and extensive LAD.    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 Right leg free from thrombus      Paroxsymal Atrial Fibrillation - new onset  -On metoprolol - please continue and monitor vitals  -c/w coumadin   - Contacted by pharmacy and was told pt follows with [facility] pt [facility] and takes 1.5mg of Coumadin on Sunday, Wednesday and Friday. She then takes 2.5mg on Monday, Tuesday, Thursday and Saturday.  -Will overlap Lovenox with Coumadin for 5 days until INR is therapeutic at 2.5-3.5. If not therapeutic with in 5 days, continue bridging with Lovenox until INR is 2.5-3.5 for at least 24 hours.  -Daily INR check  -Hx of PE on Coumadin    Sarcoidosis   -Patient refused Bronchoscopy and B/X - pt will f/u w/Dr. Botello out pt  -Unremarkable ACE and LDH level  -Extensive LAD on CT  -Pt is spiking fevers (Tmax 101.5 @ 9/15 22:00)    Chronic Diarrhea (possibly due to IBS)  -Can start Loperamide since C. Diff negative    PT eval  -PT saw the pt but she was unable to ambulate due to pain  -100mg Gabapentin for now. May increase as necessary    Dispo: Waiting for INR to become therapeutic. Pt wants to go Rehab Facility

## 2021-09-17 DIAGNOSIS — R59.0 LOCALIZED ENLARGED LYMPH NODES: ICD-10-CM

## 2021-09-17 LAB
ALBUMIN SERPL ELPH-MCNC: 2.3 G/DL — LOW (ref 3.5–5.2)
ALP SERPL-CCNC: 97 U/L — SIGNIFICANT CHANGE UP (ref 30–115)
ALT FLD-CCNC: 11 U/L — SIGNIFICANT CHANGE UP (ref 0–41)
ANION GAP SERPL CALC-SCNC: 10 MMOL/L — SIGNIFICANT CHANGE UP (ref 7–14)
AST SERPL-CCNC: 17 U/L — SIGNIFICANT CHANGE UP (ref 0–41)
BASOPHILS # BLD AUTO: 0.02 K/UL — SIGNIFICANT CHANGE UP (ref 0–0.2)
BASOPHILS NFR BLD AUTO: 0.2 % — SIGNIFICANT CHANGE UP (ref 0–1)
BILIRUB SERPL-MCNC: 0.5 MG/DL — SIGNIFICANT CHANGE UP (ref 0.2–1.2)
BUN SERPL-MCNC: 18 MG/DL — SIGNIFICANT CHANGE UP (ref 10–20)
CALCIUM SERPL-MCNC: 7.4 MG/DL — LOW (ref 8.5–10.1)
CHLORIDE SERPL-SCNC: 104 MMOL/L — SIGNIFICANT CHANGE UP (ref 98–110)
CO2 SERPL-SCNC: 24 MMOL/L — SIGNIFICANT CHANGE UP (ref 17–32)
CREAT SERPL-MCNC: 0.9 MG/DL — SIGNIFICANT CHANGE UP (ref 0.7–1.5)
EOSINOPHIL # BLD AUTO: 0.12 K/UL — SIGNIFICANT CHANGE UP (ref 0–0.7)
EOSINOPHIL NFR BLD AUTO: 1.1 % — SIGNIFICANT CHANGE UP (ref 0–8)
GLUCOSE SERPL-MCNC: 95 MG/DL — SIGNIFICANT CHANGE UP (ref 70–99)
HCT VFR BLD CALC: 26.3 % — LOW (ref 37–47)
HGB BLD-MCNC: 8.1 G/DL — LOW (ref 12–16)
IMM GRANULOCYTES NFR BLD AUTO: 0.7 % — HIGH (ref 0.1–0.3)
INR BLD: 2.39 RATIO — HIGH (ref 0.65–1.3)
LYMPHOCYTES # BLD AUTO: 2.71 K/UL — SIGNIFICANT CHANGE UP (ref 1.2–3.4)
LYMPHOCYTES # BLD AUTO: 25.8 % — SIGNIFICANT CHANGE UP (ref 20.5–51.1)
MAGNESIUM SERPL-MCNC: 1.8 MG/DL — SIGNIFICANT CHANGE UP (ref 1.8–2.4)
MCHC RBC-ENTMCNC: 28.4 PG — SIGNIFICANT CHANGE UP (ref 27–31)
MCHC RBC-ENTMCNC: 30.8 G/DL — LOW (ref 32–37)
MCV RBC AUTO: 92.3 FL — SIGNIFICANT CHANGE UP (ref 81–99)
MONOCYTES # BLD AUTO: 0.8 K/UL — HIGH (ref 0.1–0.6)
MONOCYTES NFR BLD AUTO: 7.6 % — SIGNIFICANT CHANGE UP (ref 1.7–9.3)
NEUTROPHILS # BLD AUTO: 6.8 K/UL — HIGH (ref 1.4–6.5)
NEUTROPHILS NFR BLD AUTO: 64.6 % — SIGNIFICANT CHANGE UP (ref 42.2–75.2)
NRBC # BLD: 0 /100 WBCS — SIGNIFICANT CHANGE UP (ref 0–0)
PLATELET # BLD AUTO: 278 K/UL — SIGNIFICANT CHANGE UP (ref 130–400)
POTASSIUM SERPL-MCNC: 3.5 MMOL/L — SIGNIFICANT CHANGE UP (ref 3.5–5)
POTASSIUM SERPL-SCNC: 3.5 MMOL/L — SIGNIFICANT CHANGE UP (ref 3.5–5)
PROT SERPL-MCNC: 4.4 G/DL — LOW (ref 6–8)
PROTHROM AB SERPL-ACNC: 27.2 SEC — HIGH (ref 9.95–12.87)
RBC # BLD: 2.85 M/UL — LOW (ref 4.2–5.4)
RBC # FLD: 13.7 % — SIGNIFICANT CHANGE UP (ref 11.5–14.5)
SODIUM SERPL-SCNC: 138 MMOL/L — SIGNIFICANT CHANGE UP (ref 135–146)
WBC # BLD: 10.52 K/UL — SIGNIFICANT CHANGE UP (ref 4.8–10.8)
WBC # FLD AUTO: 10.52 K/UL — SIGNIFICANT CHANGE UP (ref 4.8–10.8)

## 2021-09-17 PROCEDURE — 99233 SBSQ HOSP IP/OBS HIGH 50: CPT

## 2021-09-17 PROCEDURE — 99221 1ST HOSP IP/OBS SF/LOW 40: CPT

## 2021-09-17 RX ORDER — OXYCODONE AND ACETAMINOPHEN 5; 325 MG/1; MG/1
1 TABLET ORAL EVERY 12 HOURS
Refills: 0 | Status: DISCONTINUED | OUTPATIENT
Start: 2021-09-17 | End: 2021-09-22

## 2021-09-17 RX ADMIN — CHLORHEXIDINE GLUCONATE 1 APPLICATION(S): 213 SOLUTION TOPICAL at 05:39

## 2021-09-17 RX ADMIN — GABAPENTIN 100 MILLIGRAM(S): 400 CAPSULE ORAL at 11:38

## 2021-09-17 RX ADMIN — Medication 50 MILLIGRAM(S): at 05:39

## 2021-09-17 RX ADMIN — Medication 50 MILLIGRAM(S): at 17:01

## 2021-09-17 RX ADMIN — PANTOPRAZOLE SODIUM 40 MILLIGRAM(S): 20 TABLET, DELAYED RELEASE ORAL at 05:39

## 2021-09-17 NOTE — PROGRESS NOTE ADULT - SUBJECTIVE AND OBJECTIVE BOX
RAJOEY  81y, Female  Allergy: No Known Allergies      LOS  13d    CHIEF COMPLAINT: 3 days of Weakness & Diarrhea; (17 Sep 2021 13:31)      INTERVAL EVENTS/HPI  - No acute events overnight  - T(F): , Max: 100.6 (09-17-21 @ 00:52)  - remains febrile - largely unchanged, no worsening symptoms   - WBC Count: 10.52 (09-17-21 @ 04:30)  WBC Count: 12.52 (09-16-21 @ 07:17)     - Creatinine, Serum: 0.9 (09-17-21 @ 04:30)  Creatinine, Serum: 1.0 (09-16-21 @ 07:17)       ROS  General: Denies rigors, nightsweats  HEENT: Denies headache, rhinorrhea, sore throat, eye pain  CV: Denies CP, palpitations  PULM: Denies wheezing, hemoptysis  GI: Denies hematemesis, hematochezia, melena  : Denies discharge, hematuria  MSK: Denies arthralgias, myalgias  SKIN: Denies rash, lesions  NEURO: Denies paresthesias, weakness  PSYCH: Denies depression, anxiety    VITALS:  T(F): 99.7, Max: 100.6 (09-17-21 @ 00:52)  HR: 81  BP: 117/65  RR: 17Vital Signs Last 24 Hrs  T(C): 37.6 (17 Sep 2021 15:15), Max: 38.1 (17 Sep 2021 00:52)  T(F): 99.7 (17 Sep 2021 15:15), Max: 100.6 (17 Sep 2021 00:52)  HR: 81 (17 Sep 2021 15:15) (81 - 99)  BP: 117/65 (17 Sep 2021 15:15) (115/59 - 135/80)  BP(mean): --  RR: 17 (17 Sep 2021 15:15) (17 - 18)  SpO2: --    PHYSICAL EXAM:  Gen: NAD, resting in bed  HEENT: Normocephalic, atraumatic  Neck: supple, no lymphadenopathy  CV: Regular rate & regular rhythm  Lungs: decreased BS at bases, no fremitus  Abdomen: Soft, BS present  Ext: Warm, well perfused  Neuro: non focal, awake  Skin: no rash, no erythema  Lines: no phlebitis    FH: Non-contributory  Social Hx: Non-contributory    TESTS & MEASUREMENTS:                        8.1    10.52 )-----------( 278      ( 17 Sep 2021 04:30 )             26.3     09-17    138  |  104  |  18  ----------------------------<  95  3.5   |  24  |  0.9    Ca    7.4<L>      17 Sep 2021 04:30  Mg     1.8     09-17    TPro  4.4<L>  /  Alb  2.3<L>  /  TBili  0.5  /  DBili  x   /  AST  17  /  ALT  11  /  AlkPhos  97  09-17    eGFR if Non African American: 60 mL/min/1.73M2 (09-17-21 @ 04:30)  eGFR if : 69 mL/min/1.73M2 (09-17-21 @ 04:30)    LIVER FUNCTIONS - ( 17 Sep 2021 04:30 )  Alb: 2.3 g/dL / Pro: 4.4 g/dL / ALK PHOS: 97 U/L / ALT: 11 U/L / AST: 17 U/L / GGT: x               Culture - Blood (collected 09-14-21 @ 17:23)  Source: .Blood None  Preliminary Report (09-16-21 @ 01:03):    No growth to date.    Culture - Blood (collected 09-10-21 @ 17:02)  Source: .Blood None  Final Report (09-16-21 @ 01:01):    No Growth Final    GI PCR Panel, Stool (collected 09-08-21 @ 11:17)  Source: .Stool Feces  Final Report (09-08-21 @ 20:40):    GI PCR Results: NOT detected    *******Please Note:*******    GI panel PCR evaluates for:    Campylobacter, Plesiomonas shigelloides, Salmonella,    Vibrio, Yersinia enterocolitica, Enteroaggregative    Escherichia coli (EAEC), Enteropathogenic E.coli (EPEC),    Enterotoxigenic E. coli (ETEC) lt/st, Shiga-like    toxin-producing E. coli (STEC) stx1/stx2,    Shigella/ Enteroinvasive E. coli (EIEC), Cryptosporidium,    Cyclospora cayetanensis, Entamoeba histolytica,    Giardia lamblia, Adenovirus F 40/41, Astrovirus,    Norovirus GI/GII, Rotavirus A, Sapovirus    Culture - Urine (collected 09-04-21 @ 02:30)  Source: Clean Catch Clean Catch (Midstream)  Final Report (09-05-21 @ 14:54):    >=3 organisms. Probable collection contamination.    Culture - Blood (collected 09-03-21 @ 21:30)  Source: .Blood Blood-Peripheral  Final Report (09-09-21 @ 02:01):    No Growth Final    Culture - Blood (collected 09-03-21 @ 21:30)  Source: .Blood Blood-Peripheral  Final Report (09-09-21 @ 02:01):    No Growth Final            INFECTIOUS DISEASES TESTING  COVID-19 PCR: NotDetec (09-15-21 @ 15:00)  Procalcitonin, Serum: 0.27 (09-14-21 @ 17:23)  Procalcitonin, Serum: 0.46 (09-10-21 @ 17:02)  HIV-1/2 Combo Result: Nonreact (09-06-21 @ 05:49)  MRSA PCR Result.: NotDetec (09-05-21 @ 11:40)  Legionella Antigen, Urine: Negative (09-05-21 @ 11:40)  Procalcitonin, Serum: 6.63 (09-04-21 @ 16:50)  Rapid RVP Result: NotDetec (09-04-21 @ 02:30)      INFLAMMATORY MARKERS  C-Reactive Protein, Serum: 158 mg/L (09-10-21 @ 17:02)      RADIOLOGY & ADDITIONAL TESTS:  I have personally reviewed the last available Chest xray  CXR      CT      CARDIOLOGY TESTING  12 Lead ECG:   Ventricular Rate 109 BPM    Atrial Rate 357 BPM    QRS Duration 76 ms    Q-T Interval 336 ms    QTC Calculation(Bazett) 452 ms    R Axis 246 degrees    T Axis -13 degrees    Diagnosis Line Atrial fibrillation with rapid ventricular response  Low voltage QRS  Possible Anterolateral infarct , age undetermined  Abnormal ECG    Confirmed by SARIAH RAMIREZ, [facility] ([id number]) on 9/6/2021 10:14:19 PM (09-06-21 @ 11:40)  12 Lead ECG:   Ventricular Rate 98 BPM    Atrial Rate 90 BPM    QRS Duration 88 ms    Q-T Interval 372 ms    QTC Calculation(Bazett) 474 ms    R Axis 240 degrees    T Axis 50 degrees    Diagnosis Line Sinus rhythm with 1st degree A-V block  Left axis deviation  Right ventricular hypertrophy  Abnormal ECG    Confirmed by Kim Jang MD ([id number]) on 9/4/2021 7:48:48 AM (09-03-21 @ 20:25)      MEDICATIONS  chlorhexidine 4% Liquid 1 Topical <User Schedule>  gabapentin 100 Oral daily  metoprolol tartrate 50 Oral two times a day  pantoprazole    Tablet 40 Oral before breakfast      WEIGHT  Weight (kg): 79.4 (09-03-21 @ 20:49)  Creatinine, Serum: 0.9 mg/dL (09-17-21 @ 04:30)      ANTIBIOTICS:      All available historical records have been reviewed

## 2021-09-17 NOTE — PROGRESS NOTE ADULT - SUBJECTIVE AND OBJECTIVE BOX
JOEY KNAPP 81y Female  MRN#: [id number]   CODE STATUS: FULL      SUBJECTIVE  Patient is a 81y old Female who presents with a chief complaint of 3 days of Weakness & Diarrhea; (16 Sep 2021 17:30)    Currently admitted to medicine with the primary diagnosis of     Today is hospital day 13d,   INTERVAL HPI/OVERNIGHT EVENTS:    Examined the pt at bedside this AM. Pt is endorsing b/l lower ext. pain that's unchanged. Otherwise, pt has no acute events overnight.    Present Today:           Cruz Catheter (x)No/ ()Yes?   Indication:             Central Line (x)No/ ()Yes?   Indication:          IV Fluids (x)No/ ()Yes? Type:  Rate:  Indication:    OBJECTIVE  PAST MEDICAL & SURGICAL HISTORY  H/O prothrombin mutation    Pulmonary embolism    HTN (hypertension)    Anemia requiring transfusions    Deep vein thrombosis (DVT)    S/P tonsillectomy    H/O: hysterectomy    History of cholecystectomy      ALLERGIES:  No Known Allergies    HOME MEDICATIONS:  Home Medications:  hydroCHLOROthiazide 12.5 mg oral tablet: 1 tab(s) orally once a day (03 Sep 2021 21:05)  losartan 100 mg oral tablet: 1 tab(s) orally once a day (03 Sep 2021 21:05)  metoprolol succinate 100 mg oral tablet, extended release: 1 tab(s) orally once a day (03 Sep 2021 21:05)  warfarin 2.5 mg oral tablet:  (03 Sep 2021 21:05)    MEDICATIONS:  STANDING MEDICATIONS  chlorhexidine 4% Liquid 1 Application(s) Topical <User Schedule>  gabapentin 100 milliGRAM(s) Oral daily  metoprolol tartrate 50 milliGRAM(s) Oral two times a day  pantoprazole    Tablet 40 milliGRAM(s) Oral before breakfast    PRN MEDICATIONS  acetaminophen   Tablet .. 650 milliGRAM(s) Oral every 6 hours PRN  aluminum hydroxide/magnesium hydroxide/simethicone Suspension 30 milliLiter(s) Oral every 4 hours PRN  loperamide 2 milliGRAM(s) Oral four times a day PRN  melatonin 3 milliGRAM(s) Oral at bedtime PRN  ondansetron Injectable 4 milliGRAM(s) IV Push every 8 hours PRN      VITAL SIGNS: Last 24 Hours  T(C): 38.1 (17 Sep 2021 00:52), Max: 38.1 (17 Sep 2021 00:52)  T(F): 100.6 (17 Sep 2021 00:52), Max: 100.6 (17 Sep 2021 00:52)  HR: 92 (17 Sep 2021 05:40) (92 - 99)  BP: 125/75 (17 Sep 2021 05:40) (110/58 - 134/74)  BP(mean): --  RR: 18 (17 Sep 2021 00:52) (17 - 18)  SpO2: --    LABS:                        8.2    12.52 )-----------( 286      ( 16 Sep 2021 07:17 )             26.9     09-16    140  |  107  |  19  ----------------------------<  104<H>  3.6   |  23  |  1.0    Ca    7.4<L>      16 Sep 2021 07:17  Mg     2.0     09-16    TPro  4.2<L>  /  Alb  2.2<L>  /  TBili  0.5  /  DBili  x   /  AST  23  /  ALT  13  /  AlkPhos  106  09-16    PT/INR - ( 16 Sep 2021 07:17 )   PT: 27.40 sec;   INR: 2.40 ratio                       Culture - Blood (collected 14 Sep 2021 17:23)  Source: .Blood None  Preliminary Report (16 Sep 2021 01:03):    No growth to date.      RADIOLOGY:  < from: Xray Chest 1 View-PORTABLE IMMEDIATE (Xray Chest 1 View-PORTABLE IMMEDIATE .) (09.08.21 @ 09:53) >  Impression:        Stable left opacity/effusion  Minimal blunting right costophrenic angle  . No pneumothorax.    < end of copied text >  < from: US Kidney and Bladder (09.05.21 @ 22:27) >  IMPRESSION:    No hydronephrosis bilaterally.    Urinary bladder prevoid volume of 534 cc. Patient unable to void during this examination.    --- End of Report ---    < end of copied text >  < from: CT Abdomen and Pelvis No Cont (09.04.21 @ 04:11) >  IMPRESSION:    New mediastinal, axillary, hilar, supraclavicular, retroperitoneal, and bilateral inguinal lymphadenopathy as above. Findings highly suspicious for neoplastic process such as lymphoma, however consider less likely but possible inflammatory etiologies in patient with history of rheumatoid arthritis. Oncology workup recommended.    Left lower lobe lobe bronchus filling defects, with left lung lower lobe consolidative opacification. Additional bilateral bilateral patchy opacities. Findings consistent with pneumonia; correlate for aspiration.    Bilateral pulmonary nodular opacities. Findings may be inflammatory, infectious or neoplastic in etiology. Follow-up CT after treatment recommended.    < end of copied text >  < from: CT Chest No Cont (09.04.21 @ 04:10) >  IMPRESSION:    New mediastinal, axillary, hilar, supraclavicular, retroperitoneal, and bilateral inguinal lymphadenopathy as above. Findings highly suspicious for neoplastic process such as lymphoma, however consider less likely but possible inflammatory etiologies in patient with history of rheumatoid arthritis. Oncology workup recommended.    Left lower lobe lobe bronchus filling defects, with left lung lower lobe consolidative opacification. Additional bilateral bilateral patchy opacities. Findings consistent with pneumonia; correlate for aspiration.    Bilateral pulmonary nodular opacities. Findings may be inflammatory, infectious or neoplastic in etiology. Follow-up CT after treatment recommended.    < end of copied text >      ECHO:  PHYSICIAN INTERPRETATION:  Left Ventricle: Left ventricular ejection fraction, by visual estimation, is 60 to 65%. Spectral Doppler shows impaired relaxation pattern of left ventricular myocardial filling (Grade I diastolic dysfunction).  Right Ventricle: Normal right ventricular size and function. The right ventricular size is normal.  Left Atrium: Mild to moderately enlarged left atrium.  Right Atrium: Moderately enlarged right atrium.  Pericardium: Trivial pericardial effusion is present.  Mitral Valve: There is mild mitral annular calcification. No evidence of mitral stenosis. No evidence of mitral valve regurgitation is seen.  Tricuspid Valve: The tricuspid valve is normal in structure. Moderate tricuspid regurgitation is visualized.  Aortic Valve: The aortic valveis trileaflet. No evidence of aortic stenosis. Peak transaortic gradient equals 6.6 mmHg, mean transaortic gradient equals 4.4 mmHg, the calculated aortic valve area equals 3.98 cm² by the continuity equation consistent with normally opening aortic valve. No evidence of aortic valve regurgitation is seen.  Pulmonic Valve: Trace pulmonic valve regurgitation.  Pulmonary Artery: Pulmonary hypertension is present.    PHYSICAL EXAM:    GENERAL: NAD, well-developed, AAOx3  HEENT:  Atraumatic, Normocephalic. EOMI, PERRLA, conjunctiva and sclera clear, No JVD  PULMONARY: Clear to auscultation bilaterally; No wheeze  CARDIOVASCULAR: irregular rhythm; No murmurs, rubs, or gallops  GASTROINTESTINAL: Soft, Nontender, Nondistended; Bowel sounds present  MUSCULOSKELETAL:  2+ Peripheral Pulses, +1 pitting edema; Tenderness to palpation b/l; Pt has pain when trying to move b/l LEs  NEUROLOGY: non-focal  SKIN: No rashes or lesions JOEY KNAPP 81y Female  MRN#: [id number]   CODE STATUS: FULL      SUBJECTIVE  Patient is a 81y old Female who presents with a chief complaint of 3 days of Weakness & Diarrhea; (16 Sep 2021 17:30)    Currently admitted to medicine with the primary diagnosis of GNR Pneumonia    Today is hospital day 13d,   INTERVAL HPI/OVERNIGHT EVENTS:    Examined the pt at bedside this AM. Pt is endorsing b/l lower ext. pain that's unchanged. Otherwise, pt has no acute events overnight.    Present Today:           Cruz Catheter (x)No/ ()Yes?   Indication:             Central Line (x)No/ ()Yes?   Indication:          IV Fluids (x)No/ ()Yes? Type:  Rate:  Indication:    OBJECTIVE  PAST MEDICAL & SURGICAL HISTORY  H/O prothrombin mutation    Pulmonary embolism    HTN (hypertension)    Anemia requiring transfusions    Deep vein thrombosis (DVT)    S/P tonsillectomy    H/O: hysterectomy    History of cholecystectomy      ALLERGIES:  No Known Allergies    HOME MEDICATIONS:  Home Medications:  hydroCHLOROthiazide 12.5 mg oral tablet: 1 tab(s) orally once a day (03 Sep 2021 21:05)  losartan 100 mg oral tablet: 1 tab(s) orally once a day (03 Sep 2021 21:05)  metoprolol succinate 100 mg oral tablet, extended release: 1 tab(s) orally once a day (03 Sep 2021 21:05)  warfarin 2.5 mg oral tablet:  (03 Sep 2021 21:05)    MEDICATIONS:  STANDING MEDICATIONS  chlorhexidine 4% Liquid 1 Application(s) Topical <User Schedule>  gabapentin 100 milliGRAM(s) Oral daily  metoprolol tartrate 50 milliGRAM(s) Oral two times a day  pantoprazole    Tablet 40 milliGRAM(s) Oral before breakfast    PRN MEDICATIONS  acetaminophen   Tablet .. 650 milliGRAM(s) Oral every 6 hours PRN  aluminum hydroxide/magnesium hydroxide/simethicone Suspension 30 milliLiter(s) Oral every 4 hours PRN  loperamide 2 milliGRAM(s) Oral four times a day PRN  melatonin 3 milliGRAM(s) Oral at bedtime PRN  ondansetron Injectable 4 milliGRAM(s) IV Push every 8 hours PRN      VITAL SIGNS: Last 24 Hours  T(C): 38.1 (17 Sep 2021 00:52), Max: 38.1 (17 Sep 2021 00:52)  T(F): 100.6 (17 Sep 2021 00:52), Max: 100.6 (17 Sep 2021 00:52)  HR: 92 (17 Sep 2021 05:40) (92 - 99)  BP: 125/75 (17 Sep 2021 05:40) (110/58 - 134/74)  BP(mean): --  RR: 18 (17 Sep 2021 00:52) (17 - 18)  SpO2: --    LABS:                        8.2    12.52 )-----------( 286      ( 16 Sep 2021 07:17 )             26.9     09-16    140  |  107  |  19  ----------------------------<  104<H>  3.6   |  23  |  1.0    Ca    7.4<L>      16 Sep 2021 07:17  Mg     2.0     09-16    TPro  4.2<L>  /  Alb  2.2<L>  /  TBili  0.5  /  DBili  x   /  AST  23  /  ALT  13  /  AlkPhos  106  09-16    PT/INR - ( 16 Sep 2021 07:17 )   PT: 27.40 sec;   INR: 2.40 ratio                       Culture - Blood (collected 14 Sep 2021 17:23)  Source: .Blood None  Preliminary Report (16 Sep 2021 01:03):    No growth to date.      RADIOLOGY:  < from: Xray Chest 1 View-PORTABLE IMMEDIATE (Xray Chest 1 View-PORTABLE IMMEDIATE .) (09.08.21 @ 09:53) >  Impression:        Stable left opacity/effusion  Minimal blunting right costophrenic angle  . No pneumothorax.    < end of copied text >  < from: US Kidney and Bladder (09.05.21 @ 22:27) >  IMPRESSION:    No hydronephrosis bilaterally.    Urinary bladder prevoid volume of 534 cc. Patient unable to void during this examination.    --- End of Report ---    < end of copied text >  < from: CT Abdomen and Pelvis No Cont (09.04.21 @ 04:11) >  IMPRESSION:    New mediastinal, axillary, hilar, supraclavicular, retroperitoneal, and bilateral inguinal lymphadenopathy as above. Findings highly suspicious for neoplastic process such as lymphoma, however consider less likely but possible inflammatory etiologies in patient with history of rheumatoid arthritis. Oncology workup recommended.    Left lower lobe lobe bronchus filling defects, with left lung lower lobe consolidative opacification. Additional bilateral bilateral patchy opacities. Findings consistent with pneumonia; correlate for aspiration.    Bilateral pulmonary nodular opacities. Findings may be inflammatory, infectious or neoplastic in etiology. Follow-up CT after treatment recommended.    < end of copied text >  < from: CT Chest No Cont (09.04.21 @ 04:10) >  IMPRESSION:    New mediastinal, axillary, hilar, supraclavicular, retroperitoneal, and bilateral inguinal lymphadenopathy as above. Findings highly suspicious for neoplastic process such as lymphoma, however consider less likely but possible inflammatory etiologies in patient with history of rheumatoid arthritis. Oncology workup recommended.    Left lower lobe lobe bronchus filling defects, with left lung lower lobe consolidative opacification. Additional bilateral bilateral patchy opacities. Findings consistent with pneumonia; correlate for aspiration.    Bilateral pulmonary nodular opacities. Findings may be inflammatory, infectious or neoplastic in etiology. Follow-up CT after treatment recommended.    < end of copied text >      ECHO:  PHYSICIAN INTERPRETATION:  Left Ventricle: Left ventricular ejection fraction, by visual estimation, is 60 to 65%. Spectral Doppler shows impaired relaxation pattern of left ventricular myocardial filling (Grade I diastolic dysfunction).  Right Ventricle: Normal right ventricular size and function. The right ventricular size is normal.  Left Atrium: Mild to moderately enlarged left atrium.  Right Atrium: Moderately enlarged right atrium.  Pericardium: Trivial pericardial effusion is present.  Mitral Valve: There is mild mitral annular calcification. No evidence of mitral stenosis. No evidence of mitral valve regurgitation is seen.  Tricuspid Valve: The tricuspid valve is normal in structure. Moderate tricuspid regurgitation is visualized.  Aortic Valve: The aortic valveis trileaflet. No evidence of aortic stenosis. Peak transaortic gradient equals 6.6 mmHg, mean transaortic gradient equals 4.4 mmHg, the calculated aortic valve area equals 3.98 cm² by the continuity equation consistent with normally opening aortic valve. No evidence of aortic valve regurgitation is seen.  Pulmonic Valve: Trace pulmonic valve regurgitation.  Pulmonary Artery: Pulmonary hypertension is present.    PHYSICAL EXAM:    GENERAL: NAD, well-developed, AAOx3  HEENT:  Atraumatic, Normocephalic. EOMI, PERRLA, conjunctiva and sclera clear, No JVD  PULMONARY: Clear to auscultation bilaterally; No wheeze  CARDIOVASCULAR: irregular rhythm; No murmurs, rubs, or gallops  GASTROINTESTINAL: Soft, Nontender, Nondistended; Bowel sounds present  MUSCULOSKELETAL:  2+ Peripheral Pulses, +1 pitting edema; Tenderness to palpation b/l; Pt has pain when trying to move b/l LEs  NEUROLOGY: non-focal  SKIN: No rashes or lesions JOEY KNAPP 81y Female  MRN#: [id number]   CODE STATUS: FULL      SUBJECTIVE  Patient is a 81y old Female who presents with a chief complaint of 3 days of Weakness & Diarrhea; (16 Sep 2021 17:30)    Currently admitted to medicine with the primary diagnosis of GNR Pneumonia    Today is hospital day 13d,   INTERVAL HPI/OVERNIGHT EVENTS:    Examined the pt at bedside this AM. Pt is endorsing b/l lower ext. pain that's unchanged. Otherwise, pt has no acute events overnight.    Present Today:           Cruz Catheter (x)No/ ()Yes?   Indication:             Central Line (x)No/ ()Yes?   Indication:          IV Fluids (x)No/ ()Yes? Type:  Rate:  Indication:    OBJECTIVE  PAST MEDICAL & SURGICAL HISTORY  H/O prothrombin mutation    Pulmonary embolism    HTN (hypertension)    Anemia requiring transfusions    Deep vein thrombosis (DVT)    S/P tonsillectomy    H/O: hysterectomy    History of cholecystectomy      ALLERGIES:  No Known Allergies    HOME MEDICATIONS:  Home Medications:  hydroCHLOROthiazide 12.5 mg oral tablet: 1 tab(s) orally once a day (03 Sep 2021 21:05)  losartan 100 mg oral tablet: 1 tab(s) orally once a day (03 Sep 2021 21:05)  metoprolol succinate 100 mg oral tablet, extended release: 1 tab(s) orally once a day (03 Sep 2021 21:05)  warfarin 2.5 mg oral tablet:  (03 Sep 2021 21:05)    MEDICATIONS:  STANDING MEDICATIONS  chlorhexidine 4% Liquid 1 Application(s) Topical <User Schedule>  gabapentin 100 milliGRAM(s) Oral daily  metoprolol tartrate 50 milliGRAM(s) Oral two times a day  pantoprazole    Tablet 40 milliGRAM(s) Oral before breakfast    PRN MEDICATIONS  acetaminophen   Tablet .. 650 milliGRAM(s) Oral every 6 hours PRN  aluminum hydroxide/magnesium hydroxide/simethicone Suspension 30 milliLiter(s) Oral every 4 hours PRN  loperamide 2 milliGRAM(s) Oral four times a day PRN  melatonin 3 milliGRAM(s) Oral at bedtime PRN  ondansetron Injectable 4 milliGRAM(s) IV Push every 8 hours PRN      VITAL SIGNS: Last 24 Hours  T(C): 38.1 (17 Sep 2021 00:52), Max: 38.1 (17 Sep 2021 00:52)  T(F): 100.6 (17 Sep 2021 00:52), Max: 100.6 (17 Sep 2021 00:52)  HR: 92 (17 Sep 2021 05:40) (92 - 99)  BP: 125/75 (17 Sep 2021 05:40) (110/58 - 134/74)  BP(mean): --  RR: 18 (17 Sep 2021 00:52) (17 - 18)  SpO2: --    LABS:                        8.2    12.52 )-----------( 286      ( 16 Sep 2021 07:17 )             26.9     09-16    140  |  107  |  19  ----------------------------<  104<H>  3.6   |  23  |  1.0    Ca    7.4<L>      16 Sep 2021 07:17  Mg     2.0     09-16    TPro  4.2<L>  /  Alb  2.2<L>  /  TBili  0.5  /  DBili  x   /  AST  23  /  ALT  13  /  AlkPhos  106  09-16    PT/INR - ( 16 Sep 2021 07:17 )   PT: 27.40 sec;   INR: 2.40 ratio                       Culture - Blood (collected 14 Sep 2021 17:23)  Source: .Blood None  Preliminary Report (16 Sep 2021 01:03):    No growth to date.      RADIOLOGY:  < from: Xray Chest 1 View-PORTABLE IMMEDIATE (Xray Chest 1 View-PORTABLE IMMEDIATE .) (09.08.21 @ 09:53) >  Impression:        Stable left opacity/effusion  Minimal blunting right costophrenic angle  . No pneumothorax.    < end of copied text >  < from: US Kidney and Bladder (09.05.21 @ 22:27) >  IMPRESSION:    No hydronephrosis bilaterally.    Urinary bladder prevoid volume of 534 cc. Patient unable to void during this examination.    --- End of Report ---    < end of copied text >  < from: CT Abdomen and Pelvis No Cont (09.04.21 @ 04:11) >  IMPRESSION:    New mediastinal, axillary, hilar, supraclavicular, retroperitoneal, and bilateral inguinal lymphadenopathy as above. Findings highly suspicious for neoplastic process such as lymphoma, however consider less likely but possible inflammatory etiologies in patient with history of rheumatoid arthritis. Oncology workup recommended.    Left lower lobe lobe bronchus filling defects, with left lung lower lobe consolidative opacification. Additional bilateral bilateral patchy opacities. Findings consistent with pneumonia; correlate for aspiration.    Bilateral pulmonary nodular opacities. Findings may be inflammatory, infectious or neoplastic in etiology. Follow-up CT after treatment recommended.    < end of copied text >  < from: CT Chest No Cont (09.04.21 @ 04:10) >  IMPRESSION:    New mediastinal, axillary, hilar, supraclavicular, retroperitoneal, and bilateral inguinal lymphadenopathy as above. Findings highly suspicious for neoplastic process such as lymphoma, however consider less likely but possible inflammatory etiologies in patient with history of rheumatoid arthritis. Oncology workup recommended.    Left lower lobe lobe bronchus filling defects, with left lung lower lobe consolidative opacification. Additional bilateral bilateral patchy opacities. Findings consistent with pneumonia; correlate for aspiration.    Bilateral pulmonary nodular opacities. Findings may be inflammatory, infectious or neoplastic in etiology. Follow-up CT after treatment recommended.    < end of copied text >      ECHO:  PHYSICIAN INTERPRETATION:  Left Ventricle: Left ventricular ejection fraction, by visual estimation, is 60 to 65%. Spectral Doppler shows impaired relaxation pattern of left ventricular myocardial filling (Grade I diastolic dysfunction).  Right Ventricle: Normal right ventricular size and function. The right ventricular size is normal.  Left Atrium: Mild to moderately enlarged left atrium.  Right Atrium: Moderately enlarged right atrium.  Pericardium: Trivial pericardial effusion is present.  Mitral Valve: There is mild mitral annular calcification. No evidence of mitral stenosis. No evidence of mitral valve regurgitation is seen.  Tricuspid Valve: The tricuspid valve is normal in structure. Moderate tricuspid regurgitation is visualized.  Aortic Valve: The aortic valveis trileaflet. No evidence of aortic stenosis. Peak transaortic gradient equals 6.6 mmHg, mean transaortic gradient equals 4.4 mmHg, the calculated aortic valve area equals 3.98 cm² by the continuity equation consistent with normally opening aortic valve. No evidence of aortic valve regurgitation is seen.  Pulmonic Valve: Trace pulmonic valve regurgitation.  Pulmonary Artery: Pulmonary hypertension is present.    PHYSICAL EXAM:    GENERAL: NAD, well-developed, AAOx3  HEENT:  Atraumatic, Normocephalic. EOMI, PERRLA, conjunctiva and sclera clear, No JVD  PULMONARY: Clear to auscultation bilaterally; No wheeze  CARDIOVASCULAR: irregular rhythm; No murmurs, rubs, or gallops  GASTROINTESTINAL: Soft, Nontender, Nondistended; Bowel sounds present  MUSCULOSKELETAL/EXT:  2+ Peripheral Pulses, +1 pitting edema; Tenderness to palpation b/l; Pt has pain when trying to move b/l; Bruising b/l UE that are old as per the pt.  NEUROLOGY: non-focal  SKIN: No rashes or lesions

## 2021-09-17 NOTE — PROGRESS NOTE ADULT - ASSESSMENT
#s/p Septic Shock secondary to Aspiration vs GN pneumonia  stable now.  antbx, zosyn>> d/neptali    #Diffuse Lymphadenopathy  including chest/abd/groin  hx of sarcoidosis...  ACE..unremarkable   r/o reactive in lung due to PNA but abd LN?   LDH unremarkable     #Hx of PE on Warfarin.. currently getting iv heparin  Also DVT.  hx of PT gene 2 mutation     # afib .. on meds    # renal insuff.. improving    # anemia of chronic dx     # weakness/debiltation.. PT eval>>> pending d/c to rehab           #s/p Septic Shock secondary to Aspiration vs GN pneumonia  stable now.  antbx, zosyn>> d/neptali    #Diffuse Lymphadenopathy  including chest/abd/groin  hx of sarcoidosis...  ACE..unremarkable   r/o reactive in lung due to PNA but abd LN?   LDH unremarkable   bronchoscopy on monday ..     #Hx of PE on Warfarin.. currently getting iv heparin  Also DVT.  hx of PT gene 2 mutation     # afib .. on meds    # renal insuff.. improving    # anemia of chronic dx     # weakness/debiltation.. PT eval>>> pending d/c to rehab

## 2021-09-17 NOTE — PROGRESS NOTE ADULT - ASSESSMENT
ASSESSMENT  80 y/o F pmhx significant for Essential HTN & hx of PE s/p Warfarin presents to ED from Home (lives alone) w/ x3 days of diarrhea & Weakness who presents with septic shock    IMPRESSION  #Septic Shock secondary to Aspiration vs GN pneumonia  - < from: CT Abdomen and Pelvis No Cont (09.04.21 @ 04:11):  New mediastinal, axillary, hilar, supraclavicular, retroperitoneal, and bilateral inguinal lymphadenopathy as above. Findings highly suspicious for neoplastic process such as lymphoma, however consider less likely but possible inflammatory etiologies in patient with history of rheumatoid arthritis. Oncology workup recommended.  Left lower lobe lobe bronchus filling defects, with left lung lower lobe consolidative opacification. Additional bilateral bilateral patchy opacities. Findings consistent with pneumonia; correlate for aspiration. Bilateral pulmonary nodular opacities. Findings may be inflammatory, infectious or neoplastic in etiology. Follow-up CT after treatment recommended.  - Procalcitonin, Serum: 6.63 (09.04.21 @ 16:50) --> Procalcitonin, Serum: 0.46 (09.10.21 @ 17:02)    #MADGALENO  #Diarrhea - C diff negative   #Diffuse Lymphadenopathy on CT - Lactate Dehydrogenase, Serum: 210 (09.06.21 @ 05:49)    #Hx of PE on Warfarin  #Hyponatremia  #Obesity BMI (kg/m2): 30  #DM  #Abx allergy:      RECOMMENDATIONS  - check ferritin levels   - continue to monitor off antibiotics   - agree with plans for biopsy   - unclear if fevers can be from sarcoid flare vs underlying malignancy     Please call or message on [employer] Teams if with any questions.  Spectra [phone]

## 2021-09-17 NOTE — PROGRESS NOTE ADULT - TIME BILLING
I have personally seen and examined this patient.    I have reviewed all pertinent clinical information and reviewed all relevant imaging and diagnostic studies personally.   I counseled the patient about diagnostic testing and treatment plan. All questions were answered.   I discussed recommendations with the primary team.

## 2021-09-17 NOTE — CONSULT NOTE ADULT - SUBJECTIVE AND OBJECTIVE BOX
INTERVENTIONAL RADIOLOGY CONSULT:     Procedure Requested: LN biopsy     HPI:  82 y/o F pmhx significant for Essential HTN & hx of PE s/p Warfarin presents to ED from Home (lives alone) w/ x3 days of diarrhea & Weakness;     ED:   - Triage: BP was 75/49 that did not respond to IV boluses (LR 2.5L total) s/p Levophed;  - VS: , Temp 103F, BP 75/49  - Labs: WBC 16, INR 5.5, Na 133, Cr 3.8, Tn 0.07, BNP 43156, UA(+)  - CXR: Bilateral opacities/left pleural effusion. Bilateral hilar adenopathy.   - CT Chest/Abd/Pelvis:   ·	New mediastinal, axillary, hilar, supraclavicular, retroperitoneal, and bilateral inguinal lymphadenopathy as above. Findings highly suspicious for neoplastic process.Left lower lobe lobe bronchus filling defects, with left lung lower lobe consolidative opacification.   ·	Additional bilateral bilateral patchy opacities. Findings consistent with pneumonia;  ·	Bilateral pulmonary nodular opacities.     Admit to Stepdown for Septic Shock;      (04 Sep 2021 13:30)      PAST MEDICAL & SURGICAL HISTORY:  H/O prothrombin mutation    Pulmonary embolism    HTN (hypertension)    Anemia requiring transfusions    Deep vein thrombosis (DVT)    S/P tonsillectomy    H/O: hysterectomy    History of cholecystectomy        MEDICATIONS  (STANDING):  chlorhexidine 4% Liquid 1 Application(s) Topical <User Schedule>  gabapentin 100 milliGRAM(s) Oral daily  metoprolol tartrate 50 milliGRAM(s) Oral two times a day  pantoprazole    Tablet 40 milliGRAM(s) Oral before breakfast    MEDICATIONS  (PRN):  acetaminophen   Tablet .. 650 milliGRAM(s) Oral every 6 hours PRN Temp greater or equal to 38.5C (101.3F), Mild Pain (1 - 3)  aluminum hydroxide/magnesium hydroxide/simethicone Suspension 30 milliLiter(s) Oral every 4 hours PRN Dyspepsia  loperamide 2 milliGRAM(s) Oral four times a day PRN Diarrhea  melatonin 3 milliGRAM(s) Oral at bedtime PRN Insomnia  ondansetron Injectable 4 milliGRAM(s) IV Push every 8 hours PRN Nausea and/or Vomiting      Allergies    No Known Allergies    Intolerances        Social History:   Smoking: Yes [ ]  No [ ]   ______pk yrs  ETOH  Yes [ ]  No [ ]  Social [ ]  DRUGS:  Yes [ ]  No [ ]  if so what______________    FAMILY HISTORY:  No pertinent family history in first degree relatives        Physical Exam:   Vital Signs Last 24 Hrs  T(C): 36.5 (17 Sep 2021 09:34), Max: 38.1 (17 Sep 2021 00:52)  T(F): 97.7 (17 Sep 2021 09:34), Max: 100.6 (17 Sep 2021 00:52)  HR: 99 (17 Sep 2021 09:34) (92 - 99)  BP: 135/80 (17 Sep 2021 09:34) (115/59 - 135/80)  BP(mean): --  RR: 18 (17 Sep 2021 09:34) (17 - 18)  SpO2: --      Labs:                         8.1    10.52 )-----------( 278      ( 17 Sep 2021 04:30 )             26.3     09-17    138  |  104  |  18  ----------------------------<  95  3.5   |  24  |  0.9    Ca    7.4<L>      17 Sep 2021 04:30  Mg     1.8     09-17    TPro  4.4<L>  /  Alb  2.3<L>  /  TBili  0.5  /  DBili  x   /  AST  17  /  ALT  11  /  AlkPhos  97  09-17    PT/INR - ( 17 Sep 2021 04:30 )   PT: 27.20 sec;   INR: 2.39 ratio             Pertinent labs:                      8.1    10.52 )-----------( 278      ( 17 Sep 2021 04:30 )             26.3       09-17    138  |  104  |  18  ----------------------------<  95  3.5   |  24  |  0.9    Ca    7.4<L>      17 Sep 2021 04:30  Mg     1.8     09-17    TPro  4.4<L>  /  Alb  2.3<L>  /  TBili  0.5  /  DBili  x   /  AST  17  /  ALT  11  /  AlkPhos  97  09-17      PT/INR - ( 17 Sep 2021 04:30 )   PT: 27.20 sec;   INR: 2.39 ratio             Radiology & Additional Studies:     Radiology imaging reviewed.       ASSESSMENT/ PLAN:     82 y/o woman with PMH of prothrombin gene mutation and PE on lifelong anticoagulation with coumadin, Sarcoidosis, HTN and chronic diarrhea (possibly due to IBS) presented with weakness and worsening watery diarrhea. In the ED, she was diagnosed with septic shock, MAGDALENO, pneumonia and extensive LAD.          Thank you for the courtesy of this consult, please call [phone] with any further questions.    INTERVENTIONAL RADIOLOGY CONSULT:     Procedure Requested: LN biopsy     HPI:  82 y/o F pmhx significant for Essential HTN & hx of PE s/p Warfarin presents to ED from Home (lives alone) w/ x3 days of diarrhea & Weakness;     ED:   - Triage: BP was 75/49 that did not respond to IV boluses (LR 2.5L total) s/p Levophed;  - VS: , Temp 103F, BP 75/49  - Labs: WBC 16, INR 5.5, Na 133, Cr 3.8, Tn 0.07, BNP 24520, UA(+)  - CXR: Bilateral opacities/left pleural effusion. Bilateral hilar adenopathy.   - CT Chest/Abd/Pelvis:   ·	New mediastinal, axillary, hilar, supraclavicular, retroperitoneal, and bilateral inguinal lymphadenopathy as above. Findings highly suspicious for neoplastic process.Left lower lobe lobe bronchus filling defects, with left lung lower lobe consolidative opacification.   ·	Additional bilateral bilateral patchy opacities. Findings consistent with pneumonia;  ·	Bilateral pulmonary nodular opacities.     Admit to Stepdown for Septic Shock;      (04 Sep 2021 13:30)      PAST MEDICAL & SURGICAL HISTORY:  H/O prothrombin mutation    Pulmonary embolism    HTN (hypertension)    Anemia requiring transfusions    Deep vein thrombosis (DVT)    S/P tonsillectomy    H/O: hysterectomy    History of cholecystectomy        MEDICATIONS  (STANDING):  chlorhexidine 4% Liquid 1 Application(s) Topical <User Schedule>  gabapentin 100 milliGRAM(s) Oral daily  metoprolol tartrate 50 milliGRAM(s) Oral two times a day  pantoprazole    Tablet 40 milliGRAM(s) Oral before breakfast    MEDICATIONS  (PRN):  acetaminophen   Tablet .. 650 milliGRAM(s) Oral every 6 hours PRN Temp greater or equal to 38.5C (101.3F), Mild Pain (1 - 3)  aluminum hydroxide/magnesium hydroxide/simethicone Suspension 30 milliLiter(s) Oral every 4 hours PRN Dyspepsia  loperamide 2 milliGRAM(s) Oral four times a day PRN Diarrhea  melatonin 3 milliGRAM(s) Oral at bedtime PRN Insomnia  ondansetron Injectable 4 milliGRAM(s) IV Push every 8 hours PRN Nausea and/or Vomiting      Allergies    No Known Allergies    Intolerances        Social History:   Smoking: Yes [ ]  No [ ]   ______pk yrs  ETOH  Yes [ ]  No [ ]  Social [ ]  DRUGS:  Yes [ ]  No [ ]  if so what______________    FAMILY HISTORY:  No pertinent family history in first degree relatives        Physical Exam:   Vital Signs Last 24 Hrs  T(C): 36.5 (17 Sep 2021 09:34), Max: 38.1 (17 Sep 2021 00:52)  T(F): 97.7 (17 Sep 2021 09:34), Max: 100.6 (17 Sep 2021 00:52)  HR: 99 (17 Sep 2021 09:34) (92 - 99)  BP: 135/80 (17 Sep 2021 09:34) (115/59 - 135/80)  BP(mean): --  RR: 18 (17 Sep 2021 09:34) (17 - 18)  SpO2: --      Labs:                         8.1    10.52 )-----------( 278      ( 17 Sep 2021 04:30 )             26.3     09-17    138  |  104  |  18  ----------------------------<  95  3.5   |  24  |  0.9    Ca    7.4<L>      17 Sep 2021 04:30  Mg     1.8     09-17    TPro  4.4<L>  /  Alb  2.3<L>  /  TBili  0.5  /  DBili  x   /  AST  17  /  ALT  11  /  AlkPhos  97  09-17    PT/INR - ( 17 Sep 2021 04:30 )   PT: 27.20 sec;   INR: 2.39 ratio             Pertinent labs:                      8.1    10.52 )-----------( 278      ( 17 Sep 2021 04:30 )             26.3       09-17    138  |  104  |  18  ----------------------------<  95  3.5   |  24  |  0.9    Ca    7.4<L>      17 Sep 2021 04:30  Mg     1.8     09-17    TPro  4.4<L>  /  Alb  2.3<L>  /  TBili  0.5  /  DBili  x   /  AST  17  /  ALT  11  /  AlkPhos  97  09-17      PT/INR - ( 17 Sep 2021 04:30 )   PT: 27.20 sec;   INR: 2.39 ratio             Radiology & Additional Studies:     Radiology imaging reviewed.       ASSESSMENT/ PLAN:     82 y/o woman with PMH of prothrombin gene mutation and PE on lifelong anticoagulation with coumadin, Sarcoidosis, HTN and chronic diarrhea (possibly due to IBS) presented with weakness and worsening watery diarrhea. In the ED, she was diagnosed with septic shock, MAGDALENO, pneumonia and extensive LAD.    -IR consulted for LN biopsy.  -CT chest/abd/pel 9/4/21 reviewed: multiple mediastinal, axillary, hilar, supraclavicular, retroperitoneal, and bilateral inguinal lymphadenopathy.  -INR 2.4, need to be 1.5 or below.  -If patient ready for discharge, can discharge patient and we will schedule outpatient appointment for the biopsy.  -If inpatient biopsy is needed, need INR to be corrected as above.   -can schedule patient for Monday 9/20.  -keep NPO midnight Sunday 9/19 if still in hospital, and repeat coag.  -Discussed with the primary team.       Thank you for the courtesy of this consult, please call [phone] with any further questions.    INTERVENTIONAL RADIOLOGY CONSULT:     Procedure Requested: LN biopsy     HPI:  80 y/o F pmhx significant for Essential HTN & hx of PE s/p Warfarin presents to ED from Home (lives alone) w/ x3 days of diarrhea & Weakness;     ED:   - Triage: BP was 75/49 that did not respond to IV boluses (LR 2.5L total) s/p Levophed;  - VS: , Temp 103F, BP 75/49  - Labs: WBC 16, INR 5.5, Na 133, Cr 3.8, Tn 0.07, BNP 88100, UA(+)  - CXR: Bilateral opacities/left pleural effusion. Bilateral hilar adenopathy.   - CT Chest/Abd/Pelvis:   ·	New mediastinal, axillary, hilar, supraclavicular, retroperitoneal, and bilateral inguinal lymphadenopathy as above. Findings highly suspicious for neoplastic process.Left lower lobe lobe bronchus filling defects, with left lung lower lobe consolidative opacification.   ·	Additional bilateral bilateral patchy opacities. Findings consistent with pneumonia;  ·	Bilateral pulmonary nodular opacities.     Admit to Stepdown for Septic Shock;      (04 Sep 2021 13:30)      PAST MEDICAL & SURGICAL HISTORY:  H/O prothrombin mutation    Pulmonary embolism    HTN (hypertension)    Anemia requiring transfusions    Deep vein thrombosis (DVT)    S/P tonsillectomy    H/O: hysterectomy    History of cholecystectomy        MEDICATIONS  (STANDING):  chlorhexidine 4% Liquid 1 Application(s) Topical <User Schedule>  gabapentin 100 milliGRAM(s) Oral daily  metoprolol tartrate 50 milliGRAM(s) Oral two times a day  pantoprazole    Tablet 40 milliGRAM(s) Oral before breakfast    MEDICATIONS  (PRN):  acetaminophen   Tablet .. 650 milliGRAM(s) Oral every 6 hours PRN Temp greater or equal to 38.5C (101.3F), Mild Pain (1 - 3)  aluminum hydroxide/magnesium hydroxide/simethicone Suspension 30 milliLiter(s) Oral every 4 hours PRN Dyspepsia  loperamide 2 milliGRAM(s) Oral four times a day PRN Diarrhea  melatonin 3 milliGRAM(s) Oral at bedtime PRN Insomnia  ondansetron Injectable 4 milliGRAM(s) IV Push every 8 hours PRN Nausea and/or Vomiting      Allergies    No Known Allergies    Intolerances        Social History:   Smoking: Yes [ ]  No [ ]   ______pk yrs  ETOH  Yes [ ]  No [ ]  Social [ ]  DRUGS:  Yes [ ]  No [ ]  if so what______________    FAMILY HISTORY:  No pertinent family history in first degree relatives        Physical Exam:   Vital Signs Last 24 Hrs  T(C): 36.5 (17 Sep 2021 09:34), Max: 38.1 (17 Sep 2021 00:52)  T(F): 97.7 (17 Sep 2021 09:34), Max: 100.6 (17 Sep 2021 00:52)  HR: 99 (17 Sep 2021 09:34) (92 - 99)  BP: 135/80 (17 Sep 2021 09:34) (115/59 - 135/80)  BP(mean): --  RR: 18 (17 Sep 2021 09:34) (17 - 18)  SpO2: --      Labs:                         8.1    10.52 )-----------( 278      ( 17 Sep 2021 04:30 )             26.3     09-17    138  |  104  |  18  ----------------------------<  95  3.5   |  24  |  0.9    Ca    7.4<L>      17 Sep 2021 04:30  Mg     1.8     09-17    TPro  4.4<L>  /  Alb  2.3<L>  /  TBili  0.5  /  DBili  x   /  AST  17  /  ALT  11  /  AlkPhos  97  09-17    PT/INR - ( 17 Sep 2021 04:30 )   PT: 27.20 sec;   INR: 2.39 ratio             Pertinent labs:                      8.1    10.52 )-----------( 278      ( 17 Sep 2021 04:30 )             26.3       09-17    138  |  104  |  18  ----------------------------<  95  3.5   |  24  |  0.9    Ca    7.4<L>      17 Sep 2021 04:30  Mg     1.8     09-17    TPro  4.4<L>  /  Alb  2.3<L>  /  TBili  0.5  /  DBili  x   /  AST  17  /  ALT  11  /  AlkPhos  97  09-17      PT/INR - ( 17 Sep 2021 04:30 )   PT: 27.20 sec;   INR: 2.39 ratio             Radiology & Additional Studies:     Radiology imaging reviewed.       ASSESSMENT/ PLAN:     80 y/o woman with PMH of prothrombin gene mutation and PE on lifelong anticoagulation with coumadin, Sarcoidosis, HTN and chronic diarrhea (possibly due to IBS) presented with weakness and worsening watery diarrhea. In the ED, she was diagnosed with septic shock, MAGDALENO, pneumonia and extensive LAD.    -IR consulted for LN biopsy.  -CT chest/abd/pel 9/4/21 reviewed: multiple mediastinal, axillary, hilar, supraclavicular, retroperitoneal, and bilateral inguinal lymphadenopathy.  -INR 2.4, need to be 2 or below.  -If patient ready for discharge, can discharge patient and we will schedule outpatient appointment for the biopsy.  -If inpatient biopsy is needed, need INR to be corrected as above.   -can schedule patient for Monday 9/20.  -keep NPO midnight Sunday 9/19 if still in hospital, and repeat coag.  -Discussed with the primary team.       Thank you for the courtesy of this consult, please call [phone] with any further questions.

## 2021-09-17 NOTE — CONSULT NOTE ADULT - ATTENDING COMMENTS
Will schedule for Monday. If happens to be discharged or would prefer to do as outpatient, please contact IR.

## 2021-09-17 NOTE — PROGRESS NOTE ADULT - ASSESSMENT
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 In the ED, she was diagnosed with septic shock, MAGDALENO, pneumonia and extensive LAD.    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 Right leg free from thrombus    #Paroxsymal Atrial Fibrillation - new onset  -On metoprolol - please continue and monitor vitals  -c/w coumadin   - Contacted by pharmacy and was told pt follows [facility] and takes 1.5mg of Coumadin on Sunday, Wednesday and Friday. She then takes 2.5mg on Monday, Tuesday, Thursday and Saturday.  -Daily INR check  -Hx of PE on Coumadin    #Sarcoidosis vs Lymphoma  -Patient refused Bronchoscopy and B/X - pt will f/u w/Dr. Botello out pt  -Unremarkable ACE and LDH level  -Extensive LAD on CT  -Pt is spiking fevers (Tmax 101.5 @ 9/15 22:00)  - Will f/u with Pulm and Heme/Onc    #Chronic Diarrhea (possibly due to IBS)  -PRN Loperamide since C. Diff negative    #PT eval  -PT saw the pt but she was unable to ambulate due to pain  -100mg Gabapentin for now. May increase as necessary    Dispo: Waiting for INR to become therapeutic. Pt wants to go Rehab Facility   82 y/o woman with PMH of prothrombin gene mutation and PE on lifelong anticoagulation with coumadin, Sarcoidosis, HTN and chronic diarrhea (possibly due to IBS) presented with weakness and worsening watery diarrhea. In the ED, she was diagnosed with septic shock, MAGDALENO, pneumonia and extensive LAD.    #Septic shock 2/2 Aspiration VS GN Pneumonia - resolved  -Blood c/x @9/14 - No Growth to Date  -Urine Legionella and Strep negative  -Pt has spikes in Temp (Tmax 101.5 @ 9/15 22:00, 100.6 @ 0:52)  -ID recs appreciated  d/c zosyn while waiting for cultures as pt had a prolonged course of abx with improvement in procal  -fevers could be from Lymphoma or Sarcoid flare    #Extensive LAD  -CT A/P showed - Mediastinal, axillary, hilar, supraclavicular, retroperitoneal, and bilateral inguinal lymphadenopathy. 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 Right leg free from thrombus  -Pain control - Percocet 5/325 q12 PRN    #Paroxsymal Atrial Fibrillation - new onset  -On metoprolol - please continue and monitor vitals  -Holding Coumadin until INR 1.5 for Lymph node biopsy  -Contacted by pharmacy and was told pt follows with [facility] pt [facility] and takes 1.5mg of Coumadin on Sunday, Wednesday and Friday. She then takes 2.5mg on Monday, Tuesday, Thursday and Saturday.  -Daily INR check  -Hx of PE on Coumadin    #Sarcoidosis vs Lymphoma  -Patient refused Bronchoscopy and B/X - pt will f/u w/Dr. Botello out pt  -Unremarkable ACE and LDH level  -Extensive LAD on CT  -Pt is spiking fevers (Tmax 101.5 @ 9/15 22:00)  - Will f/u with Pulm and Heme/Onc    #Chronic Diarrhea (possibly due to IBS)  -PRN Loperamide since C. Diff negative    #PT eval  -PT saw the pt but she was unable to ambulate due to pain  -100mg Gabapentin for now. May increase as necessary    Dispo: Waiting for INR to become therapeutic. Pt wants to go Rehab Facility

## 2021-09-18 LAB
HCT VFR BLD CALC: 28 % — LOW (ref 37–47)
HGB BLD-MCNC: 8.5 G/DL — LOW (ref 12–16)
INR BLD: 2.55 RATIO — HIGH (ref 0.65–1.3)
INR BLD: 2.67 RATIO — HIGH (ref 0.65–1.3)
MCHC RBC-ENTMCNC: 27.9 PG — SIGNIFICANT CHANGE UP (ref 27–31)
MCHC RBC-ENTMCNC: 30.4 G/DL — LOW (ref 32–37)
MCV RBC AUTO: 91.8 FL — SIGNIFICANT CHANGE UP (ref 81–99)
NRBC # BLD: 0 /100 WBCS — SIGNIFICANT CHANGE UP (ref 0–0)
PLATELET # BLD AUTO: 263 K/UL — SIGNIFICANT CHANGE UP (ref 130–400)
PROTHROM AB SERPL-ACNC: 29 SEC — HIGH (ref 9.95–12.87)
PROTHROM AB SERPL-ACNC: 30.4 SEC — HIGH (ref 9.95–12.87)
RBC # BLD: 3.05 M/UL — LOW (ref 4.2–5.4)
RBC # FLD: 13.7 % — SIGNIFICANT CHANGE UP (ref 11.5–14.5)
WBC # BLD: 10.48 K/UL — SIGNIFICANT CHANGE UP (ref 4.8–10.8)
WBC # FLD AUTO: 10.48 K/UL — SIGNIFICANT CHANGE UP (ref 4.8–10.8)

## 2021-09-18 PROCEDURE — 99233 SBSQ HOSP IP/OBS HIGH 50: CPT

## 2021-09-18 RX ADMIN — CHLORHEXIDINE GLUCONATE 1 APPLICATION(S): 213 SOLUTION TOPICAL at 06:02

## 2021-09-18 RX ADMIN — PANTOPRAZOLE SODIUM 40 MILLIGRAM(S): 20 TABLET, DELAYED RELEASE ORAL at 06:02

## 2021-09-18 RX ADMIN — Medication 50 MILLIGRAM(S): at 06:02

## 2021-09-18 RX ADMIN — GABAPENTIN 100 MILLIGRAM(S): 400 CAPSULE ORAL at 11:31

## 2021-09-18 RX ADMIN — Medication 50 MILLIGRAM(S): at 17:23

## 2021-09-18 NOTE — PROGRESS NOTE ADULT - SUBJECTIVE AND OBJECTIVE BOX
JOEY KNAPP 81y Female  MRN#: [id number]   CODE STATUS: FULL      SUBJECTIVE  Patient is a 81y old Female who presents with a chief complaint of 3 days of Weakness & Diarrhea; (17 Sep 2021 17:01)1    Today is hospital day 14d,   INTERVAL HPI/OVERNIGHT EVENTS:    Examined the pt at bedside this AM. Pt is endorsing b/l lower ext. pain that's unchanged. Otherwise, pt has no acute events overnight. Pt scheduled for lymph node biopsy with IR on Monday/Tuesday.    Present Today:           Cruz Catheter (x)No/ ()Yes?   Indication:             Central Line (x)No/ ()Yes?   Indication:          IV Fluids (x)No/ ()Yes? Type:  Rate:  Indication:    OBJECTIVE  PAST MEDICAL & SURGICAL HISTORY  H/O prothrombin mutation    Pulmonary embolism    HTN (hypertension)    Anemia requiring transfusions    Deep vein thrombosis (DVT)    S/P tonsillectomy    H/O: hysterectomy    History of cholecystectomy      ALLERGIES:  No Known Allergies    HOME MEDICATIONS:  Home Medications:  hydroCHLOROthiazide 12.5 mg oral tablet: 1 tab(s) orally once a day (03 Sep 2021 21:05)  losartan 100 mg oral tablet: 1 tab(s) orally once a day (03 Sep 2021 21:05)  metoprolol succinate 100 mg oral tablet, extended release: 1 tab(s) orally once a day (03 Sep 2021 21:05)  warfarin 2.5 mg oral tablet:  (03 Sep 2021 21:05)    MEDICATIONS:  STANDING MEDICATIONS  chlorhexidine 4% Liquid 1 Application(s) Topical <User Schedule>  gabapentin 100 milliGRAM(s) Oral daily  metoprolol tartrate 50 milliGRAM(s) Oral two times a day  pantoprazole    Tablet 40 milliGRAM(s) Oral before breakfast    PRN MEDICATIONS  acetaminophen   Tablet .. 650 milliGRAM(s) Oral every 6 hours PRN  aluminum hydroxide/magnesium hydroxide/simethicone Suspension 30 milliLiter(s) Oral every 4 hours PRN  loperamide 2 milliGRAM(s) Oral four times a day PRN  melatonin 3 milliGRAM(s) Oral at bedtime PRN  ondansetron Injectable 4 milliGRAM(s) IV Push every 8 hours PRN  oxycodone    5 mG/acetaminophen 325 mG 1 Tablet(s) Oral every 12 hours PRN      VITAL SIGNS: Last 24 Hours  T(C): 36.9 (17 Sep 2021 23:50), Max: 37.6 (17 Sep 2021 15:15)  T(F): 98.5 (17 Sep 2021 23:50), Max: 99.7 (17 Sep 2021 15:15)  HR: 66 (17 Sep 2021 23:50) (66 - 99)  BP: 132/72 (17 Sep 2021 23:50) (117/65 - 135/80)  BP(mean): --  RR: 17 (17 Sep 2021 23:50) (17 - 18)  SpO2: --    LABS:                        8.1    10.52 )-----------( 278      ( 17 Sep 2021 04:30 )             26.3     09-17    138  |  104  |  18  ----------------------------<  95  3.5   |  24  |  0.9    Ca    7.4<L>      17 Sep 2021 04:30  Mg     1.8     09-17    TPro  4.4<L>  /  Alb  2.3<L>  /  TBili  0.5  /  DBili  x   /  AST  17  /  ALT  11  /  AlkPhos  97  09-17    PT/INR - ( 17 Sep 2021 04:30 )   PT: 27.20 sec;   INR: 2.39 ratio                       Culture - Blood (collected 16 Sep 2021 11:00)  Source: .Blood Blood-Venous  Preliminary Report (17 Sep 2021 23:01):    No growth to date.      RADIOLOGY:  < from: Xray Chest 1 View-PORTABLE IMMEDIATE (Xray Chest 1 View-PORTABLE IMMEDIATE .) (09.08.21 @ 09:53) >  Impression:        Stable left opacity/effusion  Minimal blunting right costophrenic angle  . No pneumothorax.    < end of copied text >  < from: US Kidney and Bladder (09.05.21 @ 22:27) >  IMPRESSION:    No hydronephrosis bilaterally.    Urinary bladder prevoid volume of 534 cc. Patient unable to void during this examination.    --- End of Report ---    < end of copied text >  < from: CT Abdomen and Pelvis No Cont (09.04.21 @ 04:11) >  IMPRESSION:    New mediastinal, axillary, hilar, supraclavicular, retroperitoneal, and bilateral inguinal lymphadenopathy as above. Findings highly suspicious for neoplastic process such as lymphoma, however consider less likely but possible inflammatory etiologies in patient with history of rheumatoid arthritis. Oncology workup recommended.    Left lower lobe lobe bronchus filling defects, with left lung lower lobe consolidative opacification. Additional bilateral bilateral patchy opacities. Findings consistent with pneumonia; correlate for aspiration.    Bilateral pulmonary nodular opacities. Findings may be inflammatory, infectious or neoplastic in etiology. Follow-up CT after treatment recommended.    < end of copied text >  < from: CT Chest No Cont (09.04.21 @ 04:10) >  IMPRESSION:    New mediastinal, axillary, hilar, supraclavicular, retroperitoneal, and bilateral inguinal lymphadenopathy as above. Findings highly suspicious for neoplastic process such as lymphoma, however consider less likely but possible inflammatory etiologies in patient with history of rheumatoid arthritis. Oncology workup recommended.    Left lower lobe lobe bronchus filling defects, with left lung lower lobe consolidative opacification. Additional bilateral bilateral patchy opacities. Findings consistent with pneumonia; correlate for aspiration.    Bilateral pulmonary nodular opacities. Findings may be inflammatory, infectious or neoplastic in etiology. Follow-up CT after treatment recommended.    < end of copied text >      ECHO:  PHYSICIAN INTERPRETATION:  Left Ventricle: Left ventricular ejection fraction, by visual estimation, is 60 to 65%. Spectral Doppler shows impaired relaxation pattern of left ventricular myocardial filling (Grade I diastolic dysfunction).  Right Ventricle: Normal right ventricular size and function. The right ventricular size is normal.  Left Atrium: Mild to moderately enlarged left atrium.  Right Atrium: Moderately enlarged right atrium.  Pericardium: Trivial pericardial effusion is present.  Mitral Valve: There is mild mitral annular calcification. No evidence of mitral stenosis. No evidence of mitral valve regurgitation is seen.  Tricuspid Valve: The tricuspid valve is normal in structure. Moderate tricuspid regurgitation is visualized.  Aortic Valve: The aortic valveis trileaflet. No evidence of aortic stenosis. Peak transaortic gradient equals 6.6 mmHg, mean transaortic gradient equals 4.4 mmHg, the calculated aortic valve area equals 3.98 cm² by the continuity equation consistent with normally opening aortic valve. No evidence of aortic valve regurgitation is seen.  Pulmonic Valve: Trace pulmonic valve regurgitation.  Pulmonary Artery: Pulmonary hypertension is present.    PHYSICAL EXAM:  GENERAL: NAD, well-developed, AAOx3  HEENT:  Atraumatic, Normocephalic. EOMI, PERRLA, conjunctiva and sclera clear, No JVD  PULMONARY: Clear to auscultation bilaterally; No wheeze  CARDIOVASCULAR: irregular rhythm; No murmurs, rubs, or gallops  GASTROINTESTINAL: Soft, Nontender, Nondistended; Bowel sounds present  MUSCULOSKELETAL/EXT:  2+ Peripheral Pulses, +1 pitting edema; Tenderness to palpation b/l; Pt has pain when trying to move b/l; Bruising b/l UE that are old as per the pt.  NEUROLOGY: non-focal  SKIN: No rashes or lesions   JOEY KNAPP 81y Female  MRN#: [id number]   CODE STATUS: FULL      SUBJECTIVE  Patient is a 81y old Female who presents with a chief complaint of 3 days of Weakness & Diarrhea; (17 Sep 2021 17:01)1    Today is hospital day 14d,   INTERVAL HPI/OVERNIGHT EVENTS:    Examined the pt at bedside this AM. Pt is endorsing b/l lower ext. pain that's unchanged. Otherwise, pt has no acute events overnight. Pt scheduled for lymph node biopsy with IR on Monday/Tuesday.    Present Today:           Cruz Catheter (x)No/ ()Yes?   Indication:             Central Line (x)No/ ()Yes?   Indication:          IV Fluids (x)No/ ()Yes? Type:  Rate:  Indication:    OBJECTIVE  PAST MEDICAL & SURGICAL HISTORY  H/O prothrombin mutation    Pulmonary embolism    HTN (hypertension)    Anemia requiring transfusions    Deep vein thrombosis (DVT)    S/P tonsillectomy    H/O: hysterectomy    History of cholecystectomy      ALLERGIES:  No Known Allergies    HOME MEDICATIONS:  Home Medications:  hydroCHLOROthiazide 12.5 mg oral tablet: 1 tab(s) orally once a day (03 Sep 2021 21:05)  losartan 100 mg oral tablet: 1 tab(s) orally once a day (03 Sep 2021 21:05)  metoprolol succinate 100 mg oral tablet, extended release: 1 tab(s) orally once a day (03 Sep 2021 21:05)  warfarin 2.5 mg oral tablet:  (03 Sep 2021 21:05)    MEDICATIONS:  STANDING MEDICATIONS  chlorhexidine 4% Liquid 1 Application(s) Topical <User Schedule>  gabapentin 100 milliGRAM(s) Oral daily  metoprolol tartrate 50 milliGRAM(s) Oral two times a day  pantoprazole    Tablet 40 milliGRAM(s) Oral before breakfast    PRN MEDICATIONS  acetaminophen   Tablet .. 650 milliGRAM(s) Oral every 6 hours PRN  aluminum hydroxide/magnesium hydroxide/simethicone Suspension 30 milliLiter(s) Oral every 4 hours PRN  loperamide 2 milliGRAM(s) Oral four times a day PRN  melatonin 3 milliGRAM(s) Oral at bedtime PRN  ondansetron Injectable 4 milliGRAM(s) IV Push every 8 hours PRN  oxycodone    5 mG/acetaminophen 325 mG 1 Tablet(s) Oral every 12 hours PRN      VITAL SIGNS: Last 24 Hours  T(C): 36.9 (17 Sep 2021 23:50), Max: 37.6 (17 Sep 2021 15:15)  T(F): 98.5 (17 Sep 2021 23:50), Max: 99.7 (17 Sep 2021 15:15)  HR: 66 (17 Sep 2021 23:50) (66 - 99)  BP: 132/72 (17 Sep 2021 23:50) (117/65 - 135/80)  BP(mean): --  RR: 17 (17 Sep 2021 23:50) (17 - 18)  SpO2: --    LABS:                        8.1    10.52 )-----------( 278      ( 17 Sep 2021 04:30 )             26.3     09-17    138  |  104  |  18  ----------------------------<  95  3.5   |  24  |  0.9    Ca    7.4<L>      17 Sep 2021 04:30  Mg     1.8     09-17    TPro  4.4<L>  /  Alb  2.3<L>  /  TBili  0.5  /  DBili  x   /  AST  17  /  ALT  11  /  AlkPhos  97  09-17    PT/INR - ( 17 Sep 2021 04:30 )   PT: 27.20 sec;   INR: 2.39 ratio                       Culture - Blood (collected 16 Sep 2021 11:00)  Source: .Blood Blood-Venous  Preliminary Report (17 Sep 2021 23:01):    No growth to date.      RADIOLOGY:  < from: Xray Chest 1 View-PORTABLE IMMEDIATE (Xray Chest 1 View-PORTABLE IMMEDIATE .) (09.08.21 @ 09:53) >  Impression:        Stable left opacity/effusion  Minimal blunting right costophrenic angle  . No pneumothorax.    < end of copied text >  < from: US Kidney and Bladder (09.05.21 @ 22:27) >  IMPRESSION:    No hydronephrosis bilaterally.    Urinary bladder prevoid volume of 534 cc. Patient unable to void during this examination.    --- End of Report ---    < end of copied text >  < from: CT Abdomen and Pelvis No Cont (09.04.21 @ 04:11) >  IMPRESSION:    New mediastinal, axillary, hilar, supraclavicular, retroperitoneal, and bilateral inguinal lymphadenopathy as above. Findings highly suspicious for neoplastic process such as lymphoma, however consider less likely but possible inflammatory etiologies in patient with history of rheumatoid arthritis. Oncology workup recommended.    Left lower lobe lobe bronchus filling defects, with left lung lower lobe consolidative opacification. Additional bilateral bilateral patchy opacities. Findings consistent with pneumonia; correlate for aspiration.    Bilateral pulmonary nodular opacities. Findings may be inflammatory, infectious or neoplastic in etiology. Follow-up CT after treatment recommended.    < end of copied text >  < from: CT Chest No Cont (09.04.21 @ 04:10) >  IMPRESSION:    New mediastinal, axillary, hilar, supraclavicular, retroperitoneal, and bilateral inguinal lymphadenopathy as above. Findings highly suspicious for neoplastic process such as lymphoma, however consider less likely but possible inflammatory etiologies in patient with history of rheumatoid arthritis. Oncology workup recommended.    Left lower lobe lobe bronchus filling defects, with left lung lower lobe consolidative opacification. Additional bilateral bilateral patchy opacities. Findings consistent with pneumonia; correlate for aspiration.    Bilateral pulmonary nodular opacities. Findings may be inflammatory, infectious or neoplastic in etiology. Follow-up CT after treatment recommended.    < end of copied text >      ECHO:  PHYSICIAN INTERPRETATION:  Left Ventricle: Left ventricular ejection fraction, by visual estimation, is 60 to 65%. Spectral Doppler shows impaired relaxation pattern of left ventricular myocardial filling (Grade I diastolic dysfunction).  Right Ventricle: Normal right ventricular size and function. The right ventricular size is normal.  Left Atrium: Mild to moderately enlarged left atrium.  Right Atrium: Moderately enlarged right atrium.  Pericardium: Trivial pericardial effusion is present.  Mitral Valve: There is mild mitral annular calcification. No evidence of mitral stenosis. No evidence of mitral valve regurgitation is seen.  Tricuspid Valve: The tricuspid valve is normal in structure. Moderate tricuspid regurgitation is visualized.  Aortic Valve: The aortic valveis trileaflet. No evidence of aortic stenosis. Peak transaortic gradient equals 6.6 mmHg, mean transaortic gradient equals 4.4 mmHg, the calculated aortic valve area equals 3.98 cm² by the continuity equation consistent with normally opening aortic valve. No evidence of aortic valve regurgitation is seen.  Pulmonic Valve: Trace pulmonic valve regurgitation.  Pulmonary Artery: Pulmonary hypertension is present.    PHYSICAL EXAM:  GENERAL: NAD, well-developed, AAOx3  HEENT:  Atraumatic, Normocephalic. EOMI, PERRLA, conjunctiva and sclera clear, No JVD  PULMONARY: Clear to auscultation bilaterally; No wheeze  CARDIOVASCULAR: irregular rhythm; No murmurs, rubs, or gallops  GASTROINTESTINAL: Soft, Nontender, Nondistended; Bowel sounds present  MUSCULOSKELETAL/EXT:  2+ Peripheral Pulses, no pitting edema, Legs are much less swollen compared to past few days; Tenderness to palpation b/l; Pt has pain when trying to move b/l; Bruising b/l UE that are old as per the pt.  NEUROLOGY: non-focal  SKIN: No rashes or lesions

## 2021-09-18 NOTE — PROGRESS NOTE ADULT - ASSESSMENT
80 y/o woman with PMH of prothrombin gene mutation and PE on lifelong anticoagulation with coumadin, Sarcoidosis, HTN and chronic diarrhea (possibly due to IBS) presented with weakness and worsening watery diarrhea. In the ED, she was diagnosed with septic shock, MAGDALENO, pneumonia and extensive LAD.    #Septic shock 2/2 Aspiration VS GN Pneumonia - resolved  -Blood c/x @9/14 - No Growth to Date  -Urine Legionella and Strep negative  -Pt has spikes in Temp (Tmax 101.5 @ 9/15 22:00, 100.6 @ 0:52)  -ID recs appreciated  d/c zosyn while waiting for cultures as pt had a prolonged course of abx with improvement in procal  -fevers could be from Lymphoma or Sarcoid flare    #Extensive LAD  -CT A/P showed - Mediastinal, axillary, hilar, supraclavicular, retroperitoneal, and bilateral inguinal lymphadenopathy. 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 Right leg free from thrombus  -Pain control - Percocet 5/325 q12 PRN    #Paroxsymal Atrial Fibrillation - new onset  -On metoprolol - please continue and monitor vitals  -Holding Coumadin until INR 1.5 for Lymph node biopsy  -Contacted by pharmacy and was told pt follows with [facility] pt [facility] and takes 1.5mg of Coumadin on Sunday, Wednesday and Friday. She then takes 2.5mg on Monday, Tuesday, Thursday and Saturday.  -Daily INR check  -Hx of PE on Coumadin    #Sarcoidosis vs Lymphoma  -Patient refused Bronchoscopy and B/X - pt will f/u w/Dr. Botello out pt  -Unremarkable ACE and LDH level  -Extensive LAD on CT  -Pt is spiking fevers (Tmax 101.5 @ 9/15 22:00)  - Will f/u with Pulm and Heme/Onc    #Chronic Diarrhea (possibly due to IBS)  -PRN Loperamide since C. Diff negative    #PT eval  -PT saw the pt but she was unable to ambulate due to pain  -100mg Gabapentin for now. May increase as necessary    Dispo: Waiting for INR to become therapeutic. Pt wants to go Rehab Facility   82 y/o woman with PMH of prothrombin gene mutation and PE on lifelong anticoagulation with coumadin, Sarcoidosis, HTN and chronic diarrhea (possibly due to IBS) presented with weakness and worsening watery diarrhea. In the ED, she was diagnosed with septic shock, MAGDALENO, pneumonia and extensive LAD.    #Septic shock 2/2 Aspiration VS GN Pneumonia - resolved  -Blood c/x @9/14 - No Growth to Date  -Urine Legionella and Strep negative  -Pt has spikes in Temp (Tmax 101.5 @ 9/15 22:00, 100.6 @ 0:52)  -ID recs appreciated  d/c zosyn while waiting for cultures as pt had a prolonged course of abx with improvement in procal  -fevers could be from Lymphoma or Sarcoid flare    #Extensive LAD  -CT A/P showed - Mediastinal, axillary, hilar, supraclavicular, retroperitoneal, and bilateral inguinal lymphadenopathy. 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 Right leg free from thrombus  -Pain control - Percocet 5/325 q12 PRN    #Paroxsymal Atrial Fibrillation - new onset  -On metoprolol - please continue and monitor vitals  -Holding Coumadin until INR 1.5 for Lymph node biopsy  -Contacted by pharmacy and was told pt follows with [facility] pt [facility] and takes 1.5mg of Coumadin on Sunday, Wednesday and Friday. She then takes 2.5mg on Monday, Tuesday, Thursday and Saturday.  -Daily INR check  -Hx of PE on Coumadin    #Sarcoidosis vs Lymphoma  -Patient refused Bronchoscopy and B/X - pt will f/u w/Dr. Botello out pt  -Unremarkable ACE and LDH level  -Extensive LAD on CT  -Pt is spiking fevers (Tmax 101.5 @ 9/15 22:00)  - Will f/u with Pulm and Heme/Onc    #Chronic Diarrhea (possibly due to IBS)  -PRN Loperamide since C. Diff negative    #PT eval  -PT saw the pt but she was unable to ambulate due to pain  -100mg Gabapentin for now. May increase as necessary    Dispo: Waiting for INR to become therapeutic. Pt wants to go Rehab Facility

## 2021-09-19 LAB
ALBUMIN SERPL ELPH-MCNC: 2.2 G/DL — LOW (ref 3.5–5.2)
ALP SERPL-CCNC: 97 U/L — SIGNIFICANT CHANGE UP (ref 30–115)
ALT FLD-CCNC: 11 U/L — SIGNIFICANT CHANGE UP (ref 0–41)
ANION GAP SERPL CALC-SCNC: 12 MMOL/L — SIGNIFICANT CHANGE UP (ref 7–14)
AST SERPL-CCNC: 31 U/L — SIGNIFICANT CHANGE UP (ref 0–41)
BILIRUB SERPL-MCNC: 0.5 MG/DL — SIGNIFICANT CHANGE UP (ref 0.2–1.2)
BUN SERPL-MCNC: 18 MG/DL — SIGNIFICANT CHANGE UP (ref 10–20)
CALCIUM SERPL-MCNC: 7.6 MG/DL — LOW (ref 8.5–10.1)
CHLORIDE SERPL-SCNC: 107 MMOL/L — SIGNIFICANT CHANGE UP (ref 98–110)
CO2 SERPL-SCNC: 23 MMOL/L — SIGNIFICANT CHANGE UP (ref 17–32)
CREAT SERPL-MCNC: 0.7 MG/DL — SIGNIFICANT CHANGE UP (ref 0.7–1.5)
GLUCOSE SERPL-MCNC: 92 MG/DL — SIGNIFICANT CHANGE UP (ref 70–99)
HCT VFR BLD CALC: 26.9 % — LOW (ref 37–47)
HGB BLD-MCNC: 8.3 G/DL — LOW (ref 12–16)
INR BLD: 3.18 RATIO — HIGH (ref 0.65–1.3)
MAGNESIUM SERPL-MCNC: 1.7 MG/DL — LOW (ref 1.8–2.4)
MCHC RBC-ENTMCNC: 28.4 PG — SIGNIFICANT CHANGE UP (ref 27–31)
MCHC RBC-ENTMCNC: 30.9 G/DL — LOW (ref 32–37)
MCV RBC AUTO: 92.1 FL — SIGNIFICANT CHANGE UP (ref 81–99)
NRBC # BLD: 0 /100 WBCS — SIGNIFICANT CHANGE UP (ref 0–0)
PLATELET # BLD AUTO: 253 K/UL — SIGNIFICANT CHANGE UP (ref 130–400)
POTASSIUM SERPL-MCNC: 3.5 MMOL/L — SIGNIFICANT CHANGE UP (ref 3.5–5)
POTASSIUM SERPL-SCNC: 3.5 MMOL/L — SIGNIFICANT CHANGE UP (ref 3.5–5)
PROT SERPL-MCNC: 4.3 G/DL — LOW (ref 6–8)
PROTHROM AB SERPL-ACNC: 36.1 SEC — HIGH (ref 9.95–12.87)
RBC # BLD: 2.92 M/UL — LOW (ref 4.2–5.4)
RBC # FLD: 13.7 % — SIGNIFICANT CHANGE UP (ref 11.5–14.5)
SODIUM SERPL-SCNC: 142 MMOL/L — SIGNIFICANT CHANGE UP (ref 135–146)
WBC # BLD: 8.9 K/UL — SIGNIFICANT CHANGE UP (ref 4.8–10.8)
WBC # FLD AUTO: 8.9 K/UL — SIGNIFICANT CHANGE UP (ref 4.8–10.8)

## 2021-09-19 PROCEDURE — 99233 SBSQ HOSP IP/OBS HIGH 50: CPT

## 2021-09-19 RX ORDER — MAGNESIUM SULFATE 500 MG/ML
2 VIAL (ML) INJECTION ONCE
Refills: 0 | Status: COMPLETED | OUTPATIENT
Start: 2021-09-19 | End: 2021-09-19

## 2021-09-19 RX ADMIN — Medication 50 MILLIGRAM(S): at 05:58

## 2021-09-19 RX ADMIN — Medication 40 MILLIGRAM(S): at 21:12

## 2021-09-19 RX ADMIN — Medication 50 GRAM(S): at 17:51

## 2021-09-19 RX ADMIN — CHLORHEXIDINE GLUCONATE 1 APPLICATION(S): 213 SOLUTION TOPICAL at 05:58

## 2021-09-19 RX ADMIN — Medication 50 MILLIGRAM(S): at 17:08

## 2021-09-19 RX ADMIN — OXYCODONE AND ACETAMINOPHEN 1 TABLET(S): 5; 325 TABLET ORAL at 14:59

## 2021-09-19 RX ADMIN — PANTOPRAZOLE SODIUM 40 MILLIGRAM(S): 20 TABLET, DELAYED RELEASE ORAL at 05:58

## 2021-09-19 RX ADMIN — GABAPENTIN 100 MILLIGRAM(S): 400 CAPSULE ORAL at 11:07

## 2021-09-19 NOTE — PROGRESS NOTE ADULT - SUBJECTIVE AND OBJECTIVE BOX
SUBJECTIVE:    Patient is a 81y old Female who presents with a chief complaint of 3 days of Weakness & Diarrhea; (18 Sep 2021 06:26)    Currently admitted to medicine with the primary diagnosis of Sepsis secondary to UTI       Today is hospital day 15d.     PAST MEDICAL & SURGICAL HISTORY  H/O prothrombin mutation    Pulmonary embolism    HTN (hypertension)    Anemia requiring transfusions    Deep vein thrombosis (DVT)    S/P tonsillectomy    H/O: hysterectomy    History of cholecystectomy      ALLERGIES:  No Known Allergies    MEDICATIONS:  STANDING MEDICATIONS  chlorhexidine 4% Liquid 1 Application(s) Topical <User Schedule>  gabapentin 100 milliGRAM(s) Oral daily  magnesium sulfate  IVPB 2 Gram(s) IV Intermittent once  metoprolol tartrate 50 milliGRAM(s) Oral two times a day  pantoprazole    Tablet 40 milliGRAM(s) Oral before breakfast  predniSONE   Tablet 40 milliGRAM(s) Oral once    PRN MEDICATIONS  acetaminophen   Tablet .. 650 milliGRAM(s) Oral every 6 hours PRN  aluminum hydroxide/magnesium hydroxide/simethicone Suspension 30 milliLiter(s) Oral every 4 hours PRN  loperamide 2 milliGRAM(s) Oral four times a day PRN  melatonin 3 milliGRAM(s) Oral at bedtime PRN  ondansetron Injectable 4 milliGRAM(s) IV Push every 8 hours PRN  oxycodone    5 mG/acetaminophen 325 mG 1 Tablet(s) Oral every 12 hours PRN    VITALS:   T(F): 99.8  HR: 86  BP: 115/64  RR: 18  SpO2: --    LABS:                        8.3    8.90  )-----------( 253      ( 19 Sep 2021 06:53 )             26.9     09-19    142  |  107  |  18  ----------------------------<  92  3.5   |  23  |  0.7    Ca    7.6<L>      19 Sep 2021 06:53  Mg     1.7     09-19    TPro  4.3<L>  /  Alb  2.2<L>  /  TBili  0.5  /  DBili  x   /  AST  31  /  ALT  11  /  AlkPhos  97  09-19    PT/INR - ( 19 Sep 2021 06:53 )   PT: 36.10 sec;   INR: 3.18 ratio                       RADIOLOGY:    PHYSICAL EXAM:  GEN: No acute distress  LUNGS: Clear to auscultation bilaterally   HEART: S1/S2 present. RRR.   ABD/ GI: Soft, non-tender, non-distended. Bowel sounds present  EXT: NC/NC/NE/2+PP/RUDOLPH  NEURO: AAOX3

## 2021-09-19 NOTE — PROGRESS NOTE ADULT - ASSESSMENT
· Assessment	    #s/p Septic Shock secondary to Aspiration vs GN pneumonia  stable now.  antbx, zosyn>> d/neptali    #Diffuse Lymphadenopathy  including chest/abd/groin  hx of sarcoidosis...  ACE..unremarkable   r/o reactive in lung due to PNA but abd LN?   LDH unremarkable   bronchoscopy on monday ..     #Hx of PE on Warfarin.. currently getting iv heparin  Also DVT.  hx of PT gene 2 mutation     # afib .. on meds    # renal insuff.. improving    # anemia of chronic dx     # weakness/debiltation.. PT eval>>> pending d/c to rehab

## 2021-09-19 NOTE — PROGRESS NOTE ADULT - SUBJECTIVE AND OBJECTIVE BOX
Patient is a 81y old  Female who presents with a chief complaint of 3 days of Weakness & Diarrhea; (19 Sep 2021 16:19)     HPI:  82 y/o F pmhx significant for Essential HTN & hx of PE s/p Warfarin presents to ED from Home (lives alone) w/ x3 days of diarrhea & Weakness  Pt ;s hospital course was complicated by PNA and Septic shock   Pt has recovered and stablized with a course of iv antibx.  CT of chest however showed bronchoobst opacity , mediastinal/hilar/SC /axillary adenopathy and lung nodules.  Pt's CT of abd also showed RP and inguinal LAD.    In addition to above past medical hx , pt is also known for sarcoidosis invol lung.  Last CT as oupt was done in 2013 and 2016 with stable findings.    Pt is seen and examined  pt is awake and lying in bed  pt seems comfortable and denies any complaints at this time          ROS:  Negative except for:    MEDICATIONS  (STANDING):  chlorhexidine 4% Liquid 1 Application(s) Topical <User Schedule>  gabapentin 100 milliGRAM(s) Oral daily  metoprolol tartrate 50 milliGRAM(s) Oral two times a day  pantoprazole    Tablet 40 milliGRAM(s) Oral before breakfast  predniSONE   Tablet 40 milliGRAM(s) Oral once    MEDICATIONS  (PRN):  acetaminophen   Tablet .. 650 milliGRAM(s) Oral every 6 hours PRN Temp greater or equal to 38.5C (101.3F), Mild Pain (1 - 3)  aluminum hydroxide/magnesium hydroxide/simethicone Suspension 30 milliLiter(s) Oral every 4 hours PRN Dyspepsia  loperamide 2 milliGRAM(s) Oral four times a day PRN Diarrhea  melatonin 3 milliGRAM(s) Oral at bedtime PRN Insomnia  ondansetron Injectable 4 milliGRAM(s) IV Push every 8 hours PRN Nausea and/or Vomiting  oxycodone    5 mG/acetaminophen 325 mG 1 Tablet(s) Oral every 12 hours PRN Moderate Pain (4 - 6)      Allergies    No Known Allergies    Intolerances        Vital Signs Last 24 Hrs  T(C): 36.4 (19 Sep 2021 16:10), Max: 37.7 (19 Sep 2021 07:45)  T(F): 97.6 (19 Sep 2021 16:10), Max: 99.8 (19 Sep 2021 07:45)  HR: 91 (19 Sep 2021 16:10) (84 - 91)  BP: 102/55 (19 Sep 2021 16:10) (102/55 - 115/64)  BP(mean): --  RR: 18 (19 Sep 2021 16:10) (18 - 18)  SpO2: --    PHYSICAL EXAM  General: adult in NAD  HEENT: clear oropharynx, anicteric sclera, pink conjunctiva  Neck: supple  CV: normal S1/S2 with no murmur rubs or gallops  Lungs: positive air movement b/l ant lungs,clear to auscultation, no wheezes, no rales  Abdomen: soft non-tender non-distended, no hepatosplenomegaly  Ext: no clubbing cyanosis or edema  Skin: no rashes and no petechiae  Neuro: alert and oriented X 4, no focal deficits  LABS:                          8.3    8.90  )-----------( 253      ( 19 Sep 2021 06:53 )             26.9         Mean Cell Volume : 92.1 fL  Mean Cell Hemoglobin : 28.4 pg  Mean Cell Hemoglobin Concentration : 30.9 g/dL  Auto Neutrophil # : x  Auto Lymphocyte # : x  Auto Monocyte # : x  Auto Eosinophil # : x  Auto Basophil # : x  Auto Neutrophil % : x  Auto Lymphocyte % : x  Auto Monocyte % : x  Auto Eosinophil % : x  Auto Basophil % : x    Serial CBC's  09-19 @ 06:53  Hct-26.9 / Hgb-8.3 / Plat-253 / RBC-2.92 / WBC-8.90          Serial CBC's  09-18 @ 09:16  Hct-28.0 / Hgb-8.5 / Plat-263 / RBC-3.05 / WBC-10.48          Serial CBC's  09-17 @ 04:30  Hct-26.3 / Hgb-8.1 / Plat-278 / RBC-2.85 / WBC-10.52          Serial CBC's  09-16 @ 07:17  Hct-26.9 / Hgb-8.2 / Plat-286 / RBC-2.88 / WBC-12.52            09-19    142  |  107  |  18  ----------------------------<  92  3.5   |  23  |  0.7    Ca    7.6<L>      19 Sep 2021 06:53  Mg     1.7     09-19    TPro  4.3<L>  /  Alb  2.2<L>  /  TBili  0.5  /  DBili  x   /  AST  31  /  ALT  11  /  AlkPhos  97  09-19      PT/INR - ( 19 Sep 2021 06:53 )   PT: 36.10 sec;   INR: 3.18 ratio             WBC Count: 8.90 K/uL (09-19-21 @ 06:53)  Hemoglobin: 8.3 g/dL (09-19-21 @ 06:53)  Hematocrit: 26.9 % (09-19-21 @ 06:53)  Platelet Count - Automated: 253 K/uL (09-19-21 @ 06:53)  WBC Count: 10.48 K/uL (09-18-21 @ 09:16)  Hemoglobin: 8.5 g/dL (09-18-21 @ 09:16)  Hematocrit: 28.0 % (09-18-21 @ 09:16)  Platelet Count - Automated: 263 K/uL (09-18-21 @ 09:16)  Platelet Count - Automated: 278 K/uL (09-17-21 @ 04:30)  Hemoglobin: 8.1 g/dL (09-17-21 @ 04:30)  WBC Count: 10.52 K/uL (09-17-21 @ 04:30)  Hematocrit: 26.3 % (09-17-21 @ 04:30)  Hemoglobin: 8.2 g/dL (09-16-21 @ 07:17)  WBC Count: 12.52 K/uL (09-16-21 @ 07:17)  Hematocrit: 26.9 % (09-16-21 @ 07:17)  Platelet Count - Automated: 286 K/uL (09-16-21 @ 07:17)  Hematocrit: 29.0 % (09-15-21 @ 04:30)  Platelet Count - Automated: 345 K/uL (09-15-21 @ 04:30)  Hemoglobin: 8.9 g/dL (09-15-21 @ 04:30)  WBC Count: 16.17 K/uL (09-15-21 @ 04:30)  Hemoglobin: 8.2 g/dL (09-14-21 @ 06:18)  WBC Count: 14.67 K/uL (09-14-21 @ 06:18)  Hematocrit: 26.7 % (09-14-21 @ 06:18)  Platelet Count - Automated: 330 K/uL (09-14-21 @ 06:18)  Hemoglobin: 8.2 g/dL (09-13-21 @ 07:48)  WBC Count: 13.78 K/uL (09-13-21 @ 07:48)  Hematocrit: 26.7 % (09-13-21 @ 07:48)  Platelet Count - Automated: 326 K/uL (09-13-21 @ 07:48)  Platelet Count - Automated: 319 K/uL (09-12-21 @ 12:07)  Hemoglobin: 8.3 g/dL (09-12-21 @ 12:07)  WBC Count: 12.94 K/uL (09-12-21 @ 12:07)  Hematocrit: 27.2 % (09-12-21 @ 12:07)  Hemoglobin: 8.8 g/dL (09-11-21 @ 09:10)  WBC Count: 14.22 K/uL (09-11-21 @ 09:10)  Hematocrit: 28.6 % (09-11-21 @ 09:10)  Platelet Count - Automated: 311 K/uL (09-11-21 @ 09:10)  Hemoglobin: 8.7 g/dL (09-10-21 @ 17:02)  WBC Count: 15.29 K/uL (09-10-21 @ 17:02)  Hematocrit: 27.8 % (09-10-21 @ 17:02)  Platelet Count - Automated: 319 K/uL (09-10-21 @ 17:02)                BLOOD SMEAR INTERPRETATION:       RADIOLOGY & ADDITIONAL STUDIES:

## 2021-09-19 NOTE — PROGRESS NOTE ADULT - ASSESSMENT
81 yr old female presented with c/o sepsis.  # treated for PNA and atelectasis of lung with ABX-- for 2 days-- ID wants to monitor her off ABX--  Bl cx negative--Not spiking fever  anymore   # hx of prothrombin gene mutation and Hx of PE with coumadin-- INR high at presentation. on coumadin--  now on hold-- INR supratherapeutic  # Lymphadenopathy-- diffuse-- biopsy planned-- high suspicion for lymphoma-- patient had refused it earlier. spoke with second daughter and she is agreeable for y procedure-- she was told-- difficult to obtain diagnosis if no biopsy is done. she agrees with LN biopsy   but now since no fever and with high INR -- even though not on coumadin-- will not plan procedure    # Chr DVT--lt femoral-- INR high at presentation  # hx of sarcoidosis----daughters said she never had a pulm doctor outpatient-- diagnosis of sarcoidosis? patient said she did see Dr Avila At some point but does not know what sarcoidosis is. She is very alert and knows her history.   # B/l lower ext-- redness near great toe-- will treat with steroids for possible ac attack of gout-- has hx of gout.    daughter--[phone]-- for consent she is RN.  PT sydnie and is no fever LN biopsy will be cancelled on monday . If can do PT-- Dc planning

## 2021-09-20 LAB
ALBUMIN SERPL ELPH-MCNC: 2.5 G/DL — LOW (ref 3.5–5.2)
ALP SERPL-CCNC: 104 U/L — SIGNIFICANT CHANGE UP (ref 30–115)
ALT FLD-CCNC: 12 U/L — SIGNIFICANT CHANGE UP (ref 0–41)
ANION GAP SERPL CALC-SCNC: 9 MMOL/L — SIGNIFICANT CHANGE UP (ref 7–14)
APTT BLD: 36.4 SEC — SIGNIFICANT CHANGE UP (ref 27–39.2)
AST SERPL-CCNC: 19 U/L — SIGNIFICANT CHANGE UP (ref 0–41)
BASOPHILS # BLD AUTO: 0 K/UL — SIGNIFICANT CHANGE UP (ref 0–0.2)
BASOPHILS NFR BLD AUTO: 0 % — SIGNIFICANT CHANGE UP (ref 0–1)
BILIRUB SERPL-MCNC: 0.4 MG/DL — SIGNIFICANT CHANGE UP (ref 0.2–1.2)
BLD GP AB SCN SERPL QL: SIGNIFICANT CHANGE UP
BUN SERPL-MCNC: 23 MG/DL — HIGH (ref 10–20)
CALCIUM SERPL-MCNC: 8.1 MG/DL — LOW (ref 8.5–10.1)
CHLORIDE SERPL-SCNC: 103 MMOL/L — SIGNIFICANT CHANGE UP (ref 98–110)
CO2 SERPL-SCNC: 25 MMOL/L — SIGNIFICANT CHANGE UP (ref 17–32)
CREAT SERPL-MCNC: 0.8 MG/DL — SIGNIFICANT CHANGE UP (ref 0.7–1.5)
CULTURE RESULTS: SIGNIFICANT CHANGE UP
EOSINOPHIL # BLD AUTO: 0.01 K/UL — SIGNIFICANT CHANGE UP (ref 0–0.7)
EOSINOPHIL NFR BLD AUTO: 0.2 % — SIGNIFICANT CHANGE UP (ref 0–8)
GLUCOSE SERPL-MCNC: 135 MG/DL — HIGH (ref 70–99)
HCT VFR BLD CALC: 30.6 % — LOW (ref 37–47)
HGB BLD-MCNC: 9.3 G/DL — LOW (ref 12–16)
IMM GRANULOCYTES NFR BLD AUTO: 0.5 % — HIGH (ref 0.1–0.3)
INR BLD: 2.89 RATIO — HIGH (ref 0.65–1.3)
LYMPHOCYTES # BLD AUTO: 1.27 K/UL — SIGNIFICANT CHANGE UP (ref 1.2–3.4)
LYMPHOCYTES # BLD AUTO: 22.1 % — SIGNIFICANT CHANGE UP (ref 20.5–51.1)
MAGNESIUM SERPL-MCNC: 1.8 MG/DL — SIGNIFICANT CHANGE UP (ref 1.8–2.4)
MCHC RBC-ENTMCNC: 28.1 PG — SIGNIFICANT CHANGE UP (ref 27–31)
MCHC RBC-ENTMCNC: 30.4 G/DL — LOW (ref 32–37)
MCV RBC AUTO: 92.4 FL — SIGNIFICANT CHANGE UP (ref 81–99)
MONOCYTES # BLD AUTO: 0.13 K/UL — SIGNIFICANT CHANGE UP (ref 0.1–0.6)
MONOCYTES NFR BLD AUTO: 2.3 % — SIGNIFICANT CHANGE UP (ref 1.7–9.3)
NEUTROPHILS # BLD AUTO: 4.31 K/UL — SIGNIFICANT CHANGE UP (ref 1.4–6.5)
NEUTROPHILS NFR BLD AUTO: 74.9 % — SIGNIFICANT CHANGE UP (ref 42.2–75.2)
NRBC # BLD: 0 /100 WBCS — SIGNIFICANT CHANGE UP (ref 0–0)
PLATELET # BLD AUTO: 260 K/UL — SIGNIFICANT CHANGE UP (ref 130–400)
POTASSIUM SERPL-MCNC: 3.9 MMOL/L — SIGNIFICANT CHANGE UP (ref 3.5–5)
POTASSIUM SERPL-SCNC: 3.9 MMOL/L — SIGNIFICANT CHANGE UP (ref 3.5–5)
PROT SERPL-MCNC: 4.7 G/DL — LOW (ref 6–8)
PROTHROM AB SERPL-ACNC: 32.9 SEC — HIGH (ref 9.95–12.87)
RBC # BLD: 3.31 M/UL — LOW (ref 4.2–5.4)
RBC # FLD: 13.5 % — SIGNIFICANT CHANGE UP (ref 11.5–14.5)
SODIUM SERPL-SCNC: 137 MMOL/L — SIGNIFICANT CHANGE UP (ref 135–146)
SPECIMEN SOURCE: SIGNIFICANT CHANGE UP
WBC # BLD: 5.75 K/UL — SIGNIFICANT CHANGE UP (ref 4.8–10.8)
WBC # FLD AUTO: 5.75 K/UL — SIGNIFICANT CHANGE UP (ref 4.8–10.8)

## 2021-09-20 PROCEDURE — 99232 SBSQ HOSP IP/OBS MODERATE 35: CPT

## 2021-09-20 RX ORDER — NYSTATIN CREAM 100000 [USP'U]/G
1 CREAM TOPICAL
Refills: 0 | Status: DISCONTINUED | OUTPATIENT
Start: 2021-09-20 | End: 2021-09-22

## 2021-09-20 RX ADMIN — Medication 40 MILLIGRAM(S): at 13:00

## 2021-09-20 RX ADMIN — CHLORHEXIDINE GLUCONATE 1 APPLICATION(S): 213 SOLUTION TOPICAL at 05:27

## 2021-09-20 RX ADMIN — Medication 50 MILLIGRAM(S): at 18:32

## 2021-09-20 RX ADMIN — Medication 50 MILLIGRAM(S): at 05:27

## 2021-09-20 RX ADMIN — PANTOPRAZOLE SODIUM 40 MILLIGRAM(S): 20 TABLET, DELAYED RELEASE ORAL at 05:27

## 2021-09-20 RX ADMIN — GABAPENTIN 100 MILLIGRAM(S): 400 CAPSULE ORAL at 11:23

## 2021-09-20 RX ADMIN — OXYCODONE AND ACETAMINOPHEN 1 TABLET(S): 5; 325 TABLET ORAL at 09:55

## 2021-09-20 RX ADMIN — OXYCODONE AND ACETAMINOPHEN 1 TABLET(S): 5; 325 TABLET ORAL at 10:25

## 2021-09-20 RX ADMIN — NYSTATIN CREAM 1 APPLICATION(S): 100000 CREAM TOPICAL at 17:22

## 2021-09-20 NOTE — PROVIDER CONTACT NOTE (MEDICATION) - SITUATION
pt's repeat BP is 100/60 HR 65, can RN hold 50mg Metoprolol tartrate?
Patient and daughter at bedside express concern r/t patient subtherapeutic INR (currently 1.33) w/ patient refusing 80 mg lovenox at this time and overnight (MD was made aware per handoff report).

## 2021-09-20 NOTE — PROVIDER CONTACT NOTE (OTHER) - SITUATION
pt's BP is 101/75 HR 75, pt has been trending low today. pt is scheduled to receive 50 metoprolol tartrate at this time. do you want RN to give or hold med

## 2021-09-20 NOTE — PROGRESS NOTE ADULT - ASSESSMENT
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 In the ED, she was diagnosed with septic shock, MAGDALENO, pneumonia and extensive LAD.    #Septic shock 2/2 Aspiration VS GN Pneumonia - resolved  -Blood c/x @9/14 - No Growth to Date  -Urine Legionella and Strep negative  -Pt has spikes in Temp (Tmax 101.5 @ 9/15 22:00, 100.6 @ 0:52)  -ID recs appreciated  d/c zosyn while waiting for cultures as pt had a prolonged course of abx with improvement in procal  -fevers could be from Lymphoma or Sarcoid flare    #Extensive LAD  -CT A/P showed - Mediastinal, axillary, hilar, supraclavicular, retroperitoneal, and bilateral inguinal lymphadenopathy. 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 Right leg free from thrombus  -Pain control - Percocet 5/325 q12 PRN    #Paroxsymal Atrial Fibrillation - new onset  -On metoprolol - please continue and monitor vitals  -Holding Coumadin until INR 1.5 for Lymph node biopsy  -Contacted by pharmacy and was told pt follows [facility] pt [facility] and takes 1.5mg of Coumadin on Sunday, Wednesday and Friday. She then takes 2.5mg on Monday, Tuesday, Thursday and Saturday.  -Daily INR check  -Hx of PE on Coumadin    #Sarcoidosis vs Lymphoma  -Patient refused Bronchoscopy and B/X - pt will f/u w/Dr. Botello out pt  -Unremarkable ACE and LDH level  -Extensive LAD on CT  -Pt is spiking fevers (Tmax 101.5 @ 9/15 22:00)  - Will f/u with Pulm and Heme/Onc    #Hx of Gout - LE pain could be 2/2 gout flare  -s/p 40mg prenisone - 1 time dose    #Chronic Diarrhea (possibly due to IBS)  -PRN Loperamide since C. Diff negative    #PT eval  -PT saw the pt but she was unable to ambulate due to pain  -100mg Gabapentin for now. May increase as necessary    Dispo: Waiting for INR to become therapeutic. Pt wants to go Rehab Facility   80 y/o woman with PMH of prothrombin gene mutation and PE on lifelong anticoagulation with coumadin, Sarcoidosis, HTN and chronic diarrhea (possibly due to IBS) presented with weakness and worsening watery diarrhea. In the ED, she was diagnosed with septic shock, MAGDALENO, pneumonia and extensive LAD.    #Septic shock 2/2 Aspiration VS GN Pneumonia - resolved  -Blood c/x @9/14 - No Growth to Date  -Urine Legionella and Strep negative  -Pt has spikes in Temp (Tmax 101.5 @ 9/15 22:00, 100.6 @ 0:52). Last Tmax (99.8 @9/19 7:45)  -ID recs appreciated  -d/c zosyn  -fevers could be from Lymphoma or Sarcoid flare    #Extensive LAD  -CT A/P showed - Mediastinal, axillary, hilar, supraclavicular, retroperitoneal, and bilateral inguinal lymphadenopathy. 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 Right leg free from thrombus  -Pain control - Percocet 5/325 q12 PRN    #Paroxsymal Atrial Fibrillation - new onset  -On metoprolol - please continue and monitor vitals  -Holding Coumadin until INR 1.5 for Lymph node biopsy  -Contacted by pharmacy and was told pt follows with [facility] pt [facility] and takes 1.5mg of Coumadin on Sunday, Wednesday and Friday. She then takes 2.5mg on Monday, Tuesday, Thursday and Saturday.  -Daily INR check  -Hx of PE on Coumadin    #Sarcoidosis vs Lymphoma  -Patient refused Bronchoscopy and B/X - pt will f/u w/Dr. Botello out pt  -Unremarkable ACE and LDH level  -Extensive LAD on CT  -Pt is spiking fevers (Tmax 101.5 @ 9/15 22:00)  - Will f/u with Pulm and Heme/Onc    #Hx of Gout - LE pain could be 2/2 gout flare  -s/p 40mg prenisone - 1 time dose    #PT eval  -PT saw the pt but she was unable to ambulate due to pain  -100mg Gabapentin for now. May increase as necessary    Dispo: Waiting for INR to become therapeutic. Pt wants to go Rehab Facility

## 2021-09-20 NOTE — PROGRESS NOTE ADULT - SUBJECTIVE AND OBJECTIVE BOX
JOEY KNAPP 81y Female  MRN#: [id number]   CODE STATUS: FULL      SUBJECTIVE  Patient is a 81y old Female who presents with a chief complaint of 3 days of Weakness & Diarrhea; (19 Sep 2021 18:37)    Currently admitted to medicine with the primary diagnosis of     Today is hospital day 16d,   INTERVAL HPI/OVERNIGHT EVENTS:    Examined the pt at bedside this AM. Pt has no acute events overnight. Pt unlikely to go for Inguinal Lymph node biopsy because INR is 3.18.    Present Today:           Cruz Catheter (x)No/ ()Yes?   Indication:             Central Line (x)No/ ()Yes?   Indication:          IV Fluids (x)No/ ()Yes? Type:  Rate:  Indication:    OBJECTIVE  PAST MEDICAL & SURGICAL HISTORY  H/O prothrombin mutation    Pulmonary embolism    HTN (hypertension)    Anemia requiring transfusions    Deep vein thrombosis (DVT)    S/P tonsillectomy    H/O: hysterectomy    History of cholecystectomy      ALLERGIES:  No Known Allergies    HOME MEDICATIONS:  Home Medications:  hydroCHLOROthiazide 12.5 mg oral tablet: 1 tab(s) orally once a day (03 Sep 2021 21:05)  losartan 100 mg oral tablet: 1 tab(s) orally once a day (03 Sep 2021 21:05)  metoprolol succinate 100 mg oral tablet, extended release: 1 tab(s) orally once a day (03 Sep 2021 21:05)  warfarin 2.5 mg oral tablet:  (03 Sep 2021 21:05)    MEDICATIONS:  STANDING MEDICATIONS  chlorhexidine 4% Liquid 1 Application(s) Topical <User Schedule>  gabapentin 100 milliGRAM(s) Oral daily  metoprolol tartrate 50 milliGRAM(s) Oral two times a day  pantoprazole    Tablet 40 milliGRAM(s) Oral before breakfast    PRN MEDICATIONS  acetaminophen   Tablet .. 650 milliGRAM(s) Oral every 6 hours PRN  aluminum hydroxide/magnesium hydroxide/simethicone Suspension 30 milliLiter(s) Oral every 4 hours PRN  loperamide 2 milliGRAM(s) Oral four times a day PRN  melatonin 3 milliGRAM(s) Oral at bedtime PRN  ondansetron Injectable 4 milliGRAM(s) IV Push every 8 hours PRN  oxycodone    5 mG/acetaminophen 325 mG 1 Tablet(s) Oral every 12 hours PRN      VITAL SIGNS: Last 24 Hours  T(C): 36.1 (20 Sep 2021 06:32), Max: 37.7 (19 Sep 2021 07:45)  T(F): 97 (20 Sep 2021 06:32), Max: 99.8 (19 Sep 2021 07:45)  HR: 80 (20 Sep 2021 06:32) (80 - 91)  BP: 122/66 (20 Sep 2021 06:32) (102/55 - 122/66)  BP(mean): --  RR: 17 (20 Sep 2021 06:32) (17 - 18)  SpO2: --    LABS:                        8.3    8.90  )-----------( 253      ( 19 Sep 2021 06:53 )             26.9     09-19    142  |  107  |  18  ----------------------------<  92  3.5   |  23  |  0.7    Ca    7.6<L>      19 Sep 2021 06:53  Mg     1.7     09-19    TPro  4.3<L>  /  Alb  2.2<L>  /  TBili  0.5  /  DBili  x   /  AST  31  /  ALT  11  /  AlkPhos  97  09-19    PT/INR - ( 19 Sep 2021 06:53 )   PT: 36.10 sec;   INR: 3.18 ratio             RADIOLOGY:  < from: Xray Chest 1 View-PORTABLE IMMEDIATE (Xray Chest 1 View-PORTABLE IMMEDIATE .) (09.08.21 @ 09:53) >  Impression:        Stable left opacity/effusion  Minimal blunting right costophrenic angle  . No pneumothorax.    < end of copied text >  < from: US Kidney and Bladder (09.05.21 @ 22:27) >  IMPRESSION:    No hydronephrosis bilaterally.    Urinary bladder prevoid volume of 534 cc. Patient unable to void during this examination.    --- End of Report ---    < end of copied text >  < from: CT Abdomen and Pelvis No Cont (09.04.21 @ 04:11) >  IMPRESSION:    New mediastinal, axillary, hilar, supraclavicular, retroperitoneal, and bilateral inguinal lymphadenopathy as above. Findings highly suspicious for neoplastic process such as lymphoma, however consider less likely but possible inflammatory etiologies in patient with history of rheumatoid arthritis. Oncology workup recommended.    Left lower lobe lobe bronchus filling defects, with left lung lower lobe consolidative opacification. Additional bilateral bilateral patchy opacities. Findings consistent with pneumonia; correlate for aspiration.    Bilateral pulmonary nodular opacities. Findings may be inflammatory, infectious or neoplastic in etiology. Follow-up CT after treatment recommended.    < end of copied text >  < from: CT Chest No Cont (09.04.21 @ 04:10) >  IMPRESSION:    New mediastinal, axillary, hilar, supraclavicular, retroperitoneal, and bilateral inguinal lymphadenopathy as above. Findings highly suspicious for neoplastic process such as lymphoma, however consider less likely but possible inflammatory etiologies in patient with history of rheumatoid arthritis. Oncology workup recommended.    Left lower lobe lobe bronchus filling defects, with left lung lower lobe consolidative opacification. Additional bilateral bilateral patchy opacities. Findings consistent with pneumonia; correlate for aspiration.    Bilateral pulmonary nodular opacities. Findings may be inflammatory, infectious or neoplastic in etiology. Follow-up CT after treatment recommended.    < end of copied text >      ECHO:  PHYSICIAN INTERPRETATION:  Left Ventricle: Left ventricular ejection fraction, by visual estimation, is 60 to 65%. Spectral Doppler shows impaired relaxation pattern of left ventricular myocardial filling (Grade I diastolic dysfunction).  Right Ventricle: Normal right ventricular size and function. The right ventricular size is normal.  Left Atrium: Mild to moderately enlarged left atrium.  Right Atrium: Moderately enlarged right atrium.  Pericardium: Trivial pericardial effusion is present.  Mitral Valve: There is mild mitral annular calcification. No evidence of mitral stenosis. No evidence of mitral valve regurgitation is seen.  Tricuspid Valve: The tricuspid valve is normal in structure. Moderate tricuspid regurgitation is visualized.  Aortic Valve: The aortic valve is trileaflet. No evidence of aortic stenosis. Peak transaortic gradient equals 6.6 mmHg, mean transaortic gradient equals 4.4 mmHg, the calculated aortic valve area equals 3.98 cm² by the continuity equation consistent with normally opening aortic valve. No evidence of aortic valve regurgitation is seen.  Pulmonic Valve: Trace pulmonic valve regurgitation.  Pulmonary Artery: Pulmonary hypertension is present.    PHYSICAL EXAM:  GENERAL: NAD, well-developed, AAOx3  HEENT:  Atraumatic, Normocephalic. EOMI, PERRLA, conjunctiva and sclera clear, No JVD  PULMONARY: Clear to auscultation bilaterally; No wheeze  CARDIOVASCULAR: irregular rhythm; No murmurs, rubs, or gallops  GASTROINTESTINAL: Soft, Nontender, Nondistended; Bowel sounds present  MUSCULOSKELETAL/EXT:  2+ Peripheral Pulses, no pitting edema, Legs are much less swollen compared to past few days; Tenderness to palpation b/l; Pt has pain when trying to move b/l but is improved; Bruising b/l UE that are old as per the pt.  NEUROLOGY: non-focal  SKIN: No rashes or lesions   JOEY KNAPP 81y Female  MRN#: [id number]   CODE STATUS: FULL      SUBJECTIVE  Patient is a 81y old Female who presents with a chief complaint of 3 days of Weakness & Diarrhea; (19 Sep 2021 18:37)    Currently admitted to medicine with the primary diagnosis of     Today is hospital day 16d,   INTERVAL HPI/OVERNIGHT EVENTS:    Examined the pt at bedside this AM. Pt has no acute events overnight. Pt unlikely to go for Inguinal Lymph node biopsy because INR is 2.89.    Present Today:           Cruz Catheter (x)No/ ()Yes?   Indication:             Central Line (x)No/ ()Yes?   Indication:          IV Fluids (x)No/ ()Yes? Type:  Rate:  Indication:    OBJECTIVE  PAST MEDICAL & SURGICAL HISTORY  H/O prothrombin mutation    Pulmonary embolism    HTN (hypertension)    Anemia requiring transfusions    Deep vein thrombosis (DVT)    S/P tonsillectomy    H/O: hysterectomy    History of cholecystectomy      ALLERGIES:  No Known Allergies    HOME MEDICATIONS:  Home Medications:  hydroCHLOROthiazide 12.5 mg oral tablet: 1 tab(s) orally once a day (03 Sep 2021 21:05)  losartan 100 mg oral tablet: 1 tab(s) orally once a day (03 Sep 2021 21:05)  metoprolol succinate 100 mg oral tablet, extended release: 1 tab(s) orally once a day (03 Sep 2021 21:05)  warfarin 2.5 mg oral tablet:  (03 Sep 2021 21:05)    MEDICATIONS:  STANDING MEDICATIONS  chlorhexidine 4% Liquid 1 Application(s) Topical <User Schedule>  gabapentin 100 milliGRAM(s) Oral daily  metoprolol tartrate 50 milliGRAM(s) Oral two times a day  pantoprazole    Tablet 40 milliGRAM(s) Oral before breakfast    PRN MEDICATIONS  acetaminophen   Tablet .. 650 milliGRAM(s) Oral every 6 hours PRN  aluminum hydroxide/magnesium hydroxide/simethicone Suspension 30 milliLiter(s) Oral every 4 hours PRN  loperamide 2 milliGRAM(s) Oral four times a day PRN  melatonin 3 milliGRAM(s) Oral at bedtime PRN  ondansetron Injectable 4 milliGRAM(s) IV Push every 8 hours PRN  oxycodone    5 mG/acetaminophen 325 mG 1 Tablet(s) Oral every 12 hours PRN      VITAL SIGNS: Last 24 Hours  T(C): 36.1 (20 Sep 2021 06:32), Max: 37.7 (19 Sep 2021 07:45)  T(F): 97 (20 Sep 2021 06:32), Max: 99.8 (19 Sep 2021 07:45)  HR: 80 (20 Sep 2021 06:32) (80 - 91)  BP: 122/66 (20 Sep 2021 06:32) (102/55 - 122/66)  BP(mean): --  RR: 17 (20 Sep 2021 06:32) (17 - 18)  SpO2: --    LABS:                        8.3    8.90  )-----------( 253      ( 19 Sep 2021 06:53 )             26.9     09-19    142  |  107  |  18  ----------------------------<  92  3.5   |  23  |  0.7    Ca    7.6<L>      19 Sep 2021 06:53  Mg     1.7     09-19    TPro  4.3<L>  /  Alb  2.2<L>  /  TBili  0.5  /  DBili  x   /  AST  31  /  ALT  11  /  AlkPhos  97  09-19    PT/INR - ( 19 Sep 2021 06:53 )   PT: 36.10 sec;   INR: 3.18 ratio             RADIOLOGY:  < from: Xray Chest 1 View-PORTABLE IMMEDIATE (Xray Chest 1 View-PORTABLE IMMEDIATE .) (09.08.21 @ 09:53) >  Impression:        Stable left opacity/effusion  Minimal blunting right costophrenic angle  . No pneumothorax.    < end of copied text >  < from: US Kidney and Bladder (09.05.21 @ 22:27) >  IMPRESSION:    No hydronephrosis bilaterally.    Urinary bladder prevoid volume of 534 cc. Patient unable to void during this examination.    --- End of Report ---    < end of copied text >  < from: CT Abdomen and Pelvis No Cont (09.04.21 @ 04:11) >  IMPRESSION:    New mediastinal, axillary, hilar, supraclavicular, retroperitoneal, and bilateral inguinal lymphadenopathy as above. Findings highly suspicious for neoplastic process such as lymphoma, however consider less likely but possible inflammatory etiologies in patient with history of rheumatoid arthritis. Oncology workup recommended.    Left lower lobe lobe bronchus filling defects, with left lung lower lobe consolidative opacification. Additional bilateral bilateral patchy opacities. Findings consistent with pneumonia; correlate for aspiration.    Bilateral pulmonary nodular opacities. Findings may be inflammatory, infectious or neoplastic in etiology. Follow-up CT after treatment recommended.    < end of copied text >  < from: CT Chest No Cont (09.04.21 @ 04:10) >  IMPRESSION:    New mediastinal, axillary, hilar, supraclavicular, retroperitoneal, and bilateral inguinal lymphadenopathy as above. Findings highly suspicious for neoplastic process such as lymphoma, however consider less likely but possible inflammatory etiologies in patient with history of rheumatoid arthritis. Oncology workup recommended.    Left lower lobe lobe bronchus filling defects, with left lung lower lobe consolidative opacification. Additional bilateral bilateral patchy opacities. Findings consistent with pneumonia; correlate for aspiration.    Bilateral pulmonary nodular opacities. Findings may be inflammatory, infectious or neoplastic in etiology. Follow-up CT after treatment recommended.    < end of copied text >      ECHO:  PHYSICIAN INTERPRETATION:  Left Ventricle: Left ventricular ejection fraction, by visual estimation, is 60 to 65%. Spectral Doppler shows impaired relaxation pattern of left ventricular myocardial filling (Grade I diastolic dysfunction).  Right Ventricle: Normal right ventricular size and function. The right ventricular size is normal.  Left Atrium: Mild to moderately enlarged left atrium.  Right Atrium: Moderately enlarged right atrium.  Pericardium: Trivial pericardial effusion is present.  Mitral Valve: There is mild mitral annular calcification. No evidence of mitral stenosis. No evidence of mitral valve regurgitation is seen.  Tricuspid Valve: The tricuspid valve is normal in structure. Moderate tricuspid regurgitation is visualized.  Aortic Valve: The aortic valve is trileaflet. No evidence of aortic stenosis. Peak transaortic gradient equals 6.6 mmHg, mean transaortic gradient equals 4.4 mmHg, the calculated aortic valve area equals 3.98 cm² by the continuity equation consistent with normally opening aortic valve. No evidence of aortic valve regurgitation is seen.  Pulmonic Valve: Trace pulmonic valve regurgitation.  Pulmonary Artery: Pulmonary hypertension is present.    PHYSICAL EXAM:  GENERAL: NAD, well-developed, AAOx3  HEENT:  Atraumatic, Normocephalic. EOMI, PERRLA, conjunctiva and sclera clear, No JVD  PULMONARY: Clear to auscultation bilaterally; No wheeze  CARDIOVASCULAR: irregular rhythm; No murmurs, rubs, or gallops  GASTROINTESTINAL: Soft, Nontender, Nondistended; Bowel sounds present  MUSCULOSKELETAL/EXT:  2+ Peripheral Pulses, no pitting edema, Legs are much less swollen compared to past few days; Tenderness to palpation b/l; Pt has pain when trying to move b/l but is improved; Bruising b/l UE that are old as per the pt.  NEUROLOGY: non-focal  SKIN: No rashes or lesions

## 2021-09-20 NOTE — PROGRESS NOTE ADULT - ASSESSMENT
#s/p Septic Shock secondary to Aspiration vs GN pneumonia  stable now.  antbx, zosyn>> d/neptali    #Diffuse Lymphadenopathy  including chest/abd/groin  hx of sarcoidosis...  ACE..unremarkable   r/o reactive in lung due to PNA but abd LN?   LDH unremarkable   bronchoscopy ?    #Hx of PE on Warfarin.. currently getting iv heparin  Also DVT.  hx of PT gene 2 mutation     # afib .. on meds    # renal insuff.. improving    # anemia of chronic dx     # weakness/debiltation.. PT eval>>> pending d/c to rehab

## 2021-09-21 ENCOUNTER — TRANSCRIPTION ENCOUNTER (OUTPATIENT)
Age: 81
End: 2021-09-21

## 2021-09-21 VITALS
RESPIRATION RATE: 18 BRPM | DIASTOLIC BLOOD PRESSURE: 55 MMHG | HEART RATE: 64 BPM | SYSTOLIC BLOOD PRESSURE: 100 MMHG | TEMPERATURE: 97 F

## 2021-09-21 LAB
ALBUMIN SERPL ELPH-MCNC: 2.3 G/DL — LOW (ref 3.5–5.2)
ALP SERPL-CCNC: 90 U/L — SIGNIFICANT CHANGE UP (ref 30–115)
ALT FLD-CCNC: 13 U/L — SIGNIFICANT CHANGE UP (ref 0–41)
ANION GAP SERPL CALC-SCNC: 9 MMOL/L — SIGNIFICANT CHANGE UP (ref 7–14)
AST SERPL-CCNC: 21 U/L — SIGNIFICANT CHANGE UP (ref 0–41)
BASOPHILS # BLD AUTO: 0.01 K/UL — SIGNIFICANT CHANGE UP (ref 0–0.2)
BASOPHILS NFR BLD AUTO: 0.1 % — SIGNIFICANT CHANGE UP (ref 0–1)
BILIRUB SERPL-MCNC: 0.3 MG/DL — SIGNIFICANT CHANGE UP (ref 0.2–1.2)
BUN SERPL-MCNC: 32 MG/DL — HIGH (ref 10–20)
CALCIUM SERPL-MCNC: 8 MG/DL — LOW (ref 8.5–10.1)
CHLORIDE SERPL-SCNC: 107 MMOL/L — SIGNIFICANT CHANGE UP (ref 98–110)
CO2 SERPL-SCNC: 24 MMOL/L — SIGNIFICANT CHANGE UP (ref 17–32)
CREAT SERPL-MCNC: 0.8 MG/DL — SIGNIFICANT CHANGE UP (ref 0.7–1.5)
CULTURE RESULTS: SIGNIFICANT CHANGE UP
EOSINOPHIL # BLD AUTO: 0 K/UL — SIGNIFICANT CHANGE UP (ref 0–0.7)
EOSINOPHIL NFR BLD AUTO: 0 % — SIGNIFICANT CHANGE UP (ref 0–8)
GLUCOSE SERPL-MCNC: 126 MG/DL — HIGH (ref 70–99)
HCT VFR BLD CALC: 28.6 % — LOW (ref 37–47)
HGB BLD-MCNC: 8.6 G/DL — LOW (ref 12–16)
IMM GRANULOCYTES NFR BLD AUTO: 0.7 % — HIGH (ref 0.1–0.3)
INR BLD: 3.23 RATIO — HIGH (ref 0.65–1.3)
LYMPHOCYTES # BLD AUTO: 1.77 K/UL — SIGNIFICANT CHANGE UP (ref 1.2–3.4)
LYMPHOCYTES # BLD AUTO: 17.9 % — LOW (ref 20.5–51.1)
MAGNESIUM SERPL-MCNC: 1.8 MG/DL — SIGNIFICANT CHANGE UP (ref 1.8–2.4)
MCHC RBC-ENTMCNC: 27.7 PG — SIGNIFICANT CHANGE UP (ref 27–31)
MCHC RBC-ENTMCNC: 30.1 G/DL — LOW (ref 32–37)
MCV RBC AUTO: 92.3 FL — SIGNIFICANT CHANGE UP (ref 81–99)
MONOCYTES # BLD AUTO: 0.4 K/UL — SIGNIFICANT CHANGE UP (ref 0.1–0.6)
MONOCYTES NFR BLD AUTO: 4 % — SIGNIFICANT CHANGE UP (ref 1.7–9.3)
NEUTROPHILS # BLD AUTO: 7.66 K/UL — HIGH (ref 1.4–6.5)
NEUTROPHILS NFR BLD AUTO: 77.3 % — HIGH (ref 42.2–75.2)
NRBC # BLD: 0 /100 WBCS — SIGNIFICANT CHANGE UP (ref 0–0)
PLATELET # BLD AUTO: 286 K/UL — SIGNIFICANT CHANGE UP (ref 130–400)
POTASSIUM SERPL-MCNC: 4.2 MMOL/L — SIGNIFICANT CHANGE UP (ref 3.5–5)
POTASSIUM SERPL-SCNC: 4.2 MMOL/L — SIGNIFICANT CHANGE UP (ref 3.5–5)
PROT SERPL-MCNC: 4.6 G/DL — LOW (ref 6–8)
PROTHROM AB SERPL-ACNC: 36.7 SEC — HIGH (ref 9.95–12.87)
RBC # BLD: 3.1 M/UL — LOW (ref 4.2–5.4)
RBC # FLD: 13.5 % — SIGNIFICANT CHANGE UP (ref 11.5–14.5)
SARS-COV-2 RNA SPEC QL NAA+PROBE: SIGNIFICANT CHANGE UP
SODIUM SERPL-SCNC: 140 MMOL/L — SIGNIFICANT CHANGE UP (ref 135–146)
SPECIMEN SOURCE: SIGNIFICANT CHANGE UP
WBC # BLD: 9.91 K/UL — SIGNIFICANT CHANGE UP (ref 4.8–10.8)
WBC # FLD AUTO: 9.91 K/UL — SIGNIFICANT CHANGE UP (ref 4.8–10.8)

## 2021-09-21 PROCEDURE — 99239 HOSP IP/OBS DSCHRG MGMT >30: CPT

## 2021-09-21 RX ORDER — WARFARIN SODIUM 2.5 MG/1
1 TABLET ORAL
Qty: 0 | Refills: 0 | DISCHARGE

## 2021-09-21 RX ORDER — LOSARTAN POTASSIUM 100 MG/1
1 TABLET, FILM COATED ORAL
Qty: 0 | Refills: 0 | DISCHARGE

## 2021-09-21 RX ORDER — OXYCODONE AND ACETAMINOPHEN 5; 325 MG/1; MG/1
1 TABLET ORAL
Qty: 0 | Refills: 0 | DISCHARGE
Start: 2021-09-21

## 2021-09-21 RX ORDER — WARFARIN SODIUM 2.5 MG/1
0 TABLET ORAL
Qty: 0 | Refills: 0 | DISCHARGE

## 2021-09-21 RX ORDER — LANOLIN ALCOHOL/MO/W.PET/CERES
1 CREAM (GRAM) TOPICAL
Qty: 0 | Refills: 0 | DISCHARGE
Start: 2021-09-21

## 2021-09-21 RX ADMIN — PANTOPRAZOLE SODIUM 40 MILLIGRAM(S): 20 TABLET, DELAYED RELEASE ORAL at 05:31

## 2021-09-21 RX ADMIN — Medication 50 MILLIGRAM(S): at 17:35

## 2021-09-21 RX ADMIN — CHLORHEXIDINE GLUCONATE 1 APPLICATION(S): 213 SOLUTION TOPICAL at 05:30

## 2021-09-21 RX ADMIN — NYSTATIN CREAM 1 APPLICATION(S): 100000 CREAM TOPICAL at 17:35

## 2021-09-21 RX ADMIN — OXYCODONE AND ACETAMINOPHEN 1 TABLET(S): 5; 325 TABLET ORAL at 09:52

## 2021-09-21 RX ADMIN — Medication 40 MILLIGRAM(S): at 05:31

## 2021-09-21 RX ADMIN — NYSTATIN CREAM 1 APPLICATION(S): 100000 CREAM TOPICAL at 05:31

## 2021-09-21 RX ADMIN — OXYCODONE AND ACETAMINOPHEN 1 TABLET(S): 5; 325 TABLET ORAL at 09:22

## 2021-09-21 RX ADMIN — Medication 50 MILLIGRAM(S): at 05:31

## 2021-09-21 RX ADMIN — GABAPENTIN 100 MILLIGRAM(S): 400 CAPSULE ORAL at 11:38

## 2021-09-21 NOTE — DISCHARGE NOTE PROVIDER - NSDCCPCAREPLAN_GEN_ALL_CORE_FT
PRINCIPAL DISCHARGE DIAGNOSIS  Diagnosis: Gram-negative pneumonia  Assessment and Plan of Treatment: Pneumonia is an infection in your lungs caused by bacteria, viruses, fungi, or parasites. You can become infected if you come in contact with someone who is sick. You can get pneumonia if you recently had surgery or needed a ventilator to help you breathe. Pneumonia can also be caused by accidentally inhaling saliva or small pieces of food. Pneumonia may cause mild symptoms, or it can be severe and life-threatening.  DISCHARGE INSTRUCTIONS:  Seek care immediately if:  You cough up blood, have a fast heartbeat, increased fatigue, confusion, chest pain, shortness of breath, lips turn blue, your symptioms get worse 48 hours after strating antibiotics, Fever above 100.4, decreased appetite, nausea vomiting.  Antibiotics treat pneumonia caused by bacteria.  Acetaminophen decreases pain and fever.  Acetaminophen can cause liver damage if not taken correctly. Do not use more than 4 grams (4,000 milligrams) total of acetaminophen in one day.  NSAIDS decrease inflammation. Ask a doctor before taking.   Manage your symptoms:  Rest as needed. Rest often throughout the day. Alternate times of activity with times of rest.  Drink liquids as directed.   Prevent pneumonia:  Prevent the spread of germs. Wash your hands often with soap and water. Use gel hand cleanser when there is no soap and water available.   Limit alcohol. Women should limit alcohol to 1 drink a day. Men should limit alcohol to 2 drinks a day. A drink of alcohol is 12 ounces of beer, 5 ounces of wine, or 1½ ounces of liquor.  Ask about vaccines. You may need a vaccine to help prevent pneumonia. Get an influenza (flu) vaccine every year as soon as it becomes available.  You were diagnosed with sepsis caused by pneumonia. You were treated with antibiotics and your pneumonia is now resolved. Please follow up with your PCP       SECONDARY DISCHARGE DIAGNOSES  Diagnosis: MAGDALENO (acute kidney injury)  Assessment and Plan of Treatment: During your hospital stay you were found to have an Acute Kidney Injury. The exact cause of this injury is unknown but it is likely that you were somewhat dehydrated, causing lack of blood flow to your kidneys and causing some mild reversible kidney injury. The Blood levels have returned back to your baseline.  Follow up with your PMD regarding this issue and if there is any further assessment needed to follow up and or medication adjustments needed.  Keep Hydrated as directed by your physician to prevent future occurences of any Kidney Injury.   SEEK IMMEDIATE MEDICAL CARE IF YOU HAVE ANY OF THE FOLLOWING SYMPTOMS: chest pain, shortness of breath, abdominal pain, sweating, vomiting, blood in vomit/bowel movements/urine, dizziness/lightheadedness, weakness or numbness to face/arm/leg, trouble speaking or understanding, facial droop.      Diagnosis: Lymphadenopathy  Assessment and Plan of Treatment: During your hospital stay, your CT scan of the abdomen and pelvis showed new mediastinal, axillary, hilar, supraclavicular, retroperitoneal, and bilateral inguinal lymphadenopathy as above. These findings are highly suspicious for neoplastic process such as lymphoma, however it can possibly indicate inflammatory etiologies in your case since you have a history of rheumatoid arthritis. You were offered a lymph node biopsy to help in aiding diagnosis. However, you declined a biopsy after being exlplained the risks, benefits and outcomes of diagnosis and treatment.  Please follow up with Hematology/Oncology Dr. Gaby Grant as needed.      Diagnosis: Atrial fibrillation  Assessment and Plan of Treatment: Atrial Fibrillation  Atrial fibrillation is a type of irregular heartbeat (arrhythmia) where the heart quivers continuously in a chaotic pattern that makes the heart unable to pump blood normally. This can increase the risk for stroke, heart failure, and other heart-related conditions. Atrial fibrillation can be caused by a variety of conditions and may be temporary, intermittent, or permanent. Symptoms include feeling that your heart is beating rapidly or irregularly, chest discomfort, shortness of breath, or dizziness/lightheadedness that may be worse with exertion. Treatment is varied but may involve medication or electrical shock (cardioversion).  SEEK IMMEDIATE MEDICAL CARE IF YOU HAVE ANY OF THE FOLLOWING SYMPTOMS: chest pain, shortness of breath, abdominal pain, sweating, vomiting, blood in vomit/bowel movements/urine, dizziness/lightheadedness, weakness or numbness to face/arm/leg, trouble speaking or understanding, facial droop.  You have a new diagnosis of Paroxsymal Atrial fibrillation. You were started on a medication called Metoprolol to control your heart rate. Since you were already on coumadin, there was no need for additional anticoagulation. Please follow up Cardiologist Dr. Bolden in 1-2 weeks.       PRINCIPAL DISCHARGE DIAGNOSIS  Diagnosis: Gram-negative pneumonia  Assessment and Plan of Treatment: Pneumonia is an infection in your lungs caused by bacteria, viruses, fungi, or parasites. You can become infected if you come in contact with someone who is sick. You can get pneumonia if you recently had surgery or needed a ventilator to help you breathe. Pneumonia can also be caused by accidentally inhaling saliva or small pieces of food. Pneumonia may cause mild symptoms, or it can be severe and life-threatening.  DISCHARGE INSTRUCTIONS:  Seek care immediately if:  You cough up blood, have a fast heartbeat, increased fatigue, confusion, chest pain, shortness of breath, lips turn blue, your symptioms get worse 48 hours after strating antibiotics, Fever above 100.4, decreased appetite, nausea vomiting.  Antibiotics treat pneumonia caused by bacteria.  Acetaminophen decreases pain and fever.  Acetaminophen can cause liver damage if not taken correctly. Do not use more than 4 grams (4,000 milligrams) total of acetaminophen in one day.  NSAIDS decrease inflammation. Ask a doctor before taking.   Manage your symptoms:  Rest as needed. Rest often throughout the day. Alternate times of activity with times of rest.  Drink liquids as directed.   Prevent pneumonia:  Prevent the spread of germs. Wash your hands often with soap and water. Use gel hand cleanser when there is no soap and water available.   Limit alcohol. Women should limit alcohol to 1 drink a day. Men should limit alcohol to 2 drinks a day. A drink of alcohol is 12 ounces of beer, 5 ounces of wine, or 1½ ounces of liquor.  Ask about vaccines. You may need a vaccine to help prevent pneumonia. Get an influenza (flu) vaccine every year as soon as it becomes available.  You were diagnosed with sepsis caused by pneumonia. You were treated with antibiotics and your pneumonia is now resolved. Please follow up with your PCP Dr. Ana Luisa Sr in 1-2 weeks.      SECONDARY DISCHARGE DIAGNOSES  Diagnosis: MAGDALENO (acute kidney injury)  Assessment and Plan of Treatment: During your hospital stay you were found to have an Acute Kidney Injury. The exact cause of this injury is unknown but it is likely that you were somewhat dehydrated, causing lack of blood flow to your kidneys and causing some mild reversible kidney injury. The Blood levels have returned back to your baseline.  Follow up with your PMD regarding this issue and if there is any further assessment needed to follow up and or medication adjustments needed.  Keep Hydrated as directed by your physician to prevent future occurences of any Kidney Injury.   SEEK IMMEDIATE MEDICAL CARE IF YOU HAVE ANY OF THE FOLLOWING SYMPTOMS: chest pain, shortness of breath, abdominal pain, sweating, vomiting, blood in vomit/bowel movements/urine, dizziness/lightheadedness, weakness or numbness to face/arm/leg, trouble speaking or understanding, facial droop.      Diagnosis: Lymphadenopathy  Assessment and Plan of Treatment: During your hospital stay, your CT scan of the abdomen and pelvis showed new mediastinal, axillary, hilar, supraclavicular, retroperitoneal, and bilateral inguinal lymphadenopathy as above. These findings are highly suspicious for neoplastic process such as lymphoma, however it can possibly indicate inflammatory etiologies in your case since you have a history of rheumatoid arthritis. You were offered a lymph node biopsy to help in aiding diagnosis. However, you declined a biopsy after being exlplained the risks, benefits and outcomes of diagnosis and treatment.  Please follow up with Hematology/Oncology Dr. Gaby Grant as needed.      Diagnosis: Atrial fibrillation  Assessment and Plan of Treatment: Atrial Fibrillation  Atrial fibrillation is a type of irregular heartbeat (arrhythmia) where the heart quivers continuously in a chaotic pattern that makes the heart unable to pump blood normally. This can increase the risk for stroke, heart failure, and other heart-related conditions. Atrial fibrillation can be caused by a variety of conditions and may be temporary, intermittent, or permanent. Symptoms include feeling that your heart is beating rapidly or irregularly, chest discomfort, shortness of breath, or dizziness/lightheadedness that may be worse with exertion. Treatment is varied but may involve medication or electrical shock (cardioversion).  SEEK IMMEDIATE MEDICAL CARE IF YOU HAVE ANY OF THE FOLLOWING SYMPTOMS: chest pain, shortness of breath, abdominal pain, sweating, vomiting, blood in vomit/bowel movements/urine, dizziness/lightheadedness, weakness or numbness to face/arm/leg, trouble speaking or understanding, facial droop.  You have a new diagnosis of Paroxsymal Atrial fibrillation. You were started on a medication called Metoprolol to control your heart rate. Since you were already on coumadin, there was no need for additional anticoagulation. Please follow up Cardiologist Dr. Bolden in 1-2 weeks.       PRINCIPAL DISCHARGE DIAGNOSIS  Diagnosis: Gram-negative pneumonia  Assessment and Plan of Treatment: Pneumonia is an infection in your lungs caused by bacteria, viruses, fungi, or parasites. You can become infected if you come in contact with someone who is sick. You can get pneumonia if you recently had surgery or needed a ventilator to help you breathe. Pneumonia can also be caused by accidentally inhaling saliva or small pieces of food. Pneumonia may cause mild symptoms, or it can be severe and life-threatening.  DISCHARGE INSTRUCTIONS:  Seek care immediately if:  You cough up blood, have a fast heartbeat, increased fatigue, confusion, chest pain, shortness of breath, lips turn blue, your symptioms get worse 48 hours after strating antibiotics, Fever above 100.4, decreased appetite, nausea vomiting.  Antibiotics treat pneumonia caused by bacteria.  Acetaminophen decreases pain and fever.  Acetaminophen can cause liver damage if not taken correctly. Do not use more than 4 grams (4,000 milligrams) total of acetaminophen in one day.  NSAIDS decrease inflammation. Ask a doctor before taking.   Manage your symptoms:  Rest as needed. Rest often throughout the day. Alternate times of activity with times of rest.  Drink liquids as directed.   Prevent pneumonia:  Prevent the spread of germs. Wash your hands often with soap and water. Use gel hand cleanser when there is no soap and water available.   Limit alcohol. Women should limit alcohol to 1 drink a day. Men should limit alcohol to 2 drinks a day. A drink of alcohol is 12 ounces of beer, 5 ounces of wine, or 1½ ounces of liquor.  Ask about vaccines. You may need a vaccine to help prevent pneumonia. Get an influenza (flu) vaccine every year as soon as it becomes available.  You were diagnosed with sepsis caused by pneumonia. You were treated with antibiotics and your pneumonia is now resolved. Please follow up with your PCP Dr. Ana Luisa Sr in 1-2 weeks.      SECONDARY DISCHARGE DIAGNOSES  Diagnosis: MAGDALENO (acute kidney injury)  Assessment and Plan of Treatment: During your hospital stay you were found to have an Acute Kidney Injury. The exact cause of this injury is unknown but it is likely that you were somewhat dehydrated, causing lack of blood flow to your kidneys and causing some mild reversible kidney injury. The Blood levels have returned back to your baseline.  Follow up with your PMD regarding this issue and if there is any further assessment needed to follow up and or medication adjustments needed.  Keep Hydrated as directed by your physician to prevent future occurences of any Kidney Injury.   SEEK IMMEDIATE MEDICAL CARE IF YOU HAVE ANY OF THE FOLLOWING SYMPTOMS: chest pain, shortness of breath, abdominal pain, sweating, vomiting, blood in vomit/bowel movements/urine, dizziness/lightheadedness, weakness or numbness to face/arm/leg, trouble speaking or understanding, facial droop.      Diagnosis: Lymphadenopathy  Assessment and Plan of Treatment: During your hospital stay, your CT scan of the abdomen and pelvis showed new mediastinal, axillary, hilar, supraclavicular, retroperitoneal, and bilateral inguinal lymphadenopathy as above. These findings are highly suspicious for neoplastic process such as lymphoma, however it can possibly indicate inflammatory etiologies in your case since you have a history of rheumatoid arthritis. You were offered a lymph node biopsy to help in aiding diagnosis. However, you declined a biopsy after being exlplained the risks, benefits and outcomes of diagnosis and treatment.  Please follow up with Hematology/Oncology Dr. Gaby Grant as needed.      Diagnosis: Atrial fibrillation  Assessment and Plan of Treatment: Atrial Fibrillation  Atrial fibrillation is a type of irregular heartbeat (arrhythmia) where the heart quivers continuously in a chaotic pattern that makes the heart unable to pump blood normally. This can increase the risk for stroke, heart failure, and other heart-related conditions. Atrial fibrillation can be caused by a variety of conditions and may be temporary, intermittent, or permanent. Symptoms include feeling that your heart is beating rapidly or irregularly, chest discomfort, shortness of breath, or dizziness/lightheadedness that may be worse with exertion. Treatment is varied but may involve medication or electrical shock (cardioversion).  SEEK IMMEDIATE MEDICAL CARE IF YOU HAVE ANY OF THE FOLLOWING SYMPTOMS: chest pain, shortness of breath, abdominal pain, sweating, vomiting, blood in vomit/bowel movements/urine, dizziness/lightheadedness, weakness or numbness to face/arm/leg, trouble speaking or understanding, facial droop.  You have a new diagnosis of Paroxsymal Atrial fibrillation. You were started on a medication called Metoprolol to control your heart rate. Since you were already on coumadin, there was no need for additional anticoagulation. Please follow up Cardiologist Dr. Bolden in 1-2 weeks.  PLEASE GET YOUR INR CHECKED AT [facility] every 2 weeks as [facility].

## 2021-09-21 NOTE — PROGRESS NOTE ADULT - SUBJECTIVE AND OBJECTIVE BOX
JOEY KNAPP 81y Female  MRN#: [id number]   CODE STATUS: FULL      SUBJECTIVE  Patient is a 81y old Female who presents with a chief complaint of 3 days of Weakness & Diarrhea; (20 Sep 2021 14:42)    Today is hospital day 17d,   INTERVAL HPI/OVERNIGHT EVENTS:    Examined the pt at bedside this AM. Pt has no acute events overnight. Pt unlikely to go for Inguinal Lymph node biopsy because INR is      Present Today:           Cruz Catheter (x)No/ ()Yes?   Indication:             Central Line (x)No/ ()Yes?   Indication:          IV Fluids (x)No/ ()Yes? Type:  Rate:  Indication:    OBJECTIVE  PAST MEDICAL & SURGICAL HISTORY  H/O prothrombin mutation    Pulmonary embolism    HTN (hypertension)    Anemia requiring transfusions    Deep vein thrombosis (DVT)    S/P tonsillectomy    H/O: hysterectomy    History of cholecystectomy      ALLERGIES:  No Known Allergies    HOME MEDICATIONS:  Home Medications:  hydroCHLOROthiazide 12.5 mg oral tablet: 1 tab(s) orally once a day (03 Sep 2021 21:05)  losartan 100 mg oral tablet: 1 tab(s) orally once a day (03 Sep 2021 21:05)  metoprolol succinate 100 mg oral tablet, extended release: 1 tab(s) orally once a day (03 Sep 2021 21:05)  warfarin 2.5 mg oral tablet:  (03 Sep 2021 21:05)    MEDICATIONS:  STANDING MEDICATIONS  chlorhexidine 4% Liquid 1 Application(s) Topical <User Schedule>  gabapentin 100 milliGRAM(s) Oral daily  metoprolol tartrate 50 milliGRAM(s) Oral two times a day  nystatin Ointment 1 Application(s) Topical two times a day  pantoprazole    Tablet 40 milliGRAM(s) Oral before breakfast    PRN MEDICATIONS  acetaminophen   Tablet .. 650 milliGRAM(s) Oral every 6 hours PRN  aluminum hydroxide/magnesium hydroxide/simethicone Suspension 30 milliLiter(s) Oral every 4 hours PRN  melatonin 3 milliGRAM(s) Oral at bedtime PRN  ondansetron Injectable 4 milliGRAM(s) IV Push every 8 hours PRN  oxycodone    5 mG/acetaminophen 325 mG 1 Tablet(s) Oral every 12 hours PRN      VITAL SIGNS: Last 24 Hours  T(C): 35.7 (21 Sep 2021 00:01), Max: 36.1 (20 Sep 2021 11:30)  T(F): 96.3 (21 Sep 2021 00:01), Max: 97 (20 Sep 2021 11:30)  HR: 86 (21 Sep 2021 00:01) (64 - 86)  BP: 106/57 (21 Sep 2021 00:01) (100/60 - 119/67)  BP(mean): --  RR: 18 (21 Sep 2021 00:01) (18 - 18)  SpO2: 96% (20 Sep 2021 11:30) (96% - 96%)    LABS:                        9.3    5.75  )-----------( 260      ( 20 Sep 2021 08:25 )             30.6     09-20    137  |  103  |  23<H>  ----------------------------<  135<H>  3.9   |  25  |  0.8    Ca    8.1<L>      20 Sep 2021 08:25  Mg     1.8     09-20    TPro  4.7<L>  /  Alb  2.5<L>  /  TBili  0.4  /  DBili  x   /  AST  19  /  ALT  12  /  AlkPhos  104  09-20    PT/INR - ( 20 Sep 2021 08:25 )   PT: 32.90 sec;   INR: 2.89 ratio         PTT - ( 20 Sep 2021 08:25 )  PTT:36.4 sec            RADIOLOGY:  < from: Xray Chest 1 View-PORTABLE IMMEDIATE (Xray Chest 1 View-PORTABLE IMMEDIATE .) (09.08.21 @ 09:53) >  Impression:        Stable left opacity/effusion  Minimal blunting right costophrenic angle  . No pneumothorax.    < end of copied text >  < from: US Kidney and Bladder (09.05.21 @ 22:27) >  IMPRESSION:    No hydronephrosis bilaterally.    Urinary bladder prevoid volume of 534 cc. Patient unable to void during this examination.    --- End of Report ---    < end of copied text >  < from: CT Abdomen and Pelvis No Cont (09.04.21 @ 04:11) >  IMPRESSION:    New mediastinal, axillary, hilar, supraclavicular, retroperitoneal, and bilateral inguinal lymphadenopathy as above. Findings highly suspicious for neoplastic process such as lymphoma, however consider less likely but possible inflammatory etiologies in patient with history of rheumatoid arthritis. Oncology workup recommended.    Left lower lobe lobe bronchus filling defects, with left lung lower lobe consolidative opacification. Additional bilateral bilateral patchy opacities. Findings consistent with pneumonia; correlate for aspiration.    Bilateral pulmonary nodular opacities. Findings may be inflammatory, infectious or neoplastic in etiology. Follow-up CT after treatment recommended.    < end of copied text >  < from: CT Chest No Cont (09.04.21 @ 04:10) >  IMPRESSION:    New mediastinal, axillary, hilar, supraclavicular, retroperitoneal, and bilateral inguinal lymphadenopathy as above. Findings highly suspicious for neoplastic process such as lymphoma, however consider less likely but possible inflammatory etiologies in patient with history of rheumatoid arthritis. Oncology workup recommended.    Left lower lobe lobe bronchus filling defects, with left lung lower lobe consolidative opacification. Additional bilateral bilateral patchy opacities. Findings consistent with pneumonia; correlate for aspiration.    Bilateral pulmonary nodular opacities. Findings may be inflammatory, infectious or neoplastic in etiology. Follow-up CT after treatment recommended.    < end of copied text >      ECHO:  PHYSICIAN INTERPRETATION:  Left Ventricle: Left ventricular ejection fraction, by visual estimation, is 60 to 65%. Spectral Doppler shows impaired relaxation pattern of left ventricular myocardial filling (Grade I diastolic dysfunction).  Right Ventricle: Normal right ventricular size and function. The right ventricular size is normal.  Left Atrium: Mild to moderately enlarged left atrium.  Right Atrium: Moderately enlarged right atrium.  Pericardium: Trivial pericardial effusion is present.  Mitral Valve: There is mild mitral annular calcification. No evidence of mitral stenosis. No evidence of mitral valve regurgitation is seen.  Tricuspid Valve: The tricuspid valve is normal in structure. Moderate tricuspid regurgitation is visualized.  Aortic Valve: The aortic valve is trileaflet. No evidence of aortic stenosis. Peak transaortic gradient equals 6.6 mmHg, mean transaortic gradient equals 4.4 mmHg, the calculated aortic valve area equals 3.98 cm² by the continuity equation consistent with normally opening aortic valve. No evidence of aortic valve regurgitation is seen.  Pulmonic Valve: Trace pulmonic valve regurgitation.  Pulmonary Artery: Pulmonary hypertension is present.    PHYSICAL EXAM:  GENERAL: NAD, well-developed, AAOx3  HEENT:  Atraumatic, Normocephalic. EOMI, PERRLA, conjunctiva and sclera clear, No JVD  PULMONARY: Clear to auscultation bilaterally; No wheeze  CARDIOVASCULAR: irregular rhythm; No murmurs, rubs, or gallops  GASTROINTESTINAL: Soft, Nontender, Nondistended; Bowel sounds present  MUSCULOSKELETAL/EXT:  2+ Peripheral Pulses, slight leg swelling; Tenderness to palpation b/l; Pt has pain when trying to move b/l but is improved; Bruising b/l UE that are old as per the pt.  NEUROLOGY: non-focal  SKIN: No rashes or lesions

## 2021-09-21 NOTE — DISCHARGE NOTE PROVIDER - NSDCMRMEDTOKEN_GEN_ALL_CORE_FT
allopurinol 100 mg oral tablet: orally once a day  melatonin 3 mg oral tablet: 1 tab(s) orally once a day (at bedtime), As needed, Insomnia  metoprolol succinate 100 mg oral tablet, extended release: 1 tab(s) orally once a day  oxycodone-acetaminophen 5 mg-325 mg oral tablet: 1 tab(s) orally every 12 hours, As needed, Moderate Pain (4 - 6)  warfarin 2.5 mg oral tablet: 1 tab(s) orally once a day (at bedtime) -- currently held as INR is 3.2

## 2021-09-21 NOTE — PROGRESS NOTE ADULT - ATTENDING COMMENTS
81 yr old female presented with c/o sepsis.  # treated for PNA and atelectasis of lung with ABX-- for 2 days-- ID wants to monitor her off ABX--  Bl cx negative--Not spiking fever  anymore   # hx of prothrombin gene mutation and Hx of PE with coumadin-- INR high at presentation. on coumadin--  now on hold-- INR supratherapeutic  # Lymphadenopathy-- diffuse-- biopsy planned--  suspicion for lymphoma-- patient had refused it earlier. currently no fever and will defer biopsy as family is also reluctant.     # Chr DVT--lt femoral-- INR high at presentation  # hx of sarcoidosis----daughters said she never had a pulm doctor outpatient-- diagnosis of sarcoidosis? patient said she did see Dr Avila At some point but does not know what sarcoidosis is. She is very alert and knows her history.   # B/l lower ext-- redness near great toe-- will treat with steroids for possible ac attack of gout-- has hx of gout.    daughter--[phone]-- was called and explained that we will not do LN biopsy as INR is high and she has no fever now.    Daughter wants [facility] .  Dc to SNF today-- spent more than 30mins
81 yr old female presented with c/o sepsis.  # treated for PNA and atelectasis of lung with ABX-- for 2 days-- ID wants to monitor her off ABX-- spiking fever Bl cx negative  # hx of prothrombin gene mutation and Hx of PE with coumadin-- INR low at presentation. on coumadin--    # Lymphadenopathy-- diffuse-- biopsy should be done-- now spiking fever-- high suspicion for lymphoma-- patient had refused it earlier. spoke with her daughter and she is not agreeable for any procedure-- she was told-- difficult to obtain diagnosis if no biopsy is done.  # Chr DVT--lt femoral-- INR was low at presentation  # hx of sarcoidosis-- refused biopsy as per pulm-- recalling pulm--daughters said she never had a pulm doctor outpatient-- diagnosis of sarcoidosis?   Plan to be discussed with pulm and hematology for biopsy again AM
Sepsis POA   Septic Shock improved    UTI / PNA  Generalized Lymphadenopathy possible lymphoma   MAGDALENO on CKD  HO PE on coumadin  Supra therapeutic INR  LLL atelectasis infiltrate with possible endobronchial filling defect      Plan as outlined above
81 yr old female presented with c/o sepsis.  # treated for PNA and atelectasis of lung with ABX-- for 2 days-- ID wants to monitor her off ABX--  Bl cx negative--Not spiking fever  anymore   # hx of prothrombin gene mutation and Hx of PE with coumadin-- INR high at presentation. on coumadin--  now on hold-- INR supratherapeutic  # Lymphadenopathy-- diffuse-- biopsy planned-- high suspicion for lymphoma-- patient had refused it earlier. currently no fever and will defer biopsy as family is also reluctant.     # Chr DVT--lt femoral-- INR high at presentation  # hx of sarcoidosis----daughters said she never had a pulm doctor outpatient-- diagnosis of sarcoidosis? patient said she did see Dr Avila At some point but does not know what sarcoidosis is. She is very alert and knows her history.   # B/l lower ext-- redness near great toe-- will treat with steroids for possible ac attack of gout-- has hx of gout.    daughter--[phone]-- was called and explained that we will not do LN biopsy as INR is high and she has no fever now.  PT eval -- Dc planning  Daughter wants [facility]
81 yr old female presented with c/o sepsis.  # treated for PNA and atelectasis of lung with ABX-- for 2 days-- ID wants to monitor her off ABX-- spiking fever Bl cx negative  # hx of prothrombin gene mutation and Hx of PE with coumadin-- INR high at presentation. on coumadin--    # Lymphadenopathy-- diffuse-- biopsy should be done-- now spiking fever-- high suspicion for lymphoma-- patient had refused it earlier. spoke with second daughter and she is agreeable for y procedure-- she was told-- difficult to obtain diagnosis if no biopsy is done. she agrees with LN biopsy  # Chr DVT--lt femoral-- INR high at presentation  # hx of sarcoidosis----daughters said she never had a pulm doctor outpatient-- diagnosis of sarcoidosis? patient said she did see Dr Avila At some point but does not know what sarcoidosis is. She is very alert   Plan to  get LN biopsy-- patient and daughter are agreeable-    daughter--[phone]-- for consent she is RN.
81 yr old female presented with c/o sepsis.  # treated for PNA and atelectasis of lung with ABX-- for 2 days-- ID wants to monitor her off ABX-- spiking fever Bl cx negative  # hx of prothrombin gene mutation and Hx of PE with coumadin-- INR high at presentation. on coumadin--  now on hold  # Lymphadenopathy-- diffuse-- biopsy should be done-- now spiking fever-- high suspicion for lymphoma-- patient had refused it earlier. spoke with second daughter and she is agreeable for y procedure-- she was told-- difficult to obtain diagnosis if no biopsy is done. she agrees with LN biopsy  # Chr DVT--lt femoral-- INR high at presentation  # hx of sarcoidosis----daughters said she never had a pulm doctor outpatient-- diagnosis of sarcoidosis? patient said she did see Dr Avila At some point but does not know what sarcoidosis is. She is very alert and knows her history.   Plan to  get LN biopsy-- patient and daughter are agreeable- patient has improvement in lower ext edema and has not spiked since 24 hrs-- she feels good and wants to do PT.    daughter--[phone]-- for consent she is RN.  PT eval and is no fever LN biopsy will be cancelled on monday
81 yr old female presented with c/o sepsis.  # treated for PNA and atelectasis of lung with ABX.  # hx of prothrombin gene mutation and Hx of PE with coumadin-- INR low at presentation. on lovenox and coumadin-- not sure why INR has to be 2.5-- 3.5  # Lymphadenopathy-- diffuse-- biopsy should be done-- now spiking fever-- high suspicion for lymphoma-- patient had refused it earlier.  # Chr DVT--lt femoral  # hx of sarcoidosis-- refused biopsy as per pulm   Plan to be discussed with pulm and hematology for biopsy.
Impression:  Sepsis POA   Septic Shock resolved   UTI / PNA on ABX therapy   Generalized Lymphadenopathy possible lymphoma , history of sarcoidosis  MAGDALENO on CKD improving   HO PE was on coumadin  Supra therapeutic INR  LLL atelectasis / infiltrate with possible endobronchial filling defect.  Patient and daughter refusing bronchoscopy    Plan as outlined above

## 2021-09-21 NOTE — DISCHARGE NOTE PROVIDER - PROVIDER TOKENS
PROVIDER:[TOKEN:[[id number]],FOLLOWUP:[1 week]],PROVIDER:[TOKEN:[[id number]],FOLLOWUP:[Routine]] PROVIDER:[TOKEN:[[id number]],FOLLOWUP:[1 week]],PROVIDER:[TOKEN:[[id number]],FOLLOWUP:[Routine]],PROVIDER:[TOKEN:[[id number]],FOLLOWUP:[2 weeks]]

## 2021-09-21 NOTE — PROGRESS NOTE ADULT - ASSESSMENT
80 y/o woman with PMH of prothrombin gene mutation and PE on lifelong anticoagulation with coumadin, Sarcoidosis, HTN and chronic diarrhea (possibly due to IBS) presented with weakness and worsening watery diarrhea. In the ED, she was diagnosed with septic shock, MAGDALENO, pneumonia and extensive LAD.    #Septic shock 2/2 Aspiration VS GN Pneumonia - resolved  -Blood c/x @9/14 - No Growth to Date  -Urine Legionella and Strep negative  -Pt has spikes in Temp (Tmax 101.5 @ 9/15 22:00, 100.6 @ 0:52). Last Tmax (99.8 @9/19 7:45)  -ID recs appreciated  -d/c zosyn  -fevers could be from Lymphoma or Sarcoid flare    #Extensive LAD  -CT A/P showed - Mediastinal, axillary, hilar, supraclavicular, retroperitoneal, and bilateral inguinal lymphadenopathy. 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 Right leg free from thrombus  -Pain control - Percocet 5/325 q12 PRN    #Paroxsymal Atrial Fibrillation - new onset  -On metoprolol - please continue and monitor vitals  -Holding Coumadin until INR 1.5 for Lymph node biopsy  -Contacted by pharmacy and was told pt follows [facility] pt [facility] and takes 1.5mg of Coumadin on Sunday, Wednesday and Friday. She then takes 2.5mg on Monday, Tuesday, Thursday and Saturday.  -Daily INR check  -Hx of PE on Coumadin    #Sarcoidosis vs Lymphoma  -Patient refused Bronchoscopy and B/X - pt will f/u w/Dr. Botello out pt  -Unremarkable ACE and LDH level  -Extensive LAD on CT  -Pt is spiking fevers (Tmax 101.5 @ 9/15 22:00)  - Will f/u with Pulm and Heme/Onc    #Hx of Gout - LE pain could be 2/2 gout flare  -s/p 40mg prenisone - 1 time dose    #PT eval  -PT saw the pt but she was unable to ambulate due to pain  -100mg Gabapentin for now. May increase as necessary    Dispo: Pt wants to go Rehab Facility   80 y/o woman with PMH of prothrombin gene mutation and PE on lifelong anticoagulation with coumadin, Sarcoidosis, HTN and chronic diarrhea (possibly due to IBS) presented with weakness and worsening watery diarrhea. In the ED, she was diagnosed with septic shock, MAGDALENO, pneumonia and extensive LAD.    #Septic shock 2/2 Aspiration VS GN Pneumonia - resolved  -Blood c/x @9/14 - No Growth to Date  -Urine Legionella and Strep negative  -Pt has spikes in Temp (Tmax 101.5 @ 9/15 22:00, 100.6 @ 0:52). Last Tmax (99.8 @9/19 7:45)  -ID recs appreciated  -d/c zosyn  -fevers could be from Lymphoma or Sarcoid flare    #Extensive LAD  -CT A/P showed - Mediastinal, axillary, hilar, supraclavicular, retroperitoneal, and bilateral inguinal lymphadenopathy. Findings highly suspicious for neoplastic process such as lymphoma  Pt was also explained that the lymphopathy can be malignancy. Pt initially agreed to inguinal node biopsy. Coumadin was halted to correct INR below 1.5 for inguinal lymph node biopsy with IR. Last coumadin dose was given on 9/16. INR was trended and it did not drop below 1.5. On 9/21, pt decided that she did not want to pursue the inguinal lymph node biopsy. Pt was explained that a biopsy can aid in diagnosing an underlying malignancy. Pt understood this and demonstrated teach back. Pt refused any types of biopsy, stating that at her age, treatment risks would outweigh the benefits. Pt did not want any biopsies.     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 Right leg free from thrombus  -Pain control - Percocet 5/325 q12 PRN    #Paroxsymal Atrial Fibrillation - new onset  -On metoprolol - please continue and monitor vitals  -Holding Coumadin until INR 1.5 for Lymph node biopsy  -Contacted by pharmacy and was told pt follows with [facility] pt [facility] and takes 1.5mg of Coumadin on Sunday, Wednesday and Friday. She then takes 2.5mg on Monday, Tuesday, Thursday and Saturday.  -Daily INR check  -Hx of PE on Coumadin    #Sarcoidosis vs Lymphoma  -Patient refused Bronchoscopy and B/X - pt will f/u w/Dr. Botello out pt  -Unremarkable ACE and LDH level  -Extensive LAD on CT  -Pt is spiking fevers (Tmax 101.5 @ 9/15 22:00)  - Will f/u with Pulm and Heme/Onc    #Hx of Gout - LE pain could be 2/2 gout flare  -s/p 40mg prenisone - 1 time dose    #PT eval  -PT saw the pt but she was unable to ambulate due to pain  -100mg Gabapentin for now. May increase as necessary    Dispo: Pt wants to go Rehab Facility

## 2021-09-21 NOTE — PROGRESS NOTE ADULT - REASON FOR ADMISSION
3 days of Weakness & Diarrhea;
mediastinal adenopathy
3 days of Weakness & Diarrhea;

## 2021-09-21 NOTE — DISCHARGE NOTE NURSING/CASE MANAGEMENT/SOCIAL WORK - NSDCPEPTCOWADIET_GEN_ALL_CORE
Keep your intake of vitamin K regular. The highest amount of vitamin K is found in green and leafy vegetables like broccoli, lettuces, cabbage, and spinach. You can eat these foods but keep the portion size the same. Changes in the amount you eat can affect your PT/INR blood test. Contact your doctor before making any major changes in your diet. Limit your alcohol intake.
Negative/Unknown

## 2021-09-21 NOTE — DISCHARGE NOTE NURSING/CASE MANAGEMENT/SOCIAL WORK - PATIENT PORTAL LINK FT
You can access the FollowMyHealth Patient Portal offered by [facility] by registering at the following website: [email or web address]. By joining [employer]’s FollowMyHealth portal, you will also be able to view your health information using other applications (apps) compatible with our system.

## 2021-09-26 DIAGNOSIS — I82.512 CHRONIC EMBOLISM AND THROMBOSIS OF LEFT FEMORAL VEIN: ICD-10-CM

## 2021-09-26 DIAGNOSIS — N39.0 URINARY TRACT INFECTION, SITE NOT SPECIFIED: ICD-10-CM

## 2021-09-26 DIAGNOSIS — D86.0 SARCOIDOSIS OF LUNG: ICD-10-CM

## 2021-09-26 DIAGNOSIS — K52.9 NONINFECTIVE GASTROENTERITIS AND COLITIS, UNSPECIFIED: ICD-10-CM

## 2021-09-26 DIAGNOSIS — J69.0 PNEUMONITIS DUE TO INHALATION OF FOOD AND VOMIT: ICD-10-CM

## 2021-09-26 DIAGNOSIS — E87.1 HYPO-OSMOLALITY AND HYPONATREMIA: ICD-10-CM

## 2021-09-26 DIAGNOSIS — Z79.01 LONG TERM (CURRENT) USE OF ANTICOAGULANTS: ICD-10-CM

## 2021-09-26 DIAGNOSIS — R65.21 SEVERE SEPSIS WITH SEPTIC SHOCK: ICD-10-CM

## 2021-09-26 DIAGNOSIS — A41.50 GRAM-NEGATIVE SEPSIS, UNSPECIFIED: ICD-10-CM

## 2021-09-26 DIAGNOSIS — E11.22 TYPE 2 DIABETES MELLITUS WITH DIABETIC CHRONIC KIDNEY DISEASE: ICD-10-CM

## 2021-09-26 DIAGNOSIS — Z86.711 PERSONAL HISTORY OF PULMONARY EMBOLISM: ICD-10-CM

## 2021-09-26 DIAGNOSIS — Z53.29 PROCEDURE AND TREATMENT NOT CARRIED OUT BECAUSE OF PATIENT'S DECISION FOR OTHER REASONS: ICD-10-CM

## 2021-09-26 DIAGNOSIS — J90 PLEURAL EFFUSION, NOT ELSEWHERE CLASSIFIED: ICD-10-CM

## 2021-09-26 DIAGNOSIS — Z87.891 PERSONAL HISTORY OF NICOTINE DEPENDENCE: ICD-10-CM

## 2021-09-26 DIAGNOSIS — Z88.1 ALLERGY STATUS TO OTHER ANTIBIOTIC AGENTS STATUS: ICD-10-CM

## 2021-09-26 DIAGNOSIS — J15.6 PNEUMONIA DUE TO OTHER GRAM-NEGATIVE BACTERIA: ICD-10-CM

## 2021-09-26 DIAGNOSIS — E83.42 HYPOMAGNESEMIA: ICD-10-CM

## 2021-09-26 DIAGNOSIS — M06.9 RHEUMATOID ARTHRITIS, UNSPECIFIED: ICD-10-CM

## 2021-09-26 DIAGNOSIS — N18.9 CHRONIC KIDNEY DISEASE, UNSPECIFIED: ICD-10-CM

## 2021-09-26 DIAGNOSIS — C85.90 NON-HODGKIN LYMPHOMA, UNSPECIFIED, UNSPECIFIED SITE: ICD-10-CM

## 2021-09-26 DIAGNOSIS — L30.8 OTHER SPECIFIED DERMATITIS: ICD-10-CM

## 2021-09-26 DIAGNOSIS — N17.9 ACUTE KIDNEY FAILURE, UNSPECIFIED: ICD-10-CM

## 2021-09-26 DIAGNOSIS — D68.52 PROTHROMBIN GENE MUTATION: ICD-10-CM

## 2021-09-26 DIAGNOSIS — J98.11 ATELECTASIS: ICD-10-CM

## 2021-09-26 DIAGNOSIS — E87.6 HYPOKALEMIA: ICD-10-CM

## 2021-09-26 DIAGNOSIS — D63.1 ANEMIA IN CHRONIC KIDNEY DISEASE: ICD-10-CM

## 2021-09-26 DIAGNOSIS — I12.9 HYPERTENSIVE CHRONIC KIDNEY DISEASE WITH STAGE 1 THROUGH STAGE 4 CHRONIC KIDNEY DISEASE, OR UNSPECIFIED CHRONIC KIDNEY DISEASE: ICD-10-CM

## 2021-09-26 DIAGNOSIS — A41.9 SEPSIS, UNSPECIFIED ORGANISM: ICD-10-CM

## 2021-09-26 DIAGNOSIS — E66.9 OBESITY, UNSPECIFIED: ICD-10-CM

## 2021-09-26 DIAGNOSIS — I48.0 PAROXYSMAL ATRIAL FIBRILLATION: ICD-10-CM

## 2021-09-28 ENCOUNTER — APPOINTMENT (OUTPATIENT)
Dept: MEDICATION MANAGEMENT | Facility: CLINIC | Age: 81
End: 2021-09-28

## 2021-11-04 PROBLEM — D64.9 ANEMIA, UNSPECIFIED: Chronic | Status: ACTIVE | Noted: 2021-09-03

## 2021-11-04 PROBLEM — Z86.2 PERSONAL HISTORY OF DISEASES OF THE BLOOD AND BLOOD-FORMING ORGANS AND CERTAIN DISORDERS INVOLVING THE IMMUNE MECHANISM: Chronic | Status: ACTIVE | Noted: 2021-09-03

## 2021-11-04 PROBLEM — I26.99 OTHER PULMONARY EMBOLISM WITHOUT ACUTE COR PULMONALE: Chronic | Status: ACTIVE | Noted: 2021-09-03

## 2021-11-04 PROBLEM — I82.409 ACUTE EMBOLISM AND THROMBOSIS OF UNSPECIFIED DEEP VEINS OF UNSPECIFIED LOWER EXTREMITY: Chronic | Status: ACTIVE | Noted: 2021-09-04

## 2021-11-04 PROBLEM — I10 ESSENTIAL (PRIMARY) HYPERTENSION: Chronic | Status: ACTIVE | Noted: 2021-09-03

## 2021-11-10 ENCOUNTER — NON-APPOINTMENT (OUTPATIENT)
Age: 81
End: 2021-11-10

## 2021-11-10 ENCOUNTER — APPOINTMENT (OUTPATIENT)
Dept: MEDICATION MANAGEMENT | Facility: CLINIC | Age: 81
End: 2021-11-10

## 2021-11-10 ENCOUNTER — OUTPATIENT (OUTPATIENT)
Dept: OUTPATIENT SERVICES | Facility: HOSPITAL | Age: 81
LOS: 1 days | Discharge: HOME | End: 2021-11-10

## 2021-11-10 DIAGNOSIS — Z79.01 LONG TERM (CURRENT) USE OF ANTICOAGULANTS: ICD-10-CM

## 2021-11-10 DIAGNOSIS — Z90.49 ACQUIRED ABSENCE OF OTHER SPECIFIED PARTS OF DIGESTIVE TRACT: Chronic | ICD-10-CM

## 2021-11-10 DIAGNOSIS — I48.91 UNSPECIFIED ATRIAL FIBRILLATION: ICD-10-CM

## 2021-11-10 DIAGNOSIS — Z90.89 ACQUIRED ABSENCE OF OTHER ORGANS: Chronic | ICD-10-CM

## 2021-11-10 DIAGNOSIS — Z90.710 ACQUIRED ABSENCE OF BOTH CERVIX AND UTERUS: Chronic | ICD-10-CM

## 2021-11-10 LAB
INR PPP: 3.56 RATIO
POCT-PROTHROMBIN TIME: 39.5 SECS
QUALITY CONTROL: NORMAL

## 2021-11-17 ENCOUNTER — OUTPATIENT (OUTPATIENT)
Dept: OUTPATIENT SERVICES | Facility: HOSPITAL | Age: 81
LOS: 1 days | Discharge: HOME | End: 2021-11-17

## 2021-11-17 ENCOUNTER — APPOINTMENT (OUTPATIENT)
Dept: MEDICATION MANAGEMENT | Facility: CLINIC | Age: 81
End: 2021-11-17

## 2021-11-17 DIAGNOSIS — I48.91 UNSPECIFIED ATRIAL FIBRILLATION: ICD-10-CM

## 2021-11-17 DIAGNOSIS — Z90.710 ACQUIRED ABSENCE OF BOTH CERVIX AND UTERUS: Chronic | ICD-10-CM

## 2021-11-17 DIAGNOSIS — Z90.89 ACQUIRED ABSENCE OF OTHER ORGANS: Chronic | ICD-10-CM

## 2021-11-17 DIAGNOSIS — Z79.01 LONG TERM (CURRENT) USE OF ANTICOAGULANTS: ICD-10-CM

## 2021-11-17 DIAGNOSIS — Z90.49 ACQUIRED ABSENCE OF OTHER SPECIFIED PARTS OF DIGESTIVE TRACT: Chronic | ICD-10-CM

## 2021-11-17 LAB
INR PPP: 2.28 RATIO
QUALITY CONTROL: YES

## 2021-11-19 ENCOUNTER — APPOINTMENT (OUTPATIENT)
Dept: MEDICATION MANAGEMENT | Facility: CLINIC | Age: 81
End: 2021-11-19

## 2021-11-19 ENCOUNTER — OUTPATIENT (OUTPATIENT)
Dept: OUTPATIENT SERVICES | Facility: HOSPITAL | Age: 81
LOS: 1 days | Discharge: HOME | End: 2021-11-19

## 2021-11-19 DIAGNOSIS — Z90.710 ACQUIRED ABSENCE OF BOTH CERVIX AND UTERUS: Chronic | ICD-10-CM

## 2021-11-19 DIAGNOSIS — Z90.49 ACQUIRED ABSENCE OF OTHER SPECIFIED PARTS OF DIGESTIVE TRACT: Chronic | ICD-10-CM

## 2021-11-19 DIAGNOSIS — I48.91 UNSPECIFIED ATRIAL FIBRILLATION: ICD-10-CM

## 2021-11-19 DIAGNOSIS — Z90.89 ACQUIRED ABSENCE OF OTHER ORGANS: Chronic | ICD-10-CM

## 2021-11-19 DIAGNOSIS — Z79.01 LONG TERM (CURRENT) USE OF ANTICOAGULANTS: ICD-10-CM

## 2021-11-19 LAB
INR PPP: 3.3 RATIO
QUALITY CONTROL: YES

## 2021-11-29 ENCOUNTER — APPOINTMENT (OUTPATIENT)
Dept: MEDICATION MANAGEMENT | Facility: CLINIC | Age: 81
End: 2021-11-29

## 2021-11-29 ENCOUNTER — OUTPATIENT (OUTPATIENT)
Dept: OUTPATIENT SERVICES | Facility: HOSPITAL | Age: 81
LOS: 1 days | Discharge: HOME | End: 2021-11-29

## 2021-11-29 DIAGNOSIS — Z90.89 ACQUIRED ABSENCE OF OTHER ORGANS: Chronic | ICD-10-CM

## 2021-11-29 DIAGNOSIS — I48.91 UNSPECIFIED ATRIAL FIBRILLATION: ICD-10-CM

## 2021-11-29 DIAGNOSIS — Z90.49 ACQUIRED ABSENCE OF OTHER SPECIFIED PARTS OF DIGESTIVE TRACT: Chronic | ICD-10-CM

## 2021-11-29 DIAGNOSIS — Z90.710 ACQUIRED ABSENCE OF BOTH CERVIX AND UTERUS: Chronic | ICD-10-CM

## 2021-11-29 DIAGNOSIS — Z79.01 LONG TERM (CURRENT) USE OF ANTICOAGULANTS: ICD-10-CM

## 2021-11-29 LAB
INR PPP: 2 RATIO
QUALITY CONTROL: YES

## 2021-12-06 ENCOUNTER — OUTPATIENT (OUTPATIENT)
Dept: OUTPATIENT SERVICES | Facility: HOSPITAL | Age: 81
LOS: 1 days | Discharge: HOME | End: 2021-12-06

## 2021-12-06 ENCOUNTER — APPOINTMENT (OUTPATIENT)
Dept: MEDICATION MANAGEMENT | Facility: CLINIC | Age: 81
End: 2021-12-06

## 2021-12-06 DIAGNOSIS — Z90.710 ACQUIRED ABSENCE OF BOTH CERVIX AND UTERUS: Chronic | ICD-10-CM

## 2021-12-06 DIAGNOSIS — I48.91 UNSPECIFIED ATRIAL FIBRILLATION: ICD-10-CM

## 2021-12-06 DIAGNOSIS — Z90.49 ACQUIRED ABSENCE OF OTHER SPECIFIED PARTS OF DIGESTIVE TRACT: Chronic | ICD-10-CM

## 2021-12-06 DIAGNOSIS — Z79.01 LONG TERM (CURRENT) USE OF ANTICOAGULANTS: ICD-10-CM

## 2021-12-06 DIAGNOSIS — Z90.89 ACQUIRED ABSENCE OF OTHER ORGANS: Chronic | ICD-10-CM

## 2021-12-06 LAB
INR PPP: 1.9 RATIO
QUALITY CONTROL: YES

## 2021-12-06 RX ORDER — WARFARIN 1 MG/1
1 TABLET ORAL
Qty: 180 | Refills: 4 | Status: ACTIVE | COMMUNITY
Start: 2021-12-06 | End: 1900-01-01

## 2021-12-13 ENCOUNTER — APPOINTMENT (OUTPATIENT)
Dept: MEDICATION MANAGEMENT | Facility: CLINIC | Age: 81
End: 2021-12-13

## 2021-12-13 ENCOUNTER — OUTPATIENT (OUTPATIENT)
Dept: OUTPATIENT SERVICES | Facility: HOSPITAL | Age: 81
LOS: 1 days | Discharge: HOME | End: 2021-12-13

## 2021-12-13 DIAGNOSIS — Z90.49 ACQUIRED ABSENCE OF OTHER SPECIFIED PARTS OF DIGESTIVE TRACT: Chronic | ICD-10-CM

## 2021-12-13 DIAGNOSIS — Z90.89 ACQUIRED ABSENCE OF OTHER ORGANS: Chronic | ICD-10-CM

## 2021-12-13 DIAGNOSIS — Z90.710 ACQUIRED ABSENCE OF BOTH CERVIX AND UTERUS: Chronic | ICD-10-CM

## 2021-12-13 LAB
INR PPP: 2.8 RATIO
QUALITY CONTROL: YES

## 2021-12-13 RX ORDER — WARFARIN 2 MG/1
2 TABLET ORAL DAILY
Qty: 90 | Refills: 3 | Status: ACTIVE | COMMUNITY
Start: 2021-12-13 | End: 1900-01-01

## 2021-12-20 ENCOUNTER — APPOINTMENT (OUTPATIENT)
Dept: MEDICATION MANAGEMENT | Facility: CLINIC | Age: 81
End: 2021-12-20

## 2021-12-20 ENCOUNTER — OUTPATIENT (OUTPATIENT)
Dept: OUTPATIENT SERVICES | Facility: HOSPITAL | Age: 81
LOS: 1 days | Discharge: HOME | End: 2021-12-20

## 2021-12-20 DIAGNOSIS — I48.91 UNSPECIFIED ATRIAL FIBRILLATION: ICD-10-CM

## 2021-12-20 DIAGNOSIS — Z79.01 LONG TERM (CURRENT) USE OF ANTICOAGULANTS: ICD-10-CM

## 2021-12-20 DIAGNOSIS — Z90.49 ACQUIRED ABSENCE OF OTHER SPECIFIED PARTS OF DIGESTIVE TRACT: Chronic | ICD-10-CM

## 2021-12-20 DIAGNOSIS — Z90.710 ACQUIRED ABSENCE OF BOTH CERVIX AND UTERUS: Chronic | ICD-10-CM

## 2021-12-20 DIAGNOSIS — Z90.89 ACQUIRED ABSENCE OF OTHER ORGANS: Chronic | ICD-10-CM

## 2021-12-20 LAB
INR PPP: 2.7 RATIO
QUALITY CONTROL: YES

## 2022-01-03 ENCOUNTER — APPOINTMENT (OUTPATIENT)
Dept: MEDICATION MANAGEMENT | Facility: CLINIC | Age: 82
End: 2022-01-03

## 2022-01-03 ENCOUNTER — OUTPATIENT (OUTPATIENT)
Dept: OUTPATIENT SERVICES | Facility: HOSPITAL | Age: 82
LOS: 1 days | Discharge: HOME | End: 2022-01-03

## 2022-01-03 DIAGNOSIS — I48.91 UNSPECIFIED ATRIAL FIBRILLATION: ICD-10-CM

## 2022-01-03 DIAGNOSIS — Z90.49 ACQUIRED ABSENCE OF OTHER SPECIFIED PARTS OF DIGESTIVE TRACT: Chronic | ICD-10-CM

## 2022-01-03 DIAGNOSIS — Z79.01 LONG TERM (CURRENT) USE OF ANTICOAGULANTS: ICD-10-CM

## 2022-01-03 DIAGNOSIS — Z90.710 ACQUIRED ABSENCE OF BOTH CERVIX AND UTERUS: Chronic | ICD-10-CM

## 2022-01-03 DIAGNOSIS — Z90.89 ACQUIRED ABSENCE OF OTHER ORGANS: Chronic | ICD-10-CM

## 2022-01-03 LAB
INR PPP: 3.9 RATIO
QUALITY CONTROL: YES

## 2022-01-12 ENCOUNTER — APPOINTMENT (OUTPATIENT)
Dept: MEDICATION MANAGEMENT | Facility: CLINIC | Age: 82
End: 2022-01-12

## 2022-01-12 ENCOUNTER — OUTPATIENT (OUTPATIENT)
Dept: OUTPATIENT SERVICES | Facility: HOSPITAL | Age: 82
LOS: 1 days | Discharge: HOME | End: 2022-01-12

## 2022-01-12 DIAGNOSIS — Z90.49 ACQUIRED ABSENCE OF OTHER SPECIFIED PARTS OF DIGESTIVE TRACT: Chronic | ICD-10-CM

## 2022-01-12 DIAGNOSIS — Z90.710 ACQUIRED ABSENCE OF BOTH CERVIX AND UTERUS: Chronic | ICD-10-CM

## 2022-01-12 DIAGNOSIS — Z90.89 ACQUIRED ABSENCE OF OTHER ORGANS: Chronic | ICD-10-CM

## 2022-01-12 DIAGNOSIS — I48.91 UNSPECIFIED ATRIAL FIBRILLATION: ICD-10-CM

## 2022-01-12 DIAGNOSIS — Z79.01 LONG TERM (CURRENT) USE OF ANTICOAGULANTS: ICD-10-CM

## 2022-01-12 LAB
INR PPP: 3.1 RATIO
QUALITY CONTROL: YES

## 2022-01-18 ENCOUNTER — APPOINTMENT (OUTPATIENT)
Dept: MEDICATION MANAGEMENT | Facility: CLINIC | Age: 82
End: 2022-01-18

## 2022-01-31 ENCOUNTER — OUTPATIENT (OUTPATIENT)
Dept: OUTPATIENT SERVICES | Facility: HOSPITAL | Age: 82
LOS: 1 days | Discharge: HOME | End: 2022-01-31

## 2022-01-31 ENCOUNTER — APPOINTMENT (OUTPATIENT)
Dept: MEDICATION MANAGEMENT | Facility: CLINIC | Age: 82
End: 2022-01-31

## 2022-01-31 DIAGNOSIS — I48.91 UNSPECIFIED ATRIAL FIBRILLATION: ICD-10-CM

## 2022-01-31 DIAGNOSIS — Z79.01 LONG TERM (CURRENT) USE OF ANTICOAGULANTS: ICD-10-CM

## 2022-01-31 DIAGNOSIS — Z90.89 ACQUIRED ABSENCE OF OTHER ORGANS: Chronic | ICD-10-CM

## 2022-01-31 DIAGNOSIS — Z90.710 ACQUIRED ABSENCE OF BOTH CERVIX AND UTERUS: Chronic | ICD-10-CM

## 2022-01-31 DIAGNOSIS — Z90.49 ACQUIRED ABSENCE OF OTHER SPECIFIED PARTS OF DIGESTIVE TRACT: Chronic | ICD-10-CM

## 2022-01-31 LAB
INR PPP: 2.8 RATIO
QUALITY CONTROL: YES

## 2022-02-14 ENCOUNTER — APPOINTMENT (OUTPATIENT)
Dept: MEDICATION MANAGEMENT | Facility: CLINIC | Age: 82
End: 2022-02-14

## 2022-02-14 ENCOUNTER — OUTPATIENT (OUTPATIENT)
Dept: OUTPATIENT SERVICES | Facility: HOSPITAL | Age: 82
LOS: 1 days | Discharge: HOME | End: 2022-02-14

## 2022-02-14 DIAGNOSIS — I48.91 UNSPECIFIED ATRIAL FIBRILLATION: ICD-10-CM

## 2022-02-14 DIAGNOSIS — Z90.710 ACQUIRED ABSENCE OF BOTH CERVIX AND UTERUS: Chronic | ICD-10-CM

## 2022-02-14 DIAGNOSIS — Z90.89 ACQUIRED ABSENCE OF OTHER ORGANS: Chronic | ICD-10-CM

## 2022-02-14 DIAGNOSIS — Z90.49 ACQUIRED ABSENCE OF OTHER SPECIFIED PARTS OF DIGESTIVE TRACT: Chronic | ICD-10-CM

## 2022-02-14 DIAGNOSIS — Z79.01 LONG TERM (CURRENT) USE OF ANTICOAGULANTS: ICD-10-CM

## 2022-02-14 LAB
INR PPP: 2.2 RATIO
QUALITY CONTROL: YES

## 2022-02-28 ENCOUNTER — OUTPATIENT (OUTPATIENT)
Dept: OUTPATIENT SERVICES | Facility: HOSPITAL | Age: 82
LOS: 1 days | Discharge: HOME | End: 2022-02-28

## 2022-02-28 ENCOUNTER — APPOINTMENT (OUTPATIENT)
Dept: MEDICATION MANAGEMENT | Facility: CLINIC | Age: 82
End: 2022-02-28

## 2022-02-28 DIAGNOSIS — Z90.89 ACQUIRED ABSENCE OF OTHER ORGANS: Chronic | ICD-10-CM

## 2022-02-28 DIAGNOSIS — Z79.01 LONG TERM (CURRENT) USE OF ANTICOAGULANTS: ICD-10-CM

## 2022-02-28 DIAGNOSIS — I48.91 UNSPECIFIED ATRIAL FIBRILLATION: ICD-10-CM

## 2022-02-28 DIAGNOSIS — Z90.49 ACQUIRED ABSENCE OF OTHER SPECIFIED PARTS OF DIGESTIVE TRACT: Chronic | ICD-10-CM

## 2022-02-28 DIAGNOSIS — Z90.710 ACQUIRED ABSENCE OF BOTH CERVIX AND UTERUS: Chronic | ICD-10-CM

## 2022-02-28 LAB
INR PPP: 3.1 RATIO
QUALITY CONTROL: YES

## 2022-03-14 ENCOUNTER — OUTPATIENT (OUTPATIENT)
Dept: OUTPATIENT SERVICES | Facility: HOSPITAL | Age: 82
LOS: 1 days | Discharge: HOME | End: 2022-03-14

## 2022-03-14 ENCOUNTER — APPOINTMENT (OUTPATIENT)
Dept: MEDICATION MANAGEMENT | Facility: CLINIC | Age: 82
End: 2022-03-14

## 2022-03-14 DIAGNOSIS — Z90.89 ACQUIRED ABSENCE OF OTHER ORGANS: Chronic | ICD-10-CM

## 2022-03-14 DIAGNOSIS — Z79.01 LONG TERM (CURRENT) USE OF ANTICOAGULANTS: ICD-10-CM

## 2022-03-14 DIAGNOSIS — Z90.710 ACQUIRED ABSENCE OF BOTH CERVIX AND UTERUS: Chronic | ICD-10-CM

## 2022-03-14 DIAGNOSIS — I48.91 UNSPECIFIED ATRIAL FIBRILLATION: ICD-10-CM

## 2022-03-14 DIAGNOSIS — Z90.49 ACQUIRED ABSENCE OF OTHER SPECIFIED PARTS OF DIGESTIVE TRACT: Chronic | ICD-10-CM

## 2022-03-14 LAB
INR PPP: 3 RATIO
QUALITY CONTROL: YES

## 2022-03-28 ENCOUNTER — OUTPATIENT (OUTPATIENT)
Dept: OUTPATIENT SERVICES | Facility: HOSPITAL | Age: 82
LOS: 1 days | Discharge: HOME | End: 2022-03-28

## 2022-03-28 ENCOUNTER — APPOINTMENT (OUTPATIENT)
Dept: MEDICATION MANAGEMENT | Facility: CLINIC | Age: 82
End: 2022-03-28

## 2022-03-28 DIAGNOSIS — Z90.710 ACQUIRED ABSENCE OF BOTH CERVIX AND UTERUS: Chronic | ICD-10-CM

## 2022-03-28 DIAGNOSIS — I48.91 UNSPECIFIED ATRIAL FIBRILLATION: ICD-10-CM

## 2022-03-28 DIAGNOSIS — Z90.89 ACQUIRED ABSENCE OF OTHER ORGANS: Chronic | ICD-10-CM

## 2022-03-28 DIAGNOSIS — Z90.49 ACQUIRED ABSENCE OF OTHER SPECIFIED PARTS OF DIGESTIVE TRACT: Chronic | ICD-10-CM

## 2022-03-28 DIAGNOSIS — Z79.01 LONG TERM (CURRENT) USE OF ANTICOAGULANTS: ICD-10-CM

## 2022-03-28 LAB
INR PPP: 3.5 RATIO
QUALITY CONTROL: YES

## 2022-04-11 ENCOUNTER — APPOINTMENT (OUTPATIENT)
Dept: MEDICATION MANAGEMENT | Facility: CLINIC | Age: 82
End: 2022-04-11

## 2022-04-11 ENCOUNTER — OUTPATIENT (OUTPATIENT)
Dept: OUTPATIENT SERVICES | Facility: HOSPITAL | Age: 82
LOS: 1 days | Discharge: HOME | End: 2022-04-11

## 2022-04-11 DIAGNOSIS — I48.91 UNSPECIFIED ATRIAL FIBRILLATION: ICD-10-CM

## 2022-04-11 DIAGNOSIS — Z79.01 LONG TERM (CURRENT) USE OF ANTICOAGULANTS: ICD-10-CM

## 2022-04-11 DIAGNOSIS — Z90.710 ACQUIRED ABSENCE OF BOTH CERVIX AND UTERUS: Chronic | ICD-10-CM

## 2022-04-11 DIAGNOSIS — Z90.49 ACQUIRED ABSENCE OF OTHER SPECIFIED PARTS OF DIGESTIVE TRACT: Chronic | ICD-10-CM

## 2022-04-11 DIAGNOSIS — Z90.89 ACQUIRED ABSENCE OF OTHER ORGANS: Chronic | ICD-10-CM

## 2022-04-11 LAB
INR PPP: 2.6 RATIO
INR PPP: 2.6 RATIO
QUALITY CONTROL: YES
QUALITY CONTROL: YES

## 2022-04-25 ENCOUNTER — APPOINTMENT (OUTPATIENT)
Dept: MEDICATION MANAGEMENT | Facility: CLINIC | Age: 82
End: 2022-04-25

## 2022-05-09 ENCOUNTER — APPOINTMENT (OUTPATIENT)
Dept: MEDICATION MANAGEMENT | Facility: CLINIC | Age: 82
End: 2022-05-09

## 2022-05-09 ENCOUNTER — OUTPATIENT (OUTPATIENT)
Dept: OUTPATIENT SERVICES | Facility: HOSPITAL | Age: 82
LOS: 1 days | Discharge: HOME | End: 2022-05-09

## 2022-05-09 DIAGNOSIS — I48.91 UNSPECIFIED ATRIAL FIBRILLATION: ICD-10-CM

## 2022-05-09 DIAGNOSIS — Z90.89 ACQUIRED ABSENCE OF OTHER ORGANS: Chronic | ICD-10-CM

## 2022-05-09 DIAGNOSIS — Z79.01 LONG TERM (CURRENT) USE OF ANTICOAGULANTS: ICD-10-CM

## 2022-05-09 DIAGNOSIS — Z90.49 ACQUIRED ABSENCE OF OTHER SPECIFIED PARTS OF DIGESTIVE TRACT: Chronic | ICD-10-CM

## 2022-05-09 DIAGNOSIS — Z90.710 ACQUIRED ABSENCE OF BOTH CERVIX AND UTERUS: Chronic | ICD-10-CM

## 2022-05-09 LAB
INR PPP: 4.4 RATIO
QUALITY CONTROL: YES

## 2022-05-16 ENCOUNTER — APPOINTMENT (OUTPATIENT)
Dept: MEDICATION MANAGEMENT | Facility: CLINIC | Age: 82
End: 2022-05-16

## 2022-05-16 ENCOUNTER — OUTPATIENT (OUTPATIENT)
Dept: OUTPATIENT SERVICES | Facility: HOSPITAL | Age: 82
LOS: 1 days | Discharge: HOME | End: 2022-05-16

## 2022-05-16 DIAGNOSIS — Z90.89 ACQUIRED ABSENCE OF OTHER ORGANS: Chronic | ICD-10-CM

## 2022-05-16 DIAGNOSIS — Z90.49 ACQUIRED ABSENCE OF OTHER SPECIFIED PARTS OF DIGESTIVE TRACT: Chronic | ICD-10-CM

## 2022-05-16 DIAGNOSIS — Z90.710 ACQUIRED ABSENCE OF BOTH CERVIX AND UTERUS: Chronic | ICD-10-CM

## 2022-05-16 DIAGNOSIS — Z79.01 LONG TERM (CURRENT) USE OF ANTICOAGULANTS: ICD-10-CM

## 2022-05-16 DIAGNOSIS — I48.91 UNSPECIFIED ATRIAL FIBRILLATION: ICD-10-CM

## 2022-05-16 LAB
INR PPP: 4 RATIO
QUALITY CONTROL: NORMAL

## 2022-05-23 ENCOUNTER — APPOINTMENT (OUTPATIENT)
Dept: MEDICATION MANAGEMENT | Facility: CLINIC | Age: 82
End: 2022-05-23

## 2022-05-23 ENCOUNTER — OUTPATIENT (OUTPATIENT)
Dept: OUTPATIENT SERVICES | Facility: HOSPITAL | Age: 82
LOS: 1 days | Discharge: HOME | End: 2022-05-23

## 2022-05-23 DIAGNOSIS — Z90.710 ACQUIRED ABSENCE OF BOTH CERVIX AND UTERUS: Chronic | ICD-10-CM

## 2022-05-23 DIAGNOSIS — Z79.01 LONG TERM (CURRENT) USE OF ANTICOAGULANTS: ICD-10-CM

## 2022-05-23 DIAGNOSIS — Z90.89 ACQUIRED ABSENCE OF OTHER ORGANS: Chronic | ICD-10-CM

## 2022-05-23 DIAGNOSIS — Z90.49 ACQUIRED ABSENCE OF OTHER SPECIFIED PARTS OF DIGESTIVE TRACT: Chronic | ICD-10-CM

## 2022-05-23 DIAGNOSIS — I48.91 UNSPECIFIED ATRIAL FIBRILLATION: ICD-10-CM

## 2022-05-23 LAB
INR PPP: 3.6 RATIO
QUALITY CONTROL: YES

## 2022-05-31 ENCOUNTER — APPOINTMENT (OUTPATIENT)
Dept: MEDICATION MANAGEMENT | Facility: CLINIC | Age: 82
End: 2022-05-31

## 2022-05-31 ENCOUNTER — OUTPATIENT (OUTPATIENT)
Dept: OUTPATIENT SERVICES | Facility: HOSPITAL | Age: 82
LOS: 1 days | Discharge: HOME | End: 2022-05-31

## 2022-05-31 DIAGNOSIS — Z90.89 ACQUIRED ABSENCE OF OTHER ORGANS: Chronic | ICD-10-CM

## 2022-05-31 DIAGNOSIS — Z90.710 ACQUIRED ABSENCE OF BOTH CERVIX AND UTERUS: Chronic | ICD-10-CM

## 2022-05-31 DIAGNOSIS — I48.91 UNSPECIFIED ATRIAL FIBRILLATION: ICD-10-CM

## 2022-05-31 DIAGNOSIS — Z79.01 LONG TERM (CURRENT) USE OF ANTICOAGULANTS: ICD-10-CM

## 2022-05-31 DIAGNOSIS — Z90.49 ACQUIRED ABSENCE OF OTHER SPECIFIED PARTS OF DIGESTIVE TRACT: Chronic | ICD-10-CM

## 2022-05-31 LAB
INR PPP: 2.7 RATIO
QUALITY CONTROL: YES

## 2022-06-13 ENCOUNTER — OUTPATIENT (OUTPATIENT)
Dept: OUTPATIENT SERVICES | Facility: HOSPITAL | Age: 82
LOS: 1 days | Discharge: HOME | End: 2022-06-13

## 2022-06-13 ENCOUNTER — APPOINTMENT (OUTPATIENT)
Dept: MEDICATION MANAGEMENT | Facility: CLINIC | Age: 82
End: 2022-06-13

## 2022-06-13 DIAGNOSIS — Z79.01 LONG TERM (CURRENT) USE OF ANTICOAGULANTS: ICD-10-CM

## 2022-06-13 DIAGNOSIS — Z90.89 ACQUIRED ABSENCE OF OTHER ORGANS: Chronic | ICD-10-CM

## 2022-06-13 DIAGNOSIS — I48.91 UNSPECIFIED ATRIAL FIBRILLATION: ICD-10-CM

## 2022-06-13 DIAGNOSIS — Z90.710 ACQUIRED ABSENCE OF BOTH CERVIX AND UTERUS: Chronic | ICD-10-CM

## 2022-06-13 DIAGNOSIS — Z90.49 ACQUIRED ABSENCE OF OTHER SPECIFIED PARTS OF DIGESTIVE TRACT: Chronic | ICD-10-CM

## 2022-06-13 LAB
INR PPP: 2.2 RATIO
QUALITY CONTROL: YES

## 2022-06-21 ENCOUNTER — OUTPATIENT (OUTPATIENT)
Dept: OUTPATIENT SERVICES | Facility: HOSPITAL | Age: 82
LOS: 1 days | Discharge: HOME | End: 2022-06-21

## 2022-06-21 ENCOUNTER — APPOINTMENT (OUTPATIENT)
Dept: MEDICATION MANAGEMENT | Facility: CLINIC | Age: 82
End: 2022-06-21

## 2022-06-21 DIAGNOSIS — I48.91 UNSPECIFIED ATRIAL FIBRILLATION: ICD-10-CM

## 2022-06-21 DIAGNOSIS — Z90.89 ACQUIRED ABSENCE OF OTHER ORGANS: Chronic | ICD-10-CM

## 2022-06-21 DIAGNOSIS — Z79.01 LONG TERM (CURRENT) USE OF ANTICOAGULANTS: ICD-10-CM

## 2022-06-21 DIAGNOSIS — Z90.710 ACQUIRED ABSENCE OF BOTH CERVIX AND UTERUS: Chronic | ICD-10-CM

## 2022-06-21 DIAGNOSIS — Z90.49 ACQUIRED ABSENCE OF OTHER SPECIFIED PARTS OF DIGESTIVE TRACT: Chronic | ICD-10-CM

## 2022-06-27 ENCOUNTER — OUTPATIENT (OUTPATIENT)
Dept: OUTPATIENT SERVICES | Facility: HOSPITAL | Age: 82
LOS: 1 days | Discharge: HOME | End: 2022-06-27

## 2022-06-27 ENCOUNTER — APPOINTMENT (OUTPATIENT)
Dept: MEDICATION MANAGEMENT | Facility: CLINIC | Age: 82
End: 2022-06-27

## 2022-06-27 DIAGNOSIS — Z79.01 LONG TERM (CURRENT) USE OF ANTICOAGULANTS: ICD-10-CM

## 2022-06-27 DIAGNOSIS — Z90.49 ACQUIRED ABSENCE OF OTHER SPECIFIED PARTS OF DIGESTIVE TRACT: Chronic | ICD-10-CM

## 2022-06-27 DIAGNOSIS — Z90.710 ACQUIRED ABSENCE OF BOTH CERVIX AND UTERUS: Chronic | ICD-10-CM

## 2022-06-27 DIAGNOSIS — Z90.89 ACQUIRED ABSENCE OF OTHER ORGANS: Chronic | ICD-10-CM

## 2022-06-27 DIAGNOSIS — I48.91 UNSPECIFIED ATRIAL FIBRILLATION: ICD-10-CM

## 2022-06-27 LAB
INR PPP: 5.8 RATIO
QUALITY CONTROL: YES

## 2022-06-30 ENCOUNTER — APPOINTMENT (OUTPATIENT)
Dept: MEDICATION MANAGEMENT | Facility: CLINIC | Age: 82
End: 2022-06-30

## 2022-06-30 ENCOUNTER — OUTPATIENT (OUTPATIENT)
Dept: OUTPATIENT SERVICES | Facility: HOSPITAL | Age: 82
LOS: 1 days | Discharge: HOME | End: 2022-06-30

## 2022-06-30 DIAGNOSIS — Z79.01 LONG TERM (CURRENT) USE OF ANTICOAGULANTS: ICD-10-CM

## 2022-06-30 DIAGNOSIS — Z90.49 ACQUIRED ABSENCE OF OTHER SPECIFIED PARTS OF DIGESTIVE TRACT: Chronic | ICD-10-CM

## 2022-06-30 DIAGNOSIS — Z90.710 ACQUIRED ABSENCE OF BOTH CERVIX AND UTERUS: Chronic | ICD-10-CM

## 2022-06-30 DIAGNOSIS — I48.91 UNSPECIFIED ATRIAL FIBRILLATION: ICD-10-CM

## 2022-06-30 DIAGNOSIS — Z90.89 ACQUIRED ABSENCE OF OTHER ORGANS: Chronic | ICD-10-CM

## 2022-06-30 LAB
INR PPP: 2.7 RATIO
QUALITY CONTROL: YES

## 2022-07-06 ENCOUNTER — APPOINTMENT (OUTPATIENT)
Dept: MEDICATION MANAGEMENT | Facility: CLINIC | Age: 82
End: 2022-07-06

## 2022-07-06 ENCOUNTER — OUTPATIENT (OUTPATIENT)
Dept: OUTPATIENT SERVICES | Facility: HOSPITAL | Age: 82
LOS: 1 days | Discharge: HOME | End: 2022-07-06

## 2022-07-06 DIAGNOSIS — Z90.89 ACQUIRED ABSENCE OF OTHER ORGANS: Chronic | ICD-10-CM

## 2022-07-06 DIAGNOSIS — Z90.49 ACQUIRED ABSENCE OF OTHER SPECIFIED PARTS OF DIGESTIVE TRACT: Chronic | ICD-10-CM

## 2022-07-06 DIAGNOSIS — I48.91 UNSPECIFIED ATRIAL FIBRILLATION: ICD-10-CM

## 2022-07-06 DIAGNOSIS — Z79.01 LONG TERM (CURRENT) USE OF ANTICOAGULANTS: ICD-10-CM

## 2022-07-06 DIAGNOSIS — Z90.710 ACQUIRED ABSENCE OF BOTH CERVIX AND UTERUS: Chronic | ICD-10-CM

## 2022-07-06 LAB
INR PPP: 4.1 RATIO
QUALITY CONTROL: YES

## 2022-07-12 ENCOUNTER — OUTPATIENT (OUTPATIENT)
Dept: OUTPATIENT SERVICES | Facility: HOSPITAL | Age: 82
LOS: 1 days | Discharge: HOME | End: 2022-07-12

## 2022-07-12 ENCOUNTER — APPOINTMENT (OUTPATIENT)
Dept: MEDICATION MANAGEMENT | Facility: CLINIC | Age: 82
End: 2022-07-12

## 2022-07-12 DIAGNOSIS — I48.91 UNSPECIFIED ATRIAL FIBRILLATION: ICD-10-CM

## 2022-07-12 DIAGNOSIS — Z79.01 LONG TERM (CURRENT) USE OF ANTICOAGULANTS: ICD-10-CM

## 2022-07-12 DIAGNOSIS — Z90.89 ACQUIRED ABSENCE OF OTHER ORGANS: Chronic | ICD-10-CM

## 2022-07-12 DIAGNOSIS — Z90.710 ACQUIRED ABSENCE OF BOTH CERVIX AND UTERUS: Chronic | ICD-10-CM

## 2022-07-12 DIAGNOSIS — Z90.49 ACQUIRED ABSENCE OF OTHER SPECIFIED PARTS OF DIGESTIVE TRACT: Chronic | ICD-10-CM

## 2022-07-12 LAB
INR PPP: 5.5 RATIO
QUALITY CONTROL: YES

## 2022-07-13 ENCOUNTER — APPOINTMENT (OUTPATIENT)
Dept: MEDICATION MANAGEMENT | Facility: CLINIC | Age: 82
End: 2022-07-13

## 2022-07-15 ENCOUNTER — OUTPATIENT (OUTPATIENT)
Dept: OUTPATIENT SERVICES | Facility: HOSPITAL | Age: 82
LOS: 1 days | Discharge: HOME | End: 2022-07-15

## 2022-07-15 ENCOUNTER — APPOINTMENT (OUTPATIENT)
Dept: MEDICATION MANAGEMENT | Facility: CLINIC | Age: 82
End: 2022-07-15

## 2022-07-15 DIAGNOSIS — Z90.49 ACQUIRED ABSENCE OF OTHER SPECIFIED PARTS OF DIGESTIVE TRACT: Chronic | ICD-10-CM

## 2022-07-15 DIAGNOSIS — Z79.01 LONG TERM (CURRENT) USE OF ANTICOAGULANTS: ICD-10-CM

## 2022-07-15 DIAGNOSIS — Z90.89 ACQUIRED ABSENCE OF OTHER ORGANS: Chronic | ICD-10-CM

## 2022-07-15 DIAGNOSIS — I48.91 UNSPECIFIED ATRIAL FIBRILLATION: ICD-10-CM

## 2022-07-15 DIAGNOSIS — Z90.710 ACQUIRED ABSENCE OF BOTH CERVIX AND UTERUS: Chronic | ICD-10-CM

## 2022-07-18 ENCOUNTER — OUTPATIENT (OUTPATIENT)
Dept: OUTPATIENT SERVICES | Facility: HOSPITAL | Age: 82
LOS: 1 days | Discharge: HOME | End: 2022-07-18

## 2022-07-18 ENCOUNTER — APPOINTMENT (OUTPATIENT)
Dept: MEDICATION MANAGEMENT | Facility: CLINIC | Age: 82
End: 2022-07-18

## 2022-07-18 DIAGNOSIS — Z90.89 ACQUIRED ABSENCE OF OTHER ORGANS: Chronic | ICD-10-CM

## 2022-07-18 DIAGNOSIS — Z90.49 ACQUIRED ABSENCE OF OTHER SPECIFIED PARTS OF DIGESTIVE TRACT: Chronic | ICD-10-CM

## 2022-07-18 DIAGNOSIS — I48.91 UNSPECIFIED ATRIAL FIBRILLATION: ICD-10-CM

## 2022-07-18 DIAGNOSIS — Z90.710 ACQUIRED ABSENCE OF BOTH CERVIX AND UTERUS: Chronic | ICD-10-CM

## 2022-07-18 DIAGNOSIS — Z79.01 LONG TERM (CURRENT) USE OF ANTICOAGULANTS: ICD-10-CM

## 2022-07-18 LAB
INR PPP: 3 RATIO
QUALITY CONTROL: YES

## 2022-07-25 ENCOUNTER — APPOINTMENT (OUTPATIENT)
Dept: MEDICATION MANAGEMENT | Facility: CLINIC | Age: 82
End: 2022-07-25

## 2022-07-25 ENCOUNTER — OUTPATIENT (OUTPATIENT)
Dept: OUTPATIENT SERVICES | Facility: HOSPITAL | Age: 82
LOS: 1 days | Discharge: HOME | End: 2022-07-25

## 2022-07-25 DIAGNOSIS — Z90.710 ACQUIRED ABSENCE OF BOTH CERVIX AND UTERUS: Chronic | ICD-10-CM

## 2022-07-25 DIAGNOSIS — Z90.49 ACQUIRED ABSENCE OF OTHER SPECIFIED PARTS OF DIGESTIVE TRACT: Chronic | ICD-10-CM

## 2022-07-25 DIAGNOSIS — Z79.01 LONG TERM (CURRENT) USE OF ANTICOAGULANTS: ICD-10-CM

## 2022-07-25 DIAGNOSIS — I48.91 UNSPECIFIED ATRIAL FIBRILLATION: ICD-10-CM

## 2022-07-25 DIAGNOSIS — Z90.89 ACQUIRED ABSENCE OF OTHER ORGANS: Chronic | ICD-10-CM

## 2022-07-25 LAB
INR PPP: 2.4 RATIO
QUALITY CONTROL: YES

## 2022-08-01 ENCOUNTER — APPOINTMENT (OUTPATIENT)
Dept: MEDICATION MANAGEMENT | Facility: CLINIC | Age: 82
End: 2022-08-01

## 2022-08-01 ENCOUNTER — OUTPATIENT (OUTPATIENT)
Dept: OUTPATIENT SERVICES | Facility: HOSPITAL | Age: 82
LOS: 1 days | Discharge: HOME | End: 2022-08-01

## 2022-08-01 DIAGNOSIS — Z90.710 ACQUIRED ABSENCE OF BOTH CERVIX AND UTERUS: Chronic | ICD-10-CM

## 2022-08-01 DIAGNOSIS — Z90.49 ACQUIRED ABSENCE OF OTHER SPECIFIED PARTS OF DIGESTIVE TRACT: Chronic | ICD-10-CM

## 2022-08-01 DIAGNOSIS — Z90.89 ACQUIRED ABSENCE OF OTHER ORGANS: Chronic | ICD-10-CM

## 2022-08-01 DIAGNOSIS — I48.91 UNSPECIFIED ATRIAL FIBRILLATION: ICD-10-CM

## 2022-08-01 DIAGNOSIS — Z79.01 LONG TERM (CURRENT) USE OF ANTICOAGULANTS: ICD-10-CM

## 2022-08-01 LAB
INR PPP: 2.9 RATIO
QUALITY CONTROL: YES

## 2022-08-15 ENCOUNTER — APPOINTMENT (OUTPATIENT)
Dept: MEDICATION MANAGEMENT | Facility: CLINIC | Age: 82
End: 2022-08-15

## 2022-08-15 ENCOUNTER — OUTPATIENT (OUTPATIENT)
Dept: OUTPATIENT SERVICES | Facility: HOSPITAL | Age: 82
LOS: 1 days | Discharge: HOME | End: 2022-08-15

## 2022-08-15 DIAGNOSIS — Z90.710 ACQUIRED ABSENCE OF BOTH CERVIX AND UTERUS: Chronic | ICD-10-CM

## 2022-08-15 DIAGNOSIS — Z79.01 LONG TERM (CURRENT) USE OF ANTICOAGULANTS: ICD-10-CM

## 2022-08-15 DIAGNOSIS — Z90.89 ACQUIRED ABSENCE OF OTHER ORGANS: Chronic | ICD-10-CM

## 2022-08-15 DIAGNOSIS — I48.91 UNSPECIFIED ATRIAL FIBRILLATION: ICD-10-CM

## 2022-08-15 DIAGNOSIS — Z90.49 ACQUIRED ABSENCE OF OTHER SPECIFIED PARTS OF DIGESTIVE TRACT: Chronic | ICD-10-CM

## 2022-08-15 LAB
INR PPP: 5.1 RATIO
QUALITY CONTROL: YES

## 2022-08-21 ENCOUNTER — EMERGENCY (EMERGENCY)
Facility: HOSPITAL | Age: 82
LOS: 0 days | Discharge: HOME | End: 2022-08-21
Attending: STUDENT IN AN ORGANIZED HEALTH CARE EDUCATION/TRAINING PROGRAM | Admitting: STUDENT IN AN ORGANIZED HEALTH CARE EDUCATION/TRAINING PROGRAM

## 2022-08-21 VITALS
RESPIRATION RATE: 16 BRPM | OXYGEN SATURATION: 97 % | HEART RATE: 74 BPM | SYSTOLIC BLOOD PRESSURE: 117 MMHG | WEIGHT: 166.01 LBS | DIASTOLIC BLOOD PRESSURE: 60 MMHG | TEMPERATURE: 98 F | HEIGHT: 64 IN

## 2022-08-21 DIAGNOSIS — S09.90XA UNSPECIFIED INJURY OF HEAD, INITIAL ENCOUNTER: ICD-10-CM

## 2022-08-21 DIAGNOSIS — Z90.89 ACQUIRED ABSENCE OF OTHER ORGANS: ICD-10-CM

## 2022-08-21 DIAGNOSIS — Z86.718 PERSONAL HISTORY OF OTHER VENOUS THROMBOSIS AND EMBOLISM: ICD-10-CM

## 2022-08-21 DIAGNOSIS — Z20.822 CONTACT WITH AND (SUSPECTED) EXPOSURE TO COVID-19: ICD-10-CM

## 2022-08-21 DIAGNOSIS — D68.52 PROTHROMBIN GENE MUTATION: ICD-10-CM

## 2022-08-21 DIAGNOSIS — I44.0 ATRIOVENTRICULAR BLOCK, FIRST DEGREE: ICD-10-CM

## 2022-08-21 DIAGNOSIS — Z90.49 ACQUIRED ABSENCE OF OTHER SPECIFIED PARTS OF DIGESTIVE TRACT: Chronic | ICD-10-CM

## 2022-08-21 DIAGNOSIS — Z90.89 ACQUIRED ABSENCE OF OTHER ORGANS: Chronic | ICD-10-CM

## 2022-08-21 DIAGNOSIS — Z86.711 PERSONAL HISTORY OF PULMONARY EMBOLISM: ICD-10-CM

## 2022-08-21 DIAGNOSIS — Z90.49 ACQUIRED ABSENCE OF OTHER SPECIFIED PARTS OF DIGESTIVE TRACT: ICD-10-CM

## 2022-08-21 DIAGNOSIS — I10 ESSENTIAL (PRIMARY) HYPERTENSION: ICD-10-CM

## 2022-08-21 DIAGNOSIS — Z90.710 ACQUIRED ABSENCE OF BOTH CERVIX AND UTERUS: ICD-10-CM

## 2022-08-21 DIAGNOSIS — K52.9 NONINFECTIVE GASTROENTERITIS AND COLITIS, UNSPECIFIED: ICD-10-CM

## 2022-08-21 DIAGNOSIS — Z79.01 LONG TERM (CURRENT) USE OF ANTICOAGULANTS: ICD-10-CM

## 2022-08-21 DIAGNOSIS — Z86.2 PERSONAL HISTORY OF DISEASES OF THE BLOOD AND BLOOD-FORMING ORGANS AND CERTAIN DISORDERS INVOLVING THE IMMUNE MECHANISM: ICD-10-CM

## 2022-08-21 DIAGNOSIS — S00.03XA CONTUSION OF SCALP, INITIAL ENCOUNTER: ICD-10-CM

## 2022-08-21 DIAGNOSIS — Z90.710 ACQUIRED ABSENCE OF BOTH CERVIX AND UTERUS: Chronic | ICD-10-CM
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
## 2022-08-21 DIAGNOSIS — Z23 ENCOUNTER FOR IMMUNIZATION: ICD-10-CM

## 2022-08-21 DIAGNOSIS — M10.9 GOUT, UNSPECIFIED: ICD-10-CM

## 2022-08-21 LAB
ALBUMIN SERPL ELPH-MCNC: 3.9 G/DL — SIGNIFICANT CHANGE UP (ref 3.5–5.2)
ALP SERPL-CCNC: 63 U/L — SIGNIFICANT CHANGE UP (ref 30–115)
ALT FLD-CCNC: 7 U/L — SIGNIFICANT CHANGE UP (ref 0–41)
ANION GAP SERPL CALC-SCNC: 9 MMOL/L — SIGNIFICANT CHANGE UP (ref 7–14)
ANISOCYTOSIS BLD QL: SLIGHT — SIGNIFICANT CHANGE UP
APTT BLD: 39 SEC — SIGNIFICANT CHANGE UP (ref 27–39.2)
AST SERPL-CCNC: 23 U/L — SIGNIFICANT CHANGE UP (ref 0–41)
BASOPHILS # BLD AUTO: 0 K/UL — SIGNIFICANT CHANGE UP (ref 0–0.2)
BASOPHILS NFR BLD AUTO: 0 % — SIGNIFICANT CHANGE UP (ref 0–1)
BILIRUB SERPL-MCNC: 0.4 MG/DL — SIGNIFICANT CHANGE UP (ref 0.2–1.2)
BLD GP AB SCN SERPL QL: SIGNIFICANT CHANGE UP
BUN SERPL-MCNC: 19 MG/DL — SIGNIFICANT CHANGE UP (ref 10–20)
CALCIUM SERPL-MCNC: 9.2 MG/DL — SIGNIFICANT CHANGE UP (ref 8.5–10.1)
CHLORIDE SERPL-SCNC: 105 MMOL/L — SIGNIFICANT CHANGE UP (ref 98–110)
CO2 SERPL-SCNC: 26 MMOL/L — SIGNIFICANT CHANGE UP (ref 17–32)
CREAT SERPL-MCNC: 1 MG/DL — SIGNIFICANT CHANGE UP (ref 0.7–1.5)
EGFR: 56 ML/MIN/1.73M2 — LOW
EOSINOPHIL # BLD AUTO: 0.49 K/UL — SIGNIFICANT CHANGE UP (ref 0–0.7)
EOSINOPHIL NFR BLD AUTO: 5.3 % — SIGNIFICANT CHANGE UP (ref 0–8)
ETHANOL SERPL-MCNC: <10 MG/DL — SIGNIFICANT CHANGE UP
GIANT PLATELETS BLD QL SMEAR: PRESENT — SIGNIFICANT CHANGE UP
GLUCOSE SERPL-MCNC: 104 MG/DL — HIGH (ref 70–99)
HCT VFR BLD CALC: 42.6 % — SIGNIFICANT CHANGE UP (ref 37–47)
HGB BLD-MCNC: 12.5 G/DL — SIGNIFICANT CHANGE UP (ref 12–16)
INR BLD: 3.19 RATIO — HIGH (ref 0.65–1.3)
LACTATE SERPL-SCNC: 1.5 MMOL/L — SIGNIFICANT CHANGE UP (ref 0.7–2)
LIDOCAIN IGE QN: 43 U/L — SIGNIFICANT CHANGE UP (ref 7–60)
LYMPHOCYTES # BLD AUTO: 2.52 K/UL — SIGNIFICANT CHANGE UP (ref 1.2–3.4)
LYMPHOCYTES # BLD AUTO: 27.2 % — SIGNIFICANT CHANGE UP (ref 20.5–51.1)
MANUAL SMEAR VERIFICATION: SIGNIFICANT CHANGE UP
MCHC RBC-ENTMCNC: 25 PG — LOW (ref 27–31)
MCHC RBC-ENTMCNC: 29.3 G/DL — LOW (ref 32–37)
MCV RBC AUTO: 85 FL — SIGNIFICANT CHANGE UP (ref 81–99)
MICROCYTES BLD QL: SLIGHT — SIGNIFICANT CHANGE UP
MONOCYTES # BLD AUTO: 0.89 K/UL — HIGH (ref 0.1–0.6)
MONOCYTES NFR BLD AUTO: 9.6 % — HIGH (ref 1.7–9.3)
NEUTROPHILS # BLD AUTO: 5.28 K/UL — SIGNIFICANT CHANGE UP (ref 1.4–6.5)
NEUTROPHILS NFR BLD AUTO: 57 % — SIGNIFICANT CHANGE UP (ref 42.2–75.2)
PLAT MORPH BLD: NORMAL — SIGNIFICANT CHANGE UP
PLATELET # BLD AUTO: 257 K/UL — SIGNIFICANT CHANGE UP (ref 130–400)
POIKILOCYTOSIS BLD QL AUTO: SLIGHT — SIGNIFICANT CHANGE UP
POLYCHROMASIA BLD QL SMEAR: SIGNIFICANT CHANGE UP
POTASSIUM SERPL-MCNC: 3.4 MMOL/L — LOW (ref 3.5–5)
POTASSIUM SERPL-SCNC: 3.4 MMOL/L — LOW (ref 3.5–5)
PROT SERPL-MCNC: 6.1 G/DL — SIGNIFICANT CHANGE UP (ref 6–8)
PROTHROM AB SERPL-ACNC: 36.3 SEC — HIGH (ref 9.95–12.87)
RBC # BLD: 5.01 M/UL — SIGNIFICANT CHANGE UP (ref 4.2–5.4)
RBC # FLD: 23.1 % — HIGH (ref 11.5–14.5)
RBC BLD AUTO: NORMAL — SIGNIFICANT CHANGE UP
SARS-COV-2 RNA SPEC QL NAA+PROBE: SIGNIFICANT CHANGE UP
SMUDGE CELLS # BLD: PRESENT — SIGNIFICANT CHANGE UP
SODIUM SERPL-SCNC: 140 MMOL/L — SIGNIFICANT CHANGE UP (ref 135–146)
TROPONIN T SERPL-MCNC: 0.01 NG/ML — SIGNIFICANT CHANGE UP
VARIANT LYMPHS # BLD: 0.9 % — SIGNIFICANT CHANGE UP (ref 0–5)
WBC # BLD: 9.26 K/UL — SIGNIFICANT CHANGE UP (ref 4.8–10.8)
WBC # FLD AUTO: 9.26 K/UL — SIGNIFICANT CHANGE UP (ref 4.8–10.8)

## 2022-08-21 PROCEDURE — 72125 CT NECK SPINE W/O DYE: CPT | Mod: 26,MA

## 2022-08-21 PROCEDURE — 71260 CT THORAX DX C+: CPT | Mod: 26,MA

## 2022-08-21 PROCEDURE — 72170 X-RAY EXAM OF PELVIS: CPT | Mod: 26

## 2022-08-21 PROCEDURE — 99283 EMERGENCY DEPT VISIT LOW MDM: CPT

## 2022-08-21 PROCEDURE — 99285 EMERGENCY DEPT VISIT HI MDM: CPT

## 2022-08-21 PROCEDURE — 74177 CT ABD & PELVIS W/CONTRAST: CPT | Mod: 26,MA

## 2022-08-21 PROCEDURE — 93010 ELECTROCARDIOGRAM REPORT: CPT

## 2022-08-21 PROCEDURE — 71045 X-RAY EXAM CHEST 1 VIEW: CPT | Mod: 26

## 2022-08-21 PROCEDURE — 70450 CT HEAD/BRAIN W/O DYE: CPT | Mod: 26,MA

## 2022-08-21 RX ORDER — CIPROFLOXACIN LACTATE 400MG/40ML
1 VIAL (ML) INTRAVENOUS
Qty: 14 | Refills: 0
Start: 2022-08-21 | End: 2022-08-27

## 2022-08-21 RX ORDER — TETANUS TOXOID, REDUCED DIPHTHERIA TOXOID AND ACELLULAR PERTUSSIS VACCINE, ADSORBED 5; 2.5; 8; 8; 2.5 [IU]/.5ML; [IU]/.5ML; UG/.5ML; UG/.5ML; UG/.5ML
0.5 SUSPENSION INTRAMUSCULAR ONCE
Refills: 0 | Status: COMPLETED | OUTPATIENT
Start: 2022-08-21 | End: 2022-08-21

## 2022-08-21 RX ORDER — SODIUM CHLORIDE 9 MG/ML
250 INJECTION INTRAMUSCULAR; INTRAVENOUS; SUBCUTANEOUS ONCE
Refills: 0 | Status: COMPLETED | OUTPATIENT
Start: 2022-08-21 | End: 2022-08-21

## 2022-08-21 RX ORDER — METRONIDAZOLE 500 MG
1 TABLET ORAL
Qty: 21 | Refills: 0
Start: 2022-08-21 | End: 2022-08-27

## 2022-08-21 RX ADMIN — TETANUS TOXOID, REDUCED DIPHTHERIA TOXOID AND ACELLULAR PERTUSSIS VACCINE, ADSORBED 0.5 MILLILITER(S): 5; 2.5; 8; 8; 2.5 SUSPENSION INTRAMUSCULAR at 14:03

## 2022-08-21 RX ADMIN — SODIUM CHLORIDE 250 MILLILITER(S): 9 INJECTION INTRAMUSCULAR; INTRAVENOUS; SUBCUTANEOUS at 14:05

## 2022-08-21 NOTE — CONSULT NOTE ADULT - ASSESSMENT
82yF w/ PMHx of prothrombin gene mutation, B/L PE in 2005 on coumadin, HTN, gout seen as Trauma Alert s/p mechanical fall +HT, -LOC, +AC on coumadin with no complaints, external signs of trauma include 3cm frontal hematoma. Trauma assessment in ED: ABCs intact , GCS 15 , AAOx3,  RUDOLPH.     Injuries identified:   - Mild soft tissue swelling involving the anterior frontal scalp   -**************pending*******    PLAN:   - Trauma Labs: (CBC, BMP, Coags, T&S, UA, EtOH level)  Additional studies:  EKG  Utox    Trauma Imaging to include the following:  - CXR, Pelvic Xray  - CT Head,  CT C-spine, CT Max/Face, CT Chest, CT Abd/Pelvis  - Extremity films: None    Additional consultations:  - **pending***********    Disposition pending results of above labs and imaging  Above plan discussed with Trauma attending, Dr. Ames, patient, patient family, and ED team  --------------------------------------------------------------------------------------  08-21-22 @ 14:37 82yF w/ PMHx of prothrombin gene mutation, B/L PE in 2005 on coumadin, HTN, gout seen as Trauma Alert s/p mechanical fall +HT, -LOC, +AC on coumadin with no complaints, external signs of trauma include 3cm frontal hematoma. Trauma assessment in ED: ABCs intact , GCS 15 , AAOx3,  RUDOLPH.     Injuries identified:   - Mild soft tissue swelling involving the anterior frontal scalp     PLAN:   - Trauma Labs: (CBC, BMP, Coags, T&S, UA, EtOH level)  Additional studies:  EKG  Utox    Trauma Imaging to include the following:  - CXR, Pelvic Xray  - CT Head,  CT C-spine, CT Chest, CT Abd/Pelvis  - Extremity films: None    No acute traumatic injuries identified, disposition per ED  Above plan discussed with Trauma attending, Dr. Ames, patient, patient family, and ED team  --------------------------------------------------------------------------------------  08-21-22 @ 14:37

## 2022-08-21 NOTE — ED PROVIDER NOTE - NS ED ROS FT
Constitutional: (-) fever, (-) chills, (-) lethargy  Eyes: (-) eye pain, (-) visual changes, (-) discharge  ENMT: (-) nasal congestion, (-) sore throat. (-) neck pain (-) neck stiffness  Respiratory: (-) cough, (-) SOB, (-) PEDERSEN, (-) respiratory distress  Cardiac: (-) chest pain, (-) palpitations  GI: (-) abdominal pain, (-) nausea, (-) vomiting, (-) diarrhea.  :  (-) dysuria, (-) hematuria, (-) frequency   MS:  (-) back pain, (-) joint pain.  Neuro:  (-) headache, (-) numbness, (-) focal weakness, (-) tingling   Skin:  (-) rash  Except as documented in the HPI,  all other systems are negative

## 2022-08-21 NOTE — CONSULT NOTE ADULT - ATTENDING COMMENTS
Trauma Attending H&P Attestation    Patient seen and evaluated with the trauma team in the trauma bay upon arrival. All pertinent labs and radiographic imaging reviewed, pending final reports. Outpatient medications reviewed, including the presence of anticoagulants, if applicable. I agree with the resident's note above, including the physical exam findings, assessment and plan as documented with the following adjustments.     Trauma Level: [ ] Code  [x ] Alert  [ ] Consult [ ] Transfer in  Activation by:  [x ] ED physician [ x] EMS  Intubated in Field? [ ] Yes [x ] No  Intubated in ED? [ ] Yes [x ] No  Intubated in Trauma [place]? [ ] Yes [x ] No    JOEY KNAPP Patient is a 82y old  Female who presents with a chief complaint of  mechanical fall on Coumadin    Patient presented with GCS [15 ]  upon arrival to the trauma bay.  Allergies  No Known Allergies  Intolerances    PAST MEDICAL & SURGICAL HISTORY:  H/O prothrombin mutation  Pulmonary embolism  HTN (hypertension)  Anemia requiring transfusions  Deep vein thrombosis (DVT)  S/P tonsillectomy  H/O: hysterectomy  History of cholecystectomy    On AC/Antiplatelets [ x] Yes [ ] No              [ ] NOVACs, [x ] Coumadin, [ ] ASA, [ ] Antiplatelets     Vital Signs Last 24 Hrs  T(C): 36.7 (21 Aug 2022 13:30), Max: 36.7 (21 Aug 2022 13:30)  T(F): 98 (21 Aug 2022 13:30), Max: 98 (21 Aug 2022 13:30)  HR: 74 (21 Aug 2022 13:30) (74 - 74)  BP: 117/60 (21 Aug 2022 13:30) (117/60 - 117/60)  BP(mean): --  RR: 16 (21 Aug 2022 13:30) (16 - 16)  SpO2: 97% (21 Aug 2022 13:30) (97% - 97%)    Parameters below as of 21 Aug 2022 13:30  Patient On (Oxygen Delivery Method): room air    PE: scal hematoma    Assessment: fall on Coumadin    PLAN  - supportive care  - GI/DVT prophylaxis  - pain management  - repeat studies as needed  - complete and follow up on trauma work up included but not limites to                          [x ] CXR [x ] PXR [x ] Extremities X-RAYs                          [x ] NCHCT [x ] C-Spine CT [x ] CT Chest [x ] CT Abdomen/Pelvis                          [x ] FAST [ ] Other                          [x ] Trauma Labs   [ ] Toxicology   - Follow up Consults  [ ] Neurosurgery [ ] Orthopaedics [ ] Plastics [ ] Fascial/OMFS [ ] Opthalmology [ x] Medicine [ ] Cardiology/EP [ ] Pediatric ICU                                        [ ] SICU/SDU [ ] Urology  - IV ABx give as indicated [ ] Yes [x ] No  - Tetanus given as indicated [ ] Yes [x ] No    Tawny Ames MD, FACS  Trauma/ACS/Surgical Critical Care Attending

## 2022-08-21 NOTE — ED PROVIDER NOTE - ATTENDING CONTRIBUTION TO CARE
82-year-old female with a past medical history significant PE on Coumadin hypertension gout who presents status post fall.  Patient states that today she had a trip and fall after walking on a rug.  Patient states that when she hit her head.  Patient denies any LOC.  Nausea vomiting fever chills or any other medical complaints.    VITAL SIGNS: I have reviewed nursing notes and confirm.  CONSTITUTIONAL: non-toxic, well appearing  SKIN: no rash, no petechiae.  EYES:  EOMI, pink conjunctiva, anicteric  ENT: tongue midline, no exudates, MMM. 3 x4 hematoma on the midline petra  NECK: Supple; no meningismus, no JVD  CARD: RRR, no murmurs, equal radial pulses bilaterally 2+  RESP: CTAB, no respiratory distress  ABD: Soft, non-tender, non-distended, no peritoneal signs, no HSM, no CVA tenderness  EXT: Normal ROM x4. No edema. No calves tenderness  NEURO: Alert, oriented x3.     a/p  82 yr old f that presents s/p mechanical fall   -labs  -ekg  -imaging  -tetanus  -reassess  -dispo pending

## 2022-08-21 NOTE — ED PROVIDER NOTE - CLINICAL SUMMARY MEDICAL DECISION MAKING FREE TEXT BOX
82 yr old f that presents s/p mechanical fall. pt called as a trauma notification. labs, imaging, meds given. pt informed of all findings. pt evaluated and cleared by trauma. pt discharged with pcp follow up and strict return precautions.

## 2022-08-21 NOTE — ED PROVIDER NOTE - OBJECTIVE STATEMENT
81 yo F with PMH of prothrombin gene mutation and b/l PE 2005 on coumadin, gout, HTN, presenting for mechanical fall. Patient bent over and lost her footing causing her to fall forward on wooden floor hitting head. No LOC, n/w/t, headache, vision changes, dizziness, CP, SOB, NVD, dysuria, hematuria, melena, hematochezia, fever, cold/flu symptoms.

## 2022-08-21 NOTE — CONSULT NOTE ADULT - SUBJECTIVE AND OBJECTIVE BOX
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 Patient tripped and fell over rug in her home falling forward and hitting her head. Patient remained on the floor for about 10min before her son was able to help her up. Patient states she was unable to ambulate or get up after the fall and that this is normal for her.       Patient states she last took her coumadin last night. Trauma assessment in ED: ABCs intact , GCS 15 , AAOx3.    PAST MEDICAL & SURGICAL HISTORY:  H/O prothrombin mutation      Pulmonary embolism      HTN (hypertension)      Anemia requiring transfusions      Deep vein thrombosis (DVT)      S/P tonsillectomy      H/O: hysterectomy      History of cholecystectomy          Allergies    No Known Allergies    Intolerances        Home Medications:  allopurinol 100 mg oral tablet: orally once a day (21 Sep 2021 13:03)  melatonin 3 mg oral tablet: 1 tab(s) orally once a day (at bedtime), As needed, Insomnia (21 Sep 2021 13:00)  metoprolol succinate 100 mg oral tablet, extended release: 1 tab(s) orally once a day (03 Sep 2021 21:05)  oxycodone-acetaminophen 5 mg-325 mg oral tablet: 1 tab(s) orally every 12 hours, As needed, Moderate Pain (4 - 6) (21 Sep 2021 13:00)  warfarin 2.5 mg oral tablet: 1 tab(s) orally once a day (at bedtime) -- currently held as INR is 3.2 (21 Sep 2021 13:01)      ROS: 10-system review is otherwise negative except HPI above.      Primary Survey:    A - airway intact  B - bilateral breath sounds and good chest rise  C - palpable pulses in all extremities  D - GCS 15 on arrival, RUDOLPH  Exposure obtained    Vital Signs Last 24 Hrs  T(C): 36.7 (21 Aug 2022 13:30), Max: 36.7 (21 Aug 2022 13:30)  T(F): 98 (21 Aug 2022 13:30), Max: 98 (21 Aug 2022 13:30)  HR: 74 (21 Aug 2022 13:30) (74 - 74)  BP: 117/60 (21 Aug 2022 13:30) (117/60 - 117/60)  BP(mean): --  RR: 16 (21 Aug 2022 13:30) (16 - 16)  SpO2: 97% (21 Aug 2022 13:30) (97% - 97%)    Parameters below as of 21 Aug 2022 13:30  Patient On (Oxygen Delivery Method): room air        Secondary Survey:   General: NAD  HEENT: Normocephalic, atraumatic, EOMI, PEERLA. no scalp lacerations   Neck: Soft, midline trachea. no c-spine tenderness  Chest: No chest wall tenderness, no subcutaneous emphysema   Cardiac: S1, S2, RRR  Respiratory: Bilateral breath sounds, clear and equal bilaterally  Abdomen: Soft, non-distended, non-tender, no rebound, no guarding.  Groin: Normal appearing, pelvis stable   Ext:  Moving b/l upper and lower extremities. Palpable Radial b/l UE, b/l DP palpable in LE.   Back: No T/L/S spine tenderness, No palpable runoff/stepoff/deformity  Rectal: No amberly blood, PITER with good tone    FAST: *****/Negative    ACCESS / DEVICES:  [ ] Peripheral IV  [ ] Central Venous Line	[ ] R	[ ] L	[ ] IJ	[ ] Fem	[ ] SC	Placed:   [ ] Arterial Line		[ ] R	[ ] L	[ ] Fem	[ ] Rad	[ ] Ax	Placed:   [ ] PICC:					[ ] Mediport  [ ] Urinary Catheter,  Date Placed:   [ ] Chest tube: [ ] Right, [ ] Left  [ ] DAKSHA/Gallo Drains    Labs:  CAPILLARY BLOOD GLUCOSE      POCT Blood Glucose.: 111 mg/dL (21 Aug 2022 13:52)                          12.5   9.26  )-----------( 257      ( 21 Aug 2022 13:55 )             42.6         08-21    140  |  105  |  19  ----------------------------<  104<H>  3.4<L>   |  26  |  1.0      Calcium, Total Serum: 9.2 mg/dL (08-21-22 @ 13:55)      LFTs:             6.1  | 0.4  | 23       ------------------[63      ( 21 Aug 2022 13:55 )  3.9  | x    | 7           Lipase:43     Amylase:x         Lactate, Blood: 1.5 mmol/L (08-21-22 @ 13:55)      Coags:     36.30  ----< 3.19    ( 21 Aug 2022 13:55 )     39.0        CARDIAC MARKERS ( 21 Aug 2022 13:55 )  x     / 0.01 ng/mL / x     / x     / x            Alcohol, Blood: <10 mg/dL (08-21-22 @ 13:55)            Alcohol, Blood: <10 mg/dL (08-21-22 @ 13:55)      RADIOLOGY & ADDITIONAL STUDIES:  ---------------------------------------------------------------------------------------    ASSESSMENT:  82y Female  w/ PMHx of *** seen as (Code Trauma / Trauma Alert / Trauma Consult) s/p ****** with complaint of *** , external signs of trauma include *** . Trauma assessment in ED: ABCs intact , GCS 15 , AAOx3,  RUDOLPH.     Injuries identified:   -   -   -     PLAN:   - Trauma Labs: (CBC, BMP, Coags, T&S, UA, EtOH level)  Additional studies:  EKG  Utox    Trauma Imaging to include the following:  - CXR, Pelvic Xray  - CT Head,  CT C-spine, CT Max/Face, CT Chest, CT Abd/Pelvis  - Extremity films: None    Additional consultations:  - Neurosurgery  - Orthopedics  - OMFS  - PT/Rehab/SW  - Hospitalist/Medicine     Disposition pending results of above labs and imaging  Above plan discussed with Trauma attending,  ***  , patient, patient family, and ED team  --------------------------------------------------------------------------------------  08-21-22 @ 14:37 TRAUMA ACTIVATION LEVEL:  ALERT    ACTIVATED BY: EMS**  /  ED**  INTUBATED: YES** / NO**    MECHANISM OF INJURY:   [] Blunt     [] MVC	  [X] Fall	  [] Pedestrian Struck	  [] Motorcycle     [] Assault     [] Bicycle collision    [] Sports injury    [] Penetrating    [] Gun Shot Wound      [] Stab Wound    GCS: 15 	E: 4	V: 5	M: 6    HPI:    82yF w/ PMHx of prothrombin gene mutation, B/L PE in 2005 on coumadin, HTN, gout seen as Trauma Alert s/p mechanical fall +HT, -LOC, +AC on coumadin. Patient tripped and fell over rug in her home falling forward and hitting her head. Patient remained on the floor for about 10min before her son was able to help her up. Patient states she was unable to ambulate or get up after the fall and that this is normal for her. Patient states she last took her coumadin last night. Patient has no complaints at time of exam. Trauma assessment in ED: ABCs intact , GCS 15 , AAOx3.    PAST MEDICAL & SURGICAL HISTORY:  H/O prothrombin mutation  Pulmonary embolism  HTN (hypertension)  Anemia requiring transfusions  Deep vein thrombosis (DVT)  S/P tonsillectomy  H/O: hysterectomy  History of cholecystectomy    Allergies  No Known Allergies  Intolerances    Home Medications:  allopurinol 100 mg oral tablet: orally once a day (21 Sep 2021 13:03)  melatonin 3 mg oral tablet: 1 tab(s) orally once a day (at bedtime), As needed, Insomnia (21 Sep 2021 13:00)  metoprolol succinate 100 mg oral tablet, extended release: 1 tab(s) orally once a day (03 Sep 2021 21:05)  oxycodone-acetaminophen 5 mg-325 mg oral tablet: 1 tab(s) orally every 12 hours, As needed, Moderate Pain (4 - 6) (21 Sep 2021 13:00)  warfarin 2.5 mg oral tablet: 1 tab(s) orally once a day (at bedtime) -- currently held as INR is 3.2 (21 Sep 2021 13:01)    ROS: 10-system review is otherwise negative except HPI above.      Primary Survey:    A - airway intact  B - bilateral breath sounds and good chest rise  C - palpable pulses in all extremities  D - GCS 15 on arrival, RUDOLPH  Exposure obtained    Vital Signs Last 24 Hrs  T(C): 36.7 (21 Aug 2022 13:30), Max: 36.7 (21 Aug 2022 13:30)  T(F): 98 (21 Aug 2022 13:30), Max: 98 (21 Aug 2022 13:30)  HR: 74 (21 Aug 2022 13:30) (74 - 74)  BP: 117/60 (21 Aug 2022 13:30) (117/60 - 117/60)  BP(mean): --  RR: 16 (21 Aug 2022 13:30) (16 - 16)  SpO2: 97% (21 Aug 2022 13:30) (97% - 97%)    Parameters below as of 21 Aug 2022 13:30  Patient On (Oxygen Delivery Method): room air    Secondary Survey:   General: NAD  HEENT: Normocephalic, EOMI, PEERLA. no scalp lacerations +3cm frontal hematoma  Neck: Soft, midline trachea. no c-spine tenderness  Chest: No chest wall tenderness, no subcutaneous emphysema   Cardiac: S1, S2, RRR  Respiratory: Bilateral breath sounds, clear and equal bilaterally  Abdomen: Soft, non-distended, non-tender, no rebound, no guarding.  Groin: Normal appearing, pelvis stable   Ext:  Moving b/l upper and lower extremities. Palpable Radial b/l UE, b/l DP palpable in LE.   Back: No T/L/S spine tenderness, No palpable runoff/stepoff/deformity    Labs:  CAPILLARY BLOOD GLUCOSE  POCT Blood Glucose.: 111 mg/dL (21 Aug 2022 13:52)             12.5   9.26  )-----------( 257      ( 21 Aug 2022 13:55 )             42.6   08-21    140  |  105  |  19  ----------------------------<  104<H>  3.4<L>   |  26  |  1.0    Calcium, Total Serum: 9.2 mg/dL (08-21-22 @ 13:55)    LFTs:             6.1  | 0.4  | 23       ------------------[63      ( 21 Aug 2022 13:55 )  3.9  | x    | 7           Lipase:43     Amylase:x        Lactate, Blood: 1.5 mmol/L (08-21-22 @ 13:55)    Coags:     36.30  ----< 3.19    ( 21 Aug 2022 13:55 )     39.0     CARDIAC MARKERS ( 21 Aug 2022 13:55 )  x     / 0.01 ng/mL / x     / x     / x        Alcohol, Blood: <10 mg/dL (08-21-22 @ 13:55)  Alcohol, Blood: <10 mg/dL (08-21-22 @ 13:55)      RADIOLOGY & ADDITIONAL STUDIES:    ---------------------------------------------------------------------------------------    ASSESSMENT:  < from: CT Cervical Spine No Cont (08.21.22 @ 14:58) >  IMPRESSION:  No acute fracture or subluxation.    Bilateral cervical chain lymphadenopathy concerning for   lymphoproliferative process.    < from: CT Head No Cont (08.21.22 @ 14:50) >  IMPRESSION:    CT HEAD:  Mild soft tissue swelling involving the anterior frontal scalp without   underlying calvarial fracture or intracranial hemorrhage.    CT CERVICAL SPINE:  No acute fracture or subluxation.    Bilateral cervical chain lymphadenopathy concerning for   lymphoproliferative process.    < from: Xray Pelvis AP only (08.21.22 @ 14:38) >  impression: Osteopenia. Degenerative changes of lower lumbar spine and   bilateral hip joints. No acute fracture or dislocation.          **************pending

## 2022-08-21 NOTE — ED ADULT NURSE NOTE - NSICDXPASTMEDICALHX_GEN_ALL_CORE_FT
PAST MEDICAL HISTORY:  Anemia requiring transfusions     Deep vein thrombosis (DVT)     H/O prothrombin mutation     HTN (hypertension)     Pulmonary embolism

## 2022-08-21 NOTE — ED PROVIDER NOTE - PATIENT PORTAL LINK FT
You can access the FollowMyHealth Patient Portal offered by [facility] by registering at the following website: [email or web address]. By joining [facility]’s FollowMyHealth portal, you will also be able to view your health information using other applications (apps) compatible with our system.

## 2022-08-21 NOTE — ED PROVIDER NOTE - PHYSICAL EXAMINATION
CONSTITUTIONAL: well-appearing, in NAD  SKIN: Warm dry, normal skin turgor  HEAD: NC; 4x3 cm ecchymotic hematoma on center of forehead; no hemotympanum, anthony sign, or raccoon eyes  EYES: EOMI, PERRLA, no scleral icterus, conjunctiva pink  ENT: normal pharynx with no erythema or exudates  NECK: Supple; non tender. Full ROM. no c/t/l/s tenderness  CARD: RRR, no murmurs. no chest wall tenderness  RESP: clear to ausculation b/l. No crackles or wheezing.  ABD: soft, non-tender, non-distended, no rebound or guarding.  EXT: Full ROM, no bony tenderness, no pedal edema, no calf tenderness; DP and radial pulses 2+ b/l  NEURO: normal motor. normal sensory. CN II-XII intact. Cerebellar testing normal.   PSYCH: Cooperative, appropriate.

## 2022-08-21 NOTE — ED PROVIDER NOTE - CARE PLAN
1 Principal Discharge DX:	Closed head injury  Secondary Diagnosis:	Hematoma  Secondary Diagnosis:	Colitis

## 2022-08-21 NOTE — ED PROVIDER NOTE - NSFOLLOWUPINSTRUCTIONS_ED_ALL_ED_FT
Our Emergency Department Referral Coordinators will be reaching out to you in the next 24-48 hours from 9:00am to 5:00pm with a follow up appointment with gastroenterology. Please expect a phone call from the hospital in that time frame. If you do not receive a call or if you have any questions or concerns, you can reach them at [phone] or [phone].    Closed Head Injury    Closed head injury in an injury to your head that may or may not involve a traumatic brain injury (TBI). Symptoms of TBI can be short or long lasting and include headache, dizziness, interference with memory or speech, fatigue, confusion, changes in sleep, mood changes, nausea, depression/anxiety, and dulling of senses. Make sure to obtain proper rest which includes getting plenty of sleep, avoiding excessive visual stimulation, and avoiding activities that may cause physical or mental stress. Avoid any situation where there is potential for another head injury including sports.    SEEK MEDICAL CARE IF YOU HAVE THE FOLLOWING SYMPTOMS: unusual drowsiness, vomiting, severe dizziness, seizures, lightheadedness, muscular weakness, different pupil sizes, visual changes, or clear or bloody discharge from your ears or nose.    Hematoma    A hematoma is a collection of blood under the skin, in an organ, in a body space, in a joint space, or in other tissue. The blood can thicken (clot) to form a lump that you can see and feel. The lump is often firm and may become sore and tender. Most hematomas get better in a few days to weeks. However, some hematomas may be serious and require medical care. Hematomas can range from very small to very large.    What are the causes?  This condition is caused by:  •A blunt or penetrating injury.  •A leakage from a blood vessel under the skin.  •Some medical procedures, including surgeries, such as oral surgery, face lifts, and surgeries on the joints.  •Some medical conditions that cause bleeding or bruising. There may be multiple hematomas that appear in different areas of the body.    What increases the risk?  You are more likely to develop this condition if:  •You are an older adult.  •You use blood thinners.    What are the signs or symptoms?     Symptoms of this condition depend on where the hematoma is located.   Common symptoms of a hematoma that is under the skin include:  •A firm lump on the body.  •Pain and tenderness in the area.  •Bruising. Blue, dark blue, purple-red, or yellowish skin (discoloration) may appear at the site of the hematoma if the hematoma is close to the surface of the skin.    Common symptoms of a hematoma that is deep in the tissues or body spaces may be less obvious. They include:  •A collection of blood in the stomach (intra-abdominal hematoma). This may cause pain in the abdomen, weakness, fainting, and shortness of breath.   •A collection of blood in the head (intracranial hematoma). This may cause a headache or symptoms such as weakness, trouble speaking or understanding, or a change in consciousness.     How is this diagnosed?  This condition is diagnosed based on:  •Your medical history.  •A physical exam.  •Imaging tests, such as an ultrasound or CT scan. These may be needed if your health care provider suspects a hematoma in deeper tissues or body spaces.  •Blood tests. These may be needed if your health care provider believes that the hematoma is caused by a medical condition.    How is this treated?  Treatment for this condition depends on the cause, size, and location of the hematoma. Treatment may include:  •Doing nothing. The majority of hematomas do not need treatment as many of them go away on their own over time.  •Surgery or close monitoring. This may be needed for large hematomas or hematomas that affect vital organs.  •Medicines. Medicines may be given if there is an underlying medical cause for the hematoma.    Follow these instructions at home:    Managing pain, stiffness, and swelling    •If directed, put ice on the affected area.  •Put ice in a plastic bag.  •Place a towel between your skin and the bag.  •Leave the ice on for 20 minutes, 2–3 times a day for the first couple of days.  •If directed, apply heat to the affected area after applying ice for a couple of days. Use the heat source that your health care provider recommends, such as a moist heat pack or a heating pad.  •Place a towel between your skin and the heat source.   •Leave the heat on for 20–30 minutes.   •Remove the heat if your skin turns bright red. This is especially important if you are unable to feel pain, heat, or cold. You may have a greater risk of getting burned.  •Raise (elevate) the affected area above the level of your heart while you are sitting or lying down.  •If told, wrap the affected area with an elastic bandage. The bandage applies pressure (compression) to the area, which may help to reduce swelling and promote healing. Do not wrap the bandage too tightly around the affected area.  •If your hematoma is on a leg or foot (lower extremity) and is painful, your health care provider may recommend crutches. Use them as told by your health care provider.    General instructions     •Take over-the-counter and prescription medicines only as told by your health care provider.  •Keep all follow-up visits as told by your health care provider. This is important.    Contact a health care provider if:  •You have a fever.  •The swelling or discoloration gets worse.  •You develop more hematomas.    Get help right away if:  •Your pain is worse or your pain is not controlled with medicine.  •Your skin over the hematoma breaks or starts bleeding.  •Your hematoma is in your chest or abdomen and you have weakness, shortness of breath, or a change in consciousness.  •You have a hematoma on your scalp that is caused by a fall or injury, and you also have:  •A headache that gets worse.  •Trouble speaking or understanding speech.   •Weakness.  •Change in alertness or consciousness.    Summary  •A hematoma is a collection of blood under the skin, in an organ, in a body space, in a joint space, or in other tissue.  •This condition usually does not need treatment because many hematomas go away on their own over time.  •Large hematomas, or those that may affect vital organs, may need surgical drainage or monitoring. If the hematoma is caused by a medical condition, medicines may be prescribed.  •Get help right away if your hematoma breaks or starts to bleed, you have shortness of breath, or you have a headache or trouble speaking after a fall.    Colitis    Colitis is inflammation of the colon. Colitis may last a short time (acute) or it may last a long time (chronic).     CAUSES  This condition may be caused by:    Viruses.  Bacteria.  Reactions to medicine.  Certain autoimmune diseases, such as Crohn disease or ulcerative colitis.    SYMPTOMS  Symptoms of this condition include:    Diarrhea.  Passing bloody or tarry stool.  Pain.  Fever.  Vomiting.  Tiredness (fatigue).  Weight loss.  Bloating.  Sudden increase in abdominal pain.  Having fewer bowel movements than usual.    DIAGNOSIS  This condition is diagnosed with a stool test or a blood test. You may also have other tests, including X-rays, a CT scan, or a colonoscopy.    TREATMENT  Treatment may include:    Resting the bowel. This involves not eating or drinking for a period of time.  Fluids that are given through an IV tube.  Medicine for pain and diarrhea.  Antibiotic medicines.  Surgery.    HOME CARE INSTRUCTIONS  Eating and Drinking    Follow instructions from your health care provider about eating or drinking restrictions.  Drink enough fluid to keep your urine clear or pale yellow.  Work with a dietitian to determine which foods cause your condition to flare up.  Avoid foods that cause flare-ups.  Eat a well-balanced diet.    Medicines    Take over-the-counter and prescription medicines only as told by your health care provider.  If you were prescribed an antibiotic medicine, take it as told by your health care provider. Do not stop taking the antibiotic even if you start to feel better.    General Instructions    Keep all follow-up visits as told by your health care provider. This is important.    SEEK MEDICAL CARE IF:  Your symptoms do not go away.  You develop new symptoms.    SEEK IMMEDIATE MEDICAL CARE IF:  You have a fever that does not go away with treatment.  You develop chills.  You have extreme weakness, fainting, or dehydration.  You have repeated vomiting.  You develop severe pain in your abdomen.  You pass bloody or tarry stool.    ADDITIONAL NOTES AND INSTRUCTIONS    Please follow up with your Primary MD in 24-48 hr.  Seek immediate medical care for any new/worsening signs or symptoms.

## 2022-08-22 ENCOUNTER — OUTPATIENT (OUTPATIENT)
Dept: OUTPATIENT SERVICES | Facility: HOSPITAL | Age: 82
LOS: 1 days | Discharge: HOME | End: 2022-08-22

## 2022-08-22 ENCOUNTER — APPOINTMENT (OUTPATIENT)
Dept: MEDICATION MANAGEMENT | Facility: CLINIC | Age: 82
End: 2022-08-22

## 2022-08-22 DIAGNOSIS — Z90.710 ACQUIRED ABSENCE OF BOTH CERVIX AND UTERUS: Chronic | ICD-10-CM

## 2022-08-22 DIAGNOSIS — Z90.49 ACQUIRED ABSENCE OF OTHER SPECIFIED PARTS OF DIGESTIVE TRACT: Chronic | ICD-10-CM

## 2022-08-22 DIAGNOSIS — I48.91 UNSPECIFIED ATRIAL FIBRILLATION: ICD-10-CM

## 2022-08-22 DIAGNOSIS — Z79.01 LONG TERM (CURRENT) USE OF ANTICOAGULANTS: ICD-10-CM

## 2022-08-22 DIAGNOSIS — Z90.89 ACQUIRED ABSENCE OF OTHER ORGANS: Chronic | ICD-10-CM

## 2022-08-22 LAB
INR PPP: 3.5 RATIO
QUALITY CONTROL: YES

## 2022-08-29 ENCOUNTER — APPOINTMENT (OUTPATIENT)
Dept: MEDICATION MANAGEMENT | Facility: CLINIC | Age: 82
End: 2022-08-29

## 2022-08-29 ENCOUNTER — OUTPATIENT (OUTPATIENT)
Dept: OUTPATIENT SERVICES | Facility: HOSPITAL | Age: 82
LOS: 1 days | Discharge: HOME | End: 2022-08-29

## 2022-08-29 DIAGNOSIS — Z90.710 ACQUIRED ABSENCE OF BOTH CERVIX AND UTERUS: Chronic | ICD-10-CM

## 2022-08-29 DIAGNOSIS — Z79.01 LONG TERM (CURRENT) USE OF ANTICOAGULANTS: ICD-10-CM

## 2022-08-29 DIAGNOSIS — Z90.89 ACQUIRED ABSENCE OF OTHER ORGANS: Chronic | ICD-10-CM

## 2022-08-29 DIAGNOSIS — Z90.49 ACQUIRED ABSENCE OF OTHER SPECIFIED PARTS OF DIGESTIVE TRACT: Chronic | ICD-10-CM

## 2022-08-29 DIAGNOSIS — I48.91 UNSPECIFIED ATRIAL FIBRILLATION: ICD-10-CM

## 2022-08-29 LAB
INR PPP: 2.9 RATIO
QUALITY CONTROL: NO

## 2022-09-12 ENCOUNTER — OUTPATIENT (OUTPATIENT)
Dept: OUTPATIENT SERVICES | Facility: HOSPITAL | Age: 82
LOS: 1 days | Discharge: HOME | End: 2022-09-12

## 2022-09-12 ENCOUNTER — APPOINTMENT (OUTPATIENT)
Dept: MEDICATION MANAGEMENT | Facility: CLINIC | Age: 82
End: 2022-09-12

## 2022-09-12 DIAGNOSIS — Z90.89 ACQUIRED ABSENCE OF OTHER ORGANS: Chronic | ICD-10-CM

## 2022-09-12 DIAGNOSIS — Z90.710 ACQUIRED ABSENCE OF BOTH CERVIX AND UTERUS: Chronic | ICD-10-CM

## 2022-09-12 DIAGNOSIS — I48.91 UNSPECIFIED ATRIAL FIBRILLATION: ICD-10-CM

## 2022-09-12 DIAGNOSIS — Z79.01 LONG TERM (CURRENT) USE OF ANTICOAGULANTS: ICD-10-CM

## 2022-09-12 DIAGNOSIS — Z90.49 ACQUIRED ABSENCE OF OTHER SPECIFIED PARTS OF DIGESTIVE TRACT: Chronic | ICD-10-CM

## 2022-09-12 LAB
INR PPP: 2.1 RATIO
QUALITY CONTROL: YES

## 2022-09-19 ENCOUNTER — APPOINTMENT (OUTPATIENT)
Dept: MEDICATION MANAGEMENT | Facility: CLINIC | Age: 82
End: 2022-09-19

## 2022-09-19 ENCOUNTER — OUTPATIENT (OUTPATIENT)
Dept: OUTPATIENT SERVICES | Facility: HOSPITAL | Age: 82
LOS: 1 days | Discharge: HOME | End: 2022-09-19

## 2022-09-19 DIAGNOSIS — Z90.89 ACQUIRED ABSENCE OF OTHER ORGANS: Chronic | ICD-10-CM

## 2022-09-19 DIAGNOSIS — Z90.710 ACQUIRED ABSENCE OF BOTH CERVIX AND UTERUS: Chronic | ICD-10-CM

## 2022-09-19 DIAGNOSIS — I48.91 UNSPECIFIED ATRIAL FIBRILLATION: ICD-10-CM

## 2022-09-19 DIAGNOSIS — Z79.01 LONG TERM (CURRENT) USE OF ANTICOAGULANTS: ICD-10-CM

## 2022-09-19 DIAGNOSIS — Z90.49 ACQUIRED ABSENCE OF OTHER SPECIFIED PARTS OF DIGESTIVE TRACT: Chronic | ICD-10-CM

## 2022-09-19 LAB
INR PPP: 2.5 RATIO
QUALITY CONTROL: YES

## 2022-09-26 ENCOUNTER — OUTPATIENT (OUTPATIENT)
Dept: OUTPATIENT SERVICES | Facility: HOSPITAL | Age: 82
LOS: 1 days | Discharge: HOME | End: 2022-09-26

## 2022-09-26 ENCOUNTER — APPOINTMENT (OUTPATIENT)
Dept: MEDICATION MANAGEMENT | Facility: CLINIC | Age: 82
End: 2022-09-26

## 2022-09-26 DIAGNOSIS — Z79.01 LONG TERM (CURRENT) USE OF ANTICOAGULANTS: ICD-10-CM

## 2022-09-26 DIAGNOSIS — Z90.89 ACQUIRED ABSENCE OF OTHER ORGANS: Chronic | ICD-10-CM

## 2022-09-26 DIAGNOSIS — Z90.49 ACQUIRED ABSENCE OF OTHER SPECIFIED PARTS OF DIGESTIVE TRACT: Chronic | ICD-10-CM

## 2022-09-26 DIAGNOSIS — Z90.710 ACQUIRED ABSENCE OF BOTH CERVIX AND UTERUS: Chronic | ICD-10-CM

## 2022-09-26 DIAGNOSIS — I48.91 UNSPECIFIED ATRIAL FIBRILLATION: ICD-10-CM

## 2022-09-26 LAB
INR PPP: 3.2 RATIO
QUALITY CONTROL: YES

## 2022-10-03 ENCOUNTER — APPOINTMENT (OUTPATIENT)
Dept: MEDICATION MANAGEMENT | Facility: CLINIC | Age: 82
End: 2022-10-03

## 2022-10-03 ENCOUNTER — OUTPATIENT (OUTPATIENT)
Dept: OUTPATIENT SERVICES | Facility: HOSPITAL | Age: 82
LOS: 1 days | Discharge: HOME | End: 2022-10-03

## 2022-10-03 DIAGNOSIS — Z90.49 ACQUIRED ABSENCE OF OTHER SPECIFIED PARTS OF DIGESTIVE TRACT: Chronic | ICD-10-CM

## 2022-10-03 DIAGNOSIS — Z79.01 LONG TERM (CURRENT) USE OF ANTICOAGULANTS: ICD-10-CM

## 2022-10-03 DIAGNOSIS — Z90.710 ACQUIRED ABSENCE OF BOTH CERVIX AND UTERUS: Chronic | ICD-10-CM

## 2022-10-03 DIAGNOSIS — Z90.89 ACQUIRED ABSENCE OF OTHER ORGANS: Chronic | ICD-10-CM

## 2022-10-03 DIAGNOSIS — I48.91 UNSPECIFIED ATRIAL FIBRILLATION: ICD-10-CM

## 2022-10-03 LAB
INR PPP: 2.9 RATIO
QUALITY CONTROL: YES

## 2022-10-17 ENCOUNTER — APPOINTMENT (OUTPATIENT)
Dept: MEDICATION MANAGEMENT | Facility: CLINIC | Age: 82
End: 2022-10-17

## 2022-10-17 ENCOUNTER — OUTPATIENT (OUTPATIENT)
Dept: OUTPATIENT SERVICES | Facility: HOSPITAL | Age: 82
LOS: 1 days | Discharge: HOME | End: 2022-10-17

## 2022-10-17 DIAGNOSIS — Z90.89 ACQUIRED ABSENCE OF OTHER ORGANS: Chronic | ICD-10-CM

## 2022-10-17 DIAGNOSIS — Z79.01 LONG TERM (CURRENT) USE OF ANTICOAGULANTS: ICD-10-CM

## 2022-10-17 DIAGNOSIS — Z90.49 ACQUIRED ABSENCE OF OTHER SPECIFIED PARTS OF DIGESTIVE TRACT: Chronic | ICD-10-CM

## 2022-10-17 DIAGNOSIS — Z90.710 ACQUIRED ABSENCE OF BOTH CERVIX AND UTERUS: Chronic | ICD-10-CM

## 2022-10-17 DIAGNOSIS — I48.91 UNSPECIFIED ATRIAL FIBRILLATION: ICD-10-CM

## 2022-10-17 LAB
INR PPP: 2.8 RATIO
QUALITY CONTROL: YES

## 2022-10-31 ENCOUNTER — OUTPATIENT (OUTPATIENT)
Dept: OUTPATIENT SERVICES | Facility: HOSPITAL | Age: 82
LOS: 1 days | Discharge: HOME | End: 2022-10-31

## 2022-10-31 ENCOUNTER — APPOINTMENT (OUTPATIENT)
Dept: MEDICATION MANAGEMENT | Facility: CLINIC | Age: 82
End: 2022-10-31

## 2022-10-31 DIAGNOSIS — Z79.01 LONG TERM (CURRENT) USE OF ANTICOAGULANTS: ICD-10-CM

## 2022-10-31 DIAGNOSIS — Z90.89 ACQUIRED ABSENCE OF OTHER ORGANS: Chronic | ICD-10-CM

## 2022-10-31 DIAGNOSIS — Z90.49 ACQUIRED ABSENCE OF OTHER SPECIFIED PARTS OF DIGESTIVE TRACT: Chronic | ICD-10-CM

## 2022-10-31 DIAGNOSIS — Z90.710 ACQUIRED ABSENCE OF BOTH CERVIX AND UTERUS: Chronic | ICD-10-CM

## 2022-10-31 DIAGNOSIS — I48.91 UNSPECIFIED ATRIAL FIBRILLATION: ICD-10-CM

## 2022-10-31 LAB
INR PPP: 2.7 RATIO
QUALITY CONTROL: YES

## 2022-11-14 ENCOUNTER — APPOINTMENT (OUTPATIENT)
Dept: MEDICATION MANAGEMENT | Facility: CLINIC | Age: 82
End: 2022-11-14

## 2022-11-14 ENCOUNTER — OUTPATIENT (OUTPATIENT)
Dept: OUTPATIENT SERVICES | Facility: HOSPITAL | Age: 82
LOS: 1 days | Discharge: HOME | End: 2022-11-14

## 2022-11-14 DIAGNOSIS — Z79.01 LONG TERM (CURRENT) USE OF ANTICOAGULANTS: ICD-10-CM

## 2022-11-14 DIAGNOSIS — Z90.49 ACQUIRED ABSENCE OF OTHER SPECIFIED PARTS OF DIGESTIVE TRACT: Chronic | ICD-10-CM

## 2022-11-14 DIAGNOSIS — Z90.89 ACQUIRED ABSENCE OF OTHER ORGANS: Chronic | ICD-10-CM

## 2022-11-14 DIAGNOSIS — I48.91 UNSPECIFIED ATRIAL FIBRILLATION: ICD-10-CM

## 2022-11-14 DIAGNOSIS — Z90.710 ACQUIRED ABSENCE OF BOTH CERVIX AND UTERUS: Chronic | ICD-10-CM

## 2022-11-14 LAB
INR PPP: 2.6 RATIO
QUALITY CONTROL: YES

## 2022-11-28 ENCOUNTER — APPOINTMENT (OUTPATIENT)
Dept: MEDICATION MANAGEMENT | Facility: CLINIC | Age: 82
End: 2022-11-28

## 2022-11-28 ENCOUNTER — OUTPATIENT (OUTPATIENT)
Dept: OUTPATIENT SERVICES | Facility: HOSPITAL | Age: 82
LOS: 1 days | Discharge: HOME | End: 2022-11-28

## 2022-11-28 DIAGNOSIS — Z90.49 ACQUIRED ABSENCE OF OTHER SPECIFIED PARTS OF DIGESTIVE TRACT: Chronic | ICD-10-CM

## 2022-11-28 DIAGNOSIS — I48.91 UNSPECIFIED ATRIAL FIBRILLATION: ICD-10-CM

## 2022-11-28 DIAGNOSIS — Z90.710 ACQUIRED ABSENCE OF BOTH CERVIX AND UTERUS: Chronic | ICD-10-CM

## 2022-11-28 DIAGNOSIS — Z79.01 LONG TERM (CURRENT) USE OF ANTICOAGULANTS: ICD-10-CM

## 2022-11-28 DIAGNOSIS — Z90.89 ACQUIRED ABSENCE OF OTHER ORGANS: Chronic | ICD-10-CM

## 2022-11-28 LAB
INR PPP: 2.8 RATIO
QUALITY CONTROL: YES

## 2022-12-12 ENCOUNTER — APPOINTMENT (OUTPATIENT)
Dept: MEDICATION MANAGEMENT | Facility: CLINIC | Age: 82
End: 2022-12-12

## 2022-12-12 ENCOUNTER — OUTPATIENT (OUTPATIENT)
Dept: OUTPATIENT SERVICES | Facility: HOSPITAL | Age: 82
LOS: 1 days | Discharge: HOME | End: 2022-12-12

## 2022-12-12 DIAGNOSIS — Z90.710 ACQUIRED ABSENCE OF BOTH CERVIX AND UTERUS: Chronic | ICD-10-CM

## 2022-12-12 DIAGNOSIS — I48.91 UNSPECIFIED ATRIAL FIBRILLATION: ICD-10-CM

## 2022-12-12 DIAGNOSIS — Z79.01 LONG TERM (CURRENT) USE OF ANTICOAGULANTS: ICD-10-CM

## 2022-12-12 DIAGNOSIS — Z90.89 ACQUIRED ABSENCE OF OTHER ORGANS: Chronic | ICD-10-CM

## 2022-12-12 DIAGNOSIS — Z90.49 ACQUIRED ABSENCE OF OTHER SPECIFIED PARTS OF DIGESTIVE TRACT: Chronic | ICD-10-CM

## 2022-12-12 LAB
INR PPP: 2.5 RATIO
QUALITY CONTROL: YES

## 2022-12-19 ENCOUNTER — OUTPATIENT (OUTPATIENT)
Dept: OUTPATIENT SERVICES | Facility: HOSPITAL | Age: 82
LOS: 1 days | Discharge: HOME | End: 2022-12-19

## 2022-12-19 ENCOUNTER — APPOINTMENT (OUTPATIENT)
Dept: MEDICATION MANAGEMENT | Facility: CLINIC | Age: 82
End: 2022-12-19

## 2022-12-19 DIAGNOSIS — I48.91 UNSPECIFIED ATRIAL FIBRILLATION: ICD-10-CM

## 2022-12-19 DIAGNOSIS — Z90.49 ACQUIRED ABSENCE OF OTHER SPECIFIED PARTS OF DIGESTIVE TRACT: Chronic | ICD-10-CM

## 2022-12-19 DIAGNOSIS — Z90.89 ACQUIRED ABSENCE OF OTHER ORGANS: Chronic | ICD-10-CM

## 2022-12-19 DIAGNOSIS — Z79.01 LONG TERM (CURRENT) USE OF ANTICOAGULANTS: ICD-10-CM

## 2022-12-19 DIAGNOSIS — Z90.710 ACQUIRED ABSENCE OF BOTH CERVIX AND UTERUS: Chronic | ICD-10-CM

## 2022-12-19 LAB
INR PPP: 3 RATIO
QUALITY CONTROL: YES

## 2023-01-03 ENCOUNTER — APPOINTMENT (OUTPATIENT)
Dept: MEDICATION MANAGEMENT | Facility: CLINIC | Age: 83
End: 2023-01-03

## 2023-01-03 ENCOUNTER — OUTPATIENT (OUTPATIENT)
Dept: OUTPATIENT SERVICES | Facility: HOSPITAL | Age: 83
LOS: 1 days | Discharge: HOME | End: 2023-01-03

## 2023-01-03 DIAGNOSIS — I48.91 UNSPECIFIED ATRIAL FIBRILLATION: ICD-10-CM

## 2023-01-03 DIAGNOSIS — Z90.49 ACQUIRED ABSENCE OF OTHER SPECIFIED PARTS OF DIGESTIVE TRACT: Chronic | ICD-10-CM

## 2023-01-03 DIAGNOSIS — Z90.710 ACQUIRED ABSENCE OF BOTH CERVIX AND UTERUS: Chronic | ICD-10-CM

## 2023-01-03 DIAGNOSIS — Z90.89 ACQUIRED ABSENCE OF OTHER ORGANS: Chronic | ICD-10-CM

## 2023-01-03 DIAGNOSIS — Z79.01 LONG TERM (CURRENT) USE OF ANTICOAGULANTS: ICD-10-CM

## 2023-01-03 LAB
INR PPP: 3.3 RATIO
QUALITY CONTROL: YES

## 2023-01-12 ENCOUNTER — EMERGENCY (EMERGENCY)
Facility: HOSPITAL | Age: 83
LOS: 0 days | Discharge: HOME | End: 2023-01-13
Attending: STUDENT IN AN ORGANIZED HEALTH CARE EDUCATION/TRAINING PROGRAM | Admitting: EMERGENCY MEDICINE
Payer: MEDICARE

## 2023-01-12 VITALS
HEART RATE: 63 BPM | TEMPERATURE: 96 F | OXYGEN SATURATION: 96 % | DIASTOLIC BLOOD PRESSURE: 79 MMHG | RESPIRATION RATE: 18 BRPM | SYSTOLIC BLOOD PRESSURE: 178 MMHG

## 2023-01-12 DIAGNOSIS — R07.9 CHEST PAIN, UNSPECIFIED: ICD-10-CM

## 2023-01-12 DIAGNOSIS — Z86.711 PERSONAL HISTORY OF PULMONARY EMBOLISM: ICD-10-CM

## 2023-01-12 DIAGNOSIS — Z20.822 CONTACT WITH AND (SUSPECTED) EXPOSURE TO COVID-19: ICD-10-CM

## 2023-01-12 DIAGNOSIS — Z79.899 OTHER LONG TERM (CURRENT) DRUG THERAPY: ICD-10-CM

## 2023-01-12 DIAGNOSIS — R11.2 NAUSEA WITH VOMITING, UNSPECIFIED: ICD-10-CM

## 2023-01-12 DIAGNOSIS — I10 ESSENTIAL (PRIMARY) HYPERTENSION: ICD-10-CM

## 2023-01-12 DIAGNOSIS — N39.0 URINARY TRACT INFECTION, SITE NOT SPECIFIED: ICD-10-CM

## 2023-01-12 DIAGNOSIS — R10.31 RIGHT LOWER QUADRANT PAIN: ICD-10-CM

## 2023-01-12 DIAGNOSIS — Z90.89 ACQUIRED ABSENCE OF OTHER ORGANS: Chronic | ICD-10-CM

## 2023-01-12 DIAGNOSIS — Z90.49 ACQUIRED ABSENCE OF OTHER SPECIFIED PARTS OF DIGESTIVE TRACT: Chronic | ICD-10-CM

## 2023-01-12 DIAGNOSIS — Z79.01 LONG TERM (CURRENT) USE OF ANTICOAGULANTS: ICD-10-CM

## 2023-01-12 DIAGNOSIS — Z86.718 PERSONAL HISTORY OF OTHER VENOUS THROMBOSIS AND EMBOLISM: ICD-10-CM

## 2023-01-12 DIAGNOSIS — Z90.710 ACQUIRED ABSENCE OF BOTH CERVIX AND UTERUS: Chronic | ICD-10-CM

## 2023-01-12 DIAGNOSIS — I48.91 UNSPECIFIED ATRIAL FIBRILLATION: ICD-10-CM

## 2023-01-12 LAB
ALBUMIN SERPL ELPH-MCNC: 4.5 G/DL — SIGNIFICANT CHANGE UP (ref 3.5–5.2)
ALP SERPL-CCNC: 70 U/L — SIGNIFICANT CHANGE UP (ref 30–115)
ALT FLD-CCNC: 7 U/L — SIGNIFICANT CHANGE UP (ref 0–41)
ANION GAP SERPL CALC-SCNC: 16 MMOL/L — HIGH (ref 7–14)
APTT BLD: 39.2 SEC — SIGNIFICANT CHANGE UP (ref 27–39.2)
AST SERPL-CCNC: 22 U/L — SIGNIFICANT CHANGE UP (ref 0–41)
BASOPHILS # BLD AUTO: 0.03 K/UL — SIGNIFICANT CHANGE UP (ref 0–0.2)
BASOPHILS NFR BLD AUTO: 0.3 % — SIGNIFICANT CHANGE UP (ref 0–1)
BILIRUB SERPL-MCNC: 0.7 MG/DL — SIGNIFICANT CHANGE UP (ref 0.2–1.2)
BUN SERPL-MCNC: 26 MG/DL — HIGH (ref 10–20)
CALCIUM SERPL-MCNC: 9.8 MG/DL — SIGNIFICANT CHANGE UP (ref 8.4–10.5)
CHLORIDE SERPL-SCNC: 104 MMOL/L — SIGNIFICANT CHANGE UP (ref 98–110)
CO2 SERPL-SCNC: 23 MMOL/L — SIGNIFICANT CHANGE UP (ref 17–32)
CREAT SERPL-MCNC: 1.2 MG/DL — SIGNIFICANT CHANGE UP (ref 0.7–1.5)
EGFR: 45 ML/MIN/1.73M2 — LOW
EOSINOPHIL # BLD AUTO: 0.39 K/UL — SIGNIFICANT CHANGE UP (ref 0–0.7)
EOSINOPHIL NFR BLD AUTO: 3.9 % — SIGNIFICANT CHANGE UP (ref 0–8)
GLUCOSE SERPL-MCNC: 156 MG/DL — HIGH (ref 70–99)
HCT VFR BLD CALC: 50.1 % — HIGH (ref 37–47)
HGB BLD-MCNC: 16.1 G/DL — HIGH (ref 12–16)
IMM GRANULOCYTES NFR BLD AUTO: 0.2 % — SIGNIFICANT CHANGE UP (ref 0.1–0.3)
INR BLD: 2.86 RATIO — HIGH (ref 0.65–1.3)
LACTATE SERPL-SCNC: 2.1 MMOL/L — HIGH (ref 0.7–2)
LIDOCAIN IGE QN: 51 U/L — SIGNIFICANT CHANGE UP (ref 7–60)
LYMPHOCYTES # BLD AUTO: 2.57 K/UL — SIGNIFICANT CHANGE UP (ref 1.2–3.4)
LYMPHOCYTES # BLD AUTO: 25.9 % — SIGNIFICANT CHANGE UP (ref 20.5–51.1)
MCHC RBC-ENTMCNC: 30.3 PG — SIGNIFICANT CHANGE UP (ref 27–31)
MCHC RBC-ENTMCNC: 32.1 G/DL — SIGNIFICANT CHANGE UP (ref 32–37)
MCV RBC AUTO: 94.2 FL — SIGNIFICANT CHANGE UP (ref 81–99)
MONOCYTES # BLD AUTO: 0.51 K/UL — SIGNIFICANT CHANGE UP (ref 0.1–0.6)
MONOCYTES NFR BLD AUTO: 5.1 % — SIGNIFICANT CHANGE UP (ref 1.7–9.3)
NEUTROPHILS # BLD AUTO: 6.42 K/UL — SIGNIFICANT CHANGE UP (ref 1.4–6.5)
NEUTROPHILS NFR BLD AUTO: 64.6 % — SIGNIFICANT CHANGE UP (ref 42.2–75.2)
NRBC # BLD: 0 /100 WBCS — SIGNIFICANT CHANGE UP (ref 0–0)
PLATELET # BLD AUTO: 171 K/UL — SIGNIFICANT CHANGE UP (ref 130–400)
POTASSIUM SERPL-MCNC: 3.7 MMOL/L — SIGNIFICANT CHANGE UP (ref 3.5–5)
POTASSIUM SERPL-SCNC: 3.7 MMOL/L — SIGNIFICANT CHANGE UP (ref 3.5–5)
PROT SERPL-MCNC: 6.5 G/DL — SIGNIFICANT CHANGE UP (ref 6–8)
PROTHROM AB SERPL-ACNC: 33.7 SEC — HIGH (ref 9.95–12.87)
RBC # BLD: 5.32 M/UL — SIGNIFICANT CHANGE UP (ref 4.2–5.4)
RBC # FLD: 13.7 % — SIGNIFICANT CHANGE UP (ref 11.5–14.5)
SODIUM SERPL-SCNC: 143 MMOL/L — SIGNIFICANT CHANGE UP (ref 135–146)
TROPONIN T SERPL-MCNC: <0.01 NG/ML — SIGNIFICANT CHANGE UP
WBC # BLD: 9.94 K/UL — SIGNIFICANT CHANGE UP (ref 4.8–10.8)
WBC # FLD AUTO: 9.94 K/UL — SIGNIFICANT CHANGE UP (ref 4.8–10.8)

## 2023-01-12 PROCEDURE — 99223 1ST HOSP IP/OBS HIGH 75: CPT

## 2023-01-12 PROCEDURE — 74177 CT ABD & PELVIS W/CONTRAST: CPT | Mod: 26,MA

## 2023-01-12 RX ORDER — ONDANSETRON 8 MG/1
4 TABLET, FILM COATED ORAL ONCE
Refills: 0 | Status: COMPLETED | OUTPATIENT
Start: 2023-01-12 | End: 2023-01-12

## 2023-01-12 RX ORDER — MORPHINE SULFATE 50 MG/1
4 CAPSULE, EXTENDED RELEASE ORAL ONCE
Refills: 0 | Status: DISCONTINUED | OUTPATIENT
Start: 2023-01-12 | End: 2023-01-12

## 2023-01-12 RX ORDER — SODIUM CHLORIDE 9 MG/ML
1000 INJECTION INTRAMUSCULAR; INTRAVENOUS; SUBCUTANEOUS ONCE
Refills: 0 | Status: COMPLETED | OUTPATIENT
Start: 2023-01-12 | End: 2023-01-12

## 2023-01-12 RX ORDER — FAMOTIDINE 10 MG/ML
20 INJECTION INTRAVENOUS ONCE
Refills: 0 | Status: COMPLETED | OUTPATIENT
Start: 2023-01-12 | End: 2023-01-12

## 2023-01-12 RX ADMIN — ONDANSETRON 4 MILLIGRAM(S): 8 TABLET, FILM COATED ORAL at 22:35

## 2023-01-12 RX ADMIN — MORPHINE SULFATE 4 MILLIGRAM(S): 50 CAPSULE, EXTENDED RELEASE ORAL at 22:35

## 2023-01-12 NOTE — ED PROVIDER NOTE - PROGRESS NOTE DETAILS
Note authored by Dr. Hendrix: Back from the CT scan.  Still pending completion of labs and urinalysis.  CT results pending. Authored by Dr. Hendrix: s/o to dr silva, f/u labs, imaging results and dispo Patient daughter name is Angela, and her phone number is [phone]. Patient remained stable in ED. EKG/labs/CT findings reviewed and discussed with patient. Patient stated that, she is aware of the previous CT findings and had decided at that time, not to pursue further due to her age and so did not see the Hematology/oncologist. Discussed with patient about the current CT findings and the need for further evaluation, she verbalized understanding and still wanted to go with the same decision she had in the past, not to get further work up for it. Patient was encouraged about the need for Hematology/oncology evaluation and further evaluation of the CT findings. Discussed with patient about her chest discomfort which she had in the morning and offered further evaluation for it, including the stress test, patient verbalized understanding and agreed. Will admit patient to [facility] for further evaluation and treat with abx for her ureteral findings on the CT. All the information discussed with patient and she verbalized understanding and agreed.

## 2023-01-12 NOTE — ED PROVIDER NOTE - OBJECTIVE STATEMENT
82-year-old female history of PE/A. fib on Coumadin, hypertension, status post cholecystectomy, now presents with 1 day history of right lower quadrant pain associated with nausea and vomiting.  Radiating to the right flank.  No hematuria.  No dysuria.  No fever.

## 2023-01-12 NOTE — ED PROVIDER NOTE - PHYSICAL EXAMINATION
VITAL SIGNS: I have reviewed nursing notes and confirm.  CONSTITUTIONAL: Elderly female, nontoxic, in pain  SKIN: no rash, no petechiae.  No zoster  EYES: pink conjunctiva, anicteric  ENT: Dry mucous membranes  NECK: Supple; no JVD  CARD: RRR, no murmurs, equal radial pulses bilaterally 2+  RESP: CTAB, no respiratory distress  ABD: Soft, positive right lower quadrant tenderness without peritoneal signs, non-distended, no peritoneal signs, no HSM, no CVA tenderness  EXT: Normal ROM x4. No edema. No calves tenderness  NEURO: Alert, oriented. CN2-12 intact, equal strength bilaterally, nl gait.  PSYCH: Cooperative, appropriate.        Afebrile rectally 98.7

## 2023-01-12 NOTE — ED PROVIDER NOTE - CLINICAL SUMMARY MEDICAL DECISION MAKING FREE TEXT BOX
Right lower quadrant pain.  Labs and imaging ordered and reviewed.  IV fluids given.  Antiemetics given.  Pain management and medication given. Right lower quadrant pain.  Labs and imaging ordered and reviewed.  IV fluids given.  Antiemetics given.  Pain management and medication given.    Follow up note:  Patient was signed out to me pending EKG, labs and CT scan. Patient was seen and evaluated by me after receiving sign out. Patient stated that, in the morning started having discomfort in the chest/upper abd area, gas like discomfort, stated that, was concerned about the heart and thought was having the heart attack, but symptoms resolved after short period of time. Patient was ok until evening, started having RLQ abd pain, which continued, thought had to use the bathroom, so went to use the bathroom, but her RLQ abd pain continued and did not go away, so had decided to come to ED for evaluation. Patient denies any other associated symptoms. Will follow up with labs/EKG and CT results and reevaluate.

## 2023-01-13 VITALS — SYSTOLIC BLOOD PRESSURE: 98 MMHG | DIASTOLIC BLOOD PRESSURE: 57 MMHG | HEART RATE: 57 BPM

## 2023-01-13 LAB
APPEARANCE UR: ABNORMAL
BACTERIA # UR AUTO: NEGATIVE — SIGNIFICANT CHANGE UP
BILIRUB UR-MCNC: NEGATIVE — SIGNIFICANT CHANGE UP
COLOR SPEC: ABNORMAL
DIFF PNL FLD: ABNORMAL
EPI CELLS # UR: 2 /HPF — SIGNIFICANT CHANGE UP (ref 0–5)
GLUCOSE UR QL: NEGATIVE — SIGNIFICANT CHANGE UP
HYALINE CASTS # UR AUTO: 0 /LPF — SIGNIFICANT CHANGE UP (ref 0–7)
KETONES UR-MCNC: NEGATIVE — SIGNIFICANT CHANGE UP
LEUKOCYTE ESTERASE UR-ACNC: ABNORMAL
NITRITE UR-MCNC: NEGATIVE — SIGNIFICANT CHANGE UP
PH UR: 6 — SIGNIFICANT CHANGE UP (ref 5–8)
PROT UR-MCNC: ABNORMAL
RBC CASTS # UR COMP ASSIST: >720 /HPF — HIGH (ref 0–4)
SARS-COV-2 RNA SPEC QL NAA+PROBE: SIGNIFICANT CHANGE UP
SP GR SPEC: 1.03 — HIGH (ref 1.01–1.03)
TROPONIN T SERPL-MCNC: <0.01 NG/ML — SIGNIFICANT CHANGE UP
UROBILINOGEN FLD QL: SIGNIFICANT CHANGE UP
WBC UR QL: 11 /HPF — HIGH (ref 0–5)

## 2023-01-13 PROCEDURE — 71045 X-RAY EXAM CHEST 1 VIEW: CPT | Mod: 26

## 2023-01-13 PROCEDURE — 93010 ELECTROCARDIOGRAM REPORT: CPT | Mod: 76

## 2023-01-13 RX ORDER — CEFTRIAXONE 500 MG/1
1000 INJECTION, POWDER, FOR SOLUTION INTRAMUSCULAR; INTRAVENOUS ONCE
Refills: 0 | Status: COMPLETED | OUTPATIENT
Start: 2023-01-13 | End: 2023-01-13

## 2023-01-13 RX ORDER — SODIUM CHLORIDE 9 MG/ML
1000 INJECTION INTRAMUSCULAR; INTRAVENOUS; SUBCUTANEOUS ONCE
Refills: 0 | Status: DISCONTINUED | OUTPATIENT
Start: 2023-01-13 | End: 2023-01-13

## 2023-01-13 RX ORDER — CEFPODOXIME PROXETIL 100 MG
1 TABLET ORAL
Qty: 20 | Refills: 0
Start: 2023-01-13 | End: 2023-01-22

## 2023-01-13 RX ADMIN — SODIUM CHLORIDE 1000 MILLILITER(S): 9 INJECTION INTRAMUSCULAR; INTRAVENOUS; SUBCUTANEOUS at 00:26

## 2023-01-13 RX ADMIN — CEFTRIAXONE 100 MILLIGRAM(S): 500 INJECTION, POWDER, FOR SOLUTION INTRAMUSCULAR; INTRAVENOUS at 03:23

## 2023-01-13 RX ADMIN — FAMOTIDINE 20 MILLIGRAM(S): 10 INJECTION INTRAVENOUS at 00:26

## 2023-01-13 NOTE — ED CDU PROVIDER INITIAL DAY NOTE - NS ED ATTENDING STATEMENT MOD
This was a shared visit with the NILESH. I reviewed and verified the documentation and independently performed the documented:

## 2023-01-13 NOTE — ED CDU PROVIDER DISPOSITION NOTE - PATIENT PORTAL LINK FT
You can access the FollowMyHealth Patient Portal offered by [facility] by registering at the following website: [email or web address]. By joining [facility]’s FollowMyHealth portal, you will also be able to view your health information using other applications (apps) compatible with our system.

## 2023-01-13 NOTE — ED CDU PROVIDER DISPOSITION NOTE - PROVIDER TOKENS
FREE:[LAST:[your PMD and cardiologist Dr Rosen],PHONE:[(   )    -],FAX:[(   )    -],FOLLOWUP:[1-3 Days]]

## 2023-01-13 NOTE — ED CDU PROVIDER INITIAL DAY NOTE - PROGRESS NOTE DETAILS
Patient feels well today. She reports that the chest pain she has was yesterday morning while in bed which she attributed to gas. Pain lasted 1 hour and went away. 2 trops negative. Will dc home with abx

## 2023-01-13 NOTE — ED CDU PROVIDER DISPOSITION NOTE - CLINICAL COURSE
83 y/o F placed in OBS for CP. Pt main complaint for coming to ED was R abd pain and urinary sx; CT imaging and UA c/w UTI. Pt's chest pain was yesterday morning (1/12) 7-8am, resolved w/ no re-occurrence. EKG NL sinus, no STEMI. Trop 11pm last night and 5am this AM were both negative. Pt would prefer to go home and cont wup as outpt. Given clinical picture, this is reasonable. IMP: UTI. Discussed all available results with Pt.  Pt understands results, plan of care, 10 abx course, outpt follow up w/ pmd and Dr. Fisher, cardiology as discussed, and signs and symptoms for ED return.  Pt is comfortable with discharge. DC home.

## 2023-01-13 NOTE — ED CDU PROVIDER DISPOSITION NOTE - CARE PROVIDER_API CALL
your PMD and cardiologist Dr Rosen,   Phone: (   )    -  Fax: (   )    -  Follow Up Time: 1-3 Days

## 2023-01-13 NOTE — ED CDU PROVIDER INITIAL DAY NOTE - OBJECTIVE STATEMENT
Patient stated that, in the morning started having discomfort in the chest/upper abd area, gas like discomfort, stated that, was concerned about the heart and thought was having the heart attack, but symptoms resolved after short period of time. Denies sob/palpitations/syncope. Patient has been taking her coumadin regularly and did not miss any doses. INR is therapeutic. Patient was ok until evening, started having RLQ abd pain, which continued, thought had to use the bathroom, so went to use the bathroom, but her RLQ abd pain continued and did not go away, so had decided to come to ED for evaluation. Patient denies any other associated symptoms. Will follow up with labs/EKG and CT results and reevaluate.

## 2023-01-13 NOTE — ED CDU PROVIDER DISPOSITION NOTE - NSFOLLOWUPINSTRUCTIONS_ED_ALL_ED_FT
Urinary Tract Infection    A urinary tract infection (UTI) is an infection of any part of the urinary tract, which includes the kidneys, ureters, bladder, and urethra. Risk factors include ignoring your need to urinate, wiping back to front if female, being an uncircumcised male, and having diabetes or a weak immune system. Symptoms include frequent urination, pain or burning with urination, foul smelling urine, cloudy urine, pain in the lower abdomen, blood in the urine, and fever. If you were prescribed an antibiotic medicine, take it as told by your health care provider. Do not stop taking the antibiotic even if you start to feel better.    SEEK IMMEDIATE MEDICAL CARE IF YOU HAVE THE FOLLOWING SYMPTOMS: severe back or abdominal pain, inability to keep fluids or medicine down, dizziness/lightheadedness, or a change in mental status.      Flank Pain    Flank pain refers to pain that is located on the side of the body between the upper abdomen and the back. The pain may occur over a short period of time (acute) or may be long-term or reoccurring (chronic). It may be mild or severe. Flank pain can be caused by many things.    CAUSES  Some of the more common causes of flank pain include:    Muscle strains.    Muscle spasms.    A disease of your spine (vertebral disk disease).    A lung infection (pneumonia).    Fluid around your lungs (pulmonary edema).    A kidney infection.    Kidney stones.    A very painful skin rash caused by the chickenpox virus (shingles).    Gallbladder disease.      HOME CARE INSTRUCTIONS  Home care will depend on the cause of your pain. In general,    Rest as directed by your caregiver.  Drink enough fluids to keep your urine clear or pale yellow.  Only take over-the-counter or prescription medicines as directed by your caregiver. Some medicines may help relieve the pain.  Tell your caregiver about any changes in your pain.  Follow up with your caregiver as directed.    SEEK IMMEDIATE MEDICAL CARE IF:  Your pain is not controlled with medicine.    You have new or worsening symptoms.  Your pain increases.    You have abdominal pain.    You have shortness of breath.    You have persistent nausea or vomiting.    You have swelling in your abdomen.    You feel faint or pass out.    You have blood in your urine.  You have a fever or persistent symptoms for more than 2–3 days.  You have a fever and your symptoms suddenly get worse.     MAKE SURE YOU:  Understand these instructions.  Will watch your condition.  Will get help right away if you are not doing well or get worse.    Chest Pain    Chest pain can be caused by many different conditions which may or may not be dangerous. Causes include heartburn, lung infections, heart attack, blood clot in lungs, skin infections, strain or damage to muscle, cartilage, or bones, etc. In addition to a history and physical examination, an electrocardiogram (ECG) or other lab tests may have been performed to determine the cause of your chest pain. Follow up with your primary care provider or with a cardiologist as instructed.     SEEK IMMEDIATE MEDICAL CARE IF YOU HAVE ANY OF THE FOLLOWING SYMPTOMS: worsening chest pain, coughing up blood, unexplained back/neck/jaw pain, severe abdominal pain, dizziness or lightheadedness, fainting, shortness of breath, sweaty or clammy skin, vomiting, or racing heart beat. These symptoms may represent a serious problem that is an emergency. Do not wait to see if the symptoms will go away. Get medical help right away. Call 911 and do not drive yourself to the hospital.

## 2023-01-14 LAB
CULTURE RESULTS: SIGNIFICANT CHANGE UP
SPECIMEN SOURCE: SIGNIFICANT CHANGE UP

## 2023-01-23 ENCOUNTER — OUTPATIENT (OUTPATIENT)
Dept: OUTPATIENT SERVICES | Facility: HOSPITAL | Age: 83
LOS: 1 days | Discharge: HOME | End: 2023-01-23

## 2023-01-23 ENCOUNTER — APPOINTMENT (OUTPATIENT)
Dept: MEDICATION MANAGEMENT | Facility: CLINIC | Age: 83
End: 2023-01-23

## 2023-01-23 DIAGNOSIS — Z90.89 ACQUIRED ABSENCE OF OTHER ORGANS: Chronic | ICD-10-CM

## 2023-01-23 DIAGNOSIS — Z79.01 LONG TERM (CURRENT) USE OF ANTICOAGULANTS: ICD-10-CM

## 2023-01-23 DIAGNOSIS — Z90.49 ACQUIRED ABSENCE OF OTHER SPECIFIED PARTS OF DIGESTIVE TRACT: Chronic | ICD-10-CM

## 2023-01-23 DIAGNOSIS — I48.91 UNSPECIFIED ATRIAL FIBRILLATION: ICD-10-CM

## 2023-01-23 DIAGNOSIS — Z90.710 ACQUIRED ABSENCE OF BOTH CERVIX AND UTERUS: Chronic | ICD-10-CM

## 2023-01-23 LAB
INR PPP: 2.3 RATIO
QUALITY CONTROL: YES

## 2023-01-30 ENCOUNTER — OUTPATIENT (OUTPATIENT)
Dept: OUTPATIENT SERVICES | Facility: HOSPITAL | Age: 83
LOS: 1 days | Discharge: HOME | End: 2023-01-30

## 2023-01-30 ENCOUNTER — APPOINTMENT (OUTPATIENT)
Dept: MEDICATION MANAGEMENT | Facility: CLINIC | Age: 83
End: 2023-01-30

## 2023-01-30 DIAGNOSIS — Z79.01 LONG TERM (CURRENT) USE OF ANTICOAGULANTS: ICD-10-CM

## 2023-01-30 DIAGNOSIS — I48.91 UNSPECIFIED ATRIAL FIBRILLATION: ICD-10-CM

## 2023-01-30 DIAGNOSIS — Z90.89 ACQUIRED ABSENCE OF OTHER ORGANS: Chronic | ICD-10-CM

## 2023-01-30 DIAGNOSIS — Z90.710 ACQUIRED ABSENCE OF BOTH CERVIX AND UTERUS: Chronic | ICD-10-CM

## 2023-01-30 DIAGNOSIS — Z90.49 ACQUIRED ABSENCE OF OTHER SPECIFIED PARTS OF DIGESTIVE TRACT: Chronic | ICD-10-CM

## 2023-01-30 LAB
INR PPP: 3.6 RATIO
QUALITY CONTROL: YES

## 2023-02-06 ENCOUNTER — APPOINTMENT (OUTPATIENT)
Dept: MEDICATION MANAGEMENT | Facility: CLINIC | Age: 83
End: 2023-02-06

## 2023-02-06 ENCOUNTER — OUTPATIENT (OUTPATIENT)
Dept: OUTPATIENT SERVICES | Facility: HOSPITAL | Age: 83
LOS: 1 days | End: 2023-02-06
Payer: MEDICARE

## 2023-02-06 DIAGNOSIS — Z90.89 ACQUIRED ABSENCE OF OTHER ORGANS: Chronic | ICD-10-CM

## 2023-02-06 DIAGNOSIS — Z79.01 LONG TERM (CURRENT) USE OF ANTICOAGULANTS: ICD-10-CM

## 2023-02-06 DIAGNOSIS — I48.91 UNSPECIFIED ATRIAL FIBRILLATION: ICD-10-CM

## 2023-02-06 DIAGNOSIS — Z90.710 ACQUIRED ABSENCE OF BOTH CERVIX AND UTERUS: Chronic | ICD-10-CM

## 2023-02-06 DIAGNOSIS — Z90.49 ACQUIRED ABSENCE OF OTHER SPECIFIED PARTS OF DIGESTIVE TRACT: Chronic | ICD-10-CM

## 2023-02-06 LAB
INR PPP: 3.3 RATIO
QUALITY CONTROL: YES

## 2023-02-06 PROCEDURE — G0463: CPT

## 2023-02-07 DIAGNOSIS — Z79.01 LONG TERM (CURRENT) USE OF ANTICOAGULANTS: ICD-10-CM

## 2023-02-07 DIAGNOSIS — I48.91 UNSPECIFIED ATRIAL FIBRILLATION: ICD-10-CM

## 2023-02-21 ENCOUNTER — OUTPATIENT (OUTPATIENT)
Dept: OUTPATIENT SERVICES | Facility: HOSPITAL | Age: 83
LOS: 1 days | End: 2023-02-21
Payer: MEDICARE

## 2023-02-21 ENCOUNTER — APPOINTMENT (OUTPATIENT)
Dept: MEDICATION MANAGEMENT | Facility: CLINIC | Age: 83
End: 2023-02-21

## 2023-02-21 DIAGNOSIS — Z90.49 ACQUIRED ABSENCE OF OTHER SPECIFIED PARTS OF DIGESTIVE TRACT: Chronic | ICD-10-CM

## 2023-02-21 DIAGNOSIS — R79.1 ABNORMAL COAGULATION PROFILE: ICD-10-CM

## 2023-02-21 DIAGNOSIS — Z79.01 LONG TERM (CURRENT) USE OF ANTICOAGULANTS: ICD-10-CM

## 2023-02-21 DIAGNOSIS — Z90.89 ACQUIRED ABSENCE OF OTHER ORGANS: Chronic | ICD-10-CM

## 2023-02-21 DIAGNOSIS — I48.91 UNSPECIFIED ATRIAL FIBRILLATION: ICD-10-CM

## 2023-02-21 DIAGNOSIS — Z90.710 ACQUIRED ABSENCE OF BOTH CERVIX AND UTERUS: Chronic | ICD-10-CM

## 2023-02-21 LAB
INR PPP: 3.6 RATIO
QUALITY CONTROL: YES

## 2023-02-21 PROCEDURE — G0463: CPT

## 2023-02-22 DIAGNOSIS — Z79.01 LONG TERM (CURRENT) USE OF ANTICOAGULANTS: ICD-10-CM

## 2023-02-22 DIAGNOSIS — R79.1 ABNORMAL COAGULATION PROFILE: ICD-10-CM

## 2023-02-22 DIAGNOSIS — I48.91 UNSPECIFIED ATRIAL FIBRILLATION: ICD-10-CM

## 2023-03-06 ENCOUNTER — OUTPATIENT (OUTPATIENT)
Dept: OUTPATIENT SERVICES | Facility: HOSPITAL | Age: 83
LOS: 1 days | End: 2023-03-06
Payer: MEDICARE

## 2023-03-06 DIAGNOSIS — I48.91 UNSPECIFIED ATRIAL FIBRILLATION: ICD-10-CM

## 2023-03-06 DIAGNOSIS — R79.1 ABNORMAL COAGULATION PROFILE: ICD-10-CM

## 2023-03-06 DIAGNOSIS — Z79.01 LONG TERM (CURRENT) USE OF ANTICOAGULANTS: ICD-10-CM

## 2023-03-06 DIAGNOSIS — Z90.89 ACQUIRED ABSENCE OF OTHER ORGANS: Chronic | ICD-10-CM

## 2023-03-06 DIAGNOSIS — Z90.710 ACQUIRED ABSENCE OF BOTH CERVIX AND UTERUS: Chronic | ICD-10-CM

## 2023-03-06 DIAGNOSIS — Z90.49 ACQUIRED ABSENCE OF OTHER SPECIFIED PARTS OF DIGESTIVE TRACT: Chronic | ICD-10-CM

## 2023-03-06 LAB
INR PPP: 4.1 RATIO
QUALITY CONTROL: YES

## 2023-03-06 PROCEDURE — G0463: CPT

## 2023-03-07 ENCOUNTER — APPOINTMENT (OUTPATIENT)
Dept: MEDICATION MANAGEMENT | Facility: CLINIC | Age: 83
End: 2023-03-07

## 2023-03-13 ENCOUNTER — APPOINTMENT (OUTPATIENT)
Dept: MEDICATION MANAGEMENT | Facility: CLINIC | Age: 83
End: 2023-03-13

## 2023-03-13 ENCOUNTER — OUTPATIENT (OUTPATIENT)
Dept: OUTPATIENT SERVICES | Facility: HOSPITAL | Age: 83
LOS: 1 days | End: 2023-03-13
Payer: MEDICARE

## 2023-03-13 DIAGNOSIS — Z90.89 ACQUIRED ABSENCE OF OTHER ORGANS: Chronic | ICD-10-CM

## 2023-03-13 DIAGNOSIS — Z90.49 ACQUIRED ABSENCE OF OTHER SPECIFIED PARTS OF DIGESTIVE TRACT: Chronic | ICD-10-CM

## 2023-03-13 DIAGNOSIS — Z90.710 ACQUIRED ABSENCE OF BOTH CERVIX AND UTERUS: Chronic | ICD-10-CM

## 2023-03-13 DIAGNOSIS — I48.91 UNSPECIFIED ATRIAL FIBRILLATION: ICD-10-CM

## 2023-03-13 DIAGNOSIS — Z79.01 LONG TERM (CURRENT) USE OF ANTICOAGULANTS: ICD-10-CM

## 2023-03-13 DIAGNOSIS — R79.1 ABNORMAL COAGULATION PROFILE: ICD-10-CM

## 2023-03-13 LAB
INR PPP: 7.4 RATIO
QUALITY CONTROL: YES

## 2023-03-13 PROCEDURE — G0463: CPT

## 2023-03-14 DIAGNOSIS — R79.1 ABNORMAL COAGULATION PROFILE: ICD-10-CM

## 2023-03-14 DIAGNOSIS — Z79.01 LONG TERM (CURRENT) USE OF ANTICOAGULANTS: ICD-10-CM

## 2023-03-14 DIAGNOSIS — I48.91 UNSPECIFIED ATRIAL FIBRILLATION: ICD-10-CM

## 2023-03-16 ENCOUNTER — APPOINTMENT (OUTPATIENT)
Dept: MEDICATION MANAGEMENT | Facility: CLINIC | Age: 83
End: 2023-03-16

## 2023-03-16 ENCOUNTER — OUTPATIENT (OUTPATIENT)
Dept: OUTPATIENT SERVICES | Facility: HOSPITAL | Age: 83
LOS: 1 days | End: 2023-03-16
Payer: MEDICARE

## 2023-03-16 DIAGNOSIS — Z79.01 LONG TERM (CURRENT) USE OF ANTICOAGULANTS: ICD-10-CM

## 2023-03-16 DIAGNOSIS — I48.91 UNSPECIFIED ATRIAL FIBRILLATION: ICD-10-CM

## 2023-03-16 DIAGNOSIS — Z90.710 ACQUIRED ABSENCE OF BOTH CERVIX AND UTERUS: Chronic | ICD-10-CM

## 2023-03-16 DIAGNOSIS — R79.1 ABNORMAL COAGULATION PROFILE: ICD-10-CM

## 2023-03-16 DIAGNOSIS — Z90.49 ACQUIRED ABSENCE OF OTHER SPECIFIED PARTS OF DIGESTIVE TRACT: Chronic | ICD-10-CM

## 2023-03-16 DIAGNOSIS — Z90.89 ACQUIRED ABSENCE OF OTHER ORGANS: Chronic | ICD-10-CM

## 2023-03-16 LAB
INR PPP: 2.5 RATIO
QUALITY CONTROL: YES

## 2023-03-16 PROCEDURE — G0463: CPT

## 2023-03-20 ENCOUNTER — OUTPATIENT (OUTPATIENT)
Dept: OUTPATIENT SERVICES | Facility: HOSPITAL | Age: 83
LOS: 1 days | End: 2023-03-20
Payer: MEDICARE

## 2023-03-20 ENCOUNTER — APPOINTMENT (OUTPATIENT)
Dept: MEDICATION MANAGEMENT | Facility: CLINIC | Age: 83
End: 2023-03-20

## 2023-03-20 DIAGNOSIS — Z90.89 ACQUIRED ABSENCE OF OTHER ORGANS: Chronic | ICD-10-CM

## 2023-03-20 DIAGNOSIS — Z79.01 LONG TERM (CURRENT) USE OF ANTICOAGULANTS: ICD-10-CM

## 2023-03-20 DIAGNOSIS — I48.91 UNSPECIFIED ATRIAL FIBRILLATION: ICD-10-CM

## 2023-03-20 DIAGNOSIS — R79.1 ABNORMAL COAGULATION PROFILE: ICD-10-CM

## 2023-03-20 DIAGNOSIS — Z90.49 ACQUIRED ABSENCE OF OTHER SPECIFIED PARTS OF DIGESTIVE TRACT: Chronic | ICD-10-CM

## 2023-03-20 DIAGNOSIS — Z90.710 ACQUIRED ABSENCE OF BOTH CERVIX AND UTERUS: Chronic | ICD-10-CM

## 2023-03-20 LAB
INR PPP: 3.1 RATIO
QUALITY CONTROL: YES

## 2023-03-20 PROCEDURE — G0463: CPT

## 2023-03-23 ENCOUNTER — APPOINTMENT (OUTPATIENT)
Dept: MEDICATION MANAGEMENT | Facility: CLINIC | Age: 83
End: 2023-03-23

## 2023-03-23 ENCOUNTER — OUTPATIENT (OUTPATIENT)
Dept: OUTPATIENT SERVICES | Facility: HOSPITAL | Age: 83
LOS: 1 days | End: 2023-03-23
Payer: MEDICARE

## 2023-03-23 DIAGNOSIS — Z90.49 ACQUIRED ABSENCE OF OTHER SPECIFIED PARTS OF DIGESTIVE TRACT: Chronic | ICD-10-CM

## 2023-03-23 DIAGNOSIS — Z79.01 LONG TERM (CURRENT) USE OF ANTICOAGULANTS: ICD-10-CM

## 2023-03-23 DIAGNOSIS — R79.1 ABNORMAL COAGULATION PROFILE: ICD-10-CM

## 2023-03-23 DIAGNOSIS — I48.91 UNSPECIFIED ATRIAL FIBRILLATION: ICD-10-CM

## 2023-03-23 DIAGNOSIS — Z90.89 ACQUIRED ABSENCE OF OTHER ORGANS: Chronic | ICD-10-CM

## 2023-03-23 DIAGNOSIS — Z90.710 ACQUIRED ABSENCE OF BOTH CERVIX AND UTERUS: Chronic | ICD-10-CM

## 2023-03-23 LAB
INR PPP: 4 RATIO
QUALITY CONTROL: YES

## 2023-03-23 PROCEDURE — G0463: CPT

## 2023-03-27 ENCOUNTER — INPATIENT (INPATIENT)
Facility: HOSPITAL | Age: 83
LOS: 7 days | Discharge: NURSING, REHAB OR LONG-TERM CARE | DRG: 871 | End: 2023-04-04
Attending: INTERNAL MEDICINE | Admitting: INTERNAL MEDICINE
Payer: MEDICARE

## 2023-03-27 VITALS — HEART RATE: 75 BPM | OXYGEN SATURATION: 95 %

## 2023-03-27 DIAGNOSIS — A41.9 SEPSIS, UNSPECIFIED ORGANISM: ICD-10-CM

## 2023-03-27 DIAGNOSIS — Z90.710 ACQUIRED ABSENCE OF BOTH CERVIX AND UTERUS: Chronic | ICD-10-CM

## 2023-03-27 DIAGNOSIS — Z90.49 ACQUIRED ABSENCE OF OTHER SPECIFIED PARTS OF DIGESTIVE TRACT: Chronic | ICD-10-CM

## 2023-03-27 DIAGNOSIS — Z90.89 ACQUIRED ABSENCE OF OTHER ORGANS: Chronic | ICD-10-CM

## 2023-03-27 LAB
LACTATE SERPL-SCNC: 2.6 MMOL/L — HIGH (ref 0.7–2)
MRSA PCR RESULT.: NEGATIVE — SIGNIFICANT CHANGE UP

## 2023-03-27 PROCEDURE — U0003: CPT

## 2023-03-27 PROCEDURE — 94760 N-INVAS EAR/PLS OXIMETRY 1: CPT

## 2023-03-27 PROCEDURE — 85730 THROMBOPLASTIN TIME PARTIAL: CPT

## 2023-03-27 PROCEDURE — 85027 COMPLETE CBC AUTOMATED: CPT

## 2023-03-27 PROCEDURE — 87641 MR-STAPH DNA AMP PROBE: CPT

## 2023-03-27 PROCEDURE — U0005: CPT

## 2023-03-27 PROCEDURE — 80053 COMPREHEN METABOLIC PANEL: CPT

## 2023-03-27 PROCEDURE — 85384 FIBRINOGEN ACTIVITY: CPT

## 2023-03-27 PROCEDURE — 84436 ASSAY OF TOTAL THYROXINE: CPT

## 2023-03-27 PROCEDURE — 83605 ASSAY OF LACTIC ACID: CPT

## 2023-03-27 PROCEDURE — 85610 PROTHROMBIN TIME: CPT

## 2023-03-27 PROCEDURE — 85025 COMPLETE CBC W/AUTO DIFF WBC: CPT

## 2023-03-27 PROCEDURE — 87640 STAPH A DNA AMP PROBE: CPT

## 2023-03-27 PROCEDURE — 83735 ASSAY OF MAGNESIUM: CPT

## 2023-03-27 PROCEDURE — 87449 NOS EACH ORGANISM AG IA: CPT

## 2023-03-27 PROCEDURE — 83036 HEMOGLOBIN GLYCOSYLATED A1C: CPT

## 2023-03-27 PROCEDURE — 99291 CRITICAL CARE FIRST HOUR: CPT | Mod: 25

## 2023-03-27 PROCEDURE — 71045 X-RAY EXAM CHEST 1 VIEW: CPT | Mod: 26

## 2023-03-27 PROCEDURE — 97110 THERAPEUTIC EXERCISES: CPT | Mod: GP

## 2023-03-27 PROCEDURE — 71250 CT THORAX DX C-: CPT

## 2023-03-27 PROCEDURE — 97116 GAIT TRAINING THERAPY: CPT | Mod: GP

## 2023-03-27 PROCEDURE — 93970 EXTREMITY STUDY: CPT

## 2023-03-27 PROCEDURE — 97162 PT EVAL MOD COMPLEX 30 MIN: CPT | Mod: GP

## 2023-03-27 PROCEDURE — 84478 ASSAY OF TRIGLYCERIDES: CPT

## 2023-03-27 PROCEDURE — 81001 URINALYSIS AUTO W/SCOPE: CPT

## 2023-03-27 PROCEDURE — 87899 AGENT NOS ASSAY W/OPTIC: CPT

## 2023-03-27 PROCEDURE — 84145 PROCALCITONIN (PCT): CPT

## 2023-03-27 PROCEDURE — 36556 INSERT NON-TUNNEL CV CATH: CPT

## 2023-03-27 PROCEDURE — 36415 COLL VENOUS BLD VENIPUNCTURE: CPT

## 2023-03-27 PROCEDURE — 87040 BLOOD CULTURE FOR BACTERIA: CPT

## 2023-03-27 PROCEDURE — 86901 BLOOD TYPING SEROLOGIC RH(D): CPT

## 2023-03-27 PROCEDURE — 87086 URINE CULTURE/COLONY COUNT: CPT

## 2023-03-27 PROCEDURE — 82248 BILIRUBIN DIRECT: CPT

## 2023-03-27 PROCEDURE — 71045 X-RAY EXAM CHEST 1 VIEW: CPT

## 2023-03-27 PROCEDURE — 82533 TOTAL CORTISOL: CPT

## 2023-03-27 PROCEDURE — 86850 RBC ANTIBODY SCREEN: CPT

## 2023-03-27 PROCEDURE — 84443 ASSAY THYROID STIM HORMONE: CPT

## 2023-03-27 PROCEDURE — 83615 LACTATE (LD) (LDH) ENZYME: CPT

## 2023-03-27 PROCEDURE — 86900 BLOOD TYPING SEROLOGIC ABO: CPT

## 2023-03-27 PROCEDURE — 84100 ASSAY OF PHOSPHORUS: CPT

## 2023-03-27 PROCEDURE — 85379 FIBRIN DEGRADATION QUANT: CPT

## 2023-03-27 PROCEDURE — 93010 ELECTROCARDIOGRAM REPORT: CPT

## 2023-03-27 PROCEDURE — 93306 TTE W/DOPPLER COMPLETE: CPT

## 2023-03-28 LAB
A1C WITH ESTIMATED AVERAGE GLUCOSE RESULT: 5.1 % — SIGNIFICANT CHANGE UP (ref 4–5.6)
ALBUMIN SERPL ELPH-MCNC: 2.8 G/DL — LOW (ref 3.5–5.2)
ALBUMIN SERPL ELPH-MCNC: 3.2 G/DL — LOW (ref 3.5–5.2)
ALP SERPL-CCNC: 75 U/L — SIGNIFICANT CHANGE UP (ref 30–115)
ALP SERPL-CCNC: 75 U/L — SIGNIFICANT CHANGE UP (ref 30–115)
ALT FLD-CCNC: <5 U/L — SIGNIFICANT CHANGE UP (ref 0–41)
ALT FLD-CCNC: <5 U/L — SIGNIFICANT CHANGE UP (ref 0–41)
ANION GAP SERPL CALC-SCNC: 10 MMOL/L — SIGNIFICANT CHANGE UP (ref 7–14)
ANION GAP SERPL CALC-SCNC: 12 MMOL/L — SIGNIFICANT CHANGE UP (ref 7–14)
APPEARANCE UR: ABNORMAL
APTT BLD: 47 SEC — HIGH (ref 27–39.2)
AST SERPL-CCNC: 11 U/L — SIGNIFICANT CHANGE UP (ref 0–41)
AST SERPL-CCNC: 9 U/L — SIGNIFICANT CHANGE UP (ref 0–41)
BACTERIA # UR AUTO: ABNORMAL
BASOPHILS # BLD AUTO: 0.19 K/UL — SIGNIFICANT CHANGE UP (ref 0–0.2)
BASOPHILS NFR BLD AUTO: 0.5 % — SIGNIFICANT CHANGE UP (ref 0–1)
BILIRUB SERPL-MCNC: 0.7 MG/DL — SIGNIFICANT CHANGE UP (ref 0.2–1.2)
BILIRUB SERPL-MCNC: 1.4 MG/DL — HIGH (ref 0.2–1.2)
BILIRUB UR-MCNC: NEGATIVE — SIGNIFICANT CHANGE UP
BUN SERPL-MCNC: 34 MG/DL — HIGH (ref 10–20)
BUN SERPL-MCNC: 39 MG/DL — HIGH (ref 10–20)
CALCIUM SERPL-MCNC: 7.8 MG/DL — LOW (ref 8.4–10.5)
CALCIUM SERPL-MCNC: 8 MG/DL — LOW (ref 8.4–10.5)
CHLORIDE SERPL-SCNC: 107 MMOL/L — SIGNIFICANT CHANGE UP (ref 98–110)
CHLORIDE SERPL-SCNC: 107 MMOL/L — SIGNIFICANT CHANGE UP (ref 98–110)
CO2 SERPL-SCNC: 20 MMOL/L — SIGNIFICANT CHANGE UP (ref 17–32)
CO2 SERPL-SCNC: 22 MMOL/L — SIGNIFICANT CHANGE UP (ref 17–32)
COLOR SPEC: YELLOW — SIGNIFICANT CHANGE UP
CREAT SERPL-MCNC: 1 MG/DL — SIGNIFICANT CHANGE UP (ref 0.7–1.5)
CREAT SERPL-MCNC: 1.3 MG/DL — SIGNIFICANT CHANGE UP (ref 0.7–1.5)
DIFF PNL FLD: ABNORMAL
EGFR: 41 ML/MIN/1.73M2 — LOW
EGFR: 56 ML/MIN/1.73M2 — LOW
EOSINOPHIL # BLD AUTO: 0.02 K/UL — SIGNIFICANT CHANGE UP (ref 0–0.7)
EOSINOPHIL NFR BLD AUTO: 0 % — SIGNIFICANT CHANGE UP (ref 0–8)
EPI CELLS # UR: ABNORMAL /HPF
ESTIMATED AVERAGE GLUCOSE: 100 MG/DL — SIGNIFICANT CHANGE UP (ref 68–114)
GLUCOSE SERPL-MCNC: 113 MG/DL — HIGH (ref 70–99)
GLUCOSE SERPL-MCNC: 123 MG/DL — HIGH (ref 70–99)
GLUCOSE UR QL: NEGATIVE MG/DL — SIGNIFICANT CHANGE UP
GRAM STN FLD: SIGNIFICANT CHANGE UP
GRAM STN FLD: SIGNIFICANT CHANGE UP
HCT VFR BLD CALC: 42.1 % — SIGNIFICANT CHANGE UP (ref 37–47)
HGB BLD-MCNC: 13.2 G/DL — SIGNIFICANT CHANGE UP (ref 12–16)
IMM GRANULOCYTES NFR BLD AUTO: 3.5 % — HIGH (ref 0.1–0.3)
INR BLD: 6.9 RATIO — CRITICAL HIGH (ref 0.65–1.3)
KETONES UR-MCNC: NEGATIVE — SIGNIFICANT CHANGE UP
LACTATE SERPL-SCNC: 2.3 MMOL/L — HIGH (ref 0.7–2)
LACTATE SERPL-SCNC: 4.8 MMOL/L — CRITICAL HIGH (ref 0.7–2)
LDH SERPL L TO P-CCNC: 270 — HIGH (ref 50–242)
LEGIONELLA AG UR QL: NEGATIVE — SIGNIFICANT CHANGE UP
LEUKOCYTE ESTERASE UR-ACNC: NEGATIVE — SIGNIFICANT CHANGE UP
LYMPHOCYTES # BLD AUTO: 2.9 K/UL — SIGNIFICANT CHANGE UP (ref 1.2–3.4)
LYMPHOCYTES # BLD AUTO: 7.2 % — LOW (ref 20.5–51.1)
MAGNESIUM SERPL-MCNC: 1.6 MG/DL — LOW (ref 1.8–2.4)
MAGNESIUM SERPL-MCNC: 2.8 MG/DL — HIGH (ref 1.8–2.4)
MANUAL SMEAR VERIFICATION: SIGNIFICANT CHANGE UP
MCHC RBC-ENTMCNC: 29.7 PG — SIGNIFICANT CHANGE UP (ref 27–31)
MCHC RBC-ENTMCNC: 31.4 G/DL — LOW (ref 32–37)
MCV RBC AUTO: 94.8 FL — SIGNIFICANT CHANGE UP (ref 81–99)
METAMYELOCYTES # FLD: 1 % — HIGH (ref 0–0)
METHOD TYPE: SIGNIFICANT CHANGE UP
MONOCYTES # BLD AUTO: 0.65 K/UL — HIGH (ref 0.1–0.6)
MONOCYTES NFR BLD AUTO: 1.6 % — LOW (ref 1.7–9.3)
MYELOCYTES NFR BLD: 1 % — HIGH (ref 0–0)
NEUTROPHILS # BLD AUTO: 34.83 K/UL — HIGH (ref 1.4–6.5)
NEUTROPHILS NFR BLD AUTO: 87.2 % — HIGH (ref 42.2–75.2)
NEUTS BAND # BLD: 17 % — HIGH (ref 0–6)
NITRITE UR-MCNC: NEGATIVE — SIGNIFICANT CHANGE UP
NRBC # BLD: 0 /100 WBCS — SIGNIFICANT CHANGE UP (ref 0–0)
NRBC # BLD: 0 /100 — SIGNIFICANT CHANGE UP (ref 0–0)
PH UR: 5 — SIGNIFICANT CHANGE UP (ref 5–8)
PHOSPHATE SERPL-MCNC: 4.1 MG/DL — SIGNIFICANT CHANGE UP (ref 2.1–4.9)
PLAT MORPH BLD: NORMAL — SIGNIFICANT CHANGE UP
PLATELET # BLD AUTO: 266 K/UL — SIGNIFICANT CHANGE UP (ref 130–400)
PLATELET COUNT - ESTIMATE: NORMAL — SIGNIFICANT CHANGE UP
POLYCHROMASIA BLD QL SMEAR: SLIGHT — SIGNIFICANT CHANGE UP
POTASSIUM SERPL-MCNC: 3 MMOL/L — LOW (ref 3.5–5)
POTASSIUM SERPL-MCNC: 5 MMOL/L — SIGNIFICANT CHANGE UP (ref 3.5–5)
POTASSIUM SERPL-SCNC: 3 MMOL/L — LOW (ref 3.5–5)
POTASSIUM SERPL-SCNC: 5 MMOL/L — SIGNIFICANT CHANGE UP (ref 3.5–5)
PROCALCITONIN SERPL-MCNC: 9.57 NG/ML — HIGH (ref 0.02–0.1)
PROT SERPL-MCNC: 4.5 G/DL — LOW (ref 6–8)
PROT SERPL-MCNC: 5.1 G/DL — LOW (ref 6–8)
PROT UR-MCNC: 30 MG/DL
PROTHROM AB SERPL-ACNC: >40 SEC — HIGH (ref 9.95–12.87)
RBC # BLD: 4.44 M/UL — SIGNIFICANT CHANGE UP (ref 4.2–5.4)
RBC # FLD: 14.1 % — SIGNIFICANT CHANGE UP (ref 11.5–14.5)
RBC BLD AUTO: NORMAL — SIGNIFICANT CHANGE UP
RBC CASTS # UR COMP ASSIST: ABNORMAL /HPF
S PNEUM DNA BLD POS QL NAA+NON-PROBE: SIGNIFICANT CHANGE UP
SODIUM SERPL-SCNC: 139 MMOL/L — SIGNIFICANT CHANGE UP (ref 135–146)
SODIUM SERPL-SCNC: 139 MMOL/L — SIGNIFICANT CHANGE UP (ref 135–146)
SP GR SPEC: >=1.03 (ref 1.01–1.03)
SPECIMEN SOURCE: SIGNIFICANT CHANGE UP
T4 AB SER-ACNC: 9.6 UG/DL — SIGNIFICANT CHANGE UP (ref 4.6–12)
TRIGL SERPL-MCNC: 63 MG/DL — SIGNIFICANT CHANGE UP
TSH SERPL-MCNC: 0.97 UIU/ML — SIGNIFICANT CHANGE UP (ref 0.27–4.2)
UROBILINOGEN FLD QL: 0.2 MG/DL — SIGNIFICANT CHANGE UP
WBC # BLD: 40.01 K/UL — CRITICAL HIGH (ref 4.8–10.8)
WBC # FLD AUTO: 40.01 K/UL — CRITICAL HIGH (ref 4.8–10.8)
WBC UR QL: SIGNIFICANT CHANGE UP /HPF

## 2023-03-28 PROCEDURE — 93970 EXTREMITY STUDY: CPT | Mod: 26

## 2023-03-28 PROCEDURE — 99233 SBSQ HOSP IP/OBS HIGH 50: CPT

## 2023-03-28 PROCEDURE — 99223 1ST HOSP IP/OBS HIGH 75: CPT

## 2023-03-28 PROCEDURE — 71045 X-RAY EXAM CHEST 1 VIEW: CPT | Mod: 26

## 2023-03-28 RX ORDER — POTASSIUM CHLORIDE 20 MEQ
40 PACKET (EA) ORAL EVERY 4 HOURS
Refills: 0 | Status: COMPLETED | OUTPATIENT
Start: 2023-03-28 | End: 2023-03-28

## 2023-03-28 RX ORDER — SODIUM CHLORIDE 9 MG/ML
1000 INJECTION, SOLUTION INTRAVENOUS
Refills: 0 | Status: DISCONTINUED | OUTPATIENT
Start: 2023-03-28 | End: 2023-03-30

## 2023-03-28 RX ORDER — MAGNESIUM SULFATE 500 MG/ML
2 VIAL (ML) INJECTION
Refills: 0 | Status: COMPLETED | OUTPATIENT
Start: 2023-03-28 | End: 2023-03-28

## 2023-03-28 RX ORDER — CEFEPIME 1 G/1
2000 INJECTION, POWDER, FOR SOLUTION INTRAMUSCULAR; INTRAVENOUS EVERY 12 HOURS
Refills: 0 | Status: DISCONTINUED | OUTPATIENT
Start: 2023-03-28 | End: 2023-03-28

## 2023-03-28 RX ORDER — CEFTRIAXONE 500 MG/1
2000 INJECTION, POWDER, FOR SOLUTION INTRAMUSCULAR; INTRAVENOUS EVERY 24 HOURS
Refills: 0 | Status: DISCONTINUED | OUTPATIENT
Start: 2023-03-28 | End: 2023-04-04

## 2023-03-28 RX ORDER — SODIUM CHLORIDE 9 MG/ML
1000 INJECTION, SOLUTION INTRAVENOUS ONCE
Refills: 0 | Status: COMPLETED | OUTPATIENT
Start: 2023-03-28 | End: 2023-03-28

## 2023-03-28 RX ORDER — SODIUM CHLORIDE 9 MG/ML
250 INJECTION INTRAMUSCULAR; INTRAVENOUS; SUBCUTANEOUS ONCE
Refills: 0 | Status: COMPLETED | OUTPATIENT
Start: 2023-03-28 | End: 2023-03-28

## 2023-03-28 RX ORDER — SODIUM CHLORIDE 9 MG/ML
500 INJECTION, SOLUTION INTRAVENOUS ONCE
Refills: 0 | Status: COMPLETED | OUTPATIENT
Start: 2023-03-28 | End: 2023-03-28

## 2023-03-28 RX ORDER — PANTOPRAZOLE SODIUM 20 MG/1
40 TABLET, DELAYED RELEASE ORAL
Refills: 0 | Status: DISCONTINUED | OUTPATIENT
Start: 2023-03-28 | End: 2023-04-04

## 2023-03-28 RX ORDER — HYDROCORTISONE 20 MG
50 TABLET ORAL EVERY 6 HOURS
Refills: 0 | Status: DISCONTINUED | OUTPATIENT
Start: 2023-03-28 | End: 2023-03-29

## 2023-03-28 RX ORDER — NOREPINEPHRINE BITARTRATE/D5W 8 MG/250ML
0.05 PLASTIC BAG, INJECTION (ML) INTRAVENOUS
Qty: 8 | Refills: 0 | Status: DISCONTINUED | OUTPATIENT
Start: 2023-03-28 | End: 2023-03-29

## 2023-03-28 RX ADMIN — Medication 40 MILLIEQUIVALENT(S): at 18:29

## 2023-03-28 RX ADMIN — Medication 50 MILLIGRAM(S): at 12:16

## 2023-03-28 RX ADMIN — SODIUM CHLORIDE 1000 MILLILITER(S): 9 INJECTION, SOLUTION INTRAVENOUS at 18:27

## 2023-03-28 RX ADMIN — SODIUM CHLORIDE 75 MILLILITER(S): 9 INJECTION, SOLUTION INTRAVENOUS at 19:59

## 2023-03-28 RX ADMIN — Medication 7.19 MICROGRAM(S)/KG/MIN: at 14:32

## 2023-03-28 RX ADMIN — SODIUM CHLORIDE 750 MILLILITER(S): 9 INJECTION INTRAMUSCULAR; INTRAVENOUS; SUBCUTANEOUS at 18:27

## 2023-03-28 RX ADMIN — Medication 25 GRAM(S): at 12:14

## 2023-03-28 RX ADMIN — CEFTRIAXONE 100 MILLIGRAM(S): 500 INJECTION, POWDER, FOR SOLUTION INTRAMUSCULAR; INTRAVENOUS at 18:30

## 2023-03-28 RX ADMIN — Medication 50 MILLIGRAM(S): at 18:28

## 2023-03-28 RX ADMIN — Medication 40 MILLIEQUIVALENT(S): at 12:14

## 2023-03-28 RX ADMIN — SODIUM CHLORIDE 1000 MILLILITER(S): 9 INJECTION, SOLUTION INTRAVENOUS at 12:17

## 2023-03-28 RX ADMIN — Medication 40 MILLIEQUIVALENT(S): at 14:31

## 2023-03-28 RX ADMIN — Medication 25 GRAM(S): at 14:31

## 2023-03-28 NOTE — CONSULT NOTE ADULT - ASSESSMENT
ASSESSMENT  82-year-old female history of PE/A. fib on Coumadin, hypertension, status post cholecystectomy, now presents with 1 day history of right lower quadrant pain associated with nausea and vomiting    IMPRESSION  #Severe Sepsis present on admission secondary to Strep pneumoniae pneumonia with bacteremia  - Blood Cx 3/27 +   - WBC Count: 40.01(03.28.23 @ 04:30)    #Atrial Fibrillation    #Abx allergy: No Known Allergies    RECOMMENDATIONS  This is a preliminary incomplete pended note, all final recommendations to follow after interview and examination of the patient.    Please call or message on [employer] Teams if with any questions.  Spectra [phone]     ASSESSMENT  82-year-old female history of PE/A. fib on Coumadin, hypertension, status post cholecystectomy, now presents with 1 day history of right lower quadrant pain associated with nausea and vomiting    IMPRESSION  #Septic Shock secondary to RLL pneumonia with  Strep pneumoniaebacteremia  - Xray Chest 1 View- PORTABLE-Routine (03.28.23 @ 07:10): Unchanged right basilar parenchymal opacity.  - Blood Cx 3/27 +    - WBC Count: 40.01(03.28.23 @ 04:30)    #Atrial Fibrillation on coumadin  #Elevated INR   #MAGDALENO    #Abx allergy: No Known Allergies    RECOMMENDATIONS  - narrow to ceftriaxone 2g daily   - on pressors, but appears comfortable  - can hold off on vancomycin for now, but if worsening pressor requirements over night, give vancomycin 15 mg/kg x 1   - repeat blood cx for surveillance   - check TTE   - steroids per primary team   - trend WBC     Please call or message on [employer] Teams if with any questions.  Spectra [phone]

## 2023-03-28 NOTE — PROGRESS NOTE ADULT - SUBJECTIVE AND OBJECTIVE BOX
Patient seen and evaluated this am, comfortable in bed, feeling improved       T(F): 99.1 (03-28-23 @ 15:00), Max: 99.1 (03-28-23 @ 15:00)  HR: 105 (03-28-23 @ 09:26)  BP: 105/58 (03-28-23 @ 08:00)  RR: 20  SpO2: 94% (03-28-23 @ 09:26) (93% - 99%)    PHYSICAL EXAM:  GENERAL: NAD  HEAD:  Atraumatic, Normocephalic  EYES: EOMI, PERRLA, conjunctiva and sclera clear  NERVOUS SYSTEM:  Alert & Oriented X3, no focal deficits   CHEST/LUNG:  bilateral rhonchi  HEART: Regular rate and rhythm; No murmurs, rubs, or gallops  ABDOMEN: Soft, Nontender, Nondistended; Bowel sounds present  EXTREMITIES:  2+ Peripheral Pulses, No clubbing, cyanosis, or edema    LABS  03-28    139  |  107  |  39<H>  ----------------------------<  113<H>  3.0<L>   |  20  |  1.3    Ca    8.0<L>      28 Mar 2023 04:30  Phos  4.1     03-28  Mg     1.6     03-28    TPro  5.1<L>  /  Alb  3.2<L>  /  TBili  1.4<H>  /  DBili  x   /  AST  11  /  ALT  <5  /  AlkPhos  75  03-28                          13.2   40.01 )-----------( 266      ( 28 Mar 2023 04:30 )             42.1       PT/INR - ( 28 Mar 2023 04:30 )   PT: >40.00 sec;   INR: 6.90 ratio         PTT - ( 28 Mar 2023 04:30 )  PTT:47.0 sec    Culture Results:   Growth in aerobic bottle: Gram Positive Cocci in Pairs and Chains  Growth in anaerobic bottle: Gram positive cocci in pairs  ***Blood Panel PCR results on this specimen are available  approximately 3 hours after the Gram stain result.***  Gram stain, PCR, and/or culture results may not always  correspond due to difference in methodologies.  ************************************************************  This PCR assay was performed by multiplex PCR. This  Assay tests for 66 bacterial and resistance gene targets.  Please refer to the [facility] test directory  at [email or web address] for details. (03-27-23)    RADIOLOGY  < from: Xray Chest 1 View- PORTABLE-Routine (03.28.23 @ 07:10) >  Impression:    Unchanged right basilar parenchymal opacity.    < end of copied text >    MEDICATIONS  (STANDING):  cefTRIAXone   IVPB 2000 milliGRAM(s) IV Intermittent every 24 hours  hydrocortisone sodium succinate Injectable 50 milliGRAM(s) IV Push every 6 hours  lactated ringers Bolus 1000 milliLiter(s) IV Bolus once  lactated ringers. 1000 milliLiter(s) (75 mL/Hr) IV Continuous <Continuous>  norepinephrine Infusion 0.05 MICROgram(s)/kG/Min (7.19 mL/Hr) IV Continuous <Continuous>  pantoprazole    Tablet 40 milliGRAM(s) Oral before breakfast  potassium chloride   Powder 40 milliEquivalent(s) Oral every 4 hours  sodium chloride 0.9% Bolus 250 milliLiter(s) IV Bolus once    MEDICATIONS  (PRN):

## 2023-03-28 NOTE — CONSULT NOTE ADULT - ATTENDING COMMENTS
Attending Statement: I have personally performed a face to face diagnostic evaluation on this patient. The patient is suffering from:  Sepsis present on admission  Septic shock  Severe Community acquired pneumonia  post obstructive pneumonia  mediastinal adenopathy enlarging  I have made amendments to the documentation where necessary. I have personally seen and examined this patient.  I have fully participated in the care of this patient.  I have reviewed all pertinent clinical information, including history, physical exam, plan and note.

## 2023-03-28 NOTE — CONSULT NOTE ADULT - SUBJECTIVE AND OBJECTIVE BOX
Patient is a 83y old  Female who presents with a chief complaint of     HPI:  83 y.o. male present with shortness of breath associated with fevers and chills with cough.       PAST MEDICAL & SURGICAL HISTORY:  H/O prothrombin mutation      Pulmonary embolism      HTN (hypertension)      Anemia requiring transfusions      Deep vein thrombosis (DVT)      S/P tonsillectomy      H/O: hysterectomy      History of cholecystectomy        FAMILY HISTORY:  No pertinent family history in first degree relatives    :  No known cardiovascular family history     Review Of Systems:     All ROS are negative except per HPI       Allergies    No Known Allergies    Intolerances      Overnight events  Tolerated BIPAP overnight. Venti mask 28%, satting 94%.     Drips  Levophed- 0.04      PHYSICAL EXAM    ICU Vital Signs Last 24 Hrs  T(C): 36.4 (28 Mar 2023 07:00), Max: 36.4 (28 Mar 2023 07:00)  T(F): 97.5 (28 Mar 2023 07:00), Max: 97.5 (28 Mar 2023 07:00)  HR: 94 (28 Mar 2023 08:00) (75 - 94)  BP: 105/58 (28 Mar 2023 08:00) (97/56 - 105/58)  BP(mean): 78 (28 Mar 2023 08:00) (70 - 78)  RR: 26 (28 Mar 2023 08:00) (26 - 29)  SpO2: 93% (28 Mar 2023 08:00) (93% - 99%)    O2 Parameters below as of 28 Mar 2023 08:00  Patient On (Oxygen Delivery Method): mask, Venturi  O2 Flow (L/min): 6  O2 Concentration (%): 28        CONSTITUTIONAL:  Elderly, frail, ill-appearing female.     ENT:   Airway patent,   Mouth with normal mucosa.   No thrush    EYES:   pupils equal,   round and reactive to light.    CARDIAC:   Normal rate,   Regular rhythm.    Heart sounds S1, S2.   No edema    RESPIRATORY:   Decreased bilateral breath sounds    GASTROINTESTINAL:  Abdomen soft   Non-tender,   No guarding,   + BS    GENITOURINARY  normal genitalia for sex  no edema    MUSCULOSKELETAL:   Range of motion is not limited,  No clubbing, cyanosis    NEUROLOGICAL:   AOx4  Follows commands  Moves all extremities     SKIN:   Skin normal color for race,   Warm and dry  No evidence of rash.    PSYCHIATRIC:   No apparent risk to self or others.          LABS:                          13.2   40.01 )-----------( 266      ( 28 Mar 2023 04:30 )             42.1                                               03-28    139  |  107  |  39<H>  ----------------------------<  113<H>  3.0<L>   |  20  |  1.3    Ca    8.0<L>      28 Mar 2023 04:30  Phos  4.1     03-28  Mg     1.6     03-28    TPro  5.1<L>  /  Alb  3.2<L>  /  TBili  1.4<H>  /  DBili  x   /  AST  11  /  ALT  <5  /  AlkPhos  75  03-28      PT/INR - ( 28 Mar 2023 04:30 )   PT: >40.00 sec;   INR: 6.90 ratio         PTT - ( 28 Mar 2023 04:30 )  PTT:47.0 sec                                                                                     LIVER FUNCTIONS - ( 28 Mar 2023 04:30 )  Alb: 3.2 g/dL / Pro: 5.1 g/dL / ALK PHOS: 75 U/L / ALT: <5 U/L / AST: 11 U/L / GGT: x                                                                                                                                       X-Rays reviewed:                                                                                      CXR interpreted by me: right middle lobe pneumonia    MEDICATIONS  (STANDING):  magnesium sulfate  IVPB 2 Gram(s) IV Intermittent every 2 hours  potassium chloride   Powder 40 milliEquivalent(s) Oral every 4 hours    MEDICATIONS  (PRN):

## 2023-03-28 NOTE — CONSULT NOTE ADULT - SUBJECTIVE AND OBJECTIVE BOX
JOEY KNAPP  83y, Female  Allergy: No Known Allergies      CHIEF COMPLAINT:     LOS  1d    HPI:  82-year-old female history of PE/A. fib on Coumadin, hypertension, status post cholecystectomy, now presents with 1 day history of right lower quadrant pain associated with nausea and vomiting.  Radiating to the right flank.  No hematuria.  No dysuria.  No fever.    INFECTIOUS DISEASE HISTORY:  ID consulted for Strep pneumo bacteremia  CXR with right basilar infiltrate     PAST MEDICAL & SURGICAL HISTORY:  H/O prothrombin mutation      Pulmonary embolism      HTN (hypertension)      Anemia requiring transfusions      Deep vein thrombosis (DVT)      S/P tonsillectomy      H/O: hysterectomy      History of cholecystectomy          FAMILY HISTORY  No pertinent family history in first degree relatives        SOCIAL HISTORY  Social History:  Denies etoh use, drug use       ROS  General: Denies rigors, nightsweats  HEENT: Denies headache, rhinorrhea, sore throat, eye pain  CV: Denies CP, palpitations  PULM: Denies wheezing, hemoptysis  GI: Denies hematemesis, hematochezia, melena  : Denies discharge, hematuria  MSK: Denies arthralgias, myalgias  SKIN: Denies rash, lesions  NEURO: Denies paresthesias, weakness  PSYCH: Denies depression, anxiety    VITALS:  T(F): 99.1, Max: 99.1 (03-28-23 @ 15:00)  HR: 105  BP: 105/58  RR: 30Vital Signs Last 24 Hrs  T(C): 37.3 (28 Mar 2023 15:00), Max: 37.3 (28 Mar 2023 15:00)  T(F): 99.1 (28 Mar 2023 15:00), Max: 99.1 (28 Mar 2023 15:00)  HR: 105 (28 Mar 2023 09:26) (75 - 105)  BP: 105/58 (28 Mar 2023 08:00) (97/56 - 105/58)  BP(mean): 78 (28 Mar 2023 08:00) (70 - 78)  RR: 30 (28 Mar 2023 15:00) (24 - 30)  SpO2: 94% (28 Mar 2023 09:26) (93% - 99%)    Parameters below as of 28 Mar 2023 09:26  Patient On (Oxygen Delivery Method): mask, Venturi  O2 Flow (L/min): 6  O2 Concentration (%): 28    PHYSICAL EXAM:  Gen: NAD, resting in bed  HEENT: Normocephalic, atraumatic  Neck: supple, no lymphadenopathy  CV: Regular rate & regular rhythm  Lungs: decreased BS at bases, no fremitus  Abdomen: Soft, BS present  Ext: Warm, well perfused  Neuro: non focal, awake  Skin: no rash, no erythema  Lines: no phlebitis    TESTS & MEASUREMENTS:                        13.2   40.01 )-----------( 266      ( 28 Mar 2023 04:30 )             42.1     03-28    139  |  107  |  39<H>  ----------------------------<  113<H>  3.0<L>   |  20  |  1.3    Ca    8.0<L>      28 Mar 2023 04:30  Phos  4.1     03-28  Mg     1.6     03-28    TPro  5.1<L>  /  Alb  3.2<L>  /  TBili  1.4<H>  /  DBili  x   /  AST  11  /  ALT  <5  /  AlkPhos  75  03-28      LIVER FUNCTIONS - ( 28 Mar 2023 04:30 )  Alb: 3.2 g/dL / Pro: 5.1 g/dL / ALK PHOS: 75 U/L / ALT: <5 U/L / AST: 11 U/L / GGT: x               Culture - Blood (collected 03-27-23 @ 14:10)  Source: .Blood None  Gram Stain (03-28-23 @ 13:30):    Growth in aerobic bottle: Gram Positive Cocci in Pairs and Chains    Growth in anaerobic bottle: Gram positive cocci in pairs  Preliminary Report (03-28-23 @ 13:30):    Growth in aerobic bottle: Gram Positive Cocci in Pairs and Chains    Growth in anaerobic bottle: Gram positive cocci in pairs    ***Blood Panel PCR results on this specimen are available    approximately 3 hours after the Gram stain result.***    Gram stain, PCR, and/or culture results may not always    correspond due to difference in methodologies.    ************************************************************    This PCR assay was performed by multiplex PCR. This    Assay tests for 66 bacterial and resistance gene targets.    Please refer to the [facility] test directory    at [email or web address] for details.  Organism: Blood Culture PCR (03-28-23 @ 15:14)  Organism: Blood Culture PCR (03-28-23 @ 15:14)      Method Type: PCR      -  Streptococcus pneumoniae: Detec    Culture - Urine (collected 01-13-23 @ 01:46)  Source: Clean Catch Clean Catch (Midstream)  Final Report (01-14-23 @ 10:21):    >=3 organisms. Probable collection contamination.        Lactate, Blood: 4.8 mmol/L (03-28-23 @ 11:55)  Lactate, Blood: 2.6 mmol/L (03-27-23 @ 17:00)      INFECTIOUS DISEASES TESTING  MRSA PCR Result.: Negative (03-27-23 @ 22:46)  COVID-19 PCR: NotDetec (01-12-23 @ 23:01)  COVID-19 PCR: NotDetec (08-21-22 @ 13:55)      RADIOLOGY & ADDITIONAL TESTS:  I have personally reviewed the last Chest xray  CXR      CT      CARDIOLOGY TESTING      MEDICATIONS  cefTRIAXone   IVPB 2000 IV Intermittent every 24 hours  hydrocortisone sodium succinate Injectable 50 IV Push every 6 hours  lactated ringers Bolus 1000 IV Bolus once  lactated ringers. 1000 IV Continuous <Continuous>  norepinephrine Infusion 0.05 IV Continuous <Continuous>  pantoprazole    Tablet 40 Oral before breakfast  potassium chloride   Powder 40 Oral every 4 hours  sodium chloride 0.9% Bolus 250 IV Bolus once      Weight  Weight (kg): 76.7 (03-28-23 @ 09:41)    ANTIBIOTICS:  cefTRIAXone   IVPB 2000 milliGRAM(s) IV Intermittent every 24 hours      ALLERGIES:  No Known Allergies       JOEY KNAPP  83y, Female  Allergy: No Known Allergies      CHIEF COMPLAINT:     LOS  1d    HPI:  82-year-old female history of PE/A. fib on Coumadin, hypertension, status post cholecystectomy, now presents with 1 day history of right lower quadrant pain associated with nausea and vomiting.  Radiating to the right flank.  No hematuria.  No dysuria.  No fever.    INFECTIOUS DISEASE HISTORY:  ID consulted for Strep pneumo bacteremia  CXR with right basilar infiltrate   Family at bedside.   Recently with upper respiratory infection -- felt better, but then acutely worse.   Associated with right lower quadrant pain with nausea.   Currently on 4L NC -- previously requiring venturi mask.  Febrile on admission.   She is on levophed.   INR elevated     Denies any joint pains.   Denies headaches, neck stiffness and mentating well.       PAST MEDICAL & SURGICAL HISTORY:  H/O prothrombin mutation      Pulmonary embolism      HTN (hypertension)      Anemia requiring transfusions      Deep vein thrombosis (DVT)      S/P tonsillectomy      H/O: hysterectomy      History of cholecystectomy          FAMILY HISTORY  No pertinent family history in first degree relatives        SOCIAL HISTORY  Social History:  Denies etoh use, drug use       ROS  General: Denies rigors, nightsweats  HEENT: Denies headache, rhinorrhea, sore throat, eye pain  CV: Denies CP, palpitations  PULM: Denies wheezing, hemoptysis  GI: Denies hematemesis, hematochezia, melena  : Denies discharge, hematuria  MSK: Denies arthralgias, myalgias  SKIN: Denies rash, lesions  NEURO: Denies paresthesias, weakness  PSYCH: Denies depression, anxiety    VITALS:  T(F): 99.1, Max: 99.1 (03-28-23 @ 15:00)  HR: 105  BP: 105/58  RR: 30Vital Signs Last 24 Hrs  T(C): 37.3 (28 Mar 2023 15:00), Max: 37.3 (28 Mar 2023 15:00)  T(F): 99.1 (28 Mar 2023 15:00), Max: 99.1 (28 Mar 2023 15:00)  HR: 105 (28 Mar 2023 09:26) (75 - 105)  BP: 105/58 (28 Mar 2023 08:00) (97/56 - 105/58)  BP(mean): 78 (28 Mar 2023 08:00) (70 - 78)  RR: 30 (28 Mar 2023 15:00) (24 - 30)  SpO2: 94% (28 Mar 2023 09:26) (93% - 99%)    Parameters below as of 28 Mar 2023 09:26  Patient On (Oxygen Delivery Method): mask, Venturi  O2 Flow (L/min): 6  O2 Concentration (%): 28    PHYSICAL EXAM:  Gen: NAD, resting in bed  HEENT: Normocephalic, atraumatic  Neck: supple, no lymphadenopathy  CV: Regular rate & regular rhythm  Lungs: decreased BS at bases, no fremitus  Abdomen: Soft, BS present  Ext: Warm, well perfused  Neuro: non focal, awake  Skin: no rash, no erythema  Lines: no phlebitis    TESTS & MEASUREMENTS:                        13.2   40.01 )-----------( 266      ( 28 Mar 2023 04:30 )             42.1     03-28    139  |  107  |  39<H>  ----------------------------<  113<H>  3.0<L>   |  20  |  1.3    Ca    8.0<L>      28 Mar 2023 04:30  Phos  4.1     03-28  Mg     1.6     03-28    TPro  5.1<L>  /  Alb  3.2<L>  /  TBili  1.4<H>  /  DBili  x   /  AST  11  /  ALT  <5  /  AlkPhos  75  03-28      LIVER FUNCTIONS - ( 28 Mar 2023 04:30 )  Alb: 3.2 g/dL / Pro: 5.1 g/dL / ALK PHOS: 75 U/L / ALT: <5 U/L / AST: 11 U/L / GGT: x               Culture - Blood (collected 03-27-23 @ 14:10)  Source: .Blood None  Gram Stain (03-28-23 @ 13:30):    Growth in aerobic bottle: Gram Positive Cocci in Pairs and Chains    Growth in anaerobic bottle: Gram positive cocci in pairs  Preliminary Report (03-28-23 @ 13:30):    Growth in aerobic bottle: Gram Positive Cocci in Pairs and Chains    Growth in anaerobic bottle: Gram positive cocci in pairs    ***Blood Panel PCR results on this specimen are available    approximately 3 hours after the Gram stain result.***    Gram stain, PCR, and/or culture results may not always    correspond due to difference in methodologies.    ************************************************************    This PCR assay was performed by multiplex PCR. This    Assay tests for 66 bacterial and resistance gene targets.    Please refer to the [facility] test directory    at [email or web address] for details.  Organism: Blood Culture PCR (03-28-23 @ 15:14)  Organism: Blood Culture PCR (03-28-23 @ 15:14)      Method Type: PCR      -  Streptococcus pneumoniae: Detec    Culture - Urine (collected 01-13-23 @ 01:46)  Source: Clean Catch Clean Catch (Midstream)  Final Report (01-14-23 @ 10:21):    >=3 organisms. Probable collection contamination.        Lactate, Blood: 4.8 mmol/L (03-28-23 @ 11:55)  Lactate, Blood: 2.6 mmol/L (03-27-23 @ 17:00)      INFECTIOUS DISEASES TESTING  MRSA PCR Result.: Negative (03-27-23 @ 22:46)  COVID-19 PCR: NotDetec (01-12-23 @ 23:01)  COVID-19 PCR: NotDetec (08-21-22 @ 13:55)      RADIOLOGY & ADDITIONAL TESTS:  I have personally reviewed the last Chest xray  CXR      CT      CARDIOLOGY TESTING      MEDICATIONS  cefTRIAXone   IVPB 2000 IV Intermittent every 24 hours  hydrocortisone sodium succinate Injectable 50 IV Push every 6 hours  lactated ringers Bolus 1000 IV Bolus once  lactated ringers. 1000 IV Continuous <Continuous>  norepinephrine Infusion 0.05 IV Continuous <Continuous>  pantoprazole    Tablet 40 Oral before breakfast  potassium chloride   Powder 40 Oral every 4 hours  sodium chloride 0.9% Bolus 250 IV Bolus once      Weight  Weight (kg): 76.7 (03-28-23 @ 09:41)    ANTIBIOTICS:  cefTRIAXone   IVPB 2000 milliGRAM(s) IV Intermittent every 24 hours      ALLERGIES:  No Known Allergies

## 2023-03-28 NOTE — PROGRESS NOTE ADULT - ASSESSMENT
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  found septic and admitted to ICU    sepsis secondary to RLL pneumonia / strep pneumo bacteremia / hypokalemia / hypomagnesemia     - continue IV antibiotics as per ID   - titrate pressors to maintain MAP>65   - supplement and re check lytes   - stress steroids   - element of coumadin toxicity   - no signs of bleeding   - check daily INR and resume coumadin once INR<3

## 2023-03-28 NOTE — CONSULT NOTE ADULT - ASSESSMENT
IMPRESSION:  Sepsis present on admission  Septic shock  Severe Community acquired pneumonia  HO Afib, on coumadin at home  HO DVT/PE, on coumadin at home  HO HTN    PLAN:    CNS: Avoid CNS depressants.     HEENT: Oral care    PULMONARY:  HOB @ 45 degrees.  Aspiration precautions. target SpO2 92-96%, titrate oxygen as toelrated. DC venti mask, attempt NC.     CARDIOVASCULAR: Avoid volume overload. Strict I's and O's. ECHO. Goal directed fluid resuscitation. Target MAP>60-65. Wean pressors as tolerated. CHEETAH. 500 cc LR bolus. Currently on levophed.     GI: GI prophylaxis.  Feeding as tolerated.     RENAL: Follow up lytes.  Correct as needed. Correct K. Correct Mg. Repeat LA.     INFECTIOUS DISEASE: Follow up cultures. Nasal MRSA negative, DC vanc., Cefepime and levofloxacin for now. PanCx. Procal. Fungitell. UA. Urine strep/legionella. 2 sets of BCx.     HEMATOLOGICAL:  DVT prophylaxis. LE duplex.     ENDOCRINE:  Follow up FS. A1c. TSH.     MUSCULOSKELETAL: bedrest. SIVA otoole.     [facility]    MICU for now    IMPRESSION:  Sepsis present on admission  Septic shock  Severe Community acquired pneumonia  HO Afib, on coumadin at home  HO DVT/PE, on coumadin at home  HO HTN    PLAN:    CNS: Avoid CNS depressants.     HEENT: Oral care    PULMONARY:  HOB @ 45 degrees.  Aspiration precautions. target SpO2 92-96%, titrate oxygen as toelrated. DC venti mask, attempt NC.     CARDIOVASCULAR: Avoid volume overload. Strict I's and O's. ECHO. Goal directed fluid resuscitation. Target MAP>60-65. Wean pressors as tolerated. CHEETAH. 500 cc LR bolus. Currently on levophed.     GI: GI prophylaxis.  Feeding as tolerated.     RENAL: Follow up lytes.  Correct as needed. Correct K. Correct Mg. Repeat LA.     INFECTIOUS DISEASE: Follow up cultures. Nasal MRSA negative, DC vanc., Cefepime and levofloxacin for now. PanCx. Procal. Fungitell. UA. Urine strep/legionella. 2 sets of BCx.     HEMATOLOGICAL:  DVT prophylaxis. LE duplex.     ENDOCRINE:  Follow up FS. A1c. TSH. HC 50mg q6h.     MUSCULOSKELETAL: bedrest. WCN eval.     [facility]    MICU for now    IMPRESSION:  Sepsis present on admission  Septic shock  Severe Community acquired pneumonia  post obstructive pneumonia  mediastinal adenopathy enlarging  HO Afib, on coumadin at home  HO DVT/PE, on coumadin at home  HO HTN    PLAN:    CNS: Avoid CNS depressants.     HEENT: Oral care    PULMONARY:  HOB @ 45 degrees.  Aspiration precautions. target SpO2 92-96%, titrate oxygen as toelrated. DC venti mask, attempt NC.     CARDIOVASCULAR: Avoid volume overload. Strict I's and O's. ECHO. Goal directed fluid resuscitation. Target MAP>60-65. Wean pressors as tolerated. CHEETAH. 500 cc LR bolus. Currently on levophed.     GI: GI prophylaxis.  Feeding as tolerated.     RENAL: Follow up lytes.  Correct as needed. Correct K. Correct Mg. Repeat LA.     INFECTIOUS DISEASE: Follow up cultures. Nasal MRSA negative, DC vanc., Cefepime and levofloxacin for now. PanCx. Procal. Fungitell. UA. Urine strep/legionella. 2 sets of BCx.     HEMATOLOGICAL:  DVT prophylaxis. LE duplex.     ENDOCRINE:  Follow up FS. A1c. TSH. HC 50mg q6h.     MUSCULOSKELETAL: bedrest. WCN eval.     [facility]    MICU for now

## 2023-03-29 LAB
A1C WITH ESTIMATED AVERAGE GLUCOSE RESULT: 5.5 % — SIGNIFICANT CHANGE UP (ref 4–5.6)
ALBUMIN SERPL ELPH-MCNC: 2.6 G/DL — LOW (ref 3.5–5.2)
ALBUMIN SERPL ELPH-MCNC: 3.4 G/DL — LOW (ref 3.5–5.2)
ALP SERPL-CCNC: 57 U/L — SIGNIFICANT CHANGE UP (ref 30–115)
ALP SERPL-CCNC: 62 U/L — SIGNIFICANT CHANGE UP (ref 30–115)
ALT FLD-CCNC: 6 U/L — SIGNIFICANT CHANGE UP (ref 0–41)
ALT FLD-CCNC: <5 U/L — SIGNIFICANT CHANGE UP (ref 0–41)
ANION GAP SERPL CALC-SCNC: 17 MMOL/L — HIGH (ref 7–14)
ANION GAP SERPL CALC-SCNC: 8 MMOL/L — SIGNIFICANT CHANGE UP (ref 7–14)
APTT BLD: 45.7 SEC — HIGH (ref 27–39.2)
APTT BLD: 50.5 SEC — HIGH (ref 27–39.2)
AST SERPL-CCNC: 27 U/L — SIGNIFICANT CHANGE UP (ref 0–41)
AST SERPL-CCNC: 7 U/L — SIGNIFICANT CHANGE UP (ref 0–41)
BASE EXCESS BLDV CALC-SCNC: -0.9 MMOL/L — SIGNIFICANT CHANGE UP (ref -2–3)
BASOPHILS # BLD AUTO: 0.01 K/UL — SIGNIFICANT CHANGE UP (ref 0–0.2)
BASOPHILS # BLD AUTO: 0.01 K/UL — SIGNIFICANT CHANGE UP (ref 0–0.2)
BASOPHILS NFR BLD AUTO: 0.1 % — SIGNIFICANT CHANGE UP (ref 0–1)
BASOPHILS NFR BLD AUTO: 0.1 % — SIGNIFICANT CHANGE UP (ref 0–1)
BILIRUB DIRECT SERPL-MCNC: 0.3 MG/DL — SIGNIFICANT CHANGE UP (ref 0–0.3)
BILIRUB INDIRECT FLD-MCNC: 0.4 MG/DL — SIGNIFICANT CHANGE UP (ref 0.2–1.2)
BILIRUB SERPL-MCNC: 0.7 MG/DL — SIGNIFICANT CHANGE UP (ref 0.2–1.2)
BILIRUB SERPL-MCNC: 1.5 MG/DL — HIGH (ref 0.2–1.2)
BLD GP AB SCN SERPL QL: SIGNIFICANT CHANGE UP
BUN SERPL-MCNC: 32 MG/DL — HIGH (ref 10–20)
BUN SERPL-MCNC: 32 MG/DL — HIGH (ref 10–20)
CA-I SERPL-SCNC: 1.08 MMOL/L — LOW (ref 1.15–1.33)
CALCIUM SERPL-MCNC: 8 MG/DL — LOW (ref 8.4–10.5)
CALCIUM SERPL-MCNC: 8.3 MG/DL — LOW (ref 8.4–10.5)
CHLORIDE SERPL-SCNC: 110 MMOL/L — SIGNIFICANT CHANGE UP (ref 98–110)
CHLORIDE SERPL-SCNC: 98 MMOL/L — SIGNIFICANT CHANGE UP (ref 98–110)
CO2 SERPL-SCNC: 20 MMOL/L — SIGNIFICANT CHANGE UP (ref 17–32)
CO2 SERPL-SCNC: 21 MMOL/L — SIGNIFICANT CHANGE UP (ref 17–32)
CREAT SERPL-MCNC: 0.8 MG/DL — SIGNIFICANT CHANGE UP (ref 0.7–1.5)
CREAT SERPL-MCNC: 1.5 MG/DL — SIGNIFICANT CHANGE UP (ref 0.7–1.5)
D DIMER BLD IA.RAPID-MCNC: 457 NG/ML DDU — HIGH
EGFR: 34 ML/MIN/1.73M2 — LOW
EGFR: 73 ML/MIN/1.73M2 — SIGNIFICANT CHANGE UP
EOSINOPHIL # BLD AUTO: 0 K/UL — SIGNIFICANT CHANGE UP (ref 0–0.7)
EOSINOPHIL # BLD AUTO: 0 K/UL — SIGNIFICANT CHANGE UP (ref 0–0.7)
EOSINOPHIL NFR BLD AUTO: 0 % — SIGNIFICANT CHANGE UP (ref 0–8)
EOSINOPHIL NFR BLD AUTO: 0 % — SIGNIFICANT CHANGE UP (ref 0–8)
ESTIMATED AVERAGE GLUCOSE: 111 MG/DL — SIGNIFICANT CHANGE UP (ref 68–114)
FIBRINOGEN PPP-MCNC: 641 MG/DL — HIGH (ref 204.4–570.6)
FLUAV AG NPH QL: SIGNIFICANT CHANGE UP
FLUBV AG NPH QL: SIGNIFICANT CHANGE UP
GAS PNL BLDV: 133 MMOL/L — LOW (ref 136–145)
GAS PNL BLDV: SIGNIFICANT CHANGE UP
GLUCOSE SERPL-MCNC: 121 MG/DL — HIGH (ref 70–99)
GLUCOSE SERPL-MCNC: 124 MG/DL — HIGH (ref 70–99)
HCO3 BLDV-SCNC: 22 MMOL/L — SIGNIFICANT CHANGE UP (ref 22–29)
HCT VFR BLD CALC: 32.7 % — LOW (ref 37–47)
HCT VFR BLD CALC: 34.6 % — LOW (ref 37–47)
HCT VFR BLD CALC: 45.8 % — SIGNIFICANT CHANGE UP (ref 37–47)
HCT VFR BLDA CALC: 47 % — SIGNIFICANT CHANGE UP (ref 39–51)
HGB BLD CALC-MCNC: 15.6 G/DL — SIGNIFICANT CHANGE UP (ref 12.6–17.4)
HGB BLD-MCNC: 10.3 G/DL — LOW (ref 12–16)
HGB BLD-MCNC: 10.9 G/DL — LOW (ref 12–16)
HGB BLD-MCNC: 14.8 G/DL — SIGNIFICANT CHANGE UP (ref 12–16)
HOROWITZ INDEX BLDV+IHG-RTO: 40 — SIGNIFICANT CHANGE UP
IMM GRANULOCYTES NFR BLD AUTO: 1.2 % — HIGH (ref 0.1–0.3)
IMM GRANULOCYTES NFR BLD AUTO: 1.2 % — HIGH (ref 0.1–0.3)
INR BLD: 5.64 RATIO — CRITICAL HIGH (ref 0.65–1.3)
INR BLD: 6.96 RATIO — CRITICAL HIGH (ref 0.65–1.3)
INR BLD: 7.19 RATIO — CRITICAL HIGH (ref 0.65–1.3)
LACTATE BLDV-MCNC: 4 MMOL/L — CRITICAL HIGH (ref 0.5–2)
LACTATE SERPL-SCNC: 1.2 MMOL/L — SIGNIFICANT CHANGE UP (ref 0.7–2)
LYMPHOCYTES # BLD AUTO: 1 K/UL — LOW (ref 1.2–3.4)
LYMPHOCYTES # BLD AUTO: 1.02 K/UL — LOW (ref 1.2–3.4)
LYMPHOCYTES # BLD AUTO: 8.9 % — LOW (ref 20.5–51.1)
LYMPHOCYTES # BLD AUTO: 9.2 % — LOW (ref 20.5–51.1)
MAGNESIUM SERPL-MCNC: 2.5 MG/DL — HIGH (ref 1.8–2.4)
MCHC RBC-ENTMCNC: 29.9 PG — SIGNIFICANT CHANGE UP (ref 27–31)
MCHC RBC-ENTMCNC: 31.5 G/DL — LOW (ref 32–37)
MCHC RBC-ENTMCNC: 31.5 G/DL — LOW (ref 32–37)
MCHC RBC-ENTMCNC: 32.3 G/DL — SIGNIFICANT CHANGE UP (ref 32–37)
MCV RBC AUTO: 92.5 FL — SIGNIFICANT CHANGE UP (ref 81–99)
MCV RBC AUTO: 94.8 FL — SIGNIFICANT CHANGE UP (ref 81–99)
MCV RBC AUTO: 94.8 FL — SIGNIFICANT CHANGE UP (ref 81–99)
MONOCYTES # BLD AUTO: 0.28 K/UL — SIGNIFICANT CHANGE UP (ref 0.1–0.6)
MONOCYTES # BLD AUTO: 0.43 K/UL — SIGNIFICANT CHANGE UP (ref 0.1–0.6)
MONOCYTES NFR BLD AUTO: 2.5 % — SIGNIFICANT CHANGE UP (ref 1.7–9.3)
MONOCYTES NFR BLD AUTO: 3.8 % — SIGNIFICANT CHANGE UP (ref 1.7–9.3)
NEUTROPHILS # BLD AUTO: 9.59 K/UL — HIGH (ref 1.4–6.5)
NEUTROPHILS # BLD AUTO: 9.67 K/UL — HIGH (ref 1.4–6.5)
NEUTROPHILS NFR BLD AUTO: 86 % — HIGH (ref 42.2–75.2)
NEUTROPHILS NFR BLD AUTO: 87 % — HIGH (ref 42.2–75.2)
NRBC # BLD: 0 /100 WBCS — SIGNIFICANT CHANGE UP (ref 0–0)
NT-PROBNP SERPL-SCNC: HIGH PG/ML (ref 0–300)
PCO2 BLDV: 32 MMHG — LOW (ref 39–42)
PH BLDV: 7.45 — HIGH (ref 7.32–7.43)
PLATELET # BLD AUTO: 177 K/UL — SIGNIFICANT CHANGE UP (ref 130–400)
PLATELET # BLD AUTO: 201 K/UL — SIGNIFICANT CHANGE UP (ref 130–400)
PLATELET # BLD AUTO: 277 K/UL — SIGNIFICANT CHANGE UP (ref 130–400)
PO2 BLDV: 34 MMHG — SIGNIFICANT CHANGE UP
POTASSIUM BLDV-SCNC: 3.3 MMOL/L — LOW (ref 3.5–5.1)
POTASSIUM SERPL-MCNC: 4.8 MMOL/L — SIGNIFICANT CHANGE UP (ref 3.5–5)
POTASSIUM SERPL-MCNC: 4.8 MMOL/L — SIGNIFICANT CHANGE UP (ref 3.5–5)
POTASSIUM SERPL-SCNC: 4.8 MMOL/L — SIGNIFICANT CHANGE UP (ref 3.5–5)
POTASSIUM SERPL-SCNC: 4.8 MMOL/L — SIGNIFICANT CHANGE UP (ref 3.5–5)
PROT SERPL-MCNC: 4.3 G/DL — LOW (ref 6–8)
PROT SERPL-MCNC: 5.7 G/DL — LOW (ref 6–8)
PROTHROM AB SERPL-ACNC: >40 SEC — HIGH (ref 9.95–12.87)
RBC # BLD: 3.45 M/UL — LOW (ref 4.2–5.4)
RBC # BLD: 3.65 M/UL — LOW (ref 4.2–5.4)
RBC # BLD: 4.95 M/UL — SIGNIFICANT CHANGE UP (ref 4.2–5.4)
RBC # FLD: 13.8 % — SIGNIFICANT CHANGE UP (ref 11.5–14.5)
RBC # FLD: 14.3 % — SIGNIFICANT CHANGE UP (ref 11.5–14.5)
RBC # FLD: 14.3 % — SIGNIFICANT CHANGE UP (ref 11.5–14.5)
RSV RNA NPH QL NAA+NON-PROBE: SIGNIFICANT CHANGE UP
S PNEUM AG UR QL: NEGATIVE — SIGNIFICANT CHANGE UP
SAO2 % BLDV: 60.2 % — SIGNIFICANT CHANGE UP
SARS-COV-2 RNA SPEC QL NAA+PROBE: SIGNIFICANT CHANGE UP
SODIUM SERPL-SCNC: 135 MMOL/L — SIGNIFICANT CHANGE UP (ref 135–146)
SODIUM SERPL-SCNC: 139 MMOL/L — SIGNIFICANT CHANGE UP (ref 135–146)
TROPONIN T SERPL-MCNC: <0.01 NG/ML — SIGNIFICANT CHANGE UP
TSH SERPL-MCNC: 0.22 UIU/ML — LOW (ref 0.27–4.2)
WBC # BLD: 11.03 K/UL — HIGH (ref 4.8–10.8)
WBC # BLD: 11.24 K/UL — HIGH (ref 4.8–10.8)
WBC # BLD: 27.51 K/UL — HIGH (ref 4.8–10.8)
WBC # FLD AUTO: 11.03 K/UL — HIGH (ref 4.8–10.8)
WBC # FLD AUTO: 11.24 K/UL — HIGH (ref 4.8–10.8)
WBC # FLD AUTO: 27.51 K/UL — HIGH (ref 4.8–10.8)

## 2023-03-29 PROCEDURE — 99233 SBSQ HOSP IP/OBS HIGH 50: CPT

## 2023-03-29 PROCEDURE — 93306 TTE W/DOPPLER COMPLETE: CPT | Mod: 26

## 2023-03-29 PROCEDURE — 71250 CT THORAX DX C-: CPT | Mod: 26

## 2023-03-29 RX ORDER — SODIUM CHLORIDE 9 MG/ML
250 INJECTION INTRAMUSCULAR; INTRAVENOUS; SUBCUTANEOUS ONCE
Refills: 0 | Status: COMPLETED | OUTPATIENT
Start: 2023-03-29 | End: 2023-03-29

## 2023-03-29 RX ORDER — HYDROCORTISONE 20 MG
50 TABLET ORAL EVERY 12 HOURS
Refills: 0 | Status: DISCONTINUED | OUTPATIENT
Start: 2023-03-29 | End: 2023-04-04

## 2023-03-29 RX ADMIN — Medication 50 MILLIGRAM(S): at 00:10

## 2023-03-29 RX ADMIN — CEFTRIAXONE 100 MILLIGRAM(S): 500 INJECTION, POWDER, FOR SOLUTION INTRAMUSCULAR; INTRAVENOUS at 17:50

## 2023-03-29 RX ADMIN — PANTOPRAZOLE SODIUM 40 MILLIGRAM(S): 20 TABLET, DELAYED RELEASE ORAL at 05:59

## 2023-03-29 RX ADMIN — Medication 50 MILLIGRAM(S): at 17:50

## 2023-03-29 RX ADMIN — Medication 50 MILLIGRAM(S): at 05:59

## 2023-03-29 RX ADMIN — SODIUM CHLORIDE 500 MILLILITER(S): 9 INJECTION INTRAMUSCULAR; INTRAVENOUS; SUBCUTANEOUS at 12:07

## 2023-03-29 NOTE — PHYSICAL THERAPY INITIAL EVALUATION ADULT - SPECIFY REASON(S)
Chart Reviewed , Attempted to see the Pt this PM for PT IE, LAUREEN Myers notified to defer the PT as  INR is > 7.00 this PM.will hold PT at this time and f/u as appropriate.

## 2023-03-29 NOTE — PROGRESS NOTE ADULT - SUBJECTIVE AND OBJECTIVE BOX
Patient is a 83y old  Female who presents with a chief complaint of Strep pneumoniae bacteremia (28 Mar 2023 16:50)        Over Night Events:        ROS:     All pertinent ROS are negative except HPI         PHYSICAL EXAM    ICU Vital Signs Last 24 Hrs  T(C): 35.9 (29 Mar 2023 03:02), Max: 37.3 (28 Mar 2023 15:00)  T(F): 96.6 (29 Mar 2023 03:02), Max: 99.1 (28 Mar 2023 15:00)  HR: 83 (29 Mar 2023 07:17) (79 - 129)  BP: 94/52 (29 Mar 2023 07:17) (81/52 - 111/68)  BP(mean): 67 (29 Mar 2023 07:17) (61 - 86)  RR: 19 (29 Mar 2023 07:17) (16 - 44)  SpO2: 97% (29 Mar 2023 07:17) (93% - 97%)    O2 Parameters below as of 29 Mar 2023 07:17  Patient On (Oxygen Delivery Method): nasal cannula  O2 Flow (L/min): 3          CONSTITUTIONAL:   Ill appearing.  Well nourished.  NAD    ENT:   Airway patent,   Mouth with normal mucosa.   No thrush    EYES:   Pupils equal,   Round and reactive to light.    CARDIAC:   Normal rate,   Regular rhythm.    No edema    RESPIRATORY:   No wheezing  Bilateral BS  Normal chest expansion  Not tachypneic,  No use of accessory muscles    GASTROINTESTINAL:  Abdomen soft,   Non-tender,   No guarding,   + BS    GENITOURINARY  normal genitalia for sex  no edema    MUSCULOSKELETAL:   Range of motion is not limited,  No clubbing, cyanosis    NEUROLOGICAL:   Alert and oriented   No motor  deficits.  pertinent DTRs normal    SKIN:   Skin normal color for race,   Warm and dry  No evidence of rash.    PSYCHIATRIC:   No apparent risk to self or others.        03-28-23 @ 07:01  -  03-29-23 @ 07:00  --------------------------------------------------------  IN:    Lactated Ringers: 525 mL    Lactated Ringers Bolus: 1500 mL    Oral Fluid: 600 mL    Other (mL): 750 mL    Sodium Chloride 0.9% Bolus: 250 mL  Total IN: 3625 mL    OUT:    Norepinephrine: 0 mL    Other (mL): 120 mL    Voided (mL): 750 mL  Total OUT: 870 mL    Total NET: 2755 mL          LABS:                            10.3   11.03 )-----------( 177      ( 29 Mar 2023 05:27 )             32.7                                               03-29    139  |  110  |  32<H>  ----------------------------<  124<H>  4.8   |  21  |  0.8    Ca    8.0<L>      29 Mar 2023 05:27  Phos  4.1     03-28  Mg     2.5     03-29    TPro  4.3<L>  /  Alb  2.6<L>  /  TBili  0.7  /  DBili  0.3  /  AST  7   /  ALT  <5  /  AlkPhos  62  03-29      PT/INR - ( 29 Mar 2023 05:27 )   PT: >40.00 sec;   INR: 7.19 ratio         PTT - ( 28 Mar 2023 04:30 )  PTT:47.0 sec                                       Urinalysis Basic - ( 28 Mar 2023 08:51 )    Color: Yellow / Appearance: Cloudy / SG: >=1.030 / pH: x  Gluc: x / Ketone: Negative  / Bili: Negative / Urobili: 0.2 mg/dL   Blood: x / Protein: 30 mg/dL / Nitrite: Negative   Leuk Esterase: Negative / RBC: 6-10 /HPF / WBC 1-2 /HPF   Sq Epi: x / Non Sq Epi: Occasional /HPF / Bacteria: Few                                                  LIVER FUNCTIONS - ( 29 Mar 2023 05:27 )  Alb: 2.6 g/dL / Pro: 4.3 g/dL / ALK PHOS: 62 U/L / ALT: <5 U/L / AST: 7 U/L / GGT: x                                                  Culture - Blood (collected 27 Mar 2023 14:10)  Source: .Blood None  Gram Stain (28 Mar 2023 13:30):    Growth in aerobic bottle: Gram Positive Cocci in Pairs and Chains    Growth in anaerobic bottle: Gram positive cocci in pairs  Preliminary Report (28 Mar 2023 13:30):    Growth in aerobic bottle: Gram Positive Cocci in Pairs and Chains    Growth in anaerobic bottle: Gram positive cocci in pairs    ***Blood Panel PCR results on this specimen are available    approximately 3 hours after the Gram stain result.***    Gram stain, PCR, and/or culture results may not always    correspond due to difference in methodologies.    ************************************************************    This PCR assay was performed by multiplex PCR. This    Assay tests for 66 bacterial and resistance gene targets.    Please refer to the [facility] test directory    at [email or web address] for details.  Organism: Blood Culture PCR (28 Mar 2023 15:14)  Organism: Blood Culture PCR (28 Mar 2023 15:14)    Culture - Blood (collected 27 Mar 2023 14:09)  Source: .Blood None  Preliminary Report (28 Mar 2023 23:01):    No growth to date.                                                                                           MEDICATIONS  (STANDING):  cefTRIAXone   IVPB 2000 milliGRAM(s) IV Intermittent every 24 hours  hydrocortisone sodium succinate Injectable 50 milliGRAM(s) IV Push every 6 hours  lactated ringers. 1000 milliLiter(s) (75 mL/Hr) IV Continuous <Continuous>  norepinephrine Infusion 0.05 MICROgram(s)/kG/Min (7.19 mL/Hr) IV Continuous <Continuous>  pantoprazole    Tablet 40 milliGRAM(s) Oral before breakfast    MEDICATIONS  (PRN):      New X-rays reviewed:                                                                                  ECHO    CXR interpreted by me:       Patient is a 83y old  Female who presents with a chief complaint of Strep pneumoniae bacteremia (28 Mar 2023 16:50)        Over Night Events:  No overnight events. On 3L NC, satting 95%.     Drips    Off levophed  No sedation      ROS:     All pertinent ROS are negative except HPI         PHYSICAL EXAM    ICU Vital Signs Last 24 Hrs  T(C): 35.9 (29 Mar 2023 03:02), Max: 37.3 (28 Mar 2023 15:00)  T(F): 96.6 (29 Mar 2023 03:02), Max: 99.1 (28 Mar 2023 15:00)  HR: 83 (29 Mar 2023 07:17) (79 - 129)  BP: 94/52 (29 Mar 2023 07:17) (81/52 - 111/68)  BP(mean): 67 (29 Mar 2023 07:17) (61 - 86)  RR: 19 (29 Mar 2023 07:17) (16 - 44)  SpO2: 97% (29 Mar 2023 07:17) (93% - 97%)    O2 Parameters below as of 29 Mar 2023 07:17  Patient On (Oxygen Delivery Method): nasal cannula  O2 Flow (L/min): 3        CONSTITUTIONAL:   Ill appearing. NAD    ENT:   Airway patent,   Mouth with normal mucosa.   No thrush    EYES:   Pupils equal,   Round and reactive to light.    CARDIAC:   Normal rate,   Regular rhythm.    No edema    RESPIRATORY:   Decreased bilateral breath sounds  Normal chest expansion  Not tachypneic,  No use of accessory muscles    GASTROINTESTINAL:  Abdomen soft,   Non-tender,   No guarding,   + BS    MUSCULOSKELETAL:   Range of motion is not limited,  No clubbing, cyanosis    NEUROLOGICAL:   AOx4  Follows commands  Moves all extremities     SKIN:   Skin normal color for race,   Warm and dry  No evidence of rash.    PSYCHIATRIC:   No apparent risk to self or others.        03-28-23 @ 07:01  -  03-29-23 @ 07:00  --------------------------------------------------------  IN:    Lactated Ringers: 525 mL    Lactated Ringers Bolus: 1500 mL    Oral Fluid: 600 mL    Other (mL): 750 mL    Sodium Chloride 0.9% Bolus: 250 mL  Total IN: 3625 mL    OUT:    Norepinephrine: 0 mL    Other (mL): 120 mL    Voided (mL): 750 mL  Total OUT: 870 mL    Total NET: 2755 mL          LABS:                            10.3   11.03 )-----------( 177      ( 29 Mar 2023 05:27 )             32.7                                               03-29    139  |  110  |  32<H>  ----------------------------<  124<H>  4.8   |  21  |  0.8    Ca    8.0<L>      29 Mar 2023 05:27  Phos  4.1     03-28  Mg     2.5     03-29    TPro  4.3<L>  /  Alb  2.6<L>  /  TBili  0.7  /  DBili  0.3  /  AST  7   /  ALT  <5  /  AlkPhos  62  03-29      PT/INR - ( 29 Mar 2023 05:27 )   PT: >40.00 sec;   INR: 7.19 ratio         PTT - ( 28 Mar 2023 04:30 )  PTT:47.0 sec                                       Urinalysis Basic - ( 28 Mar 2023 08:51 )    Color: Yellow / Appearance: Cloudy / SG: >=1.030 / pH: x  Gluc: x / Ketone: Negative  / Bili: Negative / Urobili: 0.2 mg/dL   Blood: x / Protein: 30 mg/dL / Nitrite: Negative   Leuk Esterase: Negative / RBC: 6-10 /HPF / WBC 1-2 /HPF   Sq Epi: x / Non Sq Epi: Occasional /HPF / Bacteria: Few                                                  LIVER FUNCTIONS - ( 29 Mar 2023 05:27 )  Alb: 2.6 g/dL / Pro: 4.3 g/dL / ALK PHOS: 62 U/L / ALT: <5 U/L / AST: 7 U/L / GGT: x                                                  Culture - Blood (collected 27 Mar 2023 14:10)  Source: .Blood None  Gram Stain (28 Mar 2023 13:30):    Growth in aerobic bottle: Gram Positive Cocci in Pairs and Chains    Growth in anaerobic bottle: Gram positive cocci in pairs  Preliminary Report (28 Mar 2023 13:30):    Growth in aerobic bottle: Gram Positive Cocci in Pairs and Chains    Growth in anaerobic bottle: Gram positive cocci in pairs    ***Blood Panel PCR results on this specimen are available    approximately 3 hours after the Gram stain result.***    Gram stain, PCR, and/or culture results may not always    correspond due to difference in methodologies.    ************************************************************    This PCR assay was performed by multiplex PCR. This    Assay tests for 66 bacterial and resistance gene targets.    Please refer to the [facility] test directory    at [email or web address] for details.  Organism: Blood Culture PCR (28 Mar 2023 15:14)  Organism: Blood Culture PCR (28 Mar 2023 15:14)    Culture - Blood (collected 27 Mar 2023 14:09)  Source: .Blood None  Preliminary Report (28 Mar 2023 23:01):    No growth to date.                                                                                           MEDICATIONS  (STANDING):  cefTRIAXone   IVPB 2000 milliGRAM(s) IV Intermittent every 24 hours  hydrocortisone sodium succinate Injectable 50 milliGRAM(s) IV Push every 6 hours  lactated ringers. 1000 milliLiter(s) (75 mL/Hr) IV Continuous <Continuous>  norepinephrine Infusion 0.05 MICROgram(s)/kG/Min (7.19 mL/Hr) IV Continuous <Continuous>  pantoprazole    Tablet 40 milliGRAM(s) Oral before breakfast    MEDICATIONS  (PRN):

## 2023-03-29 NOTE — PROGRESS NOTE ADULT - SUBJECTIVE AND OBJECTIVE BOX
Patient is feeling improved now off pressors      T(F): 97.2 (03-29-23 @ 07:00), Max: 99.1 (03-28-23 @ 15:00)  HR: 83 (03-29-23 @ 07:17)  BP: 94/52 (03-29-23 @ 07:17)  RR: 19 (03-29-23 @ 07:17)  SpO2: 97% (03-29-23 @ 07:17) (94% - 97%)    PHYSICAL EXAM:  GENERAL: NAD  HEAD:  Atraumatic, Normocephalic  EYES: EOMI, PERRLA, conjunctiva and sclera clear  NERVOUS SYSTEM:  Alert & Oriented X3, no focal deficits   CHEST/LUNG:  bilateral rhonchi  HEART: Regular rate and rhythm; No murmurs, rubs, or gallops  ABDOMEN: Soft, Nontender, Nondistended; Bowel sounds present  EXTREMITIES:  2+ Peripheral Pulses, No clubbing, cyanosis, or edema    LABS  03-29    139  |  110  |  32<H>  ----------------------------<  124<H>  4.8   |  21  |  0.8    Ca    8.0<L>      29 Mar 2023 05:27  Phos  4.1     03-28  Mg     2.5     03-29    TPro  4.3<L>  /  Alb  2.6<L>  /  TBili  0.7  /  DBili  0.3  /  AST  7   /  ALT  <5  /  AlkPhos  62  03-29                          10.3   11.03 )-----------( 177      ( 29 Mar 2023 05:27 )             32.7     PT/INR - ( 29 Mar 2023 05:27 )   PT: >40.00 sec;   INR: 7.19 ratio         PTT - ( 28 Mar 2023 04:30 )  PTT:47.0 sec      Culture Results:   Growth in aerobic bottle: Gram Positive Cocci in Pairs and Chains  Growth in anaerobic bottle: Gram positive cocci in pairs  ***Blood Panel PCR results on this specimen are available  approximately 3 hours after the Gram stain result.***  Gram stain, PCR, and/or culture results may not always  correspond due to difference in methodologies.  ************************************************************  This PCR assay was performed by multiplex PCR. This  Assay tests for 66 bacterial and resistance gene targets.  Please refer to the [facility] test directory  at [email or web address] for details. (03-27-23)  Culture Results:   No growth to date. (03-27-23)    RADIOLOGY  < from: Xray Chest 1 View- PORTABLE-Routine (03.28.23 @ 07:10) >  Impression:    Unchanged right basilar parenchymal opacity.    < end of copied text >    MEDICATIONS  (STANDING):  cefTRIAXone   IVPB 2000 milliGRAM(s) IV Intermittent every 24 hours  hydrocortisone sodium succinate Injectable 50 milliGRAM(s) IV Push every 6 hours  lactated ringers. 1000 milliLiter(s) (75 mL/Hr) IV Continuous <Continuous>  pantoprazole    Tablet 40 milliGRAM(s) Oral before breakfast    MEDICATIONS  (PRN):

## 2023-03-29 NOTE — PROGRESS NOTE ADULT - ASSESSMENT
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     HEENT: Oral care    PULMONARY:  HOB @ 45 degrees.  Aspiration precautions. target SpO2 92-96%, titrate oxygen as tolerated DC venti mask, attempt NC.     CARDIOVASCULAR: Avoid volume overload. Strict I's and O's. ECHO. Goal directed fluid resuscitation. Target MAP>60-65. Wean pressors as tolerated. CHEETAH. 500 cc LR bolus. Currently on levophed.     GI: GI prophylaxis.  Feeding as tolerated.     RENAL: Follow up lytes.  Correct as needed. Correct K. Correct Mg. Repeat LA.     INFECTIOUS DISEASE: Follow up cultures. Nasal MRSA negative, DC vanc., Cefepime and levofloxacin for now. PanCx. Procal. Fungitell. UA. Urine strep/legionella. 2 sets of BCx.     HEMATOLOGICAL:  DVT prophylaxis. LE duplex.     ENDOCRINE:  Follow up FS. A1c. TSH. HC 50mg q6h.     MUSCULOSKELETAL: bedrest. WCN eval.     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     HEENT: Oral care    PULMONARY:  HOB @ 45 degrees.  Aspiration precautions. target SpO2 92-96%, titrate oxygen as tolerated. CT Chest NC.     CARDIOVASCULAR: Avoid volume overload. Strict I's and O's. FU ECHO. Goal directed fluid resuscitation. Target MAP>60-65. Wean pressors as tolerated. CHEETAH. Off levophed.     GI: GI prophylaxis.  Feeding as tolerated.     RENAL: Follow up lytes.  Correct as needed.      INFECTIOUS DISEASE: Follow up cultures. Nasal MRSA negative. Abx as per ID.     HEMATOLOGICAL:  DVT prophylaxis. FU LE duplex.     ENDOCRINE:  Follow up FS. A1c. TSH. HC 50mg q6h.     MUSCULOSKELETAL: OOB to chair. PT/OT eval.     DC TLC    Downgrade to general medical floor in PM

## 2023-03-29 NOTE — PROGRESS NOTE ADULT - ASSESSMENT
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  found septic and admitted to ICU    sepsis - resolving secondary to RLL pneumonia / strep pneumo bacteremia      - continue IV antibiotics as per ID   - now off  pressors    - taper stress steroids   - element of coumadin toxicity - INR today is 7   - no signs of bleeding   - check daily INR and resume coumadin once INR<3

## 2023-03-30 LAB
-  CEFTRIAXONE (MENINGITIDIS): SIGNIFICANT CHANGE UP
-  CEFTRIAXONE (NON-MENINGITIDIS): SIGNIFICANT CHANGE UP
-  ERYTHROMYCIN: SIGNIFICANT CHANGE UP
-  LEVOFLOXACIN: SIGNIFICANT CHANGE UP
-  PENICILLIN (MENINGITIDIS): SIGNIFICANT CHANGE UP
-  PENICILLIN (NON-MENINGITIDIS): SIGNIFICANT CHANGE UP
-  PENICILLIN (ORAL PENICILLIN V): SIGNIFICANT CHANGE UP
-  TRIMETHOPRIM/SULFAMETHOXAZOLE: SIGNIFICANT CHANGE UP
-  VANCOMYCIN: SIGNIFICANT CHANGE UP
ALBUMIN SERPL ELPH-MCNC: 2.7 G/DL — LOW (ref 3.5–5.2)
ALP SERPL-CCNC: 59 U/L — SIGNIFICANT CHANGE UP (ref 30–115)
ALT FLD-CCNC: <5 U/L — SIGNIFICANT CHANGE UP (ref 0–41)
ANION GAP SERPL CALC-SCNC: 9 MMOL/L — SIGNIFICANT CHANGE UP (ref 7–14)
AST SERPL-CCNC: 10 U/L — SIGNIFICANT CHANGE UP (ref 0–41)
BILIRUB SERPL-MCNC: 0.5 MG/DL — SIGNIFICANT CHANGE UP (ref 0.2–1.2)
BUN SERPL-MCNC: 31 MG/DL — HIGH (ref 10–20)
CALCIUM SERPL-MCNC: 8.3 MG/DL — LOW (ref 8.4–10.5)
CHLORIDE SERPL-SCNC: 109 MMOL/L — SIGNIFICANT CHANGE UP (ref 98–110)
CO2 SERPL-SCNC: 23 MMOL/L — SIGNIFICANT CHANGE UP (ref 17–32)
CREAT SERPL-MCNC: 0.9 MG/DL — SIGNIFICANT CHANGE UP (ref 0.7–1.5)
CULTURE RESULTS: SIGNIFICANT CHANGE UP
CULTURE RESULTS: SIGNIFICANT CHANGE UP
EGFR: 63 ML/MIN/1.73M2 — SIGNIFICANT CHANGE UP
GLUCOSE SERPL-MCNC: 93 MG/DL — SIGNIFICANT CHANGE UP (ref 70–99)
HCT VFR BLD CALC: 36.9 % — LOW (ref 37–47)
HGB BLD-MCNC: 11.3 G/DL — LOW (ref 12–16)
INR BLD: 6.09 RATIO — CRITICAL HIGH (ref 0.65–1.3)
LACTATE SERPL-SCNC: 1.4 MMOL/L — SIGNIFICANT CHANGE UP (ref 0.7–2)
MAGNESIUM SERPL-MCNC: 2.3 MG/DL — SIGNIFICANT CHANGE UP (ref 1.8–2.4)
MCHC RBC-ENTMCNC: 29.6 PG — SIGNIFICANT CHANGE UP (ref 27–31)
MCHC RBC-ENTMCNC: 30.6 G/DL — LOW (ref 32–37)
MCV RBC AUTO: 96.6 FL — SIGNIFICANT CHANGE UP (ref 81–99)
METHOD TYPE: SIGNIFICANT CHANGE UP
NRBC # BLD: 0 /100 WBCS — SIGNIFICANT CHANGE UP (ref 0–0)
ORGANISM # SPEC MICROSCOPIC CNT: SIGNIFICANT CHANGE UP
PLATELET # BLD AUTO: 215 K/UL — SIGNIFICANT CHANGE UP (ref 130–400)
POTASSIUM SERPL-MCNC: 4.3 MMOL/L — SIGNIFICANT CHANGE UP (ref 3.5–5)
POTASSIUM SERPL-SCNC: 4.3 MMOL/L — SIGNIFICANT CHANGE UP (ref 3.5–5)
PROT SERPL-MCNC: 4.5 G/DL — LOW (ref 6–8)
PROTHROM AB SERPL-ACNC: >40 SEC — HIGH (ref 9.95–12.87)
RBC # BLD: 3.82 M/UL — LOW (ref 4.2–5.4)
RBC # FLD: 14.3 % — SIGNIFICANT CHANGE UP (ref 11.5–14.5)
SODIUM SERPL-SCNC: 141 MMOL/L — SIGNIFICANT CHANGE UP (ref 135–146)
SPECIMEN SOURCE: SIGNIFICANT CHANGE UP
SPECIMEN SOURCE: SIGNIFICANT CHANGE UP
WBC # BLD: 14.32 K/UL — HIGH (ref 4.8–10.8)
WBC # FLD AUTO: 14.32 K/UL — HIGH (ref 4.8–10.8)

## 2023-03-30 PROCEDURE — 99232 SBSQ HOSP IP/OBS MODERATE 35: CPT

## 2023-03-30 PROCEDURE — 99233 SBSQ HOSP IP/OBS HIGH 50: CPT

## 2023-03-30 RX ORDER — POLYETHYLENE GLYCOL 3350 17 G/17G
17 POWDER, FOR SOLUTION ORAL DAILY
Refills: 0 | Status: DISCONTINUED | OUTPATIENT
Start: 2023-03-30 | End: 2023-04-04

## 2023-03-30 RX ORDER — PHYTONADIONE (VIT K1) 5 MG
5 TABLET ORAL ONCE
Refills: 0 | Status: COMPLETED | OUTPATIENT
Start: 2023-03-30 | End: 2023-03-30

## 2023-03-30 RX ORDER — SENNA PLUS 8.6 MG/1
2 TABLET ORAL AT BEDTIME
Refills: 0 | Status: DISCONTINUED | OUTPATIENT
Start: 2023-03-30 | End: 2023-04-04

## 2023-03-30 RX ADMIN — Medication 50 MILLIGRAM(S): at 18:04

## 2023-03-30 RX ADMIN — SODIUM CHLORIDE 75 MILLILITER(S): 9 INJECTION, SOLUTION INTRAVENOUS at 00:50

## 2023-03-30 RX ADMIN — Medication 50 MILLIGRAM(S): at 05:18

## 2023-03-30 RX ADMIN — Medication 101 MILLIGRAM(S): at 09:38

## 2023-03-30 RX ADMIN — CEFTRIAXONE 100 MILLIGRAM(S): 500 INJECTION, POWDER, FOR SOLUTION INTRAMUSCULAR; INTRAVENOUS at 18:05

## 2023-03-30 RX ADMIN — PANTOPRAZOLE SODIUM 40 MILLIGRAM(S): 20 TABLET, DELAYED RELEASE ORAL at 05:18

## 2023-03-30 NOTE — DIETITIAN INITIAL EVALUATION ADULT - NSFNSPHYEXAMSKINFT_GEN_A_CORE
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

## 2023-03-30 NOTE — PHYSICAL THERAPY INITIAL EVALUATION ADULT - GENERAL OBSERVATIONS, REHAB EVAL
13;15-13;40 pt was seen for PT IE at bed side, pt is agreeable, chart thoroughly reviewed, RN Sofy is aware.  Pt received and left semi pineda in bed, in no apparent distress, +telemonitoring, +BP cuff and +pulse ox, +hep lock , +primafit, +O2 via NC, +call bell within reach, bed side table at reach, granddaughter in at bed side

## 2023-03-30 NOTE — PHYSICAL THERAPY INITIAL EVALUATION ADULT - ADDITIONAL COMMENTS
pt lives with her daughter in a private house, 2 platform steps to enter without rails, one level inside.  Pt amb with 2 SC or RW outside and RW inside.

## 2023-03-30 NOTE — PROGRESS NOTE ADULT - ASSESSMENT
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  found septic and admitted to ICU    sepsis - resolving secondary to RLL pneumonia / strep pneumo bacteremia      - continue IV antibiotics as per ID   - now off  pressors    -  stress steroids   - element of coumadin toxicity - INR today is 6   - no signs of bleeding   - check daily INR and resume coumadin once INR<3

## 2023-03-30 NOTE — PROGRESS NOTE ADULT - ATTENDING COMMENTS
Attending Statement: I have personally performed a face to face diagnostic evaluation on this patient. The patient is suffering from:  Sepsis present on admission  Septic shock  Severe Community acquired pneumonia  post obstructive pneumonia  mediastinal adenopathy enlarging  I have made amendments to the documentation where necessary. I have personally seen and examined this patient.  I have fully participated in the care of this patient.  I have reviewed all pertinent clinical information, including history, physical exam, plan and note.
Attending Statement: I have personally performed a face to face diagnostic evaluation on this patient. The patient is suffering from:  Sepsis present on admission  Septic shock  Severe Community acquired pneumonia  post obstructive pneumonia  mediastinal adenopathy enlarging  I have made amendments to the documentation where necessary. I have personally seen and examined this patient.  I have fully participated in the care of this patient.  I have reviewed all pertinent clinical information, including history, physical exam, plan and note.

## 2023-03-30 NOTE — DIETITIAN INITIAL EVALUATION ADULT - ORAL INTAKE PTA/DIET HISTORY
as per pt generally follows a regular diet PTA with good po intake, denies any significant weight changes.     Presently on a DASH/TLC diet tolerating well consumes >50% of meals.

## 2023-03-30 NOTE — PROGRESS NOTE ADULT - ASSESSMENT
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     HEENT: Oral care    PULMONARY:  HOB @ 45 degrees.  Aspiration precautions. target SpO2 92-96%, titrate oxygen as tolerated. CT Chest NC appreciated.     CARDIOVASCULAR: Avoid volume overload. Strict I's and O's. ECHO- EF-56%, no vegetation. Off levophed.     GI: GI prophylaxis.  Feeding as tolerated.     RENAL: Follow up lytes.  Correct as needed.      INFECTIOUS DISEASE: Follow up cultures. Nasal MRSA negative. Abx as per ID. Repeat procal. Daily BCx.     HEMATOLOGICAL:  DVT prophylaxis. FU LE duplex.     ENDOCRINE:  Follow up FS. A1c. TSH. HC 50mg q6h.     MUSCULOSKELETAL: OOB to chair. PT/OT eval.     Give vitamin K and then DC TLC when INR lower    Downgrade to GMF

## 2023-03-30 NOTE — PHYSICAL THERAPY INITIAL EVALUATION ADULT - PERTINENT HX OF CURRENT PROBLEM, REHAB EVAL
83 y.o. male present with shortness of breath associated with fevers and chills with cough. PNA, Sepsis, Septic shock

## 2023-03-30 NOTE — DIETITIAN INITIAL EVALUATION ADULT - PERTINENT MEDS FT
MEDICATIONS  (STANDING):  cefTRIAXone   IVPB 2000 milliGRAM(s) IV Intermittent every 24 hours  hydrocortisone sodium succinate Injectable 50 milliGRAM(s) IV Push every 12 hours  pantoprazole    Tablet 40 milliGRAM(s) Oral before breakfast  polyethylene glycol 3350 17 Gram(s) Oral daily  senna 2 Tablet(s) Oral at bedtime    MEDICATIONS  (PRN):

## 2023-03-30 NOTE — PROGRESS NOTE ADULT - SUBJECTIVE AND OBJECTIVE BOX
Patient is a 83y old  Female who presents with a chief complaint of Strep pneumoniae bacteremia (28 Mar 2023 16:50)        Over Night Events:  No overnight events.     Drips  Off levophed       ROS:     All pertinent ROS are negative except HPI         PHYSICAL EXAM    ICU Vital Signs Last 24 Hrs  T(C): 35.6 (30 Mar 2023 07:03), Max: 36.3 (29 Mar 2023 15:05)  T(F): 96 (30 Mar 2023 07:03), Max: 97.4 (29 Mar 2023 15:05)  HR: 78 (30 Mar 2023 05:21) (76 - 103)  BP: 110/65 (30 Mar 2023 05:21) (98/51 - 119/61)  BP(mean): 82 (30 Mar 2023 05:21) (74 - 83)  RR: 22 (30 Mar 2023 07:03) (22 - 39)  SpO2: 96% (30 Mar 2023 05:21) (94% - 100%)    O2 Parameters below as of 30 Mar 2023 04:01  Patient On (Oxygen Delivery Method): nasal cannula        CONSTITUTIONAL:   Ill appearing. NAD    ENT:   Airway patent,   Mouth with normal mucosa.   No thrush    EYES:   Pupils equal,   Round and reactive to light.    CARDIAC:   Normal rate,   Regular rhythm.    No edema    RESPIRATORY:   3L NC, satting 95%  Decreased bilateral BS  Normal chest expansion  Not tachypneic,  No use of accessory muscles    GASTROINTESTINAL:  Abdomen soft,   Non-tender,   No guarding,   + BS    GENITOURINARY  normal genitalia for sex  no edema    MUSCULOSKELETAL:   Range of motion is not limited,  No clubbing, cyanosis    NEUROLOGICAL:   AOx4  Follows commands  Moves all extremities     SKIN:   Skin normal color for race,   Warm and dry  No evidence of rash.    PSYCHIATRIC:   No apparent risk to self or others.        03-29-23 @ 07:01  -  03-30-23 @ 07:00  --------------------------------------------------------  IN:    IV PiggyBack: 300 mL    Lactated Ringers: 1650 mL  Total IN: 1950 mL    OUT:    Voided (mL): 650 mL  Total OUT: 650 mL    Total NET: 1300 mL        LABS:                            11.3   14.32 )-----------( 215      ( 30 Mar 2023 05:36 )             36.9                                               03-30    141  |  109  |  31<H>  ----------------------------<  93  4.3   |  23  |  0.9    Ca    8.3<L>      30 Mar 2023 05:36  Mg     2.3     03-30    TPro  4.5<L>  /  Alb  2.7<L>  /  TBili  0.5  /  DBili  x   /  AST  10  /  ALT  <5  /  AlkPhos  59  03-30      PT/INR - ( 29 Mar 2023 11:34 )   PT: >40.00 sec;   INR: 6.96 ratio         PTT - ( 29 Mar 2023 11:34 )  PTT:50.5 sec                                       Urinalysis Basic - ( 28 Mar 2023 08:51 )    Color: Yellow / Appearance: Cloudy / SG: >=1.030 / pH: x  Gluc: x / Ketone: Negative  / Bili: Negative / Urobili: 0.2 mg/dL   Blood: x / Protein: 30 mg/dL / Nitrite: Negative   Leuk Esterase: Negative / RBC: 6-10 /HPF / WBC 1-2 /HPF   Sq Epi: x / Non Sq Epi: Occasional /HPF / Bacteria: Few                                                  LIVER FUNCTIONS - ( 30 Mar 2023 05:36 )  Alb: 2.7 g/dL / Pro: 4.5 g/dL / ALK PHOS: 59 U/L / ALT: <5 U/L / AST: 10 U/L / GGT: x                                                  Culture - Blood (collected 27 Mar 2023 14:10)  Source: .Blood None  Gram Stain (28 Mar 2023 13:30):    Growth in aerobic bottle: Gram Positive Cocci in Pairs and Chains    Growth in anaerobic bottle: Gram positive cocci in pairs  Preliminary Report (29 Mar 2023 11:43):    Growth in aerobic and anaerobic bottles: Streptococcus pneumoniae    Susceptibility to follow.    ***Blood Panel PCR results on this specimen are available    approximately 3 hours after the Gram stain result.***    Gram stain, PCR, and/or culture results may not always    correspond due to difference in methodologies.    ************************************************************    This PCR assay was performed by multiplex PCR. This    Assay tests for 66 bacterial and resistance gene targets.    Please refer to the [facility] test directory    at [email or web address] for details.  Organism: Blood Culture PCR (28 Mar 2023 15:14)  Organism: Blood Culture PCR (28 Mar 2023 15:14)    Culture - Blood (collected 27 Mar 2023 14:09)  Source: .Blood None  Preliminary Report (28 Mar 2023 23:01):    No growth to date.                                          MEDICATIONS  (STANDING):  cefTRIAXone   IVPB 2000 milliGRAM(s) IV Intermittent every 24 hours  hydrocortisone sodium succinate Injectable 50 milliGRAM(s) IV Push every 12 hours  lactated ringers. 1000 milliLiter(s) (75 mL/Hr) IV Continuous <Continuous>  pantoprazole    Tablet 40 milliGRAM(s) Oral before breakfast    MEDICATIONS  (PRN):      New X-rays reviewed:                                                                                  ECHO    CXR interpreted by me:

## 2023-03-30 NOTE — PROGRESS NOTE ADULT - SUBJECTIVE AND OBJECTIVE BOX
Patient seen and evaluated this am reports  feeling improved today, remains off pressors      T(F): 97.7 (03-30-23 @ 15:33), Max: 97.7 (03-30-23 @ 15:33)  HR: 77 (03-30-23 @ 15:33)  BP: 114/67 (03-30-23 @ 15:33)  RR: 20  SpO2: 96% (03-30-23 @ 15:33) (95% - 98%)    PHYSICAL EXAM:  GENERAL: NAD  HEAD:  Atraumatic, Normocephalic  EYES: EOMI, PERRLA, conjunctiva and sclera clear  NERVOUS SYSTEM:  Alert & Oriented X3, no focal deficits   CHEST/LUNG:  bilateral rhonchi  HEART: Regular rate and rhythm; No murmurs, rubs, or gallops  ABDOMEN: Soft, Nontender, Nondistended; Bowel sounds present  EXTREMITIES:  2+ Peripheral Pulses, No clubbing, cyanosis, or edema    LABS  03-30    141  |  109  |  31<H>  ----------------------------<  93  4.3   |  23  |  0.9    Ca    8.3<L>      30 Mar 2023 05:36  Mg     2.3     03-30    TPro  4.5<L>  /  Alb  2.7<L>  /  TBili  0.5  /  DBili  x   /  AST  10  /  ALT  <5  /  AlkPhos  59  03-30                          11.3   14.32 )-----------( 215      ( 30 Mar 2023 05:36 )             36.9     PT/INR - ( 30 Mar 2023 05:36 )   PT: >40.00 sec;   INR: 6.09 ratio         PTT - ( 29 Mar 2023 11:34 )  PTT:50.5 sec      Culture Results:   No growth to date. (03-29-23)  Culture Results:   <10,000 CFU/mL Normal Urogenital Breanne (03-28-23)  Culture Results:   Growth in aerobic and anaerobic bottles: Streptococcus pneumoniae  ***Blood Panel PCR results on this specimen are available  approximately 3 hours after the Gram stain result.***  Gram stain, PCR, and/or culture results may not always  correspond due to difference in methodologies.  ************************************************************  This PCR assay was performed by multiplex PCR. This  Assay tests for 66 bacterial and resistance gene targets.  Please refer to the [facility] test directory  at [email or web address] for details. (03-27-23)  Culture Results:   No growth to date. (03-27-23)    RADIOLOGY  < from: CT Chest No Cont (03.29.23 @ 10:16) >  IMPRESSION:    Necrotizing pneumonia involving most of the right lower lung field with   superimposed opacifications throughout essentially all lung fields.    Diffuse mediastinal and axillary lymphadenopathy.    < end of copied text >    MEDICATIONS  (STANDING):  cefTRIAXone   IVPB 2000 milliGRAM(s) IV Intermittent every 24 hours  hydrocortisone sodium succinate Injectable 50 milliGRAM(s) IV Push every 12 hours  pantoprazole    Tablet 40 milliGRAM(s) Oral before breakfast  polyethylene glycol 3350 17 Gram(s) Oral daily  senna 2 Tablet(s) Oral at bedtime    MEDICATIONS  (PRN):

## 2023-03-30 NOTE — DIETITIAN INITIAL EVALUATION ADULT - NSPROEDALEARNPREF_GEN_A_NUR
as per pt's request diet education for coumadin reviewed with pt/verbal instruction/written material

## 2023-03-30 NOTE — DIETITIAN INITIAL EVALUATION ADULT - PERTINENT LABORATORY DATA
03-30    141  |  109  |  31<H>  ----------------------------<  93  4.3   |  23  |  0.9    Ca    8.3<L>      30 Mar 2023 05:36  Mg     2.3     03-30    TPro  4.5<L>  /  Alb  2.7<L>  /  TBili  0.5  /  DBili  x   /  AST  10  /  ALT  <5  /  AlkPhos  59  03-30  A1C with Estimated Average Glucose Result: 5.5 % (03-29-23 @ 05:27)  A1C with Estimated Average Glucose Result: 5.1 % (03-28-23 @ 04:30)                          11.3   14.32 )-----------( 215      ( 30 Mar 2023 05:36 )             36.9

## 2023-03-31 LAB
ALBUMIN SERPL ELPH-MCNC: 2.6 G/DL — LOW (ref 3.5–5.2)
ALP SERPL-CCNC: 55 U/L — SIGNIFICANT CHANGE UP (ref 30–115)
ALT FLD-CCNC: 11 U/L — SIGNIFICANT CHANGE UP (ref 0–41)
ANION GAP SERPL CALC-SCNC: 10 MMOL/L — SIGNIFICANT CHANGE UP (ref 7–14)
AST SERPL-CCNC: 19 U/L — SIGNIFICANT CHANGE UP (ref 0–41)
BASOPHILS # BLD AUTO: 0.02 K/UL — SIGNIFICANT CHANGE UP (ref 0–0.2)
BASOPHILS NFR BLD AUTO: 0.2 % — SIGNIFICANT CHANGE UP (ref 0–1)
BILIRUB SERPL-MCNC: 0.6 MG/DL — SIGNIFICANT CHANGE UP (ref 0.2–1.2)
BUN SERPL-MCNC: 37 MG/DL — HIGH (ref 10–20)
CALCIUM SERPL-MCNC: 8.6 MG/DL — SIGNIFICANT CHANGE UP (ref 8.4–10.5)
CHLORIDE SERPL-SCNC: 108 MMOL/L — SIGNIFICANT CHANGE UP (ref 98–110)
CO2 SERPL-SCNC: 22 MMOL/L — SIGNIFICANT CHANGE UP (ref 17–32)
CREAT SERPL-MCNC: 0.8 MG/DL — SIGNIFICANT CHANGE UP (ref 0.7–1.5)
EGFR: 73 ML/MIN/1.73M2 — SIGNIFICANT CHANGE UP
EOSINOPHIL # BLD AUTO: 0 K/UL — SIGNIFICANT CHANGE UP (ref 0–0.7)
EOSINOPHIL NFR BLD AUTO: 0 % — SIGNIFICANT CHANGE UP (ref 0–8)
GLUCOSE SERPL-MCNC: 95 MG/DL — SIGNIFICANT CHANGE UP (ref 70–99)
HCT VFR BLD CALC: 37.5 % — SIGNIFICANT CHANGE UP (ref 37–47)
HGB BLD-MCNC: 11.7 G/DL — LOW (ref 12–16)
IMM GRANULOCYTES NFR BLD AUTO: 0.9 % — HIGH (ref 0.1–0.3)
INR BLD: 1.12 RATIO — SIGNIFICANT CHANGE UP (ref 0.65–1.3)
LYMPHOCYTES # BLD AUTO: 2.37 K/UL — SIGNIFICANT CHANGE UP (ref 1.2–3.4)
LYMPHOCYTES # BLD AUTO: 22.6 % — SIGNIFICANT CHANGE UP (ref 20.5–51.1)
MAGNESIUM SERPL-MCNC: 2.1 MG/DL — SIGNIFICANT CHANGE UP (ref 1.8–2.4)
MCHC RBC-ENTMCNC: 30.2 PG — SIGNIFICANT CHANGE UP (ref 27–31)
MCHC RBC-ENTMCNC: 31.2 G/DL — LOW (ref 32–37)
MCV RBC AUTO: 96.6 FL — SIGNIFICANT CHANGE UP (ref 81–99)
MONOCYTES # BLD AUTO: 0.48 K/UL — SIGNIFICANT CHANGE UP (ref 0.1–0.6)
MONOCYTES NFR BLD AUTO: 4.6 % — SIGNIFICANT CHANGE UP (ref 1.7–9.3)
NEUTROPHILS # BLD AUTO: 7.52 K/UL — HIGH (ref 1.4–6.5)
NEUTROPHILS NFR BLD AUTO: 71.7 % — SIGNIFICANT CHANGE UP (ref 42.2–75.2)
NRBC # BLD: 0 /100 WBCS — SIGNIFICANT CHANGE UP (ref 0–0)
PLATELET # BLD AUTO: 243 K/UL — SIGNIFICANT CHANGE UP (ref 130–400)
POTASSIUM SERPL-MCNC: 4.4 MMOL/L — SIGNIFICANT CHANGE UP (ref 3.5–5)
POTASSIUM SERPL-SCNC: 4.4 MMOL/L — SIGNIFICANT CHANGE UP (ref 3.5–5)
PROCALCITONIN SERPL-MCNC: 3.25 NG/ML — HIGH (ref 0.02–0.1)
PROT SERPL-MCNC: 4.6 G/DL — LOW (ref 6–8)
PROTHROM AB SERPL-ACNC: 12.8 SEC — SIGNIFICANT CHANGE UP (ref 9.95–12.87)
RBC # BLD: 3.88 M/UL — LOW (ref 4.2–5.4)
RBC # FLD: 13.9 % — SIGNIFICANT CHANGE UP (ref 11.5–14.5)
SODIUM SERPL-SCNC: 140 MMOL/L — SIGNIFICANT CHANGE UP (ref 135–146)
WBC # BLD: 10.48 K/UL — SIGNIFICANT CHANGE UP (ref 4.8–10.8)
WBC # FLD AUTO: 10.48 K/UL — SIGNIFICANT CHANGE UP (ref 4.8–10.8)

## 2023-03-31 PROCEDURE — 99232 SBSQ HOSP IP/OBS MODERATE 35: CPT

## 2023-03-31 RX ORDER — WARFARIN SODIUM 2.5 MG/1
5 TABLET ORAL ONCE
Refills: 0 | Status: COMPLETED | OUTPATIENT
Start: 2023-03-31 | End: 2023-03-31

## 2023-03-31 RX ORDER — ENOXAPARIN SODIUM 100 MG/ML
80 INJECTION SUBCUTANEOUS ONCE
Refills: 0 | Status: COMPLETED | OUTPATIENT
Start: 2023-03-31 | End: 2023-03-31

## 2023-03-31 RX ADMIN — WARFARIN SODIUM 5 MILLIGRAM(S): 2.5 TABLET ORAL at 11:39

## 2023-03-31 RX ADMIN — CEFTRIAXONE 100 MILLIGRAM(S): 500 INJECTION, POWDER, FOR SOLUTION INTRAMUSCULAR; INTRAVENOUS at 17:34

## 2023-03-31 RX ADMIN — ENOXAPARIN SODIUM 80 MILLIGRAM(S): 100 INJECTION SUBCUTANEOUS at 15:22

## 2023-03-31 RX ADMIN — PANTOPRAZOLE SODIUM 40 MILLIGRAM(S): 20 TABLET, DELAYED RELEASE ORAL at 06:08

## 2023-03-31 RX ADMIN — Medication 50 MILLIGRAM(S): at 06:24

## 2023-03-31 RX ADMIN — Medication 50 MILLIGRAM(S): at 17:34

## 2023-03-31 NOTE — PROGRESS NOTE ADULT - SUBJECTIVE AND OBJECTIVE BOX
RAJOEY  83y, Female  Allergy: No Known Allergies      LOS  4d    CHIEF COMPLAINT: Sepsis     (30 Mar 2023 14:20)      INTERVAL EVENTS/HPI  - No acute events overnight  - T(F): , Max: 97.7 (03-30-23 @ 15:33)  - no worsening symptoms  - WBC Count: 10.48 (03-31-23 @ 05:23)  WBC Count: 14.32 (03-30-23 @ 05:36)     - Creatinine, Serum: 0.8 (03-31-23 @ 05:23)  Creatinine, Serum: 0.9 (03-30-23 @ 05:36)       ROS  General: Denies rigors, nightsweats  HEENT: Denies headache, rhinorrhea, sore throat, eye pain  CV: Denies CP, palpitations  PULM: Denies wheezing, hemoptysis  GI: Denies hematemesis, hematochezia, melena  : Denies discharge, hematuria  MSK: Denies arthralgias, myalgias  SKIN: Denies rash, lesions  NEURO: Denies paresthesias, weakness  PSYCH: Denies depression, anxiety    VITALS:  T(F): 96.3, Max: 97.7 (03-30-23 @ 15:33)  HR: 90  BP: 120/78  RR: 22Vital Signs Last 24 Hrs  T(C): 35.7 (31 Mar 2023 07:00), Max: 36.5 (30 Mar 2023 15:33)  T(F): 96.3 (31 Mar 2023 07:00), Max: 97.7 (30 Mar 2023 15:33)  HR: 90 (31 Mar 2023 07:00) (73 - 90)  BP: 120/78 (31 Mar 2023 07:00) (102/77 - 120/78)  BP(mean): 94 (31 Mar 2023 07:00) (83 - 94)  RR: 22 (31 Mar 2023 07:00) (22 - 28)  SpO2: 94% (31 Mar 2023 07:00) (94% - 98%)    Parameters below as of 31 Mar 2023 05:06  Patient On (Oxygen Delivery Method): nasal cannula  O2 Flow (L/min): 2      PHYSICAL EXAM:  Gen: NAD, resting in bed  HEENT: Normocephalic, atraumatic  Neck: supple, no lymphadenopathy  CV: Regular rate & regular rhythm  Lungs: decreased BS at bases, no fremitus  Abdomen: Soft, BS present  Ext: Warm, well perfused  Neuro: non focal, awake  Skin: no rash, no erythema  Lines: no phlebitis    FH: Non-contributory  Social Hx: Non-contributory    TESTS & MEASUREMENTS:                        11.7   10.48 )-----------( 243      ( 31 Mar 2023 05:23 )             37.5     03-31    140  |  108  |  37<H>  ----------------------------<  95  4.4   |  22  |  0.8    Ca    8.6      31 Mar 2023 05:23  Mg     2.1     03-31    TPro  4.6<L>  /  Alb  2.6<L>  /  TBili  0.6  /  DBili  x   /  AST  19  /  ALT  11  /  AlkPhos  55  03-31      LIVER FUNCTIONS - ( 31 Mar 2023 05:23 )  Alb: 2.6 g/dL / Pro: 4.6 g/dL / ALK PHOS: 55 U/L / ALT: 11 U/L / AST: 19 U/L / GGT: x               Culture - Blood (collected 03-29-23 @ 05:27)  Source: .Blood None  Preliminary Report (03-30-23 @ 18:01):    No growth to date.    Culture - Urine (collected 03-28-23 @ 22:55)  Source: Clean Catch Clean Catch (Midstream)  Final Report (03-30-23 @ 15:07):    <10,000 CFU/mL Normal Urogenital Breanne    Culture - Blood (collected 03-27-23 @ 14:10)  Source: .Blood None  Gram Stain (03-28-23 @ 13:30):    Growth in aerobic bottle: Gram Positive Cocci in Pairs and Chains    Growth in anaerobic bottle: Gram positive cocci in pairs  Final Report (03-30-23 @ 12:55):    Growth in aerobic and anaerobic bottles: Streptococcus pneumoniae    ***Blood Panel PCR results on this specimen are available    approximately 3 hours after the Gram stain result.***    Gram stain, PCR, and/or culture results may not always    correspond due to difference in methodologies.    ************************************************************    This PCR assay was performed by multiplex PCR. This    Assay tests for 66 bacterial and resistance gene targets.    Please refer to the [facility] Labs test directory    at [email or web address] for details.  Organism: Blood Culture PCR  Streptococcus pneumoniae (03-30-23 @ 12:55)  Organism: Streptococcus pneumoniae (03-30-23 @ 12:55)      Method Type: FREDDY      -  Erythromycin: S <=0.06 Predicts results for azithromycin.      -  Levofloxacin: S 0.5      -  Trimethoprim/Sulfamethoxazole: S <=.25/4.75      -  Vancomycin: S 0.25      -  Ceftriaxone (meningitidis): S <=0.25      -  Ceftriaxone (non-meningitidis): S <=0.25      -  Penicillin (meningitidis): S <=0.03      -  Penicillin (non-meningitidis): S <=0.03      -  Penicillin (oral penicillin V): S <=0.03  Organism: Blood Culture PCR (03-30-23 @ 12:55)      Method Type: PCR      -  Streptococcus pneumoniae: Detec    Culture - Blood (collected 03-27-23 @ 14:09)  Source: .Blood None  Preliminary Report (03-28-23 @ 23:01):    No growth to date.        INFECTIOUS DISEASES TESTING    RADIOLOGY & ADDITIONAL TESTS:  I have personally reviewed the last available Chest xray  CXR      CT  CT Chest No Cont:   ACC: [id number] EXAM:  CT CHEST   ORDERED BY: DRE CLARKE     PROCEDURE DATE:  03/29/2023          INTERPRETATION:  Reason for Exam:  Abnormality seen on plain film imaging.  CT of the chest was performed from the thoracic inlet to the level of the   adrenal glands without contrast injection. Coronal and sagittal images   have been submitted.    Comparison: CT scan of the thorax dated August 21, 2022.    Findings:    Tubes/Lines: None    Lungs, Pleura, and Airways:Examination reveals diffuse opacification   involving most of the right lung consistent with necrotizing pneumonia.   Fluid is seen within the minor fissure. Groundglass opacifications are   seen within the right upper lung field as well as the left upper lung   field. Less apparent opacifications are seen within the left lower lung   field.    Mediastinum/Lymph Nodes:Within the limitations of this noncontrast   evaluation, large bulky mediastinal and axillary lymphadenopathy are   seen, likely stable.    Heart/Great Vessels: Theheart is enlarged. The great vessels are   atherosclerotic.    Abdomen: Large hiatal hernia.    Bones and soft tissues: Kyphotic appearance of the spine. Multilevel   degenerative changes with osteopenia.    IMPRESSION:    Necrotizing pneumonia involving most of the right lower lung field with   superimposed opacifications throughout essentially all lung fields.    Diffuse mediastinal and axillary lymphadenopathy.    --- End of Report ---            MARKUS VIRAMONTES MD; Attending Interventional Radiologist  This document has been electronically signed. Mar 29 2023 10:27AM (03-29-23 @ 10:16)        MEDICATIONS  cefTRIAXone   IVPB 2000 IV Intermittent every 24 hours  hydrocortisone sodium succinate Injectable 50 IV Push every 12 hours  pantoprazole    Tablet 40 Oral before breakfast  polyethylene glycol 3350 17 Oral daily  senna 2 Oral at bedtime      WEIGHT  Weight (kg): 76.7 (03-28-23 @ 09:41)  Creatinine, Serum: 0.8 mg/dL (03-31-23 @ 05:23)      ANTIBIOTICS:  cefTRIAXone   IVPB 2000 milliGRAM(s) IV Intermittent every 24 hours      All available historical records have been reviewed

## 2023-03-31 NOTE — PROGRESS NOTE ADULT - ASSESSMENT
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  found septic and admitted to ICU    sepsis - resolved secondary to RLL pneumonia / strep pneumo bacteremia      - continue IV antibiotics as per ID   - now off  pressors    -  DC stress steroids   - INR now normal - resume coumadin   - remove femoral TLC and downgrade to floor

## 2023-03-31 NOTE — PROGRESS NOTE ADULT - ASSESSMENT
ASSESSMENT  82-year-old female history of PE/A. fib on Coumadin, hypertension, status post cholecystectomy, now presents with 1 day history of right lower quadrant pain associated with nausea and vomiting    IMPRESSION  #Septic Shock secondary to RLL pneumonia with  Strep pneumoniaebacteremia  - Xray Chest 1 View- PORTABLE-Routine (03.28.23 @ 07:10): Unchanged right basilar parenchymal opacity.  - Blood Cx 3/27 +    - Blood Cx 3/29 NG   - WBC Count: 40.01(03.28.23 @ 04:30)  - TTE 3/29 - mod-severe TR, moderate MVR     #Atrial Fibrillation on coumadin  #Elevated INR   #MAGDALENO    #Abx allergy: No Known Allergies    RECOMMENDATIONS  - continue ceftriaxone 2g daily   - steroids per primary team   - trend WBC   - trend INR     Please call or message on [employer] Teams if with any questions.  Spectra [phone]

## 2023-03-31 NOTE — PROGRESS NOTE ADULT - SUBJECTIVE AND OBJECTIVE BOX
Patient is feeling improved, remains off pressors      T(F): 96.3 (03-31-23 @ 07:00), Max: 97.7 (03-30-23 @ 15:33)  HR: 87 (03-31-23 @ 11:24)  BP: 120/78 (03-31-23 @ 07:00)  RR: 27 (03-31-23 @ 11:24)  SpO2: 97% (03-31-23 @ 11:24) (94% - 98%)    PHYSICAL EXAM:  GENERAL: NAD  HEAD:  Atraumatic, Normocephalic  EYES: EOMI, PERRLA, conjunctiva and sclera clear  NERVOUS SYSTEM:  Alert & Oriented X3, no focal deficits   CHEST/LUNG:  bilateral rhonchi  HEART: Regular rate and rhythm; No murmurs, rubs, or gallops  ABDOMEN: Soft, Nontender, Nondistended; Bowel sounds present  EXTREMITIES:  2+ Peripheral Pulses, No clubbing, cyanosis, or edema    LABS  03-31    140  |  108  |  37<H>  ----------------------------<  95  4.4   |  22  |  0.8    Ca    8.6      31 Mar 2023 05:23  Mg     2.1     03-31    TPro  4.6<L>  /  Alb  2.6<L>  /  TBili  0.6  /  DBili  x   /  AST  19  /  ALT  11  /  AlkPhos  55  03-31                          11.7   10.48 )-----------( 243      ( 31 Mar 2023 05:23 )             37.5     PT/INR - ( 31 Mar 2023 05:23 )   PT: 12.80 sec;   INR: 1.12 ratio           Culture Results:   No growth to date. (03-29-23)  Culture Results:   <10,000 CFU/mL Normal Urogenital Breanne (03-28-23)  Culture Results:   Growth in aerobic and anaerobic bottles: Streptococcus pneumoniae  ***Blood Panel PCR results on this specimen are available  approximately 3 hours after the Gram stain result.***  Gram stain, PCR, and/or culture results may not always  correspond due to difference in methodologies.  ************************************************************  This PCR assay was performed by multiplex PCR. This  Assay tests for 66 bacterial and resistance gene targets.  Please refer to the [facility] Labs test directory  at [email or web address] for details. (03-27-23)  Culture Results:   No growth to date. (03-27-23)    RADIOLOGY  < from: CT Chest No Cont (03.29.23 @ 10:16) >  IMPRESSION:    Necrotizing pneumonia involving most of the right lower lung field with   superimposed opacifications throughout essentially all lung fields.    Diffuse mediastinal and axillary lymphadenopathy.    < end of copied text >    MEDICATIONS  (STANDING):  cefTRIAXone   IVPB 2000 milliGRAM(s) IV Intermittent every 24 hours  hydrocortisone sodium succinate Injectable 50 milliGRAM(s) IV Push every 12 hours  pantoprazole    Tablet 40 milliGRAM(s) Oral before breakfast  polyethylene glycol 3350 17 Gram(s) Oral daily  senna 2 Tablet(s) Oral at bedtime    MEDICATIONS  (PRN):

## 2023-03-31 NOTE — PROGRESS NOTE ADULT - SUBJECTIVE AND OBJECTIVE BOX
Patient is a 83y old  Female who presents with a chief complaint of Sepsis     (30 Mar 2023 14:20)        Over Night Events:        ROS:     All pertinent ROS are negative except HPI         PHYSICAL EXAM    ICU Vital Signs Last 24 Hrs  T(C): 35.7 (31 Mar 2023 07:00), Max: 36.5 (30 Mar 2023 15:33)  T(F): 96.3 (31 Mar 2023 07:00), Max: 97.7 (30 Mar 2023 15:33)  HR: 90 (31 Mar 2023 07:00) (73 - 91)  BP: 120/78 (31 Mar 2023 07:00) (102/77 - 153/102)  BP(mean): 94 (31 Mar 2023 07:00) (83 - 123)  RR: 22 (31 Mar 2023 07:00) (22 - 28)  SpO2: 94% (31 Mar 2023 07:00) (94% - 98%)    O2 Parameters below as of 31 Mar 2023 05:06  Patient On (Oxygen Delivery Method): nasal cannula  O2 Flow (L/min): 2          CONSTITUTIONAL:   Ill appearing.  Well nourished.  NAD    ENT:   Airway patent,   Mouth with normal mucosa.   No thrush    EYES:   Pupils equal,   Round and reactive to light.    CARDIAC:   Normal rate,   Regular rhythm.    No edema    RESPIRATORY:   No wheezing  Bilateral BS  Normal chest expansion  Not tachypneic,  No use of accessory muscles    GASTROINTESTINAL:  Abdomen soft,   Non-tender,   No guarding,   + BS    MUSCULOSKELETAL:   Range of motion is not limited,  No clubbing, cyanosis    NEUROLOGICAL:   Alert and oriented   No motor  deficits.  pertinent DTRs normal    SKIN:   Skin normal color for race,   Warm and dry  No evidence of rash.    PSYCHIATRIC:   No apparent risk to self or others.        03-30-23 @ 07:01  -  03-31-23 @ 07:00  --------------------------------------------------------  IN:    IV PiggyBack: 50 mL    Oral Fluid: 240 mL  Total IN: 290 mL    OUT:    Voided (mL): 450 mL  Total OUT: 450 mL    Total NET: -160 mL        LABS:                            11.7   10.48 )-----------( 243      ( 31 Mar 2023 05:23 )             37.5                                               03-31    140  |  108  |  37<H>  ----------------------------<  95  4.4   |  22  |  0.8    Ca    8.6      31 Mar 2023 05:23  Mg     2.1     03-31    TPro  4.6<L>  /  Alb  2.6<L>  /  TBili  0.6  /  DBili  x   /  AST  19  /  ALT  11  /  AlkPhos  55  03-31      PT/INR - ( 31 Mar 2023 05:23 )   PT: 12.80 sec;   INR: 1.12 ratio         PTT - ( 29 Mar 2023 11:34 )  PTT:50.5 sec                                                                                     LIVER FUNCTIONS - ( 31 Mar 2023 05:23 )  Alb: 2.6 g/dL / Pro: 4.6 g/dL / ALK PHOS: 55 U/L / ALT: 11 U/L / AST: 19 U/L / GGT: x                                                  Culture - Blood (collected 29 Mar 2023 05:27)  Source: .Blood None  Preliminary Report (30 Mar 2023 18:01):    No growth to date.    Culture - Urine (collected 28 Mar 2023 22:55)  Source: Clean Catch Clean Catch (Midstream)  Final Report (30 Mar 2023 15:07):    <10,000 CFU/mL Normal Urogenital Breanne                                                                                         MEDICATIONS  (STANDING):  cefTRIAXone   IVPB 2000 milliGRAM(s) IV Intermittent every 24 hours  hydrocortisone sodium succinate Injectable 50 milliGRAM(s) IV Push every 12 hours  pantoprazole    Tablet 40 milliGRAM(s) Oral before breakfast  polyethylene glycol 3350 17 Gram(s) Oral daily  senna 2 Tablet(s) Oral at bedtime    MEDICATIONS  (PRN):      New X-rays reviewed:                                                                                  ECHO    CXR interpreted by me:       Patient is a 83y old  Female who presents with a chief complaint of Sepsis     (30 Mar 2023 14:20)        Over Night Events:  No overnight events.       ROS:     All pertinent ROS are negative except HPI         PHYSICAL EXAM    ICU Vital Signs Last 24 Hrs  T(C): 35.7 (31 Mar 2023 07:00), Max: 36.5 (30 Mar 2023 15:33)  T(F): 96.3 (31 Mar 2023 07:00), Max: 97.7 (30 Mar 2023 15:33)  HR: 90 (31 Mar 2023 07:00) (73 - 91)  BP: 120/78 (31 Mar 2023 07:00) (102/77 - 153/102)  BP(mean): 94 (31 Mar 2023 07:00) (83 - 123)  RR: 22 (31 Mar 2023 07:00) (22 - 28)  SpO2: 94% (31 Mar 2023 07:00) (94% - 98%)    O2 Parameters below as of 31 Mar 2023 05:06  Patient On (Oxygen Delivery Method): nasal cannula  O2 Flow (L/min): 2          CONSTITUTIONAL:   Ill appearing.  NAD    ENT:   Airway patent,   Mouth with normal mucosa.   No thrush    EYES:   Pupils equal,   Round and reactive to light.    CARDIAC:   Normal rate,   Regular rhythm.    No edema    RESPIRATORY:   Decreased bilateral BS  Normal chest expansion  Not tachypneic,  No use of accessory muscles    GASTROINTESTINAL:  Abdomen soft,   Non-tender,   No guarding,   + BS    MUSCULOSKELETAL:   Range of motion is not limited,  No clubbing, cyanosis    NEUROLOGICAL:   AOx4  Follows commands  Moves all extremities     SKIN:   Skin normal color for race,   Warm and dry  No evidence of rash.    PSYCHIATRIC:   No apparent risk to self or others.        03-30-23 @ 07:01  -  03-31-23 @ 07:00  --------------------------------------------------------  IN:    IV PiggyBack: 50 mL    Oral Fluid: 240 mL  Total IN: 290 mL    OUT:    Voided (mL): 450 mL  Total OUT: 450 mL    Total NET: -160 mL        LABS:                            11.7   10.48 )-----------( 243      ( 31 Mar 2023 05:23 )             37.5                                               03-31    140  |  108  |  37<H>  ----------------------------<  95  4.4   |  22  |  0.8    Ca    8.6      31 Mar 2023 05:23  Mg     2.1     03-31    TPro  4.6<L>  /  Alb  2.6<L>  /  TBili  0.6  /  DBili  x   /  AST  19  /  ALT  11  /  AlkPhos  55  03-31      PT/INR - ( 31 Mar 2023 05:23 )   PT: 12.80 sec;   INR: 1.12 ratio         PTT - ( 29 Mar 2023 11:34 )  PTT:50.5 sec                                                                                     LIVER FUNCTIONS - ( 31 Mar 2023 05:23 )  Alb: 2.6 g/dL / Pro: 4.6 g/dL / ALK PHOS: 55 U/L / ALT: 11 U/L / AST: 19 U/L / GGT: x                                                  Culture - Blood (collected 29 Mar 2023 05:27)  Source: .Blood None  Preliminary Report (30 Mar 2023 18:01):    No growth to date.    Culture - Urine (collected 28 Mar 2023 22:55)  Source: Clean Catch Clean Catch (Midstream)  Final Report (30 Mar 2023 15:07):    <10,000 CFU/mL Normal Urogenital Breanne                                                                                         MEDICATIONS  (STANDING):  cefTRIAXone   IVPB 2000 milliGRAM(s) IV Intermittent every 24 hours  hydrocortisone sodium succinate Injectable 50 milliGRAM(s) IV Push every 12 hours  pantoprazole    Tablet 40 milliGRAM(s) Oral before breakfast  polyethylene glycol 3350 17 Gram(s) Oral daily  senna 2 Tablet(s) Oral at bedtime    MEDICATIONS  (PRN):

## 2023-03-31 NOTE — PROGRESS NOTE ADULT - ASSESSMENT
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     HEENT: Oral care    PULMONARY:  HOB @ 45 degrees.  Aspiration precautions. target SpO2 92-96%, titrate oxygen as tolerated. CT Chest NC appreciated.     CARDIOVASCULAR: Avoid volume overload. Strict I's and O's. ECHO- EF-56%, no vegetation. Off levophed.     GI: GI prophylaxis.  Feeding as tolerated.     RENAL: Follow up lytes.  Correct as needed.      INFECTIOUS DISEASE: Follow up cultures. Nasal MRSA negative. Abx as per ID. Repeat procal. Daily BCx.     HEMATOLOGICAL:  DVT prophylaxis. FU LE duplex.     ENDOCRINE:  Follow up FS. A1c. TSH. HC 50mg q6h.     MUSCULOSKELETAL: OOB to chair. PT/OT eval.     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     HEENT: Oral care    PULMONARY:  HOB @ 45 degrees.  Aspiration precautions. target SpO2 92-96%, titrate oxygen as tolerated. OP pulm FU.     CARDIOVASCULAR: Avoid volume overload. Strict I's and O's. ECHO- EF-56%, no vegetation.     GI: GI prophylaxis.  Feeding as tolerated.     RENAL: Follow up lytes. Correct as needed.      INFECTIOUS DISEASE: Follow up cultures. Nasal MRSA negative. Abx as per ID. FU repeat procal. Daily BCx.     HEMATOLOGICAL:  DVT prophylaxis. LE duplex negative.     ENDOCRINE:  Follow up FS. A1c. TSH. Wean off HC 50mg q6h.     MUSCULOSKELETAL: OOB to chair. PT/OT following    DC TLC    Downgrade to GMF

## 2023-04-01 LAB
ALBUMIN SERPL ELPH-MCNC: 2.4 G/DL — LOW (ref 3.5–5.2)
ALP SERPL-CCNC: 53 U/L — SIGNIFICANT CHANGE UP (ref 30–115)
ALT FLD-CCNC: 15 U/L — SIGNIFICANT CHANGE UP (ref 0–41)
ANION GAP SERPL CALC-SCNC: 9 MMOL/L — SIGNIFICANT CHANGE UP (ref 7–14)
AST SERPL-CCNC: 16 U/L — SIGNIFICANT CHANGE UP (ref 0–41)
BASOPHILS # BLD AUTO: 0.03 K/UL — SIGNIFICANT CHANGE UP (ref 0–0.2)
BASOPHILS NFR BLD AUTO: 0.3 % — SIGNIFICANT CHANGE UP (ref 0–1)
BILIRUB SERPL-MCNC: 0.5 MG/DL — SIGNIFICANT CHANGE UP (ref 0.2–1.2)
BUN SERPL-MCNC: 34 MG/DL — HIGH (ref 10–20)
CALCIUM SERPL-MCNC: 8.5 MG/DL — SIGNIFICANT CHANGE UP (ref 8.4–10.5)
CHLORIDE SERPL-SCNC: 107 MMOL/L — SIGNIFICANT CHANGE UP (ref 98–110)
CO2 SERPL-SCNC: 23 MMOL/L — SIGNIFICANT CHANGE UP (ref 17–32)
CREAT SERPL-MCNC: 0.8 MG/DL — SIGNIFICANT CHANGE UP (ref 0.7–1.5)
CULTURE RESULTS: SIGNIFICANT CHANGE UP
EGFR: 73 ML/MIN/1.73M2 — SIGNIFICANT CHANGE UP
EOSINOPHIL # BLD AUTO: 0.01 K/UL — SIGNIFICANT CHANGE UP (ref 0–0.7)
EOSINOPHIL NFR BLD AUTO: 0.1 % — SIGNIFICANT CHANGE UP (ref 0–8)
GLUCOSE SERPL-MCNC: 88 MG/DL — SIGNIFICANT CHANGE UP (ref 70–99)
HCT VFR BLD CALC: 40.3 % — SIGNIFICANT CHANGE UP (ref 37–47)
HGB BLD-MCNC: 12.4 G/DL — SIGNIFICANT CHANGE UP (ref 12–16)
IMM GRANULOCYTES NFR BLD AUTO: 2.1 % — HIGH (ref 0.1–0.3)
INR BLD: 1.37 RATIO — HIGH (ref 0.65–1.3)
LYMPHOCYTES # BLD AUTO: 2.76 K/UL — SIGNIFICANT CHANGE UP (ref 1.2–3.4)
LYMPHOCYTES # BLD AUTO: 25.4 % — SIGNIFICANT CHANGE UP (ref 20.5–51.1)
MAGNESIUM SERPL-MCNC: 1.9 MG/DL — SIGNIFICANT CHANGE UP (ref 1.8–2.4)
MCHC RBC-ENTMCNC: 29.6 PG — SIGNIFICANT CHANGE UP (ref 27–31)
MCHC RBC-ENTMCNC: 30.8 G/DL — LOW (ref 32–37)
MCV RBC AUTO: 96.2 FL — SIGNIFICANT CHANGE UP (ref 81–99)
MONOCYTES # BLD AUTO: 0.92 K/UL — HIGH (ref 0.1–0.6)
MONOCYTES NFR BLD AUTO: 8.5 % — SIGNIFICANT CHANGE UP (ref 1.7–9.3)
NEUTROPHILS # BLD AUTO: 6.9 K/UL — HIGH (ref 1.4–6.5)
NEUTROPHILS NFR BLD AUTO: 63.6 % — SIGNIFICANT CHANGE UP (ref 42.2–75.2)
NRBC # BLD: 0 /100 WBCS — SIGNIFICANT CHANGE UP (ref 0–0)
PLATELET # BLD AUTO: 230 K/UL — SIGNIFICANT CHANGE UP (ref 130–400)
POTASSIUM SERPL-MCNC: 4.5 MMOL/L — SIGNIFICANT CHANGE UP (ref 3.5–5)
POTASSIUM SERPL-SCNC: 4.5 MMOL/L — SIGNIFICANT CHANGE UP (ref 3.5–5)
PROT SERPL-MCNC: 4.4 G/DL — LOW (ref 6–8)
PROTHROM AB SERPL-ACNC: 15.8 SEC — HIGH (ref 9.95–12.87)
RBC # BLD: 4.19 M/UL — LOW (ref 4.2–5.4)
RBC # FLD: 13.7 % — SIGNIFICANT CHANGE UP (ref 11.5–14.5)
SODIUM SERPL-SCNC: 139 MMOL/L — SIGNIFICANT CHANGE UP (ref 135–146)
SPECIMEN SOURCE: SIGNIFICANT CHANGE UP
WBC # BLD: 10.85 K/UL — HIGH (ref 4.8–10.8)
WBC # FLD AUTO: 10.85 K/UL — HIGH (ref 4.8–10.8)

## 2023-04-01 PROCEDURE — ZZZZZ: CPT

## 2023-04-01 PROCEDURE — 71045 X-RAY EXAM CHEST 1 VIEW: CPT | Mod: 26

## 2023-04-01 RX ORDER — WARFARIN SODIUM 2.5 MG/1
5 TABLET ORAL ONCE
Refills: 0 | Status: COMPLETED | OUTPATIENT
Start: 2023-04-01 | End: 2023-04-01

## 2023-04-01 RX ORDER — ENOXAPARIN SODIUM 100 MG/ML
80 INJECTION SUBCUTANEOUS EVERY 12 HOURS
Refills: 0 | Status: DISCONTINUED | OUTPATIENT
Start: 2023-04-01 | End: 2023-04-02

## 2023-04-01 RX ADMIN — CEFTRIAXONE 100 MILLIGRAM(S): 500 INJECTION, POWDER, FOR SOLUTION INTRAMUSCULAR; INTRAVENOUS at 17:55

## 2023-04-01 RX ADMIN — WARFARIN SODIUM 5 MILLIGRAM(S): 2.5 TABLET ORAL at 17:51

## 2023-04-01 RX ADMIN — Medication 50 MILLIGRAM(S): at 05:25

## 2023-04-01 RX ADMIN — Medication 50 MILLIGRAM(S): at 17:56

## 2023-04-01 RX ADMIN — ENOXAPARIN SODIUM 80 MILLIGRAM(S): 100 INJECTION SUBCUTANEOUS at 17:52

## 2023-04-01 RX ADMIN — PANTOPRAZOLE SODIUM 40 MILLIGRAM(S): 20 TABLET, DELAYED RELEASE ORAL at 05:25

## 2023-04-01 RX ADMIN — POLYETHYLENE GLYCOL 3350 17 GRAM(S): 17 POWDER, FOR SOLUTION ORAL at 11:23

## 2023-04-01 NOTE — PROGRESS NOTE ADULT - SUBJECTIVE AND OBJECTIVE BOX
MEDICINE ATTENDING BRIEF NOTE  82-year-old female with a past medical history of DVT, pulmonary embolism, prothrombin mutation on coumadin, paroxysmal afib, and hypertension presents with 1 day history of right lower quadrant pain associated with nausea and vomiting.  found septic and admitted to ICU for sepsis due to RLL pneumonia. Now downgraded to medical floor for further management.    INTERVAL CHANGES  No complaints today;  pt wants to go home  Vitals stable  Exam unchanged  Labs and imaging reviewed  Plan is to cont current management  Will bridge coumadin with lovenox  daily order for coumadin  dc pending INR 2-3    MEDICATIONS  (STANDING):  cefTRIAXone   IVPB 2000 milliGRAM(s) IV Intermittent every 24 hours  enoxaparin Injectable 80 milliGRAM(s) SubCutaneous every 12 hours  hydrocortisone sodium succinate Injectable 50 milliGRAM(s) IV Push every 12 hours  pantoprazole    Tablet 40 milliGRAM(s) Oral before breakfast  polyethylene glycol 3350 17 Gram(s) Oral daily  senna 2 Tablet(s) Oral at bedtime  warfarin 5 milliGRAM(s) Oral once    MEDICATIONS  (PRN):      VITALS  T(F): 96.9 (04-01-23 @ 13:36), Max: 97 (03-31-23 @ 20:37)  HR: 104 (04-01-23 @ 13:36) (80 - 104)  BP: 111/70 (04-01-23 @ 13:36) (111/70 - 120/64)  RR: 16 (04-01-23 @ 13:36) (16 - 20)  SpO2: 95% (04-01-23 @ 06:31) (95% - 98%)        04-01    139  |  107  |  34<H>  ----------------------------<  88  4.5   |  23  |  0.8    Ca    8.5      01 Apr 2023 06:30  Mg     1.9     04-01    TPro  4.4<L>  /  Alb  2.4<L>  /  TBili  0.5  /  DBili  x   /  AST  16  /  ALT  15  /  AlkPhos  53  04-01    LIVER FUNCTIONS - ( 01 Apr 2023 06:30 )  Alb: 2.4 g/dL / Pro: 4.4 g/dL / ALK PHOS: 53 U/L / ALT: 15 U/L / AST: 16 U/L / GGT: x                                 12.4   10.85 )-----------( 230      ( 01 Apr 2023 06:30 )             40.3

## 2023-04-02 LAB
INR BLD: 2.95 RATIO — HIGH (ref 0.65–1.3)
PROTHROM AB SERPL-ACNC: 34.8 SEC — HIGH (ref 9.95–12.87)

## 2023-04-02 PROCEDURE — 99232 SBSQ HOSP IP/OBS MODERATE 35: CPT

## 2023-04-02 RX ORDER — WARFARIN SODIUM 2.5 MG/1
2.5 TABLET ORAL ONCE
Refills: 0 | Status: COMPLETED | OUTPATIENT
Start: 2023-04-02 | End: 2023-04-02

## 2023-04-02 RX ADMIN — PANTOPRAZOLE SODIUM 40 MILLIGRAM(S): 20 TABLET, DELAYED RELEASE ORAL at 05:15

## 2023-04-02 RX ADMIN — Medication 50 MILLIGRAM(S): at 05:15

## 2023-04-02 RX ADMIN — WARFARIN SODIUM 2.5 MILLIGRAM(S): 2.5 TABLET ORAL at 21:19

## 2023-04-02 RX ADMIN — ENOXAPARIN SODIUM 80 MILLIGRAM(S): 100 INJECTION SUBCUTANEOUS at 05:16

## 2023-04-02 RX ADMIN — CEFTRIAXONE 100 MILLIGRAM(S): 500 INJECTION, POWDER, FOR SOLUTION INTRAMUSCULAR; INTRAVENOUS at 17:46

## 2023-04-02 RX ADMIN — Medication 50 MILLIGRAM(S): at 18:00

## 2023-04-02 NOTE — PROGRESS NOTE ADULT - SUBJECTIVE AND OBJECTIVE BOX
MEDICINE ATTENDING PROGRESS NOTE  82-year-old female history of PE/A. fib on Coumadin, hypertension, status post cholecystectomy, now presents with 1 day history of right lower quadrant pain associated with nausea and vomiting, found to have necrotizing pneumonia due to strep pneumonia. Initially admitted to MICU for sepstic shock, now downgraded to the floor for further management.    Interval/Overnight events:  feels much better; wants to leave tomorrow  walks with PT without distress  Vitals stable  Exam unchanged; no wheezing  Labs and imaging reviewed; INR now at 2.9 (goal 2.5 - 3.5)  dc Lovenox given INR now at goal  taper hydrocortisone to 25 mg; Will get AM cortisol. If normal, will dc  Will need 6-min test by PT for home oxygen needs  Prepare to discharge tomorrow    ROS:   General: denies fever, chills, insomnia, depression, weight change  Skin: denies itch, jaundice   Resp: denies cough, pleuritic chest pain, wheezing   CV: denies PND  GI: denies N/V/D constipation   : denies d/c, itching, hematuria, dysuria   EXT: denies pain, swelling, erythema   Musculoskeletal: denies low back pain, joint pain, swelling   Neuro: denies headache, weakness, syncope    MEDICATIONS  (STANDING):  cefTRIAXone   IVPB 2000 milliGRAM(s) IV Intermittent every 24 hours  hydrocortisone sodium succinate Injectable 50 milliGRAM(s) IV Push every 12 hours  pantoprazole    Tablet 40 milliGRAM(s) Oral before breakfast  polyethylene glycol 3350 17 Gram(s) Oral daily  senna 2 Tablet(s) Oral at bedtime  warfarin 2.5 milliGRAM(s) Oral once    MEDICATIONS  (PRN):    VITALS  T(F): 96.9 (04-02-23 @ 13:50), Max: 96.9 (04-02-23 @ 13:50)  HR: 101 (04-02-23 @ 13:50) (92 - 109)  BP: 108/60 (04-02-23 @ 13:50) (108/60 - 134/82)  RR: 16 (04-02-23 @ 13:50) (16 - 17)  SpO2: 97% (04-02-23 @ 08:54) (95% - 97%)    PHYSICAL EXAM  Gen- NAD, AAOx3  HEENT- no pallor, anicteric, moist mucous membranes  CVS- S1/S2, no m/r/g  Chest- CTA b/l no w/r/c, no use of accessory muscles, good inspiratory effort, speaking in full sentences  Abd- soft, nt, nd  Ext- no edema, pulses intact b/l  Neuro-CN II-XII intact;    LABS/IMAGING  04-01    139  |  107  |  34<H>  ----------------------------<  88  4.5   |  23  |  0.8    Ca    8.5      01 Apr 2023 06:30  Mg     1.9     04-01    TPro  4.4<L>  /  Alb  2.4<L>  /  TBili  0.5  /  DBili  x   /  AST  16  /  ALT  15  /  AlkPhos  53  04-01    LIVER FUNCTIONS - ( 01 Apr 2023 06:30 )  Alb: 2.4 g/dL / Pro: 4.4 g/dL / ALK PHOS: 53 U/L / ALT: 15 U/L / AST: 16 U/L / GGT: x           INR: 2.95:                         12.4   10.85 )-----------( 230      ( 01 Apr 2023 06:30 )             40.3     MICROBIOLOGY  Culture - Blood (collected 03-31-23 @ 05:23)  Source: .Blood None  Preliminary Report (04-01-23 @ 18:01):    No growth to date.    Culture - Blood (collected 03-29-23 @ 05:27)  Source: .Blood None  Preliminary Report (03-30-23 @ 18:01):    No growth to date.    Culture - Urine (collected 03-28-23 @ 22:55)  Source: Clean Catch Clean Catch (Midstream)  Final Report (03-30-23 @ 15:07):    <10,000 CFU/mL Normal Urogenital Breanne    Culture - Blood (collected 03-27-23 @ 14:10)  Source: .Blood None  Gram Stain (03-28-23 @ 13:30):    Growth in aerobic bottle: Gram Positive Cocci in Pairs and Chains    Growth in anaerobic bottle: Gram positive cocci in pairs  Final Report (03-30-23 @ 12:55):    Growth in aerobic and anaerobic bottles: Streptococcus pneumoniae    ***Blood Panel PCR results on this specimen are available    approximately 3 hours after the Gram stain result.***    Gram stain, PCR, and/or culture results may not always    correspond due to difference in methodologies.    ************************************************************    This PCR assay was performed by multiplex PCR. This    Assay tests for 66 bacterial and resistance gene targets.    Please refer to the [facility] test directory    at [email or web address] for details.  Organism: Blood Culture PCR  Streptococcus pneumoniae (03-30-23 @ 12:55)  Organism: Streptococcus pneumoniae (03-30-23 @ 12:55)      Method Type: FREDDY      -  Erythromycin: S <=0.06 Predicts results for azithromycin.      -  Levofloxacin: S 0.5      -  Trimethoprim/Sulfamethoxazole: S <=.25/4.75      -  Vancomycin: S 0.25      -  Ceftriaxone (meningitidis): S <=0.25      -  Ceftriaxone (non-meningitidis): S <=0.25      -  Penicillin (meningitidis): S <=0.03      -  Penicillin (non-meningitidis): S <=0.03      -  Penicillin (oral penicillin V): S <=0.03  Organism: Blood Culture PCR (03-30-23 @ 12:55)      Method Type: PCR      -  Streptococcus pneumoniae: Detec    Culture - Blood (collected 03-27-23 @ 14:09)  Source: .Blood None  Preliminary Report (03-28-23 @ 23:01):    No growth to date.    IMAGE  Xray Chest 1 View- PORTABLE-Urgent (Xray Chest 1 View- PORTABLE-Urgent .) (04.01.23 @ 15:43)   Lung parenchyma/Pleura: Bilateral opacities, unchanged. No pneumothorax is seen.    CT Chest No Cont (03.29.23 @ 10:16)   Findings:  Tubes/Lines: None  Lungs, Pleura, and Airways: Examination reveals diffuse opacification involving most of the right lung consistent with necrotizing pneumonia. Fluid is seen within the minor fissure. Ground glass opacifications are seen within the right upper lung field as well as the left upper lung field. Less apparent opacifications are seen within the left lower lung field. Mediastinum/Lymph Nodes :Within the limitations of this noncontrast evaluation, large bulky mediastinal and axillary lymphadenopathy are seen, likely stable.  Heart/Great Vessels: The heart is enlarged. The great vessels are atherosclerotic.  Abdomen: Large hiatal hernia.  Bones and soft tissues: Kyphotic appearance of the spine. Multilevel degenerative changes with osteopenia.    IMPRESSION:  Necrotizing pneumonia involving most of the right lower lung field with superimposed opacifications throughout essentially all lung fields.  Diffuse mediastinal and axillary lymphadenopathy.    VA Duplex Lower Ext Vein Scan, Bilat (03.28.23 @ 11:23)   IMPRESSION:  No evidence of deep venous thrombosis in either lower extremity.    A/P: 82-year-old female history of PE/A. fib on Coumadin, hypertension, status post cholecystectomy, now presents with 1 day history of right lower quadrant pain associated with nausea and vomiting, found to have necrotizing pneumonia due to strep pneumonia. Initially admitted to MICU for sepstic shock, now downgraded to the floor for further management.    #RLL Necrotizing Pneumonia due to Strep Pneumoniae bacteremia  #Septic Shock secondary to RLL pneumonia with  Strep pneumoniae bacteremia  #DVT ruled out, less likely PE given patient on AC  - WBC Count: 40.01(03.28.23 @ 04:30) > 10.8  - Blood Cx 3/27 +  Strep Pneumoniae bacteremia  - Blood Cx 3/29 NG   - Xray Chest 1 View- PORTABLE-Routine (03.28.23 @ 07:10): Unchanged right basilar parenchymal opacity.  - RLL Necrotizing Pneumonia on CT Chest  - TTE 3/29 - mod-severe TR, moderate MVR   - c/w cefTRIAXone   IVPB 2000 IV Intermittent every 24 hours  - will taper hydrocortisone to 25mg for hypotension in setting of septic shock  - ID follows    # large bulky mediastinal and axillary lymphadenopathy   - Patient has refused work up such as bronchoscopy in the past; still refusing    #Large hiatal hernia  - Denies abdominal pain  - Will cont to monitor  - Outpatient Surgery follow up    # Multilevel degenerative of the Spine  - Pain management  - f/u with PMD    #Atrial Fibrillation on coumadin  #h/o PE and DVT  - INR now 2.5 (goal ?2.5 - 3.5 per patient)  - dc lovenox  - c/w warfarin 2.5mg     #MAGDALENO  - Creat 1.5 on 3/27, now 0.8 on 4/1  - cont to monitor    #Supportive Management  - Diet: regular  - DVT PPx: c/w coumandin  - GI PPx: PPI  - Dispo: home per patient preference    Spent over 35 min reviewing chart and on coordinating patient care during interdisciplinary rounds     Kt Plummer M.D./[place]  Attending Physician

## 2023-04-03 LAB
CORTIS AM PEAK SERPL-MCNC: 69.8 UG/DL — HIGH (ref 6–18.4)
CULTURE RESULTS: SIGNIFICANT CHANGE UP
INR BLD: 3.98 RATIO — HIGH (ref 0.65–1.3)
PROTHROM AB SERPL-ACNC: >40 SEC — HIGH (ref 9.95–12.87)
SPECIMEN SOURCE: SIGNIFICANT CHANGE UP

## 2023-04-03 PROCEDURE — 99232 SBSQ HOSP IP/OBS MODERATE 35: CPT

## 2023-04-03 RX ADMIN — PANTOPRAZOLE SODIUM 40 MILLIGRAM(S): 20 TABLET, DELAYED RELEASE ORAL at 05:25

## 2023-04-03 RX ADMIN — CEFTRIAXONE 100 MILLIGRAM(S): 500 INJECTION, POWDER, FOR SOLUTION INTRAMUSCULAR; INTRAVENOUS at 17:48

## 2023-04-03 RX ADMIN — Medication 50 MILLIGRAM(S): at 05:25

## 2023-04-03 NOTE — PROGRESS NOTE ADULT - PROVIDER SPECIALTY LIST ADULT
Hospitalist
Critical Care
Critical Care
Hospitalist
Hospitalist
Critical Care
Hospitalist
Infectious Disease

## 2023-04-03 NOTE — PROGRESS NOTE ADULT - SUBJECTIVE AND OBJECTIVE BOX
MEDICINE ATTENDING PROGRESS NOTE  82-year-old female history of PE/A. fib on Coumadin, hypertension, status post cholecystectomy, now presents with 1 day history of right lower quadrant pain associated with nausea and vomiting, found to have necrotizing pneumonia due to strep pneumonia. Initially admitted to MICU for sepstic shock, now downgraded to the floor for further management.    Interval/Overnight events:  Patient continues to feel better; now wants to go to [facility]  Vitals stable  Exam unchanged; no wheezing  Labs and imaging reviewed; INR now at 3.9 (goal 2.5 - 3.5)  INR slightly above goal  If AM cortisol is normal, will dc  Will need 6-min test by PT for home oxygen needs  Prepare to discharge to [facility]    ROS:   General: denies fever, chills, insomnia, depression, weight change  Skin: denies itch, jaundice   Resp: denies cough, pleuritic chest pain, wheezing   CV: denies PND  GI: denies N/V/D constipation   : denies d/c, itching, hematuria, dysuria   EXT: denies pain, swelling, erythema   Musculoskeletal: denies low back pain, joint pain, swelling   Neuro: denies headache, weakness, syncope    MEDICATIONS  (STANDING):  cefTRIAXone   IVPB 2000 milliGRAM(s) IV Intermittent every 24 hours  hydrocortisone sodium succinate Injectable 50 milliGRAM(s) IV Push every 12 hours  pantoprazole    Tablet 40 milliGRAM(s) Oral before breakfast  polyethylene glycol 3350 17 Gram(s) Oral daily  senna 2 Tablet(s) Oral at bedtime    MEDICATIONS  (PRN):    VITALS  T(F): 97 (04-03-23 @ 05:39), Max: 97 (04-03-23 @ 05:39)  HR: 64 (04-03-23 @ 05:39) (64 - 109)  BP: 117/68 (04-03-23 @ 05:39) (108/60 - 117/68)  RR: 16 (04-03-23 @ 05:39) (16 - 18)  SpO2: 97% (04-03-23 @ 05:39) (95% - 97%)    PHYSICAL EXAM  Gen- NAD, AAOx3  HEENT- no pallor, anicteric, moist mucous membranes  CVS- S1/S2, no m/r/g  Chest- CTA b/l no w/r/c, no use of accessory muscles, good inspiratory effort, speaking in full sentences  Abd- soft, nt, nd  Ext- no edema, pulses intact b/l  Neuro-CN II-XII intact;    LABS/IMAGING  04-01    139  |  107  |  34<H>  ----------------------------<  88  4.5   |  23  |  0.8    Ca    8.5      01 Apr 2023 06:30  Mg     1.9     04-01    TPro  4.4<L>  /  Alb  2.4<L>  /  TBili  0.5  /  DBili  x   /  AST  16  /  ALT  15  /  AlkPhos  53  04-01    LIVER FUNCTIONS - ( 01 Apr 2023 06:30 )  Alb: 2.4 g/dL / Pro: 4.4 g/dL / ALK PHOS: 53 U/L / ALT: 15 U/L / AST: 16 U/L / GGT: x           INR: 3.95                         12.4   10.85 )-----------( 230      ( 01 Apr 2023 06:30 )             40.3     MICROBIOLOGY  Culture - Blood (collected 03-31-23 @ 05:23)  Source: .Blood None  Preliminary Report (04-01-23 @ 18:01):    No growth to date.    Culture - Blood (collected 03-29-23 @ 05:27)  Source: .Blood None  Preliminary Report (03-30-23 @ 18:01):    No growth to date.    Culture - Urine (collected 03-28-23 @ 22:55)  Source: Clean Catch Clean Catch (Midstream)  Final Report (03-30-23 @ 15:07):    <10,000 CFU/mL Normal Urogenital Breanne    Culture - Blood (collected 03-27-23 @ 14:10)  Source: .Blood None  Gram Stain (03-28-23 @ 13:30):    Growth in aerobic bottle: Gram Positive Cocci in Pairs and Chains    Growth in anaerobic bottle: Gram positive cocci in pairs  Final Report (03-30-23 @ 12:55):    Growth in aerobic and anaerobic bottles: Streptococcus pneumoniae    ***Blood Panel PCR results on this specimen are available    approximately 3 hours after the Gram stain result.***    Gram stain, PCR, and/or culture results may not always    correspond due to difference in methodologies.    ************************************************************    This PCR assay was performed by multiplex PCR. This    Assay tests for 66 bacterial and resistance gene targets.    Please refer to the [facility] test directory    at [email or web address] for details.  Organism: Blood Culture PCR  Streptococcus pneumoniae (03-30-23 @ 12:55)  Organism: Streptococcus pneumoniae (03-30-23 @ 12:55)      Method Type: FREDDY      -  Erythromycin: S <=0.06 Predicts results for azithromycin.      -  Levofloxacin: S 0.5      -  Trimethoprim/Sulfamethoxazole: S <=.25/4.75      -  Vancomycin: S 0.25      -  Ceftriaxone (meningitidis): S <=0.25      -  Ceftriaxone (non-meningitidis): S <=0.25      -  Penicillin (meningitidis): S <=0.03      -  Penicillin (non-meningitidis): S <=0.03      -  Penicillin (oral penicillin V): S <=0.03  Organism: Blood Culture PCR (03-30-23 @ 12:55)      Method Type: PCR      -  Streptococcus pneumoniae: Detec    Culture - Blood (collected 03-27-23 @ 14:09)  Source: .Blood None  Preliminary Report (03-28-23 @ 23:01):    No growth to date.    IMAGE  Xray Chest 1 View- PORTABLE-Urgent (Xray Chest 1 View- PORTABLE-Urgent .) (04.01.23 @ 15:43)   Lung parenchyma/Pleura: Bilateral opacities, unchanged. No pneumothorax is seen.    CT Chest No Cont (03.29.23 @ 10:16)   Findings:  Tubes/Lines: None  Lungs, Pleura, and Airways: Examination reveals diffuse opacification involving most of the right lung consistent with necrotizing pneumonia. Fluid is seen within the minor fissure. Ground glass opacifications are seen within the right upper lung field as well as the left upper lung field. Less apparent opacifications are seen within the left lower lung field. Mediastinum/Lymph Nodes :Within the limitations of this noncontrast evaluation, large bulky mediastinal and axillary lymphadenopathy are seen, likely stable.  Heart/Great Vessels: The heart is enlarged. The great vessels are atherosclerotic.  Abdomen: Large hiatal hernia.  Bones and soft tissues: Kyphotic appearance of the spine. Multilevel degenerative changes with osteopenia.    IMPRESSION:  Necrotizing pneumonia involving most of the right lower lung field with superimposed opacifications throughout essentially all lung fields.  Diffuse mediastinal and axillary lymphadenopathy.    VA Duplex Lower Ext Vein Scan, Bilat (03.28.23 @ 11:23)   IMPRESSION:  No evidence of deep venous thrombosis in either lower extremity.    A/P: 82-year-old female history of PE/A. fib on Coumadin, hypertension, status post cholecystectomy, now presents with 1 day history of right lower quadrant pain associated with nausea and vomiting, found to have necrotizing pneumonia due to strep pneumonia. Initially admitted to MICU for sepstic shock, now downgraded to the floor for further management.    #RLL Necrotizing Pneumonia due to Strep Pneumoniae bacteremia  #Septic Shock secondary to RLL pneumonia with  Strep pneumoniae bacteremia  #DVT ruled out, less likely PE given patient on AC  - WBC Count: 40.01(03.28.23 @ 04:30) > 10.8  - Blood Cx 3/27 +  Strep Pneumoniae bacteremia  - Blood Cx 3/29 NG   - Xray Chest 1 View- PORTABLE-Routine (03.28.23 @ 07:10): Unchanged right basilar parenchymal opacity.  - RLL Necrotizing Pneumonia on CT Chest  - TTE 3/29 - mod-severe TR, moderate MVR   - c/w cefTRIAXone   IVPB 2000 IV Intermittent every 24 hours  - will taper hydrocortisone to 25mg for hypotension in setting of septic shock  - ID follows    # large bulky mediastinal and axillary lymphadenopathy   - Patient has refused work up such as bronchoscopy in the past; still refusing    #Large hiatal hernia  - Denies abdominal pain  - Will cont to monitor  - Outpatient Surgery follow up    # Multilevel degenerative of the Spine  - Pain management  - f/u with PMD    #Atrial Fibrillation on coumadin  #h/o PE and DVT  - INR now 2.5 (goal ?2.5 - 3.5 per patient)  - dc lovenox  - c/w warfarin 2.5mg     #MAGDALENO  - Creat 1.5 on 3/27, now 0.8 on 4/1  - cont to monitor    #Supportive Management  - Diet: regular  - DVT PPx: c/w coumandin  - GI PPx: PPI  - Dispo: home per patient preference    Spent over 35 min reviewing chart and on coordinating patient care during interdisciplinary rounds     Kt Plummer M.D./[place]  Attending Physician

## 2023-04-03 NOTE — PHARMACOTHERAPY INTERVENTION NOTE - COMMENTS
83yFemale      Indication: VTE + AFib  INR Goal: 2-3  Home Dose: 2 mg sun, mon, tue, wed, fri. 5 mg thur, sat  Bridge Therapy:       cefTRIAXone   IVPB 2000 milliGRAM(s) IV Intermittent every 24 hours  hydrocortisone sodium succinate Injectable 50 milliGRAM(s) IV Push every 12 hours  pantoprazole    Tablet 40 milliGRAM(s) Oral before breakfast  polyethylene glycol 3350 17 Gram(s) Oral daily  senna 2 Tablet(s) Oral at bedtime        Drug Interactions:       H/H:   PLT:   GFR:    Date---------------INR-----------------Dose    INR: 3.98 ratio (04-03-23 @ 05:46)  INR: 2.95 ratio (04-02-23 @ 06:36)  INR: 1.37 ratio (04-01-23 @ 06:30)  INR: 1.12 ratio (03-31-23 @ 05:23)  INR: 6.09 ratio (03-30-23 @ 05:36)  INR: 6.96 ratio (03-29-23 @ 11:34)  INR: 7.19 ratio (03-29-23 @ 05:27)  INR: 6.90 ratio (03-28-23 @ 04:30)    INR increased by 1.03, recommended holding tonight's dose  1. Recommend Warfarin      PO x 1   2. Obtain INR tomorrow AM

## 2023-04-04 ENCOUNTER — TRANSCRIPTION ENCOUNTER (OUTPATIENT)
Age: 83
End: 2023-04-04

## 2023-04-04 VITALS
RESPIRATION RATE: 16 BRPM | DIASTOLIC BLOOD PRESSURE: 73 MMHG | OXYGEN SATURATION: 96 % | HEART RATE: 86 BPM | TEMPERATURE: 96 F | SYSTOLIC BLOOD PRESSURE: 111 MMHG

## 2023-04-04 LAB
INR BLD: 5.01 RATIO — CRITICAL HIGH (ref 0.65–1.3)
PROTHROM AB SERPL-ACNC: >40 SEC — HIGH (ref 9.95–12.87)
SARS-COV-2 RNA SPEC QL NAA+PROBE: SIGNIFICANT CHANGE UP

## 2023-04-04 PROCEDURE — 99239 HOSP IP/OBS DSCHRG MGMT >30: CPT

## 2023-04-04 RX ORDER — HYDROCORTISONE 20 MG
25 TABLET ORAL DAILY
Refills: 0 | Status: DISCONTINUED | OUTPATIENT
Start: 2023-04-04 | End: 2023-04-04

## 2023-04-04 RX ORDER — CEPHALEXIN 500 MG
1 CAPSULE ORAL
Qty: 4 | Refills: 0
Start: 2023-04-04 | End: 2023-04-05

## 2023-04-04 RX ADMIN — Medication 50 MILLIGRAM(S): at 05:43

## 2023-04-04 RX ADMIN — PANTOPRAZOLE SODIUM 40 MILLIGRAM(S): 20 TABLET, DELAYED RELEASE ORAL at 05:44

## 2023-04-04 NOTE — DISCHARGE NOTE NURSING/CASE MANAGEMENT/SOCIAL WORK - PATIENT PORTAL LINK FT
You can access the FollowMyHealth Patient Portal offered by [facility] by registering at the following website: [email or web address]. By joining [facility]’s FollowMyHealth portal, you will also be able to view your health information using other applications (apps) compatible with our system.

## 2023-04-04 NOTE — DISCHARGE NOTE PROVIDER - HOSPITAL COURSE
81 y/o female with PMH of PE/DVT and Afib (Coumadin), Milo (requiring transfusions), Hypertension, MAGDALENO and status post cholecystectomy now presents with 1 day history of right lower quadrant pain associated with nausea, vomiting, fever, chills and cough. Patient was found to have necrotizing pneumonia due to strep pneumonia. Initially admitted to MICU for septic shock, now downgraded to the floor for further management. On Ceftriaxone IVPB intermittent once daily, changed to Keflex on discharge. CT Imaging revealed large bulky mediastinal and axillary lymphadenopathy, but patient has refused work up such as bronchoscopy in the past and is still refusing. A large hiatal hernia was appreciated patient denies abdominal pain and is told to follow up outpatient with Surgery. Patient discharged to subacute rehab.

## 2023-04-04 NOTE — DISCHARGE NOTE PROVIDER - NSDCMRMEDTOKEN_GEN_ALL_CORE_FT
allopurinol 100 mg oral tablet: orally once a day  cephalexin 500 mg oral capsule: 1 cap(s) orally 2 times a day  metoprolol succinate 100 mg oral tablet, extended release: 1 tab(s) orally once a day  oxycodone-acetaminophen 5 mg-325 mg oral tablet: 1 tab(s) orally every 12 hours, As needed, Moderate Pain (4 - 6)  warfarin 2.5 mg oral tablet: 1 tab(s) orally once a day (at bedtime) -- currently held as INR is 3.2

## 2023-04-04 NOTE — DISCHARGE NOTE PROVIDER - NSDCCPCAREPLAN_GEN_ALL_CORE_FT
PRINCIPAL DISCHARGE DIAGNOSIS  Diagnosis: Pneumonia, pneumococcal  Assessment and Plan of Treatment: You were treated with IV antibiotics for strep pneumo. You were told to follow up with PMD on discharge. You were prescribed oral antibiotics to continue to treat pneumonia.

## 2023-04-04 NOTE — DISCHARGE NOTE NURSING/CASE MANAGEMENT/SOCIAL WORK - NSDCVIVACCINE_GEN_ALL_CORE_FT
Tdap; 21-Aug-2022 14:03; Kecia Cedillo (RN); [employer]; [id number] (Exp. Date: 22-Sep-2024); IntraMuscular; Deltoid Left.; 0.5 milliLiter(s); VIS (VIS Published: 09-May-2013, VIS Presented: 21-Aug-2022);

## 2023-04-04 NOTE — ED PROVIDER NOTE - PHYSICAL EXAMINATION
VITAL SIGNS: I have reviewed nursing notes and confirm.  CONSTITUTIONAL: Frail elderly woman in respiratory distress.  SKIN: Skin exam is warm and dry, no acute rash.  HEAD: Normocephalic; atraumatic.  EYES: PERRL, EOM intact; conjunctiva clear.  Sclerae white.  ENT: No nasal discharge; airway clear. Throat clear.  NECK: Supple; non tender.  No lymphadenopathy.  CARD: S1, S2 normal; no murmurs, gallops, or rubs. Irregular rate and rhythm.  RESP: No wheezes, rales or rhonchi.  Diminished breath sounds bilaterally.  ABD: soft; non-distended; non-tender; no hepatosplenomegaly.  EXT: Normal ROM.   NEURO: Alert, oriented. Grossly unremarkable. No focal deficits.  PSYCH: Cooperative, appropriate.

## 2023-04-04 NOTE — ED PROVIDER NOTE - CLINICAL SUMMARY MEDICAL DECISION MAKING FREE TEXT BOX
Patient is in significant respiratory distress, and she required BiPAP to support her respiratory status and prevent respiratory failure.  Patient's blood pressure trended hypotensive.  Central line placed and pressors ordered.  This patient has septic shock.  She will admitted to the ICU.  She is critically ill.

## 2023-04-04 NOTE — CHART NOTE - NSCHARTNOTEFT_GEN_A_CORE
Called by lab for elevated INR 5.  Patient asyx, no s/s active bleeding.  Coumadin held ystd.  Recheck INR in AM.  d/w medicine attending.
Called by medicine attending, patient stable for discharge to [facility].  Ok to d/c Rocephin, change to Kelfex x 2 more days 500mg q12h.  D/C Solu-Cortef.  Resume Allopurinol and Metoprolol on discharge.  Papers completed, orders placed.
pt is stable to dc to [facility]. She was able to walk with PT yesterday without oxygen, satting over 92%. Today, INR is elevated at 5. Will held today's coumadin dose of 2.5mg, which needs to be resumed once INR < 2.0

## 2023-04-04 NOTE — DISCHARGE NOTE PROVIDER - NSDCFUSCHEDAPPT_GEN_ALL_CORE_FT
[facility]  Scheduled Appointment: 04/07/2023    [facility]  MED[facility]  Donavan Valencia  Scheduled Appointment: 04/07/2023

## 2023-04-04 NOTE — ED PROVIDER NOTE - OBJECTIVE STATEMENT
Patient complains of cough, fever, shortness of breath for 1 day.  Because of the severity of the symptoms, the patient summoned EMS.  On their initial assessment the patient had a pulse ox of 86% on room air.  She has a past medical history of atrial fibrillation on Coumadin.  In addition she has a history of hypertension and recurrent anemia.  She denies chest pain, trauma.

## 2023-04-05 LAB
CULTURE RESULTS: SIGNIFICANT CHANGE UP
SPECIMEN SOURCE: SIGNIFICANT CHANGE UP

## 2023-04-07 DIAGNOSIS — Z86.718 PERSONAL HISTORY OF OTHER VENOUS THROMBOSIS AND EMBOLISM: ICD-10-CM

## 2023-04-07 DIAGNOSIS — I10 ESSENTIAL (PRIMARY) HYPERTENSION: ICD-10-CM

## 2023-04-07 DIAGNOSIS — J85.0 GANGRENE AND NECROSIS OF LUNG: ICD-10-CM

## 2023-04-07 DIAGNOSIS — Z86.711 PERSONAL HISTORY OF PULMONARY EMBOLISM: ICD-10-CM

## 2023-04-07 DIAGNOSIS — B95.5 UNSPECIFIED STREPTOCOCCUS AS THE CAUSE OF DISEASES CLASSIFIED ELSEWHERE: ICD-10-CM

## 2023-04-07 DIAGNOSIS — K44.9 DIAPHRAGMATIC HERNIA WITHOUT OBSTRUCTION OR GANGRENE: ICD-10-CM

## 2023-04-07 DIAGNOSIS — E87.6 HYPOKALEMIA: ICD-10-CM

## 2023-04-07 DIAGNOSIS — E83.42 HYPOMAGNESEMIA: ICD-10-CM

## 2023-04-07 DIAGNOSIS — N17.9 ACUTE KIDNEY FAILURE, UNSPECIFIED: ICD-10-CM

## 2023-04-07 DIAGNOSIS — M47.9 SPONDYLOSIS, UNSPECIFIED: ICD-10-CM

## 2023-04-07 DIAGNOSIS — I48.0 PAROXYSMAL ATRIAL FIBRILLATION: ICD-10-CM

## 2023-04-07 DIAGNOSIS — A40.3 SEPSIS DUE TO STREPTOCOCCUS PNEUMONIAE: ICD-10-CM

## 2023-04-07 DIAGNOSIS — Z79.01 LONG TERM (CURRENT) USE OF ANTICOAGULANTS: ICD-10-CM

## 2023-04-07 DIAGNOSIS — R65.21 SEVERE SEPSIS WITH SEPTIC SHOCK: ICD-10-CM

## 2023-04-24 NOTE — ED PROVIDER NOTE - CRITICAL CARE ATTENDING CONTRIBUTION TO CARE
See MDM Quality 226: Preventive Care And Screening: Tobacco Use: Screening And Cessation Intervention: Patient screened for tobacco use and is an ex/non-smoker Detail Level: Detailed Quality 130: Documentation Of Current Medications In The Medical Record: Current Medications Documented

## 2023-04-28 ENCOUNTER — OUTPATIENT (OUTPATIENT)
Dept: OUTPATIENT SERVICES | Facility: HOSPITAL | Age: 83
LOS: 1 days | End: 2023-04-28
Payer: MEDICARE

## 2023-04-28 ENCOUNTER — APPOINTMENT (OUTPATIENT)
Dept: MEDICATION MANAGEMENT | Facility: CLINIC | Age: 83
End: 2023-04-28

## 2023-04-28 DIAGNOSIS — Z79.01 LONG TERM (CURRENT) USE OF ANTICOAGULANTS: ICD-10-CM

## 2023-04-28 DIAGNOSIS — Z90.49 ACQUIRED ABSENCE OF OTHER SPECIFIED PARTS OF DIGESTIVE TRACT: Chronic | ICD-10-CM

## 2023-04-28 DIAGNOSIS — Z90.89 ACQUIRED ABSENCE OF OTHER ORGANS: Chronic | ICD-10-CM

## 2023-04-28 DIAGNOSIS — I48.91 UNSPECIFIED ATRIAL FIBRILLATION: ICD-10-CM

## 2023-04-28 DIAGNOSIS — R79.1 ABNORMAL COAGULATION PROFILE: ICD-10-CM

## 2023-04-28 DIAGNOSIS — Z90.710 ACQUIRED ABSENCE OF BOTH CERVIX AND UTERUS: Chronic | ICD-10-CM

## 2023-04-28 LAB
INR PPP: 2.4 RATIO
QUALITY CONTROL: YES

## 2023-04-28 PROCEDURE — G0463: CPT

## 2023-05-02 ENCOUNTER — APPOINTMENT (OUTPATIENT)
Dept: MEDICATION MANAGEMENT | Facility: CLINIC | Age: 83
End: 2023-05-02

## 2023-05-02 ENCOUNTER — OUTPATIENT (OUTPATIENT)
Dept: OUTPATIENT SERVICES | Facility: HOSPITAL | Age: 83
LOS: 1 days | End: 2023-05-02
Payer: MEDICARE

## 2023-05-02 DIAGNOSIS — I48.91 UNSPECIFIED ATRIAL FIBRILLATION: ICD-10-CM

## 2023-05-02 DIAGNOSIS — Z79.01 LONG TERM (CURRENT) USE OF ANTICOAGULANTS: ICD-10-CM

## 2023-05-02 DIAGNOSIS — R79.1 ABNORMAL COAGULATION PROFILE: ICD-10-CM

## 2023-05-02 DIAGNOSIS — Z90.89 ACQUIRED ABSENCE OF OTHER ORGANS: Chronic | ICD-10-CM

## 2023-05-02 DIAGNOSIS — Z90.710 ACQUIRED ABSENCE OF BOTH CERVIX AND UTERUS: Chronic | ICD-10-CM

## 2023-05-02 LAB
INR PPP: 4.1 RATIO
QUALITY CONTROL: YES

## 2023-05-02 PROCEDURE — G0463: CPT

## 2023-05-09 ENCOUNTER — OUTPATIENT (OUTPATIENT)
Dept: OUTPATIENT SERVICES | Facility: HOSPITAL | Age: 83
LOS: 1 days | End: 2023-05-09
Payer: MEDICARE

## 2023-05-09 ENCOUNTER — APPOINTMENT (OUTPATIENT)
Dept: MEDICATION MANAGEMENT | Facility: CLINIC | Age: 83
End: 2023-05-09

## 2023-05-09 DIAGNOSIS — Z79.01 LONG TERM (CURRENT) USE OF ANTICOAGULANTS: ICD-10-CM

## 2023-05-09 DIAGNOSIS — I48.91 UNSPECIFIED ATRIAL FIBRILLATION: ICD-10-CM

## 2023-05-09 DIAGNOSIS — Z90.710 ACQUIRED ABSENCE OF BOTH CERVIX AND UTERUS: Chronic | ICD-10-CM

## 2023-05-09 DIAGNOSIS — R79.1 ABNORMAL COAGULATION PROFILE: ICD-10-CM

## 2023-05-09 DIAGNOSIS — Z90.89 ACQUIRED ABSENCE OF OTHER ORGANS: Chronic | ICD-10-CM

## 2023-05-09 LAB
INR PPP: 6.7 RATIO
QUALITY CONTROL: YES

## 2023-05-09 PROCEDURE — G0463: CPT

## 2023-05-11 ENCOUNTER — APPOINTMENT (OUTPATIENT)
Dept: MEDICATION MANAGEMENT | Facility: CLINIC | Age: 83
End: 2023-05-11

## 2023-05-11 ENCOUNTER — OUTPATIENT (OUTPATIENT)
Dept: OUTPATIENT SERVICES | Facility: HOSPITAL | Age: 83
LOS: 1 days | End: 2023-05-11
Payer: MEDICARE

## 2023-05-11 DIAGNOSIS — R79.1 ABNORMAL COAGULATION PROFILE: ICD-10-CM

## 2023-05-11 DIAGNOSIS — I48.91 UNSPECIFIED ATRIAL FIBRILLATION: ICD-10-CM

## 2023-05-11 DIAGNOSIS — Z90.49 ACQUIRED ABSENCE OF OTHER SPECIFIED PARTS OF DIGESTIVE TRACT: Chronic | ICD-10-CM

## 2023-05-11 DIAGNOSIS — Z79.01 LONG TERM (CURRENT) USE OF ANTICOAGULANTS: ICD-10-CM

## 2023-05-11 DIAGNOSIS — Z90.89 ACQUIRED ABSENCE OF OTHER ORGANS: Chronic | ICD-10-CM

## 2023-05-11 LAB
INR PPP: 3.8 RATIO
QUALITY CONTROL: YES

## 2023-05-11 PROCEDURE — G0463: CPT

## 2023-05-15 ENCOUNTER — OUTPATIENT (OUTPATIENT)
Dept: OUTPATIENT SERVICES | Facility: HOSPITAL | Age: 83
LOS: 1 days | End: 2023-05-15
Payer: MEDICARE

## 2023-05-15 ENCOUNTER — APPOINTMENT (OUTPATIENT)
Dept: MEDICATION MANAGEMENT | Facility: CLINIC | Age: 83
End: 2023-05-15

## 2023-05-15 DIAGNOSIS — Z90.710 ACQUIRED ABSENCE OF BOTH CERVIX AND UTERUS: Chronic | ICD-10-CM

## 2023-05-15 DIAGNOSIS — R79.1 ABNORMAL COAGULATION PROFILE: ICD-10-CM

## 2023-05-15 DIAGNOSIS — I48.91 UNSPECIFIED ATRIAL FIBRILLATION: ICD-10-CM

## 2023-05-15 DIAGNOSIS — Z79.01 LONG TERM (CURRENT) USE OF ANTICOAGULANTS: ICD-10-CM

## 2023-05-15 DIAGNOSIS — Z90.89 ACQUIRED ABSENCE OF OTHER ORGANS: Chronic | ICD-10-CM

## 2023-05-15 DIAGNOSIS — Z90.49 ACQUIRED ABSENCE OF OTHER SPECIFIED PARTS OF DIGESTIVE TRACT: Chronic | ICD-10-CM

## 2023-05-15 LAB
INR PPP: 3.6 RATIO
QUALITY CONTROL: YES

## 2023-05-15 PROCEDURE — G0463: CPT

## 2023-05-22 NOTE — ADVANCED PRACTICE NURSE CONSULT - RECOMMEDATIONS
Care Transitions Outreach Attempt    Call within 2 business days of discharge: Yes   Attempted to reach patient for transitions of care follow up. Unable to reach patient. Patient: Emilie Brunson Patient : 1955 MRN: [id number]    Last Discharge  Street       Date Complaint Diagnosis Description Type Department Provider    23   Admission (Discharged) Shannan Duran MD              Was this an external facility discharge?  No   Discharge Facility: [facility]     Noted following upcoming appointments from discharge chart review:   [facility] follow up appointment(s):   Future Appointments   Date Time Provider Arcelia Crocker   2023 11:45 AM Ibis Holm MD [facility] BS AMB
1. IAD b/l buttock-intergluteal cleft- Continue applying Coloplast Calvin Protect moisture barrier cream daily and prn after each incontinent episode.    -Assess skin/wound qshift, report changes to primary provider.     Additional recs:   -Continue turning/positioning patient from side-to-side q2h while in bed, q1h when/if OOB chair, or in accordance w/ pt's plan of care. Utilize pillows and/or z-martín fluidized positioner to assist w/ turning/positioning. When/if OOB chair, utilize pillows or chair cushion to offload pressure.   -Continue to offload heels from bed surface with soft pillow under calfs or by applying offloading boots to BLEs.   -Continue utilizing one underpad underneath patient to contain incontinence episodes; change pad when saturated/soiled.   -Continue utilizing female incontinence device/PrimaFit (if patient candidate) to contain urinary incontinence.  -If patient w/ consistent loose fecal incontinence, consider utilizing external fecal  (if patient candidate) or fecal management system/rectal tube/DigniShield (if patient candidate; MD order required) to contain loose fecal incontinence.   -Continue nutrition consult for optimal wound healing & nutritional status.     Plan of Care: Primary RN Pretty made aware of above recs. Spoke w/  covering/primary MD (at [phone]) in regards to above. No further needs/recs from [facility] service at this time. Staff RN to perform routine skin/wound assessment and manage wound care. Questions or concerns or if wound worsens and reconsult needed, please contact [facility], [phone].

## 2023-05-23 ENCOUNTER — APPOINTMENT (OUTPATIENT)
Dept: MEDICATION MANAGEMENT | Facility: CLINIC | Age: 83
End: 2023-05-23

## 2023-05-23 ENCOUNTER — OUTPATIENT (OUTPATIENT)
Dept: OUTPATIENT SERVICES | Facility: HOSPITAL | Age: 83
LOS: 1 days | End: 2023-05-23
Payer: MEDICARE

## 2023-05-23 DIAGNOSIS — Z79.01 LONG TERM (CURRENT) USE OF ANTICOAGULANTS: ICD-10-CM

## 2023-05-23 DIAGNOSIS — R79.1 ABNORMAL COAGULATION PROFILE: ICD-10-CM

## 2023-05-23 DIAGNOSIS — I48.91 UNSPECIFIED ATRIAL FIBRILLATION: ICD-10-CM

## 2023-05-23 DIAGNOSIS — Z90.89 ACQUIRED ABSENCE OF OTHER ORGANS: Chronic | ICD-10-CM

## 2023-05-23 DIAGNOSIS — Z90.49 ACQUIRED ABSENCE OF OTHER SPECIFIED PARTS OF DIGESTIVE TRACT: Chronic | ICD-10-CM

## 2023-05-23 LAB
INR PPP: 3.3 RATIO
QUALITY CONTROL: YES

## 2023-05-23 PROCEDURE — G0463: CPT

## 2023-06-01 ENCOUNTER — OUTPATIENT (OUTPATIENT)
Dept: OUTPATIENT SERVICES | Facility: HOSPITAL | Age: 83
LOS: 1 days | End: 2023-06-01
Payer: MEDICARE

## 2023-06-01 ENCOUNTER — APPOINTMENT (OUTPATIENT)
Dept: MEDICATION MANAGEMENT | Facility: CLINIC | Age: 83
End: 2023-06-01

## 2023-06-01 DIAGNOSIS — Z90.49 ACQUIRED ABSENCE OF OTHER SPECIFIED PARTS OF DIGESTIVE TRACT: Chronic | ICD-10-CM

## 2023-06-01 DIAGNOSIS — I48.91 UNSPECIFIED ATRIAL FIBRILLATION: ICD-10-CM

## 2023-06-01 DIAGNOSIS — Z90.89 ACQUIRED ABSENCE OF OTHER ORGANS: Chronic | ICD-10-CM

## 2023-06-01 DIAGNOSIS — Z90.710 ACQUIRED ABSENCE OF BOTH CERVIX AND UTERUS: Chronic | ICD-10-CM

## 2023-06-01 DIAGNOSIS — R79.1 ABNORMAL COAGULATION PROFILE: ICD-10-CM

## 2023-06-01 DIAGNOSIS — Z79.01 LONG TERM (CURRENT) USE OF ANTICOAGULANTS: ICD-10-CM

## 2023-06-01 LAB
INR PPP: 3.4 RATIO
QUALITY CONTROL: YES

## 2023-06-01 PROCEDURE — G0463: CPT

## 2023-06-15 ENCOUNTER — APPOINTMENT (OUTPATIENT)
Dept: MEDICATION MANAGEMENT | Facility: CLINIC | Age: 83
End: 2023-06-15

## 2023-06-15 ENCOUNTER — OUTPATIENT (OUTPATIENT)
Dept: OUTPATIENT SERVICES | Facility: HOSPITAL | Age: 83
LOS: 1 days | End: 2023-06-15
Payer: MEDICARE

## 2023-06-15 DIAGNOSIS — Z90.89 ACQUIRED ABSENCE OF OTHER ORGANS: Chronic | ICD-10-CM

## 2023-06-15 DIAGNOSIS — Z79.01 LONG TERM (CURRENT) USE OF ANTICOAGULANTS: ICD-10-CM

## 2023-06-15 DIAGNOSIS — I48.91 UNSPECIFIED ATRIAL FIBRILLATION: ICD-10-CM

## 2023-06-15 DIAGNOSIS — Z90.710 ACQUIRED ABSENCE OF BOTH CERVIX AND UTERUS: Chronic | ICD-10-CM

## 2023-06-15 DIAGNOSIS — Z90.49 ACQUIRED ABSENCE OF OTHER SPECIFIED PARTS OF DIGESTIVE TRACT: Chronic | ICD-10-CM

## 2023-06-15 DIAGNOSIS — R79.1 ABNORMAL COAGULATION PROFILE: ICD-10-CM

## 2023-06-15 LAB
INR PPP: 3.2 RATIO
QUALITY CONTROL: YES

## 2023-06-15 PROCEDURE — G0463: CPT

## 2023-06-29 ENCOUNTER — OUTPATIENT (OUTPATIENT)
Dept: OUTPATIENT SERVICES | Facility: HOSPITAL | Age: 83
LOS: 1 days | End: 2023-06-29
Payer: MEDICARE

## 2023-06-29 ENCOUNTER — APPOINTMENT (OUTPATIENT)
Dept: MEDICATION MANAGEMENT | Facility: CLINIC | Age: 83
End: 2023-06-29

## 2023-06-29 DIAGNOSIS — I48.91 UNSPECIFIED ATRIAL FIBRILLATION: ICD-10-CM

## 2023-06-29 DIAGNOSIS — R79.1 ABNORMAL COAGULATION PROFILE: ICD-10-CM

## 2023-06-29 DIAGNOSIS — Z79.01 LONG TERM (CURRENT) USE OF ANTICOAGULANTS: ICD-10-CM

## 2023-06-29 LAB
INR PPP: 4 RATIO
QUALITY CONTROL: YES

## 2023-06-29 PROCEDURE — G0463: CPT

## 2023-07-06 ENCOUNTER — APPOINTMENT (OUTPATIENT)
Dept: MEDICATION MANAGEMENT | Facility: CLINIC | Age: 83
End: 2023-07-06

## 2023-07-06 ENCOUNTER — OUTPATIENT (OUTPATIENT)
Dept: OUTPATIENT SERVICES | Facility: HOSPITAL | Age: 83
LOS: 1 days | End: 2023-07-06
Payer: MEDICARE

## 2023-07-06 DIAGNOSIS — I48.91 UNSPECIFIED ATRIAL FIBRILLATION: ICD-10-CM

## 2023-07-06 DIAGNOSIS — Z90.49 ACQUIRED ABSENCE OF OTHER SPECIFIED PARTS OF DIGESTIVE TRACT: Chronic | ICD-10-CM

## 2023-07-06 DIAGNOSIS — Z90.710 ACQUIRED ABSENCE OF BOTH CERVIX AND UTERUS: Chronic | ICD-10-CM

## 2023-07-06 DIAGNOSIS — Z79.01 LONG TERM (CURRENT) USE OF ANTICOAGULANTS: ICD-10-CM

## 2023-07-06 DIAGNOSIS — R79.1 ABNORMAL COAGULATION PROFILE: ICD-10-CM

## 2023-07-06 DIAGNOSIS — Z90.89 ACQUIRED ABSENCE OF OTHER ORGANS: Chronic | ICD-10-CM

## 2023-07-06 LAB
INR PPP: 3.4 RATIO
QUALITY CONTROL: YES

## 2023-07-06 PROCEDURE — G0463: CPT

## 2023-07-19 ENCOUNTER — APPOINTMENT (OUTPATIENT)
Dept: MEDICATION MANAGEMENT | Facility: CLINIC | Age: 83
End: 2023-07-19

## 2023-07-19 ENCOUNTER — OUTPATIENT (OUTPATIENT)
Dept: OUTPATIENT SERVICES | Facility: HOSPITAL | Age: 83
LOS: 1 days | End: 2023-07-19
Payer: MEDICARE

## 2023-07-19 DIAGNOSIS — Z79.01 LONG TERM (CURRENT) USE OF ANTICOAGULANTS: ICD-10-CM

## 2023-07-19 DIAGNOSIS — Z90.710 ACQUIRED ABSENCE OF BOTH CERVIX AND UTERUS: Chronic | ICD-10-CM

## 2023-07-19 DIAGNOSIS — Z90.89 ACQUIRED ABSENCE OF OTHER ORGANS: Chronic | ICD-10-CM

## 2023-07-19 DIAGNOSIS — I48.91 UNSPECIFIED ATRIAL FIBRILLATION: ICD-10-CM

## 2023-07-19 DIAGNOSIS — R79.1 ABNORMAL COAGULATION PROFILE: ICD-10-CM

## 2023-07-19 DIAGNOSIS — Z90.49 ACQUIRED ABSENCE OF OTHER SPECIFIED PARTS OF DIGESTIVE TRACT: Chronic | ICD-10-CM

## 2023-07-19 LAB
INR PPP: 2.2 RATIO
QUALITY CONTROL: YES

## 2023-07-19 PROCEDURE — G0463: CPT

## 2023-07-27 ENCOUNTER — APPOINTMENT (OUTPATIENT)
Dept: MEDICATION MANAGEMENT | Facility: CLINIC | Age: 83
End: 2023-07-27

## 2023-07-27 ENCOUNTER — OUTPATIENT (OUTPATIENT)
Dept: OUTPATIENT SERVICES | Facility: HOSPITAL | Age: 83
LOS: 1 days | End: 2023-07-27
Payer: MEDICARE

## 2023-07-27 DIAGNOSIS — Z79.01 LONG TERM (CURRENT) USE OF ANTICOAGULANTS: ICD-10-CM

## 2023-07-27 DIAGNOSIS — I48.91 UNSPECIFIED ATRIAL FIBRILLATION: ICD-10-CM

## 2023-07-27 DIAGNOSIS — Z90.89 ACQUIRED ABSENCE OF OTHER ORGANS: Chronic | ICD-10-CM

## 2023-07-27 DIAGNOSIS — Z90.49 ACQUIRED ABSENCE OF OTHER SPECIFIED PARTS OF DIGESTIVE TRACT: Chronic | ICD-10-CM

## 2023-07-27 DIAGNOSIS — Z90.710 ACQUIRED ABSENCE OF BOTH CERVIX AND UTERUS: Chronic | ICD-10-CM

## 2023-07-27 DIAGNOSIS — R79.1 ABNORMAL COAGULATION PROFILE: ICD-10-CM

## 2023-07-27 LAB
INR PPP: 2.4 RATIO
QUALITY CONTROL: YES

## 2023-07-27 PROCEDURE — G0463: CPT

## 2023-08-04 ENCOUNTER — APPOINTMENT (OUTPATIENT)
Dept: MEDICATION MANAGEMENT | Facility: CLINIC | Age: 83
End: 2023-08-04

## 2023-08-04 ENCOUNTER — OUTPATIENT (OUTPATIENT)
Dept: OUTPATIENT SERVICES | Facility: HOSPITAL | Age: 83
LOS: 1 days | End: 2023-08-04
Payer: MEDICARE

## 2023-08-04 DIAGNOSIS — I48.91 UNSPECIFIED ATRIAL FIBRILLATION: ICD-10-CM

## 2023-08-04 DIAGNOSIS — R79.1 ABNORMAL COAGULATION PROFILE: ICD-10-CM

## 2023-08-04 DIAGNOSIS — Z79.01 LONG TERM (CURRENT) USE OF ANTICOAGULANTS: ICD-10-CM

## 2023-08-04 DIAGNOSIS — Z90.49 ACQUIRED ABSENCE OF OTHER SPECIFIED PARTS OF DIGESTIVE TRACT: Chronic | ICD-10-CM

## 2023-08-04 LAB
INR PPP: 1.8 RATIO
QUALITY CONTROL: YES

## 2023-08-04 PROCEDURE — G0463: CPT

## 2023-08-10 ENCOUNTER — OUTPATIENT (OUTPATIENT)
Dept: OUTPATIENT SERVICES | Facility: HOSPITAL | Age: 83
LOS: 1 days | End: 2023-08-10
Payer: MEDICARE

## 2023-08-10 ENCOUNTER — APPOINTMENT (OUTPATIENT)
Dept: MEDICATION MANAGEMENT | Facility: CLINIC | Age: 83
End: 2023-08-10

## 2023-08-10 DIAGNOSIS — R79.1 ABNORMAL COAGULATION PROFILE: ICD-10-CM

## 2023-08-10 DIAGNOSIS — Z79.01 LONG TERM (CURRENT) USE OF ANTICOAGULANTS: ICD-10-CM

## 2023-08-10 DIAGNOSIS — Z90.89 ACQUIRED ABSENCE OF OTHER ORGANS: Chronic | ICD-10-CM

## 2023-08-10 DIAGNOSIS — Z90.710 ACQUIRED ABSENCE OF BOTH CERVIX AND UTERUS: Chronic | ICD-10-CM

## 2023-08-10 DIAGNOSIS — I48.91 UNSPECIFIED ATRIAL FIBRILLATION: ICD-10-CM

## 2023-08-10 DIAGNOSIS — Z90.49 ACQUIRED ABSENCE OF OTHER SPECIFIED PARTS OF DIGESTIVE TRACT: Chronic | ICD-10-CM

## 2023-08-10 LAB
INR PPP: 3.4 RATIO
QUALITY CONTROL: YES

## 2023-08-10 PROCEDURE — G0463: CPT

## 2023-08-18 ENCOUNTER — APPOINTMENT (OUTPATIENT)
Dept: MEDICATION MANAGEMENT | Facility: CLINIC | Age: 83
End: 2023-08-18

## 2023-08-18 ENCOUNTER — OUTPATIENT (OUTPATIENT)
Dept: OUTPATIENT SERVICES | Facility: HOSPITAL | Age: 83
LOS: 1 days | End: 2023-08-18
Payer: MEDICARE

## 2023-08-18 DIAGNOSIS — Z79.01 LONG TERM (CURRENT) USE OF ANTICOAGULANTS: ICD-10-CM

## 2023-08-18 DIAGNOSIS — Z90.89 ACQUIRED ABSENCE OF OTHER ORGANS: Chronic | ICD-10-CM

## 2023-08-18 DIAGNOSIS — Z90.710 ACQUIRED ABSENCE OF BOTH CERVIX AND UTERUS: Chronic | ICD-10-CM

## 2023-08-18 DIAGNOSIS — I48.91 UNSPECIFIED ATRIAL FIBRILLATION: ICD-10-CM

## 2023-08-18 PROCEDURE — G0463: CPT

## 2023-08-19 DIAGNOSIS — Z79.01 LONG TERM (CURRENT) USE OF ANTICOAGULANTS: ICD-10-CM

## 2023-08-19 DIAGNOSIS — I48.91 UNSPECIFIED ATRIAL FIBRILLATION: ICD-10-CM

## 2023-08-23 ENCOUNTER — APPOINTMENT (OUTPATIENT)
Dept: MEDICATION MANAGEMENT | Facility: CLINIC | Age: 83
End: 2023-08-23

## 2023-08-23 ENCOUNTER — OUTPATIENT (OUTPATIENT)
Dept: OUTPATIENT SERVICES | Facility: HOSPITAL | Age: 83
LOS: 1 days | End: 2023-08-23
Payer: MEDICARE

## 2023-08-23 DIAGNOSIS — Z79.01 LONG TERM (CURRENT) USE OF ANTICOAGULANTS: ICD-10-CM

## 2023-08-23 DIAGNOSIS — Z90.710 ACQUIRED ABSENCE OF BOTH CERVIX AND UTERUS: Chronic | ICD-10-CM

## 2023-08-23 DIAGNOSIS — I48.91 UNSPECIFIED ATRIAL FIBRILLATION: ICD-10-CM

## 2023-08-23 DIAGNOSIS — Z90.49 ACQUIRED ABSENCE OF OTHER SPECIFIED PARTS OF DIGESTIVE TRACT: Chronic | ICD-10-CM

## 2023-08-23 LAB
INR PPP: 3.2 RATIO
QUALITY CONTROL: YES

## 2023-08-23 PROCEDURE — G0463: CPT

## 2023-09-06 ENCOUNTER — APPOINTMENT (OUTPATIENT)
Dept: MEDICATION MANAGEMENT | Facility: CLINIC | Age: 83
End: 2023-09-06

## 2023-09-06 ENCOUNTER — OUTPATIENT (OUTPATIENT)
Dept: OUTPATIENT SERVICES | Facility: HOSPITAL | Age: 83
LOS: 1 days | End: 2023-09-06
Payer: MEDICARE

## 2023-09-06 DIAGNOSIS — Z79.01 LONG TERM (CURRENT) USE OF ANTICOAGULANTS: ICD-10-CM

## 2023-09-06 DIAGNOSIS — I48.91 UNSPECIFIED ATRIAL FIBRILLATION: ICD-10-CM

## 2023-09-06 DIAGNOSIS — Z90.710 ACQUIRED ABSENCE OF BOTH CERVIX AND UTERUS: Chronic | ICD-10-CM

## 2023-09-06 DIAGNOSIS — Z90.49 ACQUIRED ABSENCE OF OTHER SPECIFIED PARTS OF DIGESTIVE TRACT: Chronic | ICD-10-CM

## 2023-09-06 DIAGNOSIS — Z90.89 ACQUIRED ABSENCE OF OTHER ORGANS: Chronic | ICD-10-CM

## 2023-09-06 LAB
INR PPP: 2.8 RATIO
QUALITY CONTROL: YES

## 2023-09-06 PROCEDURE — G0463: CPT

## 2023-09-11 ENCOUNTER — EMERGENCY (EMERGENCY)
Facility: HOSPITAL | Age: 83
LOS: 0 days | Discharge: HOME | End: 2023-09-11
Attending: EMERGENCY MEDICINE
Payer: MEDICARE

## 2023-09-11 VITALS
OXYGEN SATURATION: 95 % | DIASTOLIC BLOOD PRESSURE: 67 MMHG | WEIGHT: 164.91 LBS | HEIGHT: 64 IN | TEMPERATURE: 98 F | SYSTOLIC BLOOD PRESSURE: 113 MMHG | RESPIRATION RATE: 18 BRPM | HEART RATE: 87 BPM

## 2023-09-11 DIAGNOSIS — Z90.89 ACQUIRED ABSENCE OF OTHER ORGANS: ICD-10-CM

## 2023-09-11 DIAGNOSIS — M79.89 OTHER SPECIFIED SOFT TISSUE DISORDERS: ICD-10-CM

## 2023-09-11 DIAGNOSIS — I48.91 UNSPECIFIED ATRIAL FIBRILLATION: ICD-10-CM

## 2023-09-11 DIAGNOSIS — Z90.49 ACQUIRED ABSENCE OF OTHER SPECIFIED PARTS OF DIGESTIVE TRACT: Chronic | ICD-10-CM

## 2023-09-11 DIAGNOSIS — Z90.710 ACQUIRED ABSENCE OF BOTH CERVIX AND UTERUS: ICD-10-CM

## 2023-09-11 DIAGNOSIS — Z87.891 PERSONAL HISTORY OF NICOTINE DEPENDENCE: ICD-10-CM

## 2023-09-11 DIAGNOSIS — Z90.49 ACQUIRED ABSENCE OF OTHER SPECIFIED PARTS OF DIGESTIVE TRACT: ICD-10-CM

## 2023-09-11 DIAGNOSIS — Z79.01 LONG TERM (CURRENT) USE OF ANTICOAGULANTS: ICD-10-CM

## 2023-09-11 DIAGNOSIS — Z86.718 PERSONAL HISTORY OF OTHER VENOUS THROMBOSIS AND EMBOLISM: ICD-10-CM

## 2023-09-11 DIAGNOSIS — Z90.710 ACQUIRED ABSENCE OF BOTH CERVIX AND UTERUS: Chronic | ICD-10-CM

## 2023-09-11 DIAGNOSIS — I10 ESSENTIAL (PRIMARY) HYPERTENSION: ICD-10-CM

## 2023-09-11 DIAGNOSIS — L53.9 ERYTHEMATOUS CONDITION, UNSPECIFIED: ICD-10-CM

## 2023-09-11 DIAGNOSIS — L03.114 CELLULITIS OF LEFT UPPER LIMB: ICD-10-CM

## 2023-09-11 DIAGNOSIS — Z90.89 ACQUIRED ABSENCE OF OTHER ORGANS: Chronic | ICD-10-CM

## 2023-09-11 PROCEDURE — 99283 EMERGENCY DEPT VISIT LOW MDM: CPT

## 2023-09-11 PROCEDURE — 99284 EMERGENCY DEPT VISIT MOD MDM: CPT

## 2023-09-11 RX ORDER — CEPHALEXIN 500 MG
1 CAPSULE ORAL
Qty: 28 | Refills: 0
Start: 2023-09-11 | End: 2023-09-17

## 2023-09-11 RX ORDER — CEPHALEXIN 500 MG
500 CAPSULE ORAL ONCE
Refills: 0 | Status: COMPLETED | OUTPATIENT
Start: 2023-09-11 | End: 2023-09-11

## 2023-09-11 RX ADMIN — Medication 500 MILLIGRAM(S): at 19:19

## 2023-09-11 NOTE — ED ADULT NURSE NOTE - NS ED NOTE ABUSE SUSPICION NEGLECT YN
B/l fingers for the last 2 days   Coolness to fingers ? raynauds  Nl exam for peripheral neurpathy     Anxious about upcoming abdominal surgery 10/23  Avoid caffeine  Start back to exercise   Trial of Buspar 10 mg one po bid for her anxiety  She will call and update how she is feeling in the next week  Further workup then if persists   No

## 2023-09-11 NOTE — ED PROVIDER NOTE - PHYSICAL EXAMINATION
Physical Exam    Vital Signs: I have reviewed the initial vital signs.  Constitutional: appears stated age, no acute distress  Eyes: Sclera clear, EOMI.  Cardiovascular: S1 and S2, regular rate, irregular rhythm, well-perfused extremities, radial pulses equal and 2+, pedal pulses 2+ and equal  Respiratory: unlabored respiratory effort, clear to auscultation bilaterally   Musculoskeletal: supple neck, no lower extremity edema  Integumentary: warm, dry, two 2cm diameter erythematous + raised areas to left dorsal forearm with surrounding erythema  Neurologic: awake, alert, oriented x3, extremities’ motor and sensory functions grossly intact

## 2023-09-11 NOTE — ED ADULT NURSE NOTE - NS_SISCREENINGSR_GEN_ALL_ED
Negative Griseofulvin Counseling:  I discussed with the patient the risks of griseofulvin including but not limited to photosensitivity, cytopenia, liver damage, nausea/vomiting and severe allergy.  The patient understands that this medication is best absorbed when taken with a fatty meal (e.g., ice cream or french fries).

## 2023-09-11 NOTE — ED PROVIDER NOTE - NS ED MD DISPO DISCHARGE
Medication: LEVETIRACETAM 500 MG Oral Tab     Refill of this medication was sent to pharmacy on 9/2/22 for #120/2. Spoke with pharmacy. They confirmed receipt of above refill, and will prepare refill for patient. This request refused. Home

## 2023-09-11 NOTE — ED ADULT NURSE NOTE - NSFALLHARMRISKINTERV_ED_ALL_ED
Assistance OOB with selected safe patient handling equipment if applicable/Communicate risk of Fall with Harm to all staff, patient, and family/Monitor gait and stability/Provide patient with walking aids/Provide visual cue: red socks, yellow wristband, yellow gown, etc/Reinforce activity limits and safety measures with patient and family/Bed in lowest position, wheels locked, appropriate side rails in place/Call bell, personal items and telephone in reach/Instruct patient to call for assistance before getting out of bed/chair/stretcher/Non-slip footwear applied when patient is off stretcher/[place] to call system/Physically safe environment - no spills, clutter or unnecessary equipment/Purposeful Proactive Rounding/Room/bathroom lighting operational, light cord in reach

## 2023-09-11 NOTE — ED PROVIDER NOTE - OBJECTIVE STATEMENT
83-year-old female past medical history A-fib (on Coumadin), PE, DVT presents with complaint of wound to left forearm.  Patient reports she had 2 insect bites to left upper extremity at the forearm dorsal surface.  Reports subsequent erythema and mild swelling and warmth to area surrounding the insect bites.  Denies fever/chills, chest pain, shortness of breath, cough, abdominal pain, nausea/vomiting/diarrhea, change in bowel/bladder habits, numbness/tingling.

## 2023-09-11 NOTE — ED PROVIDER NOTE - PATIENT PORTAL LINK FT
You can access the FollowMyHealth Patient Portal offered by [facility] by registering at the following website: [email or web address]. By joining [facility]’s FollowMyHealth portal, you will also be able to view your health information using other applications (apps) compatible with our system.

## 2023-09-11 NOTE — ED PROVIDER NOTE - NSFOLLOWUPINSTRUCTIONS_ED_ALL_ED_FT
Follow-up with your PCP in 1-3 days.    Cellulitis    Cellulitis is a skin infection caused by bacteria. This condition occurs most often in the arms and lower legs but can occur anywhere over the body. Symptoms include redness, swelling, warm skin, tenderness, and chills/fever. If you were prescribed an antibiotic medicine, take it as told by your health care provider. Do not stop taking the antibiotic even if you start to feel better.    SEEK IMMEDIATE MEDICAL CARE IF YOU HAVE ANY OF THE FOLLOWING SYMPTOMS: worsening fever, red streaks coming from affected area, vomiting or diarrhea, or dizziness/lightheadedness.

## 2023-09-11 NOTE — ED PROVIDER NOTE - CLINICAL SUMMARY MEDICAL DECISION MAKING FREE TEXT BOX
84 yo woman w/ HTN, PE on warfarin here w/ rash/lesions to UE b/l  states bug bites on b/l UE 4 days prior but after scratching, the bites on L arm became bruised appearing w/ some surrounding redness  no fevers at home  sita PO w/o diff    wd/wn  nad  RUE + small insect bites, no excoriations or bruising  LUE: + 2 areas of ecchymosis, excoriations and area of surrounding erythema and warmth. no fluctuance, no ttp, no crepitus  rrr s1s2 wnl  cta b/l  nt/nd + Bs  aao X 3    84 yo woman w/ cellulitis RUE - mild. no systemic symptoms. Some mild bruising likely secondary to warfarin and local trauma. minimal swelling  will rx keflex (no hx of cellulitis, no evidence of abscess) and f/u in 48 hours

## 2023-09-14 ENCOUNTER — APPOINTMENT (OUTPATIENT)
Dept: MEDICATION MANAGEMENT | Facility: CLINIC | Age: 83
End: 2023-09-14

## 2023-09-14 ENCOUNTER — OUTPATIENT (OUTPATIENT)
Dept: OUTPATIENT SERVICES | Facility: HOSPITAL | Age: 83
LOS: 1 days | End: 2023-09-14
Payer: MEDICARE

## 2023-09-14 DIAGNOSIS — Z90.49 ACQUIRED ABSENCE OF OTHER SPECIFIED PARTS OF DIGESTIVE TRACT: Chronic | ICD-10-CM

## 2023-09-14 DIAGNOSIS — Z79.01 LONG TERM (CURRENT) USE OF ANTICOAGULANTS: ICD-10-CM

## 2023-09-14 DIAGNOSIS — I48.91 UNSPECIFIED ATRIAL FIBRILLATION: ICD-10-CM

## 2023-09-14 DIAGNOSIS — Z90.89 ACQUIRED ABSENCE OF OTHER ORGANS: Chronic | ICD-10-CM

## 2023-09-14 LAB
INR PPP: 3.7 RATIO
QUALITY CONTROL: YES

## 2023-09-14 PROCEDURE — G0463: CPT

## 2023-09-16 ENCOUNTER — INPATIENT (INPATIENT)
Facility: HOSPITAL | Age: 83
LOS: 2 days | Discharge: HOME | DRG: 871 | End: 2023-09-19
Attending: HOSPITALIST | Admitting: INTERNAL MEDICINE
Payer: MEDICARE

## 2023-09-16 VITALS
DIASTOLIC BLOOD PRESSURE: 74 MMHG | OXYGEN SATURATION: 97 % | TEMPERATURE: 99 F | RESPIRATION RATE: 20 BRPM | SYSTOLIC BLOOD PRESSURE: 127 MMHG | HEIGHT: 64 IN | HEART RATE: 107 BPM

## 2023-09-16 DIAGNOSIS — Z90.710 ACQUIRED ABSENCE OF BOTH CERVIX AND UTERUS: Chronic | ICD-10-CM

## 2023-09-16 DIAGNOSIS — Z90.89 ACQUIRED ABSENCE OF OTHER ORGANS: Chronic | ICD-10-CM

## 2023-09-16 DIAGNOSIS — Z90.49 ACQUIRED ABSENCE OF OTHER SPECIFIED PARTS OF DIGESTIVE TRACT: Chronic | ICD-10-CM

## 2023-09-16 DIAGNOSIS — A41.50 GRAM-NEGATIVE SEPSIS, UNSPECIFIED: ICD-10-CM

## 2023-09-16 LAB
ALBUMIN SERPL ELPH-MCNC: 4.2 G/DL — SIGNIFICANT CHANGE UP (ref 3.5–5.2)
ALP SERPL-CCNC: 70 U/L — SIGNIFICANT CHANGE UP (ref 30–115)
ALT FLD-CCNC: <5 U/L — SIGNIFICANT CHANGE UP (ref 0–41)
ANION GAP SERPL CALC-SCNC: 14 MMOL/L — SIGNIFICANT CHANGE UP (ref 7–14)
APPEARANCE UR: CLEAR — SIGNIFICANT CHANGE UP
APTT BLD: 46.8 SEC — HIGH (ref 27–39.2)
AST SERPL-CCNC: 16 U/L — SIGNIFICANT CHANGE UP (ref 0–41)
BACTERIA # UR AUTO: ABNORMAL /HPF
BASE EXCESS BLDV CALC-SCNC: 4.4 MMOL/L — HIGH (ref -2–3)
BASOPHILS # BLD AUTO: 0.01 K/UL — SIGNIFICANT CHANGE UP (ref 0–0.2)
BASOPHILS NFR BLD AUTO: 0.1 % — SIGNIFICANT CHANGE UP (ref 0–1)
BILIRUB SERPL-MCNC: 1.8 MG/DL — HIGH (ref 0.2–1.2)
BILIRUB UR-MCNC: NEGATIVE — SIGNIFICANT CHANGE UP
BUN SERPL-MCNC: 27 MG/DL — HIGH (ref 10–20)
CA-I SERPL-SCNC: 1.13 MMOL/L — LOW (ref 1.15–1.33)
CALCIUM SERPL-MCNC: 9.1 MG/DL — SIGNIFICANT CHANGE UP (ref 8.4–10.5)
CAST: 0 /LPF — SIGNIFICANT CHANGE UP (ref 0–4)
CHLORIDE SERPL-SCNC: 100 MMOL/L — SIGNIFICANT CHANGE UP (ref 98–110)
CO2 SERPL-SCNC: 23 MMOL/L — SIGNIFICANT CHANGE UP (ref 17–32)
COLOR SPEC: YELLOW — SIGNIFICANT CHANGE UP
CREAT SERPL-MCNC: 1.2 MG/DL — SIGNIFICANT CHANGE UP (ref 0.7–1.5)
DIFF PNL FLD: ABNORMAL
EGFR: 45 ML/MIN/1.73M2 — LOW
EOSINOPHIL # BLD AUTO: 0.01 K/UL — SIGNIFICANT CHANGE UP (ref 0–0.7)
EOSINOPHIL NFR BLD AUTO: 0.1 % — SIGNIFICANT CHANGE UP (ref 0–8)
GAS PNL BLDV: 134 MMOL/L — LOW (ref 136–145)
GAS PNL BLDV: SIGNIFICANT CHANGE UP
GLUCOSE SERPL-MCNC: 131 MG/DL — HIGH (ref 70–99)
GLUCOSE UR QL: NEGATIVE MG/DL — SIGNIFICANT CHANGE UP
HCO3 BLDV-SCNC: 29 MMOL/L — SIGNIFICANT CHANGE UP (ref 22–29)
HCT VFR BLD CALC: 47.9 % — HIGH (ref 37–47)
HCT VFR BLDA CALC: 44 % — SIGNIFICANT CHANGE UP (ref 39–51)
HGB BLD CALC-MCNC: 14.7 G/DL — SIGNIFICANT CHANGE UP (ref 12.6–17.4)
HGB BLD-MCNC: 14.5 G/DL — SIGNIFICANT CHANGE UP (ref 12–16)
HYALINE CASTS # UR AUTO: 0 /LPF — SIGNIFICANT CHANGE UP (ref 0–4)
IMM GRANULOCYTES NFR BLD AUTO: 0.9 % — HIGH (ref 0.1–0.3)
INR BLD: 2.94 RATIO — HIGH (ref 0.65–1.3)
KETONES UR-MCNC: NEGATIVE MG/DL — SIGNIFICANT CHANGE UP
LACTATE BLDV-MCNC: 1.6 MMOL/L — SIGNIFICANT CHANGE UP (ref 0.5–2)
LACTATE SERPL-SCNC: 2.1 MMOL/L — HIGH (ref 0.7–2)
LEUKOCYTE ESTERASE UR-ACNC: NEGATIVE — SIGNIFICANT CHANGE UP
LIDOCAIN IGE QN: 37 U/L — SIGNIFICANT CHANGE UP (ref 7–60)
LYMPHOCYTES # BLD AUTO: 1.34 K/UL — SIGNIFICANT CHANGE UP (ref 1.2–3.4)
LYMPHOCYTES # BLD AUTO: 11 % — LOW (ref 20.5–51.1)
MCHC RBC-ENTMCNC: 24.8 PG — LOW (ref 27–31)
MCHC RBC-ENTMCNC: 30.3 G/DL — LOW (ref 32–37)
MCV RBC AUTO: 82 FL — SIGNIFICANT CHANGE UP (ref 81–99)
MONOCYTES # BLD AUTO: 0.91 K/UL — HIGH (ref 0.1–0.6)
MONOCYTES NFR BLD AUTO: 7.5 % — SIGNIFICANT CHANGE UP (ref 1.7–9.3)
NEUTROPHILS # BLD AUTO: 9.78 K/UL — HIGH (ref 1.4–6.5)
NEUTROPHILS NFR BLD AUTO: 80.4 % — HIGH (ref 42.2–75.2)
NITRITE UR-MCNC: NEGATIVE — SIGNIFICANT CHANGE UP
NRBC # BLD: 0 /100 WBCS — SIGNIFICANT CHANGE UP (ref 0–0)
PCO2 BLDV: 43 MMHG — HIGH (ref 39–42)
PH BLDV: 7.44 — HIGH (ref 7.32–7.43)
PH UR: 6 — SIGNIFICANT CHANGE UP (ref 5–8)
PLATELET # BLD AUTO: 181 K/UL — SIGNIFICANT CHANGE UP (ref 130–400)
PMV BLD: 11.2 FL — HIGH (ref 7.4–10.4)
PO2 BLDV: 20 MMHG — SIGNIFICANT CHANGE UP
POTASSIUM BLDV-SCNC: 4.3 MMOL/L — SIGNIFICANT CHANGE UP (ref 3.5–5.1)
POTASSIUM SERPL-MCNC: 3.9 MMOL/L — SIGNIFICANT CHANGE UP (ref 3.5–5)
POTASSIUM SERPL-SCNC: 3.9 MMOL/L — SIGNIFICANT CHANGE UP (ref 3.5–5)
PROT SERPL-MCNC: 6.6 G/DL — SIGNIFICANT CHANGE UP (ref 6–8)
PROT UR-MCNC: 30 MG/DL
PROTHROM AB SERPL-ACNC: 34.6 SEC — HIGH (ref 9.95–12.87)
RBC # BLD: 5.84 M/UL — HIGH (ref 4.2–5.4)
RBC # FLD: 23.2 % — HIGH (ref 11.5–14.5)
RBC CASTS # UR COMP ASSIST: 18 /HPF — HIGH (ref 0–4)
SAO2 % BLDV: 22.4 % — SIGNIFICANT CHANGE UP
SODIUM SERPL-SCNC: 137 MMOL/L — SIGNIFICANT CHANGE UP (ref 135–146)
SP GR SPEC: >1.03 — HIGH (ref 1–1.03)
SQUAMOUS # UR AUTO: 8 /HPF — HIGH (ref 0–5)
TROPONIN T SERPL-MCNC: <0.01 NG/ML — SIGNIFICANT CHANGE UP
UROBILINOGEN FLD QL: 1 MG/DL — SIGNIFICANT CHANGE UP (ref 0.2–1)
WBC # BLD: 12.16 K/UL — HIGH (ref 4.8–10.8)
WBC # FLD AUTO: 12.16 K/UL — HIGH (ref 4.8–10.8)
WBC UR QL: 3 /HPF — SIGNIFICANT CHANGE UP (ref 0–5)
YEAST-LIKE CELLS: PRESENT

## 2023-09-16 PROCEDURE — 87077 CULTURE AEROBIC IDENTIFY: CPT

## 2023-09-16 PROCEDURE — 99223 1ST HOSP IP/OBS HIGH 75: CPT

## 2023-09-16 PROCEDURE — 80053 COMPREHEN METABOLIC PANEL: CPT

## 2023-09-16 PROCEDURE — 85025 COMPLETE CBC W/AUTO DIFF WBC: CPT

## 2023-09-16 PROCEDURE — 84145 PROCALCITONIN (PCT): CPT

## 2023-09-16 PROCEDURE — 87641 MR-STAPH DNA AMP PROBE: CPT

## 2023-09-16 PROCEDURE — 87640 STAPH A DNA AMP PROBE: CPT

## 2023-09-16 PROCEDURE — 87507 IADNA-DNA/RNA PROBE TQ 12-25: CPT

## 2023-09-16 PROCEDURE — 87040 BLOOD CULTURE FOR BACTERIA: CPT

## 2023-09-16 PROCEDURE — 71045 X-RAY EXAM CHEST 1 VIEW: CPT | Mod: 26

## 2023-09-16 PROCEDURE — 99285 EMERGENCY DEPT VISIT HI MDM: CPT

## 2023-09-16 PROCEDURE — 84550 ASSAY OF BLOOD/URIC ACID: CPT

## 2023-09-16 PROCEDURE — 85610 PROTHROMBIN TIME: CPT

## 2023-09-16 PROCEDURE — 83605 ASSAY OF LACTIC ACID: CPT

## 2023-09-16 PROCEDURE — 97162 PT EVAL MOD COMPLEX 30 MIN: CPT | Mod: GP

## 2023-09-16 PROCEDURE — 99497 ADVNCD CARE PLAN 30 MIN: CPT | Mod: 25

## 2023-09-16 PROCEDURE — 85027 COMPLETE CBC AUTOMATED: CPT

## 2023-09-16 PROCEDURE — 87045 FECES CULTURE AEROBIC BACT: CPT

## 2023-09-16 PROCEDURE — 87046 STOOL CULTR AEROBIC BACT EA: CPT

## 2023-09-16 PROCEDURE — 85730 THROMBOPLASTIN TIME PARTIAL: CPT

## 2023-09-16 PROCEDURE — 36415 COLL VENOUS BLD VENIPUNCTURE: CPT

## 2023-09-16 PROCEDURE — 83735 ASSAY OF MAGNESIUM: CPT

## 2023-09-16 PROCEDURE — 71275 CT ANGIOGRAPHY CHEST: CPT | Mod: 26,MA

## 2023-09-16 RX ORDER — SODIUM CHLORIDE 9 MG/ML
1000 INJECTION, SOLUTION INTRAVENOUS
Refills: 0 | Status: COMPLETED | OUTPATIENT
Start: 2023-09-16 | End: 2023-09-17

## 2023-09-16 RX ORDER — ALLOPURINOL 300 MG
100 TABLET ORAL DAILY
Refills: 0 | Status: DISCONTINUED | OUTPATIENT
Start: 2023-09-17 | End: 2023-09-19

## 2023-09-16 RX ORDER — ONDANSETRON 8 MG/1
4 TABLET, FILM COATED ORAL EVERY 8 HOURS
Refills: 0 | Status: DISCONTINUED | OUTPATIENT
Start: 2023-09-16 | End: 2023-09-19

## 2023-09-16 RX ORDER — SODIUM CHLORIDE 9 MG/ML
2000 INJECTION, SOLUTION INTRAVENOUS ONCE
Refills: 0 | Status: COMPLETED | OUTPATIENT
Start: 2023-09-16 | End: 2023-09-16

## 2023-09-16 RX ORDER — HEPARIN SODIUM 5000 [USP'U]/ML
5000 INJECTION INTRAVENOUS; SUBCUTANEOUS EVERY 8 HOURS
Refills: 0 | Status: DISCONTINUED | OUTPATIENT
Start: 2023-09-16 | End: 2023-09-16

## 2023-09-16 RX ORDER — CEFEPIME 1 G/1
2000 INJECTION, POWDER, FOR SOLUTION INTRAMUSCULAR; INTRAVENOUS ONCE
Refills: 0 | Status: COMPLETED | OUTPATIENT
Start: 2023-09-16 | End: 2023-09-16

## 2023-09-16 RX ORDER — PANTOPRAZOLE SODIUM 20 MG/1
40 TABLET, DELAYED RELEASE ORAL
Refills: 0 | Status: DISCONTINUED | OUTPATIENT
Start: 2023-09-16 | End: 2023-09-19

## 2023-09-16 RX ORDER — ACETAMINOPHEN 500 MG
650 TABLET ORAL EVERY 6 HOURS
Refills: 0 | Status: DISCONTINUED | OUTPATIENT
Start: 2023-09-16 | End: 2023-09-19

## 2023-09-16 RX ORDER — METOPROLOL TARTRATE 50 MG
50 TABLET ORAL EVERY 12 HOURS
Refills: 0 | Status: DISCONTINUED | OUTPATIENT
Start: 2023-09-16 | End: 2023-09-19

## 2023-09-16 RX ORDER — VANCOMYCIN HCL 1 G
1000 VIAL (EA) INTRAVENOUS EVERY 12 HOURS
Refills: 0 | Status: DISCONTINUED | OUTPATIENT
Start: 2023-09-16 | End: 2023-09-17

## 2023-09-16 RX ORDER — GUAIFENESIN/DEXTROMETHORPHAN 600MG-30MG
10 TABLET, EXTENDED RELEASE 12 HR ORAL EVERY 8 HOURS
Refills: 0 | Status: COMPLETED | OUTPATIENT
Start: 2023-09-16 | End: 2023-09-19

## 2023-09-16 RX ORDER — AZITHROMYCIN 500 MG/1
TABLET, FILM COATED ORAL
Refills: 0 | Status: DISCONTINUED | OUTPATIENT
Start: 2023-09-16 | End: 2023-09-16

## 2023-09-16 RX ORDER — LANOLIN ALCOHOL/MO/W.PET/CERES
3 CREAM (GRAM) TOPICAL AT BEDTIME
Refills: 0 | Status: DISCONTINUED | OUTPATIENT
Start: 2023-09-16 | End: 2023-09-19

## 2023-09-16 RX ORDER — INFLUENZA VIRUS VACCINE 15; 15; 15; 15 UG/.5ML; UG/.5ML; UG/.5ML; UG/.5ML
0.7 SUSPENSION INTRAMUSCULAR ONCE
Refills: 0 | Status: DISCONTINUED | OUTPATIENT
Start: 2023-09-16 | End: 2023-09-19

## 2023-09-16 RX ORDER — AZITHROMYCIN 500 MG/1
500 TABLET, FILM COATED ORAL ONCE
Refills: 0 | Status: DISCONTINUED | OUTPATIENT
Start: 2023-09-16 | End: 2023-09-16

## 2023-09-16 RX ORDER — CEFEPIME 1 G/1
1000 INJECTION, POWDER, FOR SOLUTION INTRAMUSCULAR; INTRAVENOUS EVERY 12 HOURS
Refills: 0 | Status: DISCONTINUED | OUTPATIENT
Start: 2023-09-16 | End: 2023-09-17

## 2023-09-16 RX ORDER — SODIUM CHLORIDE 9 MG/ML
3 INJECTION INTRAMUSCULAR; INTRAVENOUS; SUBCUTANEOUS EVERY 6 HOURS
Refills: 0 | Status: DISCONTINUED | OUTPATIENT
Start: 2023-09-16 | End: 2023-09-19

## 2023-09-16 RX ORDER — VANCOMYCIN HCL 1 G
1000 VIAL (EA) INTRAVENOUS ONCE
Refills: 0 | Status: COMPLETED | OUTPATIENT
Start: 2023-09-16 | End: 2023-09-16

## 2023-09-16 RX ADMIN — CEFEPIME 100 MILLIGRAM(S): 1 INJECTION, POWDER, FOR SOLUTION INTRAMUSCULAR; INTRAVENOUS at 12:21

## 2023-09-16 RX ADMIN — Medication 250 MILLIGRAM(S): at 12:21

## 2023-09-16 RX ADMIN — Medication 10 MILLILITER(S): at 22:47

## 2023-09-16 RX ADMIN — SODIUM CHLORIDE 60 MILLILITER(S): 9 INJECTION, SOLUTION INTRAVENOUS at 20:11

## 2023-09-16 RX ADMIN — SODIUM CHLORIDE 2000 MILLILITER(S): 9 INJECTION, SOLUTION INTRAVENOUS at 13:51

## 2023-09-16 NOTE — PATIENT PROFILE ADULT - FALL HARM RISK - HARM RISK INTERVENTIONS

## 2023-09-16 NOTE — ED ADULT NURSE NOTE - NSFALLUNIVINTERV_ED_ALL_ED
Bed/Stretcher in lowest position, wheels locked, appropriate side rails in place/Call bell, personal items and telephone in reach/Instruct patient to call for assistance before getting out of bed/chair/stretcher/Non-slip footwear applied when patient is off stretcher/[place] to call system/Physically safe environment - no spills, clutter or unnecessary equipment/Purposeful proactive rounding/Room/bathroom lighting operational, light cord in reach

## 2023-09-16 NOTE — H&P ADULT - NSHPLABSRESULTS_GEN_ALL_CORE
HEAD:  Atraumatic, Normocephalic  EYES: EOMI, PERRLA, conjunctiva and sclera clear  ENMT: No tonsillar erythema, exudates, or enlargement; Moist mucous membranes, Good dentition, No lesions  NECK: Supple, No JVD, Normal thyroid  NERVOUS SYSTEM:  Alert & Oriented X3, Good concentration; Motor Strength 5/5 B/L upper and lower extremities; DTRs 2+ intact and symmetric  CHEST/LUNG: Clear to percussion bilaterally; No rales, rhonchi, wheezing, or rubs.   HEART: Regular rate and rhythm; No murmurs, rubs, or gallops  ABDOMEN: No TTP, no N/V, Bowel sounds present, non-distended  EXTREMITIES:  2+ Peripheral Pulses, No clubbing, cyanosis, or edema  LYMPH: No lymphadenopathy noted  SKIN: No rashes or lesions  EOE: Mild left cheek edema and resolving bruising, ttp along left mandibular angle, no steps felt, no mobility detected, SOTO:~10mm  IOE: No lacerations visible, good OH, #6-11 veneers w/o mobility or fx, able to guide pt into stable occlusion w/ posterior contact, Class I mobility #17, exam limited by limited opening 2/2 to pain LABS:  cret                        14.5   12.16 )-----------( 181      ( 16 Sep 2023 10:05 )             47.9     09-16    137  |  100  |  27<H>  ----------------------------<  131<H>  3.9   |  23  |  1.2    Ca    9.1      16 Sep 2023 10:06    TPro  6.6  /  Alb  4.2  /  TBili  1.8<H>  /  DBili  x   /  AST  16  /  ALT  <5  /  AlkPhos  70  09-16    PT/INR - ( 16 Sep 2023 10:05 )   PT: 34.60 sec;   INR: 2.94 ratio         PTT - ( 16 Sep 2023 10:05 )  PTT:46.8 sec

## 2023-09-16 NOTE — ED PROVIDER NOTE - CONSIDERATION OF ADMISSION OBSERVATION
Consideration of Admission/Observation Patient with chest pain, abnormal EKG, will require admission

## 2023-09-16 NOTE — H&P ADULT - NSHPPHYSICALEXAM_GEN_ALL_CORE
VITALS:   Vital Signs Last 24 Hrs  T(C): 36.7 (16 Sep 2023 16:58), Max: 38.7 (16 Sep 2023 10:10)  T(F): 98 (16 Sep 2023 16:58), Max: 101.7 (16 Sep 2023 10:10)  HR: 88 (16 Sep 2023 16:58) (88 - 107)  BP: 99/70 (16 Sep 2023 16:58) (70/40 - 127/74)  RR: 18 (16 Sep 2023 16:58) (18 - 20)  SpO2: 98% (16 Sep 2023 16:58) (97% - 98%)    Parameters below as of 16 Sep 2023 16:58  Patient On (Oxygen Delivery Method): room air      I&O's Summary    CAPILLARY BLOOD GLUCOSE          PHYSICAL EXAM:  General: in NAD  HEENT: NCAT, moist mucous membranes  Neurology: A&Ox4  Respiratory: CTA B/L, normal respiratory effort, no wheezes, crackles, rales  CV: RRR, S1S2, no murmurs, rubs or gallops  Abdominal: Soft, NT, ND, +BS, no guarding or rebound  Extremities: trace edema on b/l LEs, + peripheral pulses

## 2023-09-16 NOTE — H&P ADULT - ASSESSMENT
83F PMHx DVT/PE and pA-Fib on Coumadin, recent h/o necrotizing pneumonia 2/2 strep pneumo resulting in septic shocking requiring ICU, h/o anemia requiring transfusions, HTN, HLD, gout, h/o cholecystectomy presents to the ED for chest pain.    #Chest Pain/Cough  #B/L Nodular Opacities on CT  #recent h/o of necrotizing pneumonia 2/2 strep pneumo  #h/o Smoking - currently not active  - afebrile, satting well on RA  - CXR shows Bilateral opacities/left focal atelectasis and Cardiomegaly  - CTA Chest -ve for PE, new and increased patchy bilateral nodular opacities, which may be infectious/inflammatory or neoplastic in etiology. Chronic bilateral mosaic attenuation, interlobular septal thickening and fibrotic changes. Unchanged bulky lower cervical and thoracic lymphadenopathy.  - patient has refused work up such as bronchoscopy in the past  - EKG shows afib/flutter  - Trops -ve x1  - UA +ve for blood and   - s/p Vanc and Cefepime in the ED  - f/u Procal  - f/u UCx, BCx  - trend trops    #MAGDALENO  #Elevated Lactate  - Cr 1.2, previously 0.8  - Lactate 2.1  - s/p 2L LR Bolus in ED  - continue gentle hydration , avoid overload  - Trend lactate    #h/o DVT/PE  - no PE on CTA of chest  - currently on Coumadin  - INR 2.94 on admission  - Takes Coumadin 2.5mg daily - as per patient, this dose changes frequently based on INR reading  - f/u AM INR to schedule appropriate coumadin dosage    #p-AFib  #HTN  #HLD  - BP 99/70  - c/w Metoprolol ER 100mg as Metoprolol Tartrate 50mg BID  - holding home HCTZ 25mg, restart when BP permits    #Gout  - c/w Allopurinol 100mg daily    #DVT ppx: Heparin SubQ  #GI ppx: PPI PO daily  #Diet: regular, DASH/TLC  #Activity: AAT  #Code Status: Full Code   #Dispo: from home, acute 83F PMHx DVT/PE and pA-Fib on Coumadin, recent h/o necrotizing pneumonia 2/2 strep pneumo resulting in septic shocking requiring ICU, h/o anemia requiring transfusions, HTN, HLD, gout, h/o cholecystectomy presents to the ED for chest pain.    #Chest Pain/Cough Possibly 2/2 CAP  #B/L Nodular Opacities on CT  #recent h/o of necrotizing pneumonia 2/2 strep pneumo  #h/o Smoking - currently not active  - afebrile, satting well on RA  - CXR shows Bilateral opacities/left focal atelectasis and Cardiomegaly  - CTA Chest -ve for PE, new and increased patchy bilateral nodular opacities, which may be infectious/inflammatory or neoplastic in etiology. Chronic bilateral mosaic attenuation, interlobular septal thickening and fibrotic changes. Unchanged bulky lower cervical and thoracic lymphadenopathy.  - patient has refused work up such as bronchoscopy in the past  - EKG shows afib/flutter  - Trops -ve x1  - UA +ve for blood and   - s/p Vanc and Cefepime in the ED  - starting Rocephin and Azithromycin in AM - can discontinue if procal negative  - Robitussin-DM 10ml q8hrs for 3 days  - Sodium Chloride 0.9% Nebs q6hrs   - f/u Procal  - f/u RVP  - f/u UCx, BCx  - trend trops  - pulm follow up outpatient    #MAGDALENO  #Elevated Lactate  - Cr 1.2, previously 0.8  - Lactate 2.1  - s/p 2L LR Bolus in ED  - continue gentle hydration, LR @60cc/hr for 12 hours , avoid overload  - Trend lactate    #h/o DVT/PE  - no PE on CTA of chest  - currently on Coumadin  - INR 2.94 on admission  - Takes Coumadin 2.5mg daily - as per patient, this dose changes frequently based on INR reading  - f/u AM INR to schedule appropriate coumadin dosage    #p-AFib  #HTN  #HLD  - BP 99/70  - c/w Metoprolol ER 100mg as Metoprolol Tartrate 50mg BID  - holding home HCTZ 25mg, restart when BP permits    #Gout  - c/w Allopurinol 100mg daily    #DVT ppx: Heparin SubQ  #GI ppx: PPI PO daily  #Diet: regular, DASH/TLC  #Activity: AAT  #Code Status: Full Code   #Dispo: from home, acute 83F PMHx DVT/PE and pA-Fib on Coumadin, recent h/o necrotizing pneumonia 2/2 strep pneumo resulting in septic shocking requiring ICU, h/o anemia requiring transfusions, HTN, HLD, gout, h/o cholecystectomy presents to the ED for chest pain.    #Chest Pain/Cough Possibly 2/2 CAP  #Leukocytosis w/ neutrophil predominance and bandemia  #B/L Nodular Opacities on CT  #recent h/o of necrotizing pneumonia 2/2 strep pneumo  #h/o Smoking - currently not active  - afebrile, satting well on RA  - CXR shows Bilateral opacities/left focal atelectasis and Cardiomegaly  - CTA Chest -ve for PE, new and increased patchy bilateral nodular opacities, which may be infectious/inflammatory or neoplastic in etiology. Chronic bilateral mosaic attenuation, interlobular septal thickening and fibrotic changes. Unchanged bulky lower cervical and thoracic lymphadenopathy.  - patient has refused work up such as bronchoscopy in the past  - EKG shows afib/flutter  - Trops -ve x1  - UA +ve for blood and   - s/p Vanc and Cefepime in the ED  - starting Rocephin and Azithromycin in AM - can discontinue if procal negative  - Robitussin-DM 10ml q8hrs for 3 days  - Sodium Chloride 0.9% Nebs q6hrs   - f/u Procal  - f/u RVP  - f/u UCx, BCx  - trend trops  - pulm follow up outpatient    #MAGDALENO  #Elevated Lactate  - Cr 1.2, previously 0.8  - Lactate 2.1  - s/p 2L LR Bolus in ED  - continue gentle hydration, LR @60cc/hr for 12 hours , avoid overload  - Trend lactate    #h/o DVT/PE  - no PE on CTA of chest  - currently on Coumadin  - INR 2.94 on admission  - Takes Coumadin 2.5mg daily - as per patient, this dose changes frequently based on INR reading  - f/u AM INR to schedule appropriate coumadin dosage    #p-AFib  #HTN  #HLD  - BP 99/70  - c/w Metoprolol ER 100mg as Metoprolol Tartrate 50mg BID  - holding home HCTZ 25mg, restart when BP permits    #Gout  - c/w Allopurinol 100mg daily    #DVT ppx: Heparin SubQ  #GI ppx: PPI PO daily  #Diet: regular, DASH/TLC  #Activity: AAT  #Code Status: Full Code   #Dispo: from home, acute

## 2023-09-16 NOTE — ED PROVIDER NOTE - OBJECTIVE STATEMENT
Patient is a 83-year-old female she is got a past medical history of DVT PE on Coumadin high blood pressure and high cholesterol she comes in today because she started having chest pain last night woke her up from sleep she said she has been also having a cough for the last week productive of white sputum. Otherwise denies any fever, chills, headache, changes in vision, n/v/d, abd pain, constipation, urinary complaints, lower extremity pain/swelling.

## 2023-09-16 NOTE — H&P ADULT - ATTENDING COMMENTS
83F PMHx DVT/PE and pA-Fib on Coumadin, recent h/o necrotizing pneumonia 2/2 strep pneumo resulting in septic shocking requiring ICU, h/o anemia requiring transfusions, HTN, HLD, gout, h/o cholecystectomy presents to the ED for chest pain.      Agree  with assessment  except for changes below.     CXR shows Bilateral opacities/left focal atelectasis and Cardiomegaly  CTA Chest -ve for PE, new and increased patchy bilateral nodular opacities, which may be infectious/inflammatory or neoplastic in etiology. Chronic bilateral mosaic attenuation, interlobular septal thickening and fibrotic changes.    Vital Signs (24 Hrs):  T(C): 36.4 (09-16-23 @ 20:00), Max: 38.7 (09-16-23 @ 10:10)  HR: 99 (09-16-23 @ 20:00) (88 - 107)  BP: 102/59 (09-16-23 @ 20:00) (70/40 - 127/74)  RR: 18 (09-16-23 @ 20:00) (18 - 20)  SpO2: 98% (09-16-23 @ 16:58) (97% - 98%)  Daily Height in cm: 162.56 (16 Sep 2023 07:40)          IMPRESSION  Sepsis Secondary to Suspected  Pneumonia  Associated Chest discomfort along with Cough   Leukocytosis w/ neutrophil predominance and bandemia  B/L Nodular Opacities on CT  Recent h/o of necrotizing pneumonia 2/2 strep pneumo  Hx  Smoking - currently not active   9 Aflutte,  Temp 101.7F, WBC 12, Hypotension - Improved , Lactate 2  Hematonically Stable now   ECG Aflutter  now  resolved   trop 0.01 x 1  repeat   Tele monitoring   s/p 2l IVF   Continue gentle hydration, LR @60cc/hr for 12 hours , avoid overload  Trend lactate  F/u Blood CX, MRSA, Urine strep and legionella, Procal   Switching to cefepime and vanc ( f/u MRSA  DC vanc if neg)   f/u ID  Robitussin-DM 10ml q8hrs for 3 days  consider pulm eval  if worsening     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   No need for urgent RRT  Monitor Cr/BUN Electrolytes including Phosphorus  Avoid Nephro toxins   C/w Gentle hydration  repeat BMP in AM    Hx  DVT/PE  - no PE on CTA of chest  - currently on Coumadin  - INR 2.94 on admission  - Takes Coumadin 2.5mg daily - as per patient, this dose changes frequently based on INR reading  - f/u AM INR to schedule appropriate coumadin dosage    Hx p-AFib/ aflutter   Hx HTN  Hx HLD  - BP 99/70  - c/w Metoprolol ER 100mg as Metoprolol Tartrate 50mg BID  Agree with holding  home HCTZ 25mg, restart when BP permits    Hx Gout  - c/w Allopurinol 100mg daily  renally dose 83F PMHx DVT/PE and pA-Fib on Coumadin, recent h/o necrotizing pneumonia 2/2 strep pneumo resulting in septic shocking requiring ICU, h/o anemia requiring transfusions, HTN, HLD, gout, h/o cholecystectomy presents to the ED for chest pain.      Agree  with assessment  except for changes below.     CXR shows Bilateral opacities/left focal atelectasis and Cardiomegaly  CTA Chest -ve for PE, new and increased patchy bilateral nodular opacities, which may be infectious/inflammatory or neoplastic in etiology. Chronic bilateral mosaic attenuation, interlobular septal thickening and fibrotic changes.    Vital Signs (24 Hrs):  T(C): 36.4 (09-16-23 @ 20:00), Max: 38.7 (09-16-23 @ 10:10)  HR: 99 (09-16-23 @ 20:00) (88 - 107)  BP: 102/59 (09-16-23 @ 20:00) (70/40 - 127/74)  RR: 18 (09-16-23 @ 20:00) (18 - 20)  SpO2: 98% (09-16-23 @ 16:58) (97% - 98%)  Daily Height in cm: 162.56 (16 Sep 2023 07:40)      PHYSICAL EXAM  GENERAL: NAD, Obese   HEAD:  NCAT, EOMI, MM  NECK: Supple, Nontender  NERVOUS SYSTEM:  AAOx3, NFD  CHEST/LUNG: +bs b/l, No wheezing   HEART: +s1s2 RRR  ABDOMEN: soft, NT/ND  EXTREMITIES:  pp, no edema  SKIN: age related skin changes,  Multiple skin lesion resembling bullae     IMPRESSION  Sepsis Secondary to Suspected  Pneumonia  Associated Chest discomfort along with Cough   Chest  Pain now resolved   Skin Bullae lesions on skin  possible bug bits   Leukocytosis w/ neutrophil predominance and bandemia  B/L Nodular Opacities on CT  Recent h/o of necrotizing pneumonia 2/2 strep pneumo  Hx  Smoking - currently not active   9 Aflutte,  Temp 101.7F, WBC 12, Hypotension - Improved , Lactate 2  Hematonically Stable now   ECG Aflutter  now  resolved   trop 0.01 x 1  repeat   Tele monitoring   s/p 2l IVF   Continue gentle hydration, LR @60cc/hr for 12 hours , avoid overload  Trend lactate  F/u Blood CX, MRSA, Urine strep and legionella, Procal   Switching to cefepime and vanc ( f/u MRSA  DC vanc if neg)   f/u ID  Robitussin-DM 10ml q8hrs for 3 days  consider pulm eval  if worsening         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   No need for urgent RRT  Monitor Cr/BUN Electrolytes including Phosphorus  Avoid Nephro toxins   C/w Gentle hydration  repeat BMP in AM    Hx  DVT/PE  - no PE on CTA of chest  - currently on Coumadin  - INR 2.94 on admission  - Takes Coumadin 2.5mg daily - as per patient, this dose changes frequently based on INR reading  - f/u AM INR to schedule appropriate coumadin dosage    Hx p-AFib/ aflutter   Hx HTN  Hx HLD  - BP 99/70  - c/w Metoprolol ER 100mg as Metoprolol Tartrate 50mg BID  Agree with holding  home HCTZ 25mg, restart when BP permits    Hx Gout  - c/w Allopurinol 100mg daily  renally dose    Hx Morbid Obesity  - Diet and exercise counseling     Seen on 09/16/23

## 2023-09-16 NOTE — ED PROVIDER NOTE - CLINICAL SUMMARY MEDICAL DECISION MAKING FREE TEXT BOX
Patient presented with chest pain since last night that awoke her from sleep as well as productive cough with white sputum.  On arrival to ED, patient hemodynamically stable, normal O2 saturation on room air.  EKG showed atrial flutter as documented, no known history of this, although patient already anticoagulated for PE in the past on Coumadin.  Obtained labs which were grossly unremarkable including no significant leukocytosis, anemia, signs of dehydration/MAGDALENO, transaminitis or significant electrolyte abnormalities.  Chest x-ray showed bilateral opacities likely viral pneumonia, although underlying bacterial pneumonia cannot be ruled out.  On reassessment, patient still feeling very weak and given the above findings, patient will require admission for further management.  Patient agreeable with plan.  Hemodynamically stable at time of admission.

## 2023-09-16 NOTE — H&P ADULT - CONVERSATION DETAILS
Advanced Care Planning: DNR/DNI  I have had goals of care discussion, advanced directive and code status with patient/family today.  I have spent 30 minutes with this patient. Over 50% of the encounter was spent in counseling on and coordination of patient care. The above recommendations were discussed with the patient. The patient and or family had all questions answered and expressed understanding of the plan.

## 2023-09-16 NOTE — H&P ADULT - HISTORY OF PRESENT ILLNESS
Patient is a 83-year-old female she is got a past medical history of DVT PE on Coumadin high blood pressure and high cholesterol she comes in today because she started having chest pain last night woke her up from sleep she said she has been also having a cough for the last week productive of white sputum. Otherwise denies any fever, chills, headache, changes in vision, n/v/d, abd pain, constipation, urinary complaints, lower extremity pain/swelling. 83F PMHx DVT/PE and pA-Fib on Coumadin, recent h/o necrotizing pneumonia 2/2 strep pneumo resulting in septic shocking requiring ICU, h/o anemia requiring transfusions, HTN, HLD, gout and h/o cholecystectomy presents to the ED for chest pain. She states that her chest pain started last night which woke her up from sleep. She described the pain and running across her upper chest from shoulder to shoulder and radiating down towards her abdomen. Does not describe the chest pain as sharp or crushing, just constant. Nothing made it worse and remaining in bed improved the condition. She also admits to having a productive cough for 1x week w/ white sputum. Otherwise denies any fevers, chills, headache, changes in vision, n/v/d, abd pain, constipation, urinary complaints or lower extremity pain/swelling.    Vitals: Temp 99.1F, BP 99/70, , RR 20, SpO2 98% on RA    Labs: WBC 12.16 w/ neutrophil predominance and bandemia, Lactate 2.1,     Imaging:  - CXR shows Bilateral opacities/left focal atelectasis and Cardiomegaly  - CTA Chest -ve for PE, new and increased patchy bilateral nodular opacities, which may be infectious/inflammatory or neoplastic in etiology. Chronic bilateral mosaic attenuation, interlobular septal thickening and fibrotic changes.    In the ED:  - s/p 2L LR bolus  - s/p Cefepime and Vanc    Admitted to medicine 83F PMHx DVT/PE and pA-Fib on Coumadin, recent h/o necrotizing pneumonia 2/2 strep pneumo resulting in septic shocking requiring ICU, h/o anemia requiring transfusions, HTN, HLD, gout and h/o cholecystectomy presents to the ED for chest pain. She states that her chest pain started last night which woke her up from sleep. She described the pain and running across her upper chest from shoulder to shoulder and radiating down towards her abdomen. Does not describe the chest pain as sharp or crushing, just constant. Nothing made it worse and remaining in bed improved the condition. She also admits to having a productive cough for 1x week w/ white sputum. Otherwise denies any fevers, chills, headache, changes in vision, n/v/d, abd pain, constipation, urinary complaints or lower extremity pain/swelling.    Vitals: Temp 99.1F, BP 99/70, , RR 20, SpO2 98% on RA    Labs: WBC 12.16 w/ neutrophil predominance and bandemia, Lactate 2.1,     Imaging:  - CXR shows Bilateral opacities/left focal atelectasis and Cardiomegaly  - CTA Chest -ve for PE, new and increased patchy bilateral nodular opacities, which may be infectious/inflammatory or neoplastic in etiology. Chronic bilateral mosaic attenuation, interlobular septal thickening and fibrotic changes.    In the ED:  - s/p 2L LR bolus  - s/p Cefepime and Vanc    Admitted to medicine for management of suspected CAP

## 2023-09-17 LAB
ALBUMIN SERPL ELPH-MCNC: 3.3 G/DL — LOW (ref 3.5–5.2)
ALP SERPL-CCNC: 61 U/L — SIGNIFICANT CHANGE UP (ref 30–115)
ALT FLD-CCNC: <5 U/L — SIGNIFICANT CHANGE UP (ref 0–41)
ANION GAP SERPL CALC-SCNC: 12 MMOL/L — SIGNIFICANT CHANGE UP (ref 7–14)
AST SERPL-CCNC: 11 U/L — SIGNIFICANT CHANGE UP (ref 0–41)
BASOPHILS # BLD AUTO: 0.02 K/UL — SIGNIFICANT CHANGE UP (ref 0–0.2)
BASOPHILS NFR BLD AUTO: 0.2 % — SIGNIFICANT CHANGE UP (ref 0–1)
BILIRUB SERPL-MCNC: 1.9 MG/DL — HIGH (ref 0.2–1.2)
BUN SERPL-MCNC: 23 MG/DL — HIGH (ref 10–20)
CALCIUM SERPL-MCNC: 8.7 MG/DL — SIGNIFICANT CHANGE UP (ref 8.4–10.5)
CHLORIDE SERPL-SCNC: 101 MMOL/L — SIGNIFICANT CHANGE UP (ref 98–110)
CO2 SERPL-SCNC: 22 MMOL/L — SIGNIFICANT CHANGE UP (ref 17–32)
CREAT SERPL-MCNC: 0.8 MG/DL — SIGNIFICANT CHANGE UP (ref 0.7–1.5)
CULTURE RESULTS: SIGNIFICANT CHANGE UP
EGFR: 73 ML/MIN/1.73M2 — SIGNIFICANT CHANGE UP
EOSINOPHIL # BLD AUTO: 0.2 K/UL — SIGNIFICANT CHANGE UP (ref 0–0.7)
EOSINOPHIL NFR BLD AUTO: 2.3 % — SIGNIFICANT CHANGE UP (ref 0–8)
GI PCR PANEL: SIGNIFICANT CHANGE UP
GLUCOSE SERPL-MCNC: 143 MG/DL — HIGH (ref 70–99)
HCT VFR BLD CALC: 41.3 % — SIGNIFICANT CHANGE UP (ref 37–47)
HGB BLD-MCNC: 12.6 G/DL — SIGNIFICANT CHANGE UP (ref 12–16)
IMM GRANULOCYTES NFR BLD AUTO: 0.5 % — HIGH (ref 0.1–0.3)
INR BLD: 3.53 RATIO — HIGH (ref 0.65–1.3)
LACTATE SERPL-SCNC: 1.2 MMOL/L — SIGNIFICANT CHANGE UP (ref 0.7–2)
LYMPHOCYTES # BLD AUTO: 2.26 K/UL — SIGNIFICANT CHANGE UP (ref 1.2–3.4)
LYMPHOCYTES # BLD AUTO: 25.9 % — SIGNIFICANT CHANGE UP (ref 20.5–51.1)
MAGNESIUM SERPL-MCNC: 1.7 MG/DL — LOW (ref 1.8–2.4)
MCHC RBC-ENTMCNC: 25.1 PG — LOW (ref 27–31)
MCHC RBC-ENTMCNC: 30.5 G/DL — LOW (ref 32–37)
MCV RBC AUTO: 82.4 FL — SIGNIFICANT CHANGE UP (ref 81–99)
MONOCYTES # BLD AUTO: 0.65 K/UL — HIGH (ref 0.1–0.6)
MONOCYTES NFR BLD AUTO: 7.4 % — SIGNIFICANT CHANGE UP (ref 1.7–9.3)
NEUTROPHILS # BLD AUTO: 5.56 K/UL — SIGNIFICANT CHANGE UP (ref 1.4–6.5)
NEUTROPHILS NFR BLD AUTO: 63.7 % — SIGNIFICANT CHANGE UP (ref 42.2–75.2)
NRBC # BLD: 0 /100 WBCS — SIGNIFICANT CHANGE UP (ref 0–0)
PLATELET # BLD AUTO: 144 K/UL — SIGNIFICANT CHANGE UP (ref 130–400)
PMV BLD: 11.9 FL — HIGH (ref 7.4–10.4)
POTASSIUM SERPL-MCNC: 3.6 MMOL/L — SIGNIFICANT CHANGE UP (ref 3.5–5)
POTASSIUM SERPL-SCNC: 3.6 MMOL/L — SIGNIFICANT CHANGE UP (ref 3.5–5)
PROT SERPL-MCNC: 5.1 G/DL — LOW (ref 6–8)
PROTHROM AB SERPL-ACNC: >40 SEC — HIGH (ref 9.95–12.87)
RBC # BLD: 5.01 M/UL — SIGNIFICANT CHANGE UP (ref 4.2–5.4)
RBC # FLD: 22.9 % — HIGH (ref 11.5–14.5)
SODIUM SERPL-SCNC: 135 MMOL/L — SIGNIFICANT CHANGE UP (ref 135–146)
SPECIMEN SOURCE: SIGNIFICANT CHANGE UP
URATE SERPL-MCNC: 8.4 MG/DL — HIGH (ref 2.5–7)
WBC # BLD: 8.73 K/UL — SIGNIFICANT CHANGE UP (ref 4.8–10.8)
WBC # FLD AUTO: 8.73 K/UL — SIGNIFICANT CHANGE UP (ref 4.8–10.8)

## 2023-09-17 PROCEDURE — 99233 SBSQ HOSP IP/OBS HIGH 50: CPT

## 2023-09-17 RX ORDER — VANCOMYCIN HCL 1 G
750 VIAL (EA) INTRAVENOUS EVERY 12 HOURS
Refills: 0 | Status: DISCONTINUED | OUTPATIENT
Start: 2023-09-17 | End: 2023-09-18

## 2023-09-17 RX ORDER — BACITRACIN ZINC 500 UNIT/G
1 OINTMENT IN PACKET (EA) TOPICAL
Refills: 0 | Status: DISCONTINUED | OUTPATIENT
Start: 2023-09-17 | End: 2023-09-19

## 2023-09-17 RX ORDER — CEFEPIME 1 G/1
1000 INJECTION, POWDER, FOR SOLUTION INTRAMUSCULAR; INTRAVENOUS EVERY 8 HOURS
Refills: 0 | Status: DISCONTINUED | OUTPATIENT
Start: 2023-09-17 | End: 2023-09-18

## 2023-09-17 RX ORDER — LOPERAMIDE HCL 2 MG
2 TABLET ORAL ONCE
Refills: 0 | Status: COMPLETED | OUTPATIENT
Start: 2023-09-17 | End: 2023-09-17

## 2023-09-17 RX ADMIN — Medication 5 MILLILITER(S): at 15:50

## 2023-09-17 RX ADMIN — Medication 100 MILLIGRAM(S): at 12:18

## 2023-09-17 RX ADMIN — Medication 50 MILLIGRAM(S): at 06:18

## 2023-09-17 RX ADMIN — CEFEPIME 100 MILLIGRAM(S): 1 INJECTION, POWDER, FOR SOLUTION INTRAMUSCULAR; INTRAVENOUS at 21:56

## 2023-09-17 RX ADMIN — Medication 10 MILLILITER(S): at 21:56

## 2023-09-17 RX ADMIN — Medication 2 MILLIGRAM(S): at 18:42

## 2023-09-17 RX ADMIN — Medication 250 MILLIGRAM(S): at 06:18

## 2023-09-17 RX ADMIN — CEFEPIME 100 MILLIGRAM(S): 1 INJECTION, POWDER, FOR SOLUTION INTRAMUSCULAR; INTRAVENOUS at 06:17

## 2023-09-17 RX ADMIN — SODIUM CHLORIDE 60 MILLILITER(S): 9 INJECTION, SOLUTION INTRAVENOUS at 12:18

## 2023-09-17 RX ADMIN — Medication 50 MILLIGRAM(S): at 18:43

## 2023-09-17 RX ADMIN — Medication 10 MILLILITER(S): at 06:17

## 2023-09-17 RX ADMIN — Medication 250 MILLIGRAM(S): at 18:44

## 2023-09-17 RX ADMIN — PANTOPRAZOLE SODIUM 40 MILLIGRAM(S): 20 TABLET, DELAYED RELEASE ORAL at 06:18

## 2023-09-17 NOTE — CONSULT NOTE ADULT - ASSESSMENT
ASSESSMENT  83F PMHx DVT/PE and pA-Fib on Coumadin, recent h/o necrotizing pneumonia 2/2 strep pneumo resulting in septic shocking requiring ICU, h/o anemia requiring transfusions, HTN, HLD, gout and h/o cholecystectomy presents to the ED for chest pain.    IMPRESSION  #Severe Sepsis present on admission  #CAP with risk of gram negative pneumonia/Nodular opacities  -  CT Angio Chest PE Protocol w/ IV Cont (09.16.23 @ 11:29): IMPRESSION: 1.  No pulmonary embolus. 2.  Since March 29, 2023, new and increased patchy bilateral nodular  opacities, which may be infectious/inflammatory or neoplastic in etiology. 3.  Chronic bilateral mosaic attenuation, interlobular septal thickening  and fibrotic changes. 4.  Unchanged bulky lower cervical and thoracic lymphadenopathy.    #Elevated T bili    #Hx of RLL pneumonia with  Strep pneumoniaebacteremia  - Blood Cx 3/27/2023 +    - Blood Cx 3/29 NG    #Atrial Fibrillation on coumadin  #Obesity BMI (kg/m2): 28.3, 28.3  #Abx allergy: No Known Allergies        RECOMMENDATIONS  - continue vancomycin -- decrease to 750 mg q 12 hours   - continue cefepime -- increase to 1g q 8 hours   - check procalcitonin   - check MRSA nares   -- if negative, stop vancomycin   - check urine legionella and urine strep antigen     Please call or message on [employer] Teams if with any questions.  Spectra [phone]

## 2023-09-17 NOTE — CONSULT NOTE ADULT - SUBJECTIVE AND OBJECTIVE BOX
RAJOEY  83y, Female  Allergy: No Known Allergies      CHIEF COMPLAINT: Chest Pain (16 Sep 2023 16:10)      LOS  1d    HPI:  83F PMHx DVT/PE and pA-Fib on Coumadin, recent h/o necrotizing pneumonia 2/2 strep pneumo resulting in septic shocking requiring ICU, h/o anemia requiring transfusions, HTN, HLD, gout and h/o cholecystectomy presents to the ED for chest pain. She states that her chest pain started last night which woke her up from sleep. She described the pain and running across her upper chest from shoulder to shoulder and radiating down towards her abdomen. Does not describe the chest pain as sharp or crushing, just constant. Nothing made it worse and remaining in bed improved the condition. She also admits to having a productive cough for 1x week w/ white sputum. Otherwise denies any fevers, chills, headache, changes in vision, n/v/d, abd pain, constipation, urinary complaints or lower extremity pain/swelling.    Vitals: Temp 99.1F, BP 99/70, , RR 20, SpO2 98% on RA    Labs: WBC 12.16 w/ neutrophil predominance and bandemia, Lactate 2.1,    Imaging:  - CXR shows Bilateral opacities/left focal atelectasis and Cardiomegaly  - CTA Chest -ve for PE, new and increased patchy bilateral nodular opacities, which may be infectious/inflammatory or neoplastic in etiology. Chronic bilateral mosaic attenuation, interlobular septal thickening and fibrotic changes.    In the ED:  - s/p 2L LR bolus  - s/p Cefepime and Vanc    Admitted to medicine for management of suspected CAP (16 Sep 2023 16:10)      INFECTIOUS DISEASE HISTORY:  History as above.  Presents for chest pain/pleuritic chest pain.   Associated with dry cough.   Denies shortness of breath.   Episode of diarrhea - denies nausea, vomiting, abdominal pain   Family also has been with coughing recently.   Given vancomycin /cefepime   Noted improvement in pleuritic chest pain       PAST MEDICAL & SURGICAL HISTORY:  H/O prothrombin mutation      Pulmonary embolism      HTN (hypertension)      Anemia requiring transfusions      Deep vein thrombosis (DVT)      S/P tonsillectomy      H/O: hysterectomy      History of cholecystectomy          FAMILY HISTORY  No pertinent family history in first degree relatives        SOCIAL HISTORY  Social History:        ROS  General: Denies rigors, nightsweats  HEENT: Denies headache, rhinorrhea, sore throat, eye pain  CV: Denies CP, palpitations  PULM: Denies wheezing, hemoptysis  GI: Denies hematemesis, hematochezia, melena  : Denies discharge, hematuria  MSK: Denies arthralgias, myalgias  SKIN: Denies rash, lesions  NEURO: Denies paresthesias, weakness  PSYCH: Denies depression, anxiety    VITALS:  T(F): 99.1, Max: 101.7 (09-16-23 @ 10:10)  HR: 97  BP: 99/62  RR: 18Vital Signs Last 24 Hrs  T(C): 37.3 (17 Sep 2023 05:08), Max: 38.7 (16 Sep 2023 10:10)  T(F): 99.1 (17 Sep 2023 05:08), Max: 101.7 (16 Sep 2023 10:10)  HR: 97 (17 Sep 2023 05:08) (88 - 107)  BP: 99/62 (17 Sep 2023 05:08) (70/40 - 127/74)  BP(mean): --  RR: 18 (17 Sep 2023 05:08) (18 - 20)  SpO2: 97% (16 Sep 2023 21:14) (97% - 98%)    Parameters below as of 16 Sep 2023 21:14  Patient On (Oxygen Delivery Method): room air        PHYSICAL EXAM:  Gen: NAD, resting in bed  HEENT: Normocephalic, atraumatic  Neck: supple, no lymphadenopathy  CV: Regular rate & regular rhythm  Lungs: decreased BS at bases, no fremitus  Abdomen: Soft, BS present  Ext: Warm, well perfused  Neuro: non focal, awake  Skin: no rash, no erythema  Lines: no phlebitis    TESTS & MEASUREMENTS:                        14.5  12.16 )-----------( 181      ( 16 Sep 2023 10:05 )             47.9    09-16    137  |  100  |  27<H>  ----------------------------<  131<H>  3.9   |  23  |  1.2    Ca    9.1      16 Sep 2023 10:06    TPro  6.6  /  Alb  4.2  /  TBili  1.8<H>  /  DBili  x   /  AST  16  /  ALT  <5  /  AlkPhos  70  09-16      LIVER FUNCTIONS - ( 16 Sep 2023 10:06 )  Alb: 4.2 g/dL / Pro: 6.6 g/dL / ALK PHOS: 70 U/L / ALT: <5 U/L / AST: 16 U/L / GGT: x          Urinalysis Basic - ( 16 Sep 2023 13:21 )    Color: Yellow / Appearance: Clear / SG: >1.030 / pH: x  Gluc: x / Ketone: Negative mg/dL  / Bili: Negative / Urobili: 1.0 mg/dL  Blood: x / Protein: 30 mg/dL / Nitrite: Negative  Leuk Esterase: Negative / RBC: 18 /HPF / WBC 3 /HPF  Sq Epi: x / Non Sq Epi: 8 /HPF / Bacteria: Occasional /HPF        Culture - Blood (collected 03-31-23 @ 05:23)  Source: .Blood None  Final Report (04-05-23 @ 18:01):    No Growth Final    Culture - Blood (collected 03-29-23 @ 05:27)  Source: .Blood None  Final Report (04-03-23 @ 18:00):    No Growth Final    Culture - Urine (collected 03-28-23 @ 22:55)  Source: Clean Catch Clean Catch (Midstream)  Final Report (03-30-23 @ 15:07):    <10,000 CFU/mL Normal Urogenital Breanne    Culture - Blood (collected 03-27-23 @ 14:10)  Source: .Blood None  Gram Stain (03-28-23 @ 13:30):    Growth in aerobic bottle: Gram Positive Cocci in Pairs and Chains    Growth in anaerobic bottle: Gram positive cocci in pairs  Final Report (03-30-23 @ 12:55):    Growth in aerobic and anaerobic bottles: Streptococcus pneumoniae    ***Blood Panel PCR results on this specimen are available    approximately 3 hours after the Gram stain result.***    Gram stain, PCR, and/or culture results may not always    correspond due to difference in methodologies.    ************************************************************    This PCR assay was performed by multiplex PCR. This    Assay tests for 66 bacterial and resistance gene targets.    Please refer to the [facility] test directory    at [email or web address] for details.  Organism: Blood Culture PCR  Streptococcus pneumoniae (03-30-23 @ 12:55)  Organism: Streptococcus pneumoniae (03-30-23 @ 12:55)      -  Levofloxacin: S 0.5      -  Penicillin (oral penicillin V): S <=0.03      -  Vancomycin: S 0.25      Method Type: FREDDY      -  Ceftriaxone (non-meningitidis): S <=0.25      -  Ceftriaxone (meningitidis): S <=0.25      -  Erythromycin: S <=0.06 Predicts results for azithromycin.      -  Penicillin (non-meningitidis): S <=0.03      -  Penicillin (meningitidis): S <=0.03      -  Trimethoprim/Sulfamethoxazole: S <=.25/4.75  Organism: Blood Culture PCR (03-30-23 @ 12:55)      -  Streptococcus pneumoniae: Detec      Method Type: PCR    Culture - Blood (collected 03-27-23 @ 14:09)  Source: .Blood None  Final Report (04-01-23 @ 23:00):    No Growth Final    Culture - Urine (collected 01-13-23 @ 01:46)  Source: Clean Catch Clean Catch (Midstream)  Final Report (01-14-23 @ 10:21):    >=3 organisms. Probable collection contamination.        Lactate, Blood: 2.1 mmol/L (09-16-23 @ 16:35)  Blood Gas Venous - Lactate: 1.60 mmol/L (09-16-23 @ 08:45)      INFECTIOUS DISEASES TESTING  COVID-19 PCR: NotDetec (04-03-23 @ 18:10)  Procalcitonin, Serum: 3.25 ng/mL (03-31-23 @ 05:23)  Legionella Antigen, Urine: Negative (03-28-23 @ 05:30)  Procalcitonin, Serum: 9.57 ng/mL (03-28-23 @ 04:30)  MRSA PCR Result.: Negative (03-27-23 @ 22:46)  COVID-19 PCR: NotDetec (01-12-23 @ 23:01)      RADIOLOGY & ADDITIONAL TESTS:  I have personally reviewed the last Chest xray  CXR  Xray Chest 1 View- PORTABLE-Urgent:  ACC: [id number] EXAM:  XR CHEST PORTABLE URGENT 1V   ORDERED BY: CLARIBEL BEARD    PROCEDURE DATE:  09/16/2023          INTERPRETATION:  Clinical History / Reason for exam: Pain    Comparison : Chest radiograph April 1, 2023.    Technique/Positioning: Frontal.    Findings:    Support devices: None.    Cardiac/mediastinum/hilum: Cardiomegaly, thoracic aortic calcification...    Lung parenchyma/Pleura: Bilateral opacities/left focal atelectasis. Left  lower lobe calcified granuloma    Skeleton/soft tissues: Stable. Dextroscoliosis..    Impression:    Bilateral opacities/left focal atelectasis. Cardiomegaly..        --- End of Report ---            ARA LEMUS MD; Attending Radiologist  This document has been electronically signed. Sep 16 2023  9:46AM (09-16-23 @ 09:01)      CT  CT Angio Chest PE Protocol w/ IV Cont:  ACC: [id number] EXAM:  CT ANGIO CHEST PULM [facility]   ORDERED BY: CLARIBEL BEARD    PROCEDURE DATE:  09/16/2023          INTERPRETATION:  CLINICAL STATEMENT: Chest pain.    TECHNIQUE: Pulmonary embolism protocol. Multislice helical sections were  obtained from the thoracic inlet to the lung bases during rapid  administration of intravenous contrast. Thin sections were reconstructed  through the pulmonary vasculature. Coronal and sagittal reformatted  images and 3D MIPs are also submitted.    COMPARISON: 3/29/2023.    FINDINGS:    PULMONARY EMBOLUS: No pulmonary embolus.    TUBES/LINES: None    LUNGS, PLEURA, AND AIRWAYS: Near complete resolution of the right lung  consolidation. New and increased patchy nodular opacities. For example,  new nodular opacity in the right upper lobe (4/46) and in the left upper  lobe (4/58).  Chronic bilateral mosaic attenuation and interlobular septal thickening  and groundglass opacities with bronchiectasis/fibrotic changes. Left lung  calcified granulomas.    MEDIASTINUM/LYMPH NODES: Bulky lower thoracic and intrathoracic  lymphadenopathy. For example:    -Subcarinal 4.7 x 2.5 cm lymph node    -Right hilar lymph node measuring 2.7 x 1.6 cm pneumoperitoneum due to    -Left axillary lymph node in total measuring up to 6 cm    HEART/GREAT VESSELS: Cardiomegaly. Right heart dilation with reflux of  contrast into the IVC and hepatic veins. Scattered atherosclerotic  vascular calcifications.    BONES/SOFT TISSUES: Osteopenia. Degenerative changes of the spine noted.    VISUALIZED UPPER ABDOMEN: Moderate size hiatal hernia.    IMPRESSION:  1.  No pulmonary embolus.  2.  Since March 29, 2023, new and increased patchy bilateral nodular  opacities, which may be infectious/inflammatory or neoplastic in etiology.  3.  Chronic bilateral mosaic attenuation, interlobular septal thickening  and fibrotic changes.  4.  Unchanged bulky lower cervical and thoracic lymphadenopathy.    --- End of Report ---            DAMION FATIMA MD; Attending Radiologist  This document has been electronically signed. Sep 16 2023 12:19PM (09-16-23 @ 11:29)      CARDIOLOGY TESTING  12 Lead ECG:  Ventricular Rate 121 BPM    Atrial Rate 121 BPM    P-R Interval 176 ms    QRS Duration 74 ms    Q-T Interval 352 ms    QTC Calculation(Bazett) 499 ms    P Axis 4 degrees    R Axis 181 degrees    T Axis 84 degrees    Diagnosis Line Atrial flutter with variable block  Possible Left atrial enlargement  tachycardic  Confirmed by Felix Dan ([id number]) on 9/16/2023 9:42:58 AM (09-16-23 @ 07:44)      MEDICATIONS  allopurinol 100 Oral daily  cefepime   IVPB 1000 IV Intermittent every 12 hours  guaifenesin/dextromethorphan Oral Liquid 10 Oral every 8 hours  influenza  Vaccine (HIGH DOSE) 0.7 IntraMuscular once  lactated ringers. 1000 IV Continuous <Continuous>  metoprolol tartrate 50 Oral every 12 hours  pantoprazole    Tablet 40 Oral before breakfast  sodium chloride 0.9% for Nebulization 3 Nebulizer every 6 hours  vancomycin  IVPB 1000 IV Intermittent every 12 hours      Weight  Weight (kg): 74.8 (09-11-23 @ 16:41)    ANTIBIOTICS:  cefepime   IVPB 1000 milliGRAM(s) IV Intermittent every 12 hours  vancomycin  IVPB 1000 milliGRAM(s) IV Intermittent every 12 hours      ALLERGIES:  No Known Allergies

## 2023-09-18 LAB
ALBUMIN SERPL ELPH-MCNC: 3.5 G/DL — SIGNIFICANT CHANGE UP (ref 3.5–5.2)
ALP SERPL-CCNC: 59 U/L — SIGNIFICANT CHANGE UP (ref 30–115)
ALT FLD-CCNC: <5 U/L — SIGNIFICANT CHANGE UP (ref 0–41)
ANION GAP SERPL CALC-SCNC: 12 MMOL/L — SIGNIFICANT CHANGE UP (ref 7–14)
APTT BLD: 40.8 SEC — HIGH (ref 27–39.2)
AST SERPL-CCNC: 11 U/L — SIGNIFICANT CHANGE UP (ref 0–41)
BASOPHILS # BLD AUTO: 0.02 K/UL — SIGNIFICANT CHANGE UP (ref 0–0.2)
BASOPHILS NFR BLD AUTO: 0.2 % — SIGNIFICANT CHANGE UP (ref 0–1)
BILIRUB SERPL-MCNC: 1.2 MG/DL — SIGNIFICANT CHANGE UP (ref 0.2–1.2)
BUN SERPL-MCNC: 20 MG/DL — SIGNIFICANT CHANGE UP (ref 10–20)
CALCIUM SERPL-MCNC: 8.7 MG/DL — SIGNIFICANT CHANGE UP (ref 8.4–10.5)
CHLORIDE SERPL-SCNC: 101 MMOL/L — SIGNIFICANT CHANGE UP (ref 98–110)
CO2 SERPL-SCNC: 22 MMOL/L — SIGNIFICANT CHANGE UP (ref 17–32)
CREAT SERPL-MCNC: 0.9 MG/DL — SIGNIFICANT CHANGE UP (ref 0.7–1.5)
EGFR: 63 ML/MIN/1.73M2 — SIGNIFICANT CHANGE UP
EOSINOPHIL # BLD AUTO: 0.42 K/UL — SIGNIFICANT CHANGE UP (ref 0–0.7)
EOSINOPHIL NFR BLD AUTO: 5.2 % — SIGNIFICANT CHANGE UP (ref 0–8)
GLUCOSE SERPL-MCNC: 137 MG/DL — HIGH (ref 70–99)
HCT VFR BLD CALC: 40.9 % — SIGNIFICANT CHANGE UP (ref 37–47)
HGB BLD-MCNC: 12.3 G/DL — SIGNIFICANT CHANGE UP (ref 12–16)
IMM GRANULOCYTES NFR BLD AUTO: 0.2 % — SIGNIFICANT CHANGE UP (ref 0.1–0.3)
INR BLD: 2.94 RATIO — HIGH (ref 0.65–1.3)
LYMPHOCYTES # BLD AUTO: 1.95 K/UL — SIGNIFICANT CHANGE UP (ref 1.2–3.4)
LYMPHOCYTES # BLD AUTO: 24.1 % — SIGNIFICANT CHANGE UP (ref 20.5–51.1)
MAGNESIUM SERPL-MCNC: 1.6 MG/DL — LOW (ref 1.8–2.4)
MCHC RBC-ENTMCNC: 25.5 PG — LOW (ref 27–31)
MCHC RBC-ENTMCNC: 30.1 G/DL — LOW (ref 32–37)
MCV RBC AUTO: 84.7 FL — SIGNIFICANT CHANGE UP (ref 81–99)
MONOCYTES # BLD AUTO: 0.68 K/UL — HIGH (ref 0.1–0.6)
MONOCYTES NFR BLD AUTO: 8.4 % — SIGNIFICANT CHANGE UP (ref 1.7–9.3)
MRSA PCR RESULT.: NEGATIVE — SIGNIFICANT CHANGE UP
NEUTROPHILS # BLD AUTO: 4.99 K/UL — SIGNIFICANT CHANGE UP (ref 1.4–6.5)
NEUTROPHILS NFR BLD AUTO: 61.9 % — SIGNIFICANT CHANGE UP (ref 42.2–75.2)
NRBC # BLD: 0 /100 WBCS — SIGNIFICANT CHANGE UP (ref 0–0)
PLATELET # BLD AUTO: 144 K/UL — SIGNIFICANT CHANGE UP (ref 130–400)
PMV BLD: 11.6 FL — HIGH (ref 7.4–10.4)
POTASSIUM SERPL-MCNC: 3.2 MMOL/L — LOW (ref 3.5–5)
POTASSIUM SERPL-SCNC: 3.2 MMOL/L — LOW (ref 3.5–5)
PROCALCITONIN SERPL-MCNC: 1.87 NG/ML — HIGH (ref 0.02–0.1)
PROT SERPL-MCNC: 5.1 G/DL — LOW (ref 6–8)
PROTHROM AB SERPL-ACNC: 34.7 SEC — HIGH (ref 9.95–12.87)
RBC # BLD: 4.83 M/UL — SIGNIFICANT CHANGE UP (ref 4.2–5.4)
RBC # FLD: 22.5 % — HIGH (ref 11.5–14.5)
SODIUM SERPL-SCNC: 135 MMOL/L — SIGNIFICANT CHANGE UP (ref 135–146)
WBC # BLD: 8.08 K/UL — SIGNIFICANT CHANGE UP (ref 4.8–10.8)
WBC # FLD AUTO: 8.08 K/UL — SIGNIFICANT CHANGE UP (ref 4.8–10.8)

## 2023-09-18 PROCEDURE — 99233 SBSQ HOSP IP/OBS HIGH 50: CPT

## 2023-09-18 RX ORDER — WARFARIN SODIUM 2.5 MG/1
1 TABLET ORAL ONCE
Refills: 0 | Status: COMPLETED | OUTPATIENT
Start: 2023-09-18 | End: 2023-09-18

## 2023-09-18 RX ORDER — MAGNESIUM SULFATE 500 MG/ML
2 VIAL (ML) INJECTION ONCE
Refills: 0 | Status: COMPLETED | OUTPATIENT
Start: 2023-09-18 | End: 2023-09-18

## 2023-09-18 RX ADMIN — CEFEPIME 100 MILLIGRAM(S): 1 INJECTION, POWDER, FOR SOLUTION INTRAMUSCULAR; INTRAVENOUS at 05:02

## 2023-09-18 RX ADMIN — Medication 100 MILLIGRAM(S): at 13:02

## 2023-09-18 RX ADMIN — Medication 50 MILLIGRAM(S): at 05:02

## 2023-09-18 RX ADMIN — Medication 150 GRAM(S): at 15:21

## 2023-09-18 RX ADMIN — Medication 250 MILLIGRAM(S): at 05:03

## 2023-09-18 RX ADMIN — CEFEPIME 100 MILLIGRAM(S): 1 INJECTION, POWDER, FOR SOLUTION INTRAMUSCULAR; INTRAVENOUS at 13:03

## 2023-09-18 RX ADMIN — WARFARIN SODIUM 1 MILLIGRAM(S): 2.5 TABLET ORAL at 21:52

## 2023-09-18 RX ADMIN — PANTOPRAZOLE SODIUM 40 MILLIGRAM(S): 20 TABLET, DELAYED RELEASE ORAL at 05:04

## 2023-09-18 RX ADMIN — Medication 50 MILLIGRAM(S): at 17:17

## 2023-09-18 NOTE — PHYSICAL THERAPY INITIAL EVALUATION ADULT - PATIENT PROFILE REVIEW, REHAB EVAL
1:15-1:40pm/yes Cheilitis Aggressive Treatment: I recommended application of Vaseline or Aquaphor numerous times a day (as often as every hour) and before going to bed. I also prescribed a topical steroid for twice daily use. Hypertriglyceridemia Treatment: I explained this is common when taking isotretinoin. If this worsens they will contact us. They may try OTC ibuprofen. Calculate Months Of Therapy Based On Documented Dosages (Will Hide Months Of Therapy Question)?: No Counseling Text: I reviewed the side effect in detail. Patient should get monthly blood tests, not donate blood, not drive at night if vision affected, and not share medication. Female Pregnancy Counseling Text: Female patients should also be on two forms of birth control while taking this medication and for one month after their last dose. Are Labs Available For Review?: Yes Use Therapeutic Ranged Or Therapeutic Target: please select Range or Target Headache Monitoring: I recommended monitoring the headaches for now. There is no evidence of increased intracranial pressure. They were instructed to call if the headaches are worsening. Xerosis Normal Treatment: I recommended application of Cetaphil or CeraVe numerous times a day going to bed to all dry areas. Retinoid Dermatitis Normal Treatment: I recommended more frequent application of Cetaphil or CeraVe to the areas of dermatitis. Hypercholesterolemia Monitoring: I explained this is common when taking isotretinoin. We will monitor closely. Next Month's Dosage: 40mg BID Nosebleeds Normal Treatment: I explained this is common when taking isotretinoin. I recommended saline mist in each nostril multiple times a day. If this worsens they will contact us. Pounds Preamble Statement (Weight Entered In Details Tab): Reported Weight in pounds: Xerosis Aggressive Treatment: I recommended application of Cetaphil or CeraVe numerous times a day going to bed to all dry areas. I also prescribed a topical steroid for twice daily use. Weight Units: pounds Kilograms Preamble Statement (Weight Entered In Details Tab): Reported Weight in kilograms: Lower Range (In Mg/Kg): 120 Retinoid Dermatitis Aggressive Treatment: I recommended more frequent application of Cetaphil or CeraVe to the areas of dermatitis. I also prescribed a topical steroid for twice daily use until the dermatitis resolves. Female Completion Statement: After discussing her treatment course we decided to discontinue isotretinoin therapy at this time. I explained that she would need to continue her birth control methods for at least one month after the last dosage. She should also get a pregnancy test one month after the last dose. She shouldn't donate blood for one month after the last dose. She should call with any new symptoms of depression. Patient Weight (Optional But Required For Cumulative Dose-Numbers And Decimals Only): 180 Male Completion Statement: After discussing his treatment course we decided to discontinue isotretinoin therapy at this time. He shouldn't donate blood for one month after the last dose. He should call with any new symptoms of depression. Months Of Therapy Completed: 4 Cheilitis Normal Treatment: I recommended application of Vaseline or Aquaphor numerous times a day (as often as every hour) and before going to bed. Myalgia Monitoring: I explained this is common when taking isotretinoin. If this worsens they will contact us. Xerosis Normal Treatment: I recommended application of Cetaphil or CeraVe numerous times a day and before going to bed to all dry areas. Retinoid Dermatitis Normal Treatment: I recommended more frequent application of moisturizers. Upper Range (In Mg/Kg): 150 Dosing Month 1 (Required For Cumulative Dosing): 40mg Daily Xerosis Aggressive Treatment: I recommended application of Cetaphil or CeraVe numerous times a day and before going to bed to all dry areas. I also prescribed a topical steroid for twice daily use. Target Cumulative Dosage (In Mg/Kg): 135 Dosing Month 2 (Required For Cumulative Dosing): 30mg BID Detail Level: Zone What Is The Patient's Gender: Male

## 2023-09-18 NOTE — PHYSICAL THERAPY INITIAL EVALUATION ADULT - ADL SKILLS, REHAB EVAL
Medication refill request for DULoxetine (CYMBALTA) 30 MG capsule.     Last Written Prescription Date:  12/2/2022  Last Fill Quantity: 60,  # refills: 0  Last office visit provider:  6/2/2022    Next appointment scheduled:   1/16/2023 11:00 AM (Arrive by 10:45 AM) Gale Montero MD [facility]       Medication T'd for review and signature    MANISH Garcia on 12/29/2022 at 10:52 AM     independent

## 2023-09-18 NOTE — PHYSICAL THERAPY INITIAL EVALUATION ADULT - GENERAL OBSERVATIONS, REHAB EVAL
1:15-1:40pm Pt encountered supine in bed, A & O x 4 in NAD, +IV, no c/o pain and agreeable to PT. Pt requires CGA in bed mobility, transfers and ambulation 15 ft using RW with dec ashley/kyphotic posture. Pt declined to ambulate further stated she doesn't want to trigger the pain in her back(h/o chronic back pain). Pt left in bed as found as per pt request. Pt will benefit from skilled PT 3-5x/wk for thera ex, functional mobility training, balance and gait training.

## 2023-09-18 NOTE — PHYSICAL THERAPY INITIAL EVALUATION ADULT - PERTINENT HX OF CURRENT PROBLEM, REHAB EVAL
83F PMHx DVT/PE and pA-Fib on Coumadin, recent h/o necrotizing pneumonia 2/2 strep pneumo resulting in septic shocking requiring ICU, h/o anemia requiring transfusions, HTN, HLD, gout, h/o cholecystectomy presents to the ED for chest pain.  Imaging:  - CXR shows Bilateral opacities/left focal atelectasis and Cardiomegaly  - CTA Chest -ve for PE, new and increased patchy bilateral nodular opacities, which may be infectious/inflammatory or neoplastic in etiology. Chronic bilateral mosaic attenuation, interlobular septal thickening and fibrotic changes.

## 2023-09-19 ENCOUNTER — TRANSCRIPTION ENCOUNTER (OUTPATIENT)
Age: 83
End: 2023-09-19

## 2023-09-19 VITALS
SYSTOLIC BLOOD PRESSURE: 107 MMHG | TEMPERATURE: 98 F | RESPIRATION RATE: 18 BRPM | HEART RATE: 88 BPM | DIASTOLIC BLOOD PRESSURE: 76 MMHG

## 2023-09-19 LAB
ALBUMIN SERPL ELPH-MCNC: 3.4 G/DL — LOW (ref 3.5–5.2)
ALP SERPL-CCNC: 59 U/L — SIGNIFICANT CHANGE UP (ref 30–115)
ALT FLD-CCNC: <5 U/L — SIGNIFICANT CHANGE UP (ref 0–41)
ANION GAP SERPL CALC-SCNC: 12 MMOL/L — SIGNIFICANT CHANGE UP (ref 7–14)
APTT BLD: 47.7 SEC — HIGH (ref 27–39.2)
AST SERPL-CCNC: 13 U/L — SIGNIFICANT CHANGE UP (ref 0–41)
BILIRUB SERPL-MCNC: 1 MG/DL — SIGNIFICANT CHANGE UP (ref 0.2–1.2)
BUN SERPL-MCNC: 18 MG/DL — SIGNIFICANT CHANGE UP (ref 10–20)
CALCIUM SERPL-MCNC: 8.6 MG/DL — SIGNIFICANT CHANGE UP (ref 8.4–10.5)
CHLORIDE SERPL-SCNC: 101 MMOL/L — SIGNIFICANT CHANGE UP (ref 98–110)
CO2 SERPL-SCNC: 22 MMOL/L — SIGNIFICANT CHANGE UP (ref 17–32)
CREAT SERPL-MCNC: 1 MG/DL — SIGNIFICANT CHANGE UP (ref 0.7–1.5)
CULTURE RESULTS: SIGNIFICANT CHANGE UP
EGFR: 56 ML/MIN/1.73M2 — LOW
GLUCOSE SERPL-MCNC: 88 MG/DL — SIGNIFICANT CHANGE UP (ref 70–99)
HCT VFR BLD CALC: 42.5 % — SIGNIFICANT CHANGE UP (ref 37–47)
HGB BLD-MCNC: 12.6 G/DL — SIGNIFICANT CHANGE UP (ref 12–16)
INR BLD: 2.53 RATIO — HIGH (ref 0.65–1.3)
MCHC RBC-ENTMCNC: 25 PG — LOW (ref 27–31)
MCHC RBC-ENTMCNC: 29.6 G/DL — LOW (ref 32–37)
MCV RBC AUTO: 84.5 FL — SIGNIFICANT CHANGE UP (ref 81–99)
NRBC # BLD: 0 /100 WBCS — SIGNIFICANT CHANGE UP (ref 0–0)
PLATELET # BLD AUTO: 168 K/UL — SIGNIFICANT CHANGE UP (ref 130–400)
PMV BLD: 11.5 FL — HIGH (ref 7.4–10.4)
POTASSIUM SERPL-MCNC: 4.1 MMOL/L — SIGNIFICANT CHANGE UP (ref 3.5–5)
POTASSIUM SERPL-SCNC: 4.1 MMOL/L — SIGNIFICANT CHANGE UP (ref 3.5–5)
PROT SERPL-MCNC: 5.3 G/DL — LOW (ref 6–8)
PROTHROM AB SERPL-ACNC: 29.7 SEC — HIGH (ref 9.95–12.87)
RBC # BLD: 5.03 M/UL — SIGNIFICANT CHANGE UP (ref 4.2–5.4)
RBC # FLD: 22.5 % — HIGH (ref 11.5–14.5)
SODIUM SERPL-SCNC: 135 MMOL/L — SIGNIFICANT CHANGE UP (ref 135–146)
SPECIMEN SOURCE: SIGNIFICANT CHANGE UP
WBC # BLD: 7.44 K/UL — SIGNIFICANT CHANGE UP (ref 4.8–10.8)
WBC # FLD AUTO: 7.44 K/UL — SIGNIFICANT CHANGE UP (ref 4.8–10.8)

## 2023-09-19 PROCEDURE — 99239 HOSP IP/OBS DSCHRG MGMT >30: CPT

## 2023-09-19 RX ORDER — WARFARIN SODIUM 2.5 MG/1
1 TABLET ORAL ONCE
Refills: 0 | Status: DISCONTINUED | OUTPATIENT
Start: 2023-09-19 | End: 2023-09-19

## 2023-09-19 RX ORDER — BACITRACIN ZINC 500 UNIT/G
1 OINTMENT IN PACKET (EA) TOPICAL
Qty: 0 | Refills: 0 | DISCHARGE
Start: 2023-09-19

## 2023-09-19 RX ORDER — LEVOFLOXACIN 5 MG/ML
1 INJECTION, SOLUTION INTRAVENOUS
Qty: 4 | Refills: 0
Start: 2023-09-19 | End: 2023-09-22

## 2023-09-19 RX ORDER — WARFARIN SODIUM 2.5 MG/1
2 TABLET ORAL ONCE
Refills: 0 | Status: COMPLETED | OUTPATIENT
Start: 2023-09-19 | End: 2023-09-19

## 2023-09-19 RX ADMIN — Medication 100 MILLIGRAM(S): at 11:13

## 2023-09-19 RX ADMIN — WARFARIN SODIUM 2 MILLIGRAM(S): 2.5 TABLET ORAL at 16:53

## 2023-09-19 RX ADMIN — PANTOPRAZOLE SODIUM 40 MILLIGRAM(S): 20 TABLET, DELAYED RELEASE ORAL at 05:51

## 2023-09-19 RX ADMIN — Medication 50 MILLIGRAM(S): at 05:51

## 2023-09-19 NOTE — DISCHARGE NOTE NURSING/CASE MANAGEMENT/SOCIAL WORK - PATIENT PORTAL LINK FT
You can access the FollowMyHealth Patient Portal offered by [facility] by registering at the following website: [email or web address]. By joining [employer]’s FollowMyHealth portal, you will also be able to view your health information using other applications (apps) compatible with our system.

## 2023-09-19 NOTE — DISCHARGE NOTE PROVIDER - INSTRUCTIONS
DASH diet with fiber/residue restriction, sodium restriction and cholesterol restriction. 1200mL fluid restriction.

## 2023-09-19 NOTE — DISCHARGE NOTE NURSING/CASE MANAGEMENT/SOCIAL WORK - NSDCVIVACCINE_GEN_ALL_CORE_FT
Tdap; 21-Aug-2022 14:03; Kecia Cedillo (RN); [employer]; [id number] (Exp. Date: 22-Sep-2024); IntraMuscular; Deltoid Left.; 0.5 milliLiter(s); VIS (VIS Published: 09-May-2013, VIS Presented: 21-Aug-2022);

## 2023-09-19 NOTE — DISCHARGE NOTE PROVIDER - HOSPITAL COURSE
History of Present Illness:   83F PMHx DVT/PE and pA-Fib on Coumadin, recent h/o necrotizing pneumonia 2/2 strep pneumo resulting in septic shocking requiring ICU, h/o anemia requiring transfusions, HTN, HLD, gout and h/o cholecystectomy presents to the ED for chest pain. She states that her chest pain started last night which woke her up from sleep. She described the pain and running across her upper chest from shoulder to shoulder and radiating down towards her abdomen. Does not describe the chest pain as sharp or crushing, just constant. Nothing made it worse and remaining in bed improved the condition. She also admits to having a productive cough for 1x week w/ white sputum. Otherwise denies any fevers, chills, headache, changes in vision, n/v/d, abd pain, constipation, urinary complaints or lower extremity pain/swelling.    Vitals: Temp 99.1F, BP 99/70, , RR 20, SpO2 98% on RA    Labs: WBC 12.16 w/ neutrophil predominance and bandemia, Lactate 2.1,     Imaging:  - CXR shows Bilateral opacities/left focal atelectasis and Cardiomegaly  - CTA Chest -ve for PE, new and increased patchy bilateral nodular opacities, which may be infectious/inflammatory or neoplastic in etiology. Chronic bilateral mosaic attenuation, interlobular septal thickening and fibrotic changes.    In the ED:  - s/p 2L LR bolus  - s/p Cefepime and Vanc    Admitted to medicine for management of suspected CAP    Hospital course:  pt received vanc and cefepime and robitussin. She remained afebrile and satting well on RA. MRSA negative. Per ID, abx changed to levofloxacin. Pt was having diarrhea which per pt is chronic and likely worsened by abx. She had an MAGDALENO and elevated lactate which resolved with IV hydration.     Coumadin was continued at lower dose per INR.   Metoprolol was continued and HCTZ held due to low BP (100's/60-70)    Allopurinol continued for gout.     Pt feels well and is stable for DC. History of Present Illness:   83F PMHx DVT/PE and pA-Fib on Coumadin, recent h/o necrotizing pneumonia 2/2 strep pneumo resulting in septic shocking requiring ICU, h/o anemia requiring transfusions, HTN, HLD, gout and h/o cholecystectomy presents to the ED for chest pain. She states that her chest pain started last night which woke her up from sleep. She described the pain and running across her upper chest from shoulder to shoulder and radiating down towards her abdomen. Does not describe the chest pain as sharp or crushing, just constant. Nothing made it worse and remaining in bed improved the condition. She also admits to having a productive cough for 1x week w/ white sputum. Otherwise denies any fevers, chills, headache, changes in vision, n/v/d, abd pain, constipation, urinary complaints or lower extremity pain/swelling.    Vitals: Temp 99.1F, BP 99/70, , RR 20, SpO2 98% on RA    Labs: WBC 12.16 w/ neutrophil predominance and bandemia, Lactate 2.1,     Imaging:  - CXR shows Bilateral opacities/left focal atelectasis and Cardiomegaly  - CTA Chest -ve for PE, new and increased patchy bilateral nodular opacities, which may be infectious/inflammatory or neoplastic in etiology. Chronic bilateral mosaic attenuation, interlobular septal thickening and fibrotic changes.    In the ED:  - s/p 2L LR bolus  - s/p Cefepime and Vanc    Admitted to medicine for management of suspected CAP    Hospital course:  pt received vanc and cefepime and robitussin. She remained afebrile and satting well on RA. MRSA negative. Per ID, abx changed to levofloxacin. Pt was having diarrhea which per pt is chronic and likely worsened by abx. She had an MAGDALENO and elevated lactate which resolved with IV hydration.     Coumadin was continued at lower dose per INR.   Metoprolol was continued and HCTZ held due to low BP (100's/60-70)    Allopurinol continued for gout.     Pt feels well and is stable for DC.   Per ID f/u CXR in 4 weeks

## 2023-09-19 NOTE — PROGRESS NOTE ADULT - SUBJECTIVE AND OBJECTIVE BOX
83F PMHx DVT/PE and pA-Fib on Coumadin, recent h/o necrotizing pneumonia 2/2 strep pneumo resulting in septic shocking requiring ICU, h/o anemia requiring transfusions, HTN, HLD, gout, h/o cholecystectomy presents to the ED for chest pain, admitted for PNA and pleuritic chest pain.   Today pt feels better, has no complaints, asking about discharge.     PAST MEDICAL & SURGICAL HISTORY:  H/O prothrombin mutation  Pulmonary embolism  HTN (hypertension)  Anemia requiring transfusions  Deep vein thrombosis (DVT)  S/P tonsillectomy  H/O: hysterectomy  History of cholecystectomy      Vital Signs Last 24 Hrs  T(C): 36.6 (19 Sep 2023 14:48), Max: 37.7 (18 Sep 2023 21:22)  T(F): 97.9 (19 Sep 2023 14:48), Max: 99.8 (18 Sep 2023 21:22)  HR: 88 (19 Sep 2023 14:48) (87 - 88)  BP: 107/76 (19 Sep 2023 14:48) (102/70 - 107/76)  BP(mean): --  RR: 18 (19 Sep 2023 14:48) (18 - 18)        PHYSICAL EXAM:  GENERAL: NAD, frail pleasant elderly female   HEAD:  Atraumatic, Normocephalic  EYES: EOMI, PERRLA, conjunctiva and sclera clear  ENMT: No tonsillar erythema, exudates, or enlargement; Moist mucous membranes, Good dentition, No lesions  NECK: Supple, No JVD, Normal thyroid  NERVOUS SYSTEM:  Alert & Oriented X3, Good concentration; Motor Strength 5/5 B/L upper and lower extremities; DTRs 2+ intact and symmetric  CHEST/LUNG: decreased BS at bases, no wheezing   HEART: Regular rate and rhythm; No murmurs, rubs, or gallops  ABDOMEN: Soft, Nontender, Nondistended; Bowel sounds present  EXTREMITIES:  2+ Peripheral Pulses, No clubbing, cyanosis, or edema  LYMPH: No lymphadenopathy noted  SKIN: No rashes or lesions      LABS:                                   12.6   7.44  )-----------( 168      ( 19 Sep 2023 12:26 )             42.5   09-19    135  |  101  |  18  ----------------------------<  88  4.1   |  22  |  1.0    Ca    8.6      19 Sep 2023 12:26  Mg     1.6     09-18    TPro  5.3<L>  /  Alb  3.4<L>  /  TBili  1.0  /  DBili  x   /  AST  13  /  ALT  <5  /  AlkPhos  59  09-19    GI PCR Panel: [facility]: GI Panel PCR evaluates for:   Campylobacter, Plesiomonas shigelloides, Salmonella, Vibrio, Yersinia   enterocolitica, Enteroaggregative Escherichia (EAEC), Enteropathogenic E.   coli (EPEC), Enterotoxigenic E. coli (ETEC), Shiga-like toxin producing   E.coli (STEC), E. coli O157, Shigella/Enteroinvasive E. coli (EIEC),   Adenovirus, Astrovirus, Norovirus, Rotavirus, Sapovirus, Cryptosporidium,   Cyclospora cayetanensis, Entamoeba histolytica, Giardia lamblia.   For culture and susceptibility reports refer to "reflex stool culture". (09.17.23 @ 08:57)      PTT - ( 16 Sep 2023 10:05 )  PTT:46.8 sec  Urinalysis Basic - ( 17 Sep 2023 08:18 )    Color: x / Appearance: x / SG: x / pH: x  Gluc: 143 mg/dL / Ketone: x  / Bili: x / Urobili: x   Blood: x / Protein: x / Nitrite: x   Leuk Esterase: x / RBC: x / WBC x   Sq Epi: x / Non Sq Epi: x / Bacteria: x    RADIOLOGY & ADDITIONAL TESTS:    < from: CT Angio Chest PE Protocol w/ IV Cont (09.16.23 @ 11:29) >    IMPRESSION:  1.  No pulmonary embolus.  2.  Since March 29, 2023, new and increased patchy bilateral nodular   opacities, which may be infectious/inflammatory or neoplastic in etiology.  3.  Chronic bilateral mosaic attenuation, interlobular septal thickening   and fibrotic changes.  4.  Unchanged bulky lower cervical and thoracic lymphadenopathy.    < end of copied text >  < from: Xray Chest 1 View- PORTABLE-Urgent (09.16.23 @ 09:01) >    Impression:    Bilateral opacities/left focal atelectasis. Cardiomegaly..    < end of copied text >  < from: TTE Echo Complete w/o Contrast w/ Doppler (03.29.23 @ 09:22) >      Summary:   1. LV Ejection Fraction by Rodríguez's Method with a biplane EF of 56 %.   2. Mild to moderately enlarged left atrium.   3. Mildly enlarged right atrium.   4. There is no evidence of pericardial effusion.   5. Mild mitral annular calcification.   6. Moderate mitral valve regurgitation.   7. Moderate-severe tricuspid regurgitation.   8. Normal trileaflet aortic valve with normal opening.   9. Estimated pulmonary artery systolic pressure is 36.8 mmHg assuming a   right atrial pressure of 3 mmHg, which is consistent with borderline   pulmonary hypertension.      MEDICATIONS  (STANDING):  allopurinol 100 milliGRAM(s) Oral daily  influenza  Vaccine (HIGH DOSE) 0.7 milliLiter(s) IntraMuscular once  levoFLOXacin  Tablet 750 milliGRAM(s) Oral every 24 hours  metoprolol tartrate 50 milliGRAM(s) Oral every 12 hours  pantoprazole    Tablet 40 milliGRAM(s) Oral before breakfast  sodium chloride 0.9% for Nebulization 3 milliLiter(s) Nebulizer every 6 hours    MEDICATIONS  (PRN):  acetaminophen     Tablet .. 650 milliGRAM(s) Oral every 6 hours PRN Temp greater or equal to 38C (100.4F), Mild Pain (1 - 3)  aluminum hydroxide/magnesium hydroxide/simethicone Suspension 30 milliLiter(s) Oral every 4 hours PRN Dyspepsia  bacitracin   Ointment 1 Application(s) Topical two times a day PRN post IV wound care  melatonin 3 milliGRAM(s) Oral at bedtime PRN Insomnia  ondansetron Injectable 4 milliGRAM(s) IV Push every 8 hours PRN Nausea and/or Vomiting          
RAJOEY  83y, Female  Allergy: No Known Allergies      LOS  3d    CHIEF COMPLAINT: Chest Pain (18 Sep 2023 14:15)      INTERVAL EVENTS/HPI  - No acute events overnight  - T(F): , Max: 99.8 (09-18-23 @ 21:22)  - WBC Count: 8.08 (09-18-23 @ 06:20)  WBC Count: 8.73 (09-17-23 @ 08:18)     - Creatinine: 0.9 (09-18-23 @ 06:20)       ROS  General: Denies rigors, nightsweats  HEENT: Denies headache, rhinorrhea, sore throat, eye pain  CV: Denies CP, palpitations  PULM: Denies wheezing, hemoptysis  GI: Denies hematemesis, hematochezia, melena  : Denies discharge, hematuria  MSK: Denies arthralgias, myalgias  SKIN: Denies rash, lesions  NEURO: Denies paresthesias, weakness  PSYCH: Denies depression, anxiety    VITALS:  T(F): 97.4, Max: 99.8 (09-18-23 @ 21:22)  HR: 87  BP: 102/70  RR: 18Vital Signs Last 24 Hrs  T(C): 36.3 (19 Sep 2023 05:02), Max: 37.7 (18 Sep 2023 21:22)  T(F): 97.4 (19 Sep 2023 05:02), Max: 99.8 (18 Sep 2023 21:22)  HR: 87 (19 Sep 2023 05:02) (87 - 98)  BP: 102/70 (19 Sep 2023 05:02) (102/70 - 109/60)  BP(mean): --  RR: 18 (19 Sep 2023 05:02) (18 - 18)  SpO2: --        PHYSICAL EXAM:  Gen: NAD, resting in bed  HEENT: Normocephalic, atraumatic  Neck: supple, no lymphadenopathy  CV: Regular rate & regular rhythm  Lungs: decreased BS at bases, no fremitus  Abdomen: Soft, BS present  Ext: Warm, well perfused  Neuro: non focal, awake  Skin: no rash, no erythema  Lines: no phlebitis    FH: Non-contributory  Social Hx: Non-contributory    TESTS & MEASUREMENTS:                        12.3   8.08  )-----------( 144      ( 18 Sep 2023 06:20 )             40.9     09-18    135  |  101  |  20  ----------------------------<  137<H>  3.2<L>   |  22  |  0.9    Ca    8.7      18 Sep 2023 06:20  Mg     1.6     09-18    TPro  5.1<L>  /  Alb  3.5  /  TBili  1.2  /  DBili  x   /  AST  11  /  ALT  <5  /  AlkPhos  59  09-18      LIVER FUNCTIONS - ( 18 Sep 2023 06:20 )  Alb: 3.5 g/dL / Pro: 5.1 g/dL / ALK PHOS: 59 U/L / ALT: <5 U/L / AST: 11 U/L / GGT: x           Urinalysis Basic - ( 18 Sep 2023 06:20 )    Color: x / Appearance: x / SG: x / pH: x  Gluc: 137 mg/dL / Ketone: x  / Bili: x / Urobili: x   Blood: x / Protein: x / Nitrite: x   Leuk Esterase: x / RBC: x / WBC x   Sq Epi: x / Non Sq Epi: x / Bacteria: x        Culture - Stool (collected 09-17-23 @ 08:57)  Source: .Stool Feces  Preliminary Report (09-18-23 @ 19:07):    No enteric pathogens to date: Final culture pending    Culture - Blood (collected 09-16-23 @ 15:39)  Source: .Blood Blood  Preliminary Report (09-18-23 @ 20:01):    No growth at 48 Hours    Culture - Blood (collected 09-16-23 @ 15:39)  Source: .Blood Blood  Preliminary Report (09-18-23 @ 20:01):    No growth at 48 Hours    Culture - Urine (collected 09-16-23 @ 13:21)  Source: Clean Catch Clean Catch (Midstream)  Final Report (09-17-23 @ 16:21):    <10,000 CFU/mL Normal Urogenital Breanne        Lactate, Blood: 1.2 mmol/L (09-17-23 @ 08:18)  Lactate, Blood: 2.1 mmol/L (09-16-23 @ 16:35)  Blood Gas Venous - Lactate: 1.60 mmol/L (09-16-23 @ 08:45)      INFECTIOUS DISEASES TESTING  MRSA PCR Result.: Negative (09-18-23 @ 12:00)  Procalcitonin, Serum: 1.87 (09-18-23 @ 06:20)  COVID-19 PCR: NotDetec (04-03-23 @ 18:10)  Procalcitonin, Serum: 3.25 (03-31-23 @ 05:23)  Legionella Antigen, Urine: Negative (03-28-23 @ 05:30)  Procalcitonin, Serum: 9.57 (03-28-23 @ 04:30)  MRSA PCR Result.: Negative (03-27-23 @ 22:46)  COVID-19 PCR: NotDetec (01-12-23 @ 23:01)      INFLAMMATORY MARKERS      RADIOLOGY & ADDITIONAL TESTS:  I have personally reviewed the last available Chest xray  CXR      CT      CARDIOLOGY TESTING  12 Lead ECG:   Ventricular Rate 121 BPM    Atrial Rate 121 BPM    P-R Interval 176 ms    QRS Duration 74 ms    Q-T Interval 352 ms    QTC Calculation(Bazett) 499 ms    P Axis 4 degrees    R Axis 181 degrees    T Axis 84 degrees    Diagnosis Line Atrial flutter with variable block   Possible Left atrial enlargement  tachycardic   Confirmed by Felix Dan ([id number]) on 9/16/2023 9:42:58 AM (09-16-23 @ 07:44)      MEDICATIONS  allopurinol 100 Oral daily  guaifenesin/dextromethorphan Oral Liquid 10 Oral every 8 hours  influenza  Vaccine (HIGH DOSE) 0.7 IntraMuscular once  levoFLOXacin  Tablet 750 Oral every 24 hours  metoprolol tartrate 50 Oral every 12 hours  pantoprazole    Tablet 40 Oral before breakfast  sodium chloride 0.9% for Nebulization 3 Nebulizer every 6 hours  warfarin 1 Oral once      WEIGHT  Weight (kg): 74.8 (09-11-23 @ 16:41)      ANTIBIOTICS:  levoFLOXacin  Tablet 750 milliGRAM(s) Oral every 24 hours      All available historical records have been reviewed      
83F PMHx DVT/PE and pA-Fib on Coumadin, recent h/o necrotizing pneumonia 2/2 strep pneumo resulting in septic shocking requiring ICU, h/o anemia requiring transfusions, HTN, HLD, gout, h/o cholecystectomy presents to the ED for chest pain, admitted for PNA and pleuritic chest pain.   Today pt feels better, she denies chest pain, weak, c/o diarrhea, had fever 24 hours ago, she is asking for PT.     PAST MEDICAL & SURGICAL HISTORY:  H/O prothrombin mutation  Pulmonary embolism  HTN (hypertension)  Anemia requiring transfusions  Deep vein thrombosis (DVT)  S/P tonsillectomy  H/O: hysterectomy  History of cholecystectomy      Vital Signs Last 24 Hrs  T(C): 37.3 (17 Sep 2023 05:08), Max: 37.3 (17 Sep 2023 05:08)  T(F): 99.1 (17 Sep 2023 05:08), Max: 99.1 (17 Sep 2023 05:08)  HR: 98 (17 Sep 2023 06:05) (88 - 99)  BP: 102/62 (17 Sep 2023 06:05) (90/57 - 108/60)  BP(mean): --  RR: 18 (17 Sep 2023 06:05) (18 - 18)  SpO2: 97% (17 Sep 2023 06:05) (97% - 98%)    Parameters below as of 17 Sep 2023 06:05  Patient On (Oxygen Delivery Method): room air      PHYSICAL EXAM:  GENERAL: NAD, frail pleasant elderly female   HEAD:  Atraumatic, Normocephalic  EYES: EOMI, PERRLA, conjunctiva and sclera clear  ENMT: No tonsillar erythema, exudates, or enlargement; Moist mucous membranes, Good dentition, No lesions  NECK: Supple, No JVD, Normal thyroid  NERVOUS SYSTEM:  Alert & Oriented X3, Good concentration; Motor Strength 5/5 B/L upper and lower extremities; DTRs 2+ intact and symmetric  CHEST/LUNG: decreased BS at bases, no wheezing   HEART: Regular rate and rhythm; No murmurs, rubs, or gallops  ABDOMEN: Soft, Nontender, Nondistended; Bowel sounds present  EXTREMITIES:  2+ Peripheral Pulses, No clubbing, cyanosis, or edema  LYMPH: No lymphadenopathy noted  SKIN: No rashes or lesions      LABS:                        12.6   8.73  )-----------( 144      ( 17 Sep 2023 08:18 )             41.3     09-17    135  |  101  |  23<H>  ----------------------------<  143<H>  3.6   |  22  |  0.8    Ca    8.7      17 Sep 2023 08:18  Mg     1.7     09-17    TPro  5.1<L>  /  Alb  3.3<L>  /  TBili  1.9<H>  /  DBili  x   /  AST  11  /  ALT  <5  /  AlkPhos  61  09-17    PT/INR - ( 17 Sep 2023 08:18 )   PT: >40.00 sec;   INR: 3.53 ratio         PTT - ( 16 Sep 2023 10:05 )  PTT:46.8 sec  Urinalysis Basic - ( 17 Sep 2023 08:18 )    Color: x / Appearance: x / SG: x / pH: x  Gluc: 143 mg/dL / Ketone: x  / Bili: x / Urobili: x   Blood: x / Protein: x / Nitrite: x   Leuk Esterase: x / RBC: x / WBC x   Sq Epi: x / Non Sq Epi: x / Bacteria: x    < from: CT Angio Chest PE Protocol w/ IV Cont (09.16.23 @ 11:29) >    IMPRESSION:  1.  No pulmonary embolus.  2.  Since March 29, 2023, new and increased patchy bilateral nodular   opacities, which may be infectious/inflammatory or neoplastic in etiology.  3.  Chronic bilateral mosaic attenuation, interlobular septal thickening   and fibrotic changes.  4.  Unchanged bulky lower cervical and thoracic lymphadenopathy.    < end of copied text >  < from: Xray Chest 1 View- PORTABLE-Urgent (09.16.23 @ 09:01) >    Impression:    Bilateral opacities/left focal atelectasis. Cardiomegaly..    < end of copied text >  < from: TTE Echo Complete w/o Contrast w/ Doppler (03.29.23 @ 09:22) >      Summary:   1. LV Ejection Fraction by Rodríguez's Method with a biplane EF of 56 %.   2. Mild to moderately enlarged left atrium.   3. Mildly enlarged right atrium.   4. There is no evidence of pericardial effusion.   5. Mild mitral annular calcification.   6. Moderate mitral valve regurgitation.   7. Moderate-severe tricuspid regurgitation.   8. Normal trileaflet aortic valve with normal opening.   9. Estimated pulmonary artery systolic pressure is 36.8 mmHg assuming a   right atrial pressure of 3 mmHg, which is consistent with borderline   pulmonary hypertension.      MEDICATIONS  (STANDING):  allopurinol 100 milliGRAM(s) Oral daily  cefepime   IVPB 1000 milliGRAM(s) IV Intermittent every 12 hours  guaifenesin/dextromethorphan Oral Liquid 10 milliLiter(s) Oral every 8 hours  influenza  Vaccine (HIGH DOSE) 0.7 milliLiter(s) IntraMuscular once  metoprolol tartrate 50 milliGRAM(s) Oral every 12 hours  pantoprazole    Tablet 40 milliGRAM(s) Oral before breakfast  sodium chloride 0.9% for Nebulization 3 milliLiter(s) Nebulizer every 6 hours  vancomycin  IVPB 1000 milliGRAM(s) IV Intermittent every 12 hours    MEDICATIONS  (PRN):  acetaminophen     Tablet .. 650 milliGRAM(s) Oral every 6 hours PRN Temp greater or equal to 38C (100.4F), Mild Pain (1 - 3)  aluminum hydroxide/magnesium hydroxide/simethicone Suspension 30 milliLiter(s) Oral every 4 hours PRN Dyspepsia  melatonin 3 milliGRAM(s) Oral at bedtime PRN Insomnia  ondansetron Injectable 4 milliGRAM(s) IV Push every 8 hours PRN Nausea and/or Vomiting      RADIOLOGY & ADDITIONAL TESTS:  
JOEY KNAPP 83y Female  MRN#: [id number]   CODE STATUS:    Hospital Day: 2d  83 y.o. F admitted for necrotizing pneumonia.   SUBJECTIVE  Patient was examined at bedside. She reports shortness of breath and a bout of diarrhea yesterday.                                               ----------------------------------------------------------  OBJECTIVE  PAST MEDICAL & SURGICAL HISTORY  H/O prothrombin mutation    Pulmonary embolism    HTN (hypertension)    Anemia requiring transfusions    Deep vein thrombosis (DVT)    S/P tonsillectomy    H/O: hysterectomy    History of cholecystectomy                                              -----------------------------------------------------------  ALLERGIES:  No Known Allergies                                            ------------------------------------------------------------    HOME MEDICATIONS  Home Medications:  allopurinol 100 mg oral tablet: orally once a day (11 Sep 2023 17:45)  hydroCHLOROthiazide 25 mg oral tablet: 1 orally (11 Sep 2023 17:45)  metoprolol succinate 100 mg oral tablet, extended release: 1 tab(s) orally once a day (11 Sep 2023 17:45)  warfarin 2.5 mg oral tablet: 1 tab(s) orally once a day (at bedtime) -- currently held as INR is 3.2 (11 Sep 2023 17:45)                           MEDICATIONS:  STANDING MEDICATIONS  allopurinol 100 milliGRAM(s) Oral daily  cefepime   IVPB 1000 milliGRAM(s) IV Intermittent every 8 hours  guaifenesin/dextromethorphan Oral Liquid 10 milliLiter(s) Oral every 8 hours  influenza  Vaccine (HIGH DOSE) 0.7 milliLiter(s) IntraMuscular once  metoprolol tartrate 50 milliGRAM(s) Oral every 12 hours  pantoprazole    Tablet 40 milliGRAM(s) Oral before breakfast  sodium chloride 0.9% for Nebulization 3 milliLiter(s) Nebulizer every 6 hours  vancomycin  IVPB 750 milliGRAM(s) IV Intermittent every 12 hours  warfarin 1 milliGRAM(s) Oral once    PRN MEDICATIONS  acetaminophen     Tablet .. 650 milliGRAM(s) Oral every 6 hours PRN  aluminum hydroxide/magnesium hydroxide/simethicone Suspension 30 milliLiter(s) Oral every 4 hours PRN  bacitracin   Ointment 1 Application(s) Topical two times a day PRN  melatonin 3 milliGRAM(s) Oral at bedtime PRN  ondansetron Injectable 4 milliGRAM(s) IV Push every 8 hours PRN                                            ------------------------------------------------------------  VITAL SIGNS: Last 24 Hours  T(C): 36.6 (18 Sep 2023 05:11), Max: 37.8 (17 Sep 2023 15:00)  T(F): 97.8 (18 Sep 2023 05:11), Max: 100 (17 Sep 2023 15:00)  HR: 100 (18 Sep 2023 05:11) (98 - 116)  BP: 106/68 (18 Sep 2023 05:11) (106/68 - 125/74)  BP(mean): --  RR: 18 (18 Sep 2023 05:11) (18 - 18)  SpO2: --      09-17-23 @ 07:01 - 09-18-23 @ 07:00  --------------------------------------------------------  IN: 0 mL / OUT: 850 mL / NET: -850 mL    09-18-23 @ 07:01  -  09-18-23 @ 13:29  --------------------------------------------------------  IN: 0 mL / OUT: 850 mL / NET: -850 mL                                             --------------------------------------------------------------  LABS:                        12.3   8.08  )-----------( 144      ( 18 Sep 2023 06:20 )             40.9     09-18    135  |  101  |  20  ----------------------------<  137<H>  3.2<L>   |  22  |  0.9    Ca    8.7      18 Sep 2023 06:20  Mg     1.6     09-18    TPro  5.1<L>  /  Alb  3.5  /  TBili  1.2  /  DBili  x   /  AST  11  /  ALT  <5  /  AlkPhos  59  09-18    PT/INR - ( 18 Sep 2023 06:20 )   PT: 34.70 sec;   INR: 2.94 ratio         PTT - ( 18 Sep 2023 06:20 )  PTT:40.8 sec  Urinalysis Basic - ( 18 Sep 2023 06:20 )    Color: x / Appearance: x / SG: x / pH: x  Gluc: 137 mg/dL / Ketone: x  / Bili: x / Urobili: x   Blood: x / Protein: x / Nitrite: x   Leuk Esterase: x / RBC: x / WBC x   Sq Epi: x / Non Sq Epi: x / Bacteria: x              Culture - Blood (collected 16 Sep 2023 15:39)  Source: .Blood Blood  Preliminary Report (17 Sep 2023 20:01):    No growth at 24 hours    Culture - Blood (collected 16 Sep 2023 15:39)  Source: .Blood Blood  Preliminary Report (17 Sep 2023 20:01):    No growth at 24 hours    Culture - Urine (collected 16 Sep 2023 13:21)  Source: Clean Catch Clean Catch (Midstream)  Final Report (17 Sep 2023 16:21):    <10,000 CFU/mL Normal Urogenital Breanne      PHYSICAL EXAM:  General: well-appearing in NAD. AAO x 3.  HEENT: Normocephalic, nontraumatic.   LUNGS: Crackles appreciated in lower lobes.  HEART: RRR. S1/S2 present.  ABDOMEN: Nontender, nondistended. + bowel sounds.   EXT: Pulses palpable. Nonedematous.   SKIN: Warm, dry.                                                                                   
83F PMHx DVT/PE and pA-Fib on Coumadin, recent h/o necrotizing pneumonia 2/2 strep pneumo resulting in septic shocking requiring ICU, h/o anemia requiring transfusions, HTN, HLD, gout, h/o cholecystectomy presents to the ED for chest pain, admitted for PNA and pleuritic chest pain.   Today pt feels better, c/o loose stool after initiation of IV Abx, asking for PT, denies SOB, comfortable on RA.     PAST MEDICAL & SURGICAL HISTORY:  H/O prothrombin mutation  Pulmonary embolism  HTN (hypertension)  Anemia requiring transfusions  Deep vein thrombosis (DVT)  S/P tonsillectomy  H/O: hysterectomy  History of cholecystectomy      Vital Signs Last 24 Hrs  T(C): 37 (18 Sep 2023 13:36), Max: 37.8 (17 Sep 2023 15:00)  T(F): 98.6 (18 Sep 2023 13:36), Max: 100 (17 Sep 2023 15:00)  HR: 98 (18 Sep 2023 13:36) (98 - 116)  BP: 109/60 (18 Sep 2023 13:36) (106/68 - 125/74)  BP(mean): --  RR: 18 (18 Sep 2023 05:11) (18 - 18)      PHYSICAL EXAM:  GENERAL: NAD, frail pleasant elderly female   HEAD:  Atraumatic, Normocephalic  EYES: EOMI, PERRLA, conjunctiva and sclera clear  ENMT: No tonsillar erythema, exudates, or enlargement; Moist mucous membranes, Good dentition, No lesions  NECK: Supple, No JVD, Normal thyroid  NERVOUS SYSTEM:  Alert & Oriented X3, Good concentration; Motor Strength 5/5 B/L upper and lower extremities; DTRs 2+ intact and symmetric  CHEST/LUNG: decreased BS at bases, no wheezing   HEART: Regular rate and rhythm; No murmurs, rubs, or gallops  ABDOMEN: Soft, Nontender, Nondistended; Bowel sounds present  EXTREMITIES:  2+ Peripheral Pulses, No clubbing, cyanosis, or edema  LYMPH: No lymphadenopathy noted  SKIN: No rashes or lesions      LABS:                                   12.3   8.08  )-----------( 144      ( 18 Sep 2023 06:20 )             40.9   09-18    135  |  101  |  20  ----------------------------<  137<H>  3.2<L>   |  22  |  0.9    Ca    8.7      18 Sep 2023 06:20  Mg     1.6     09-18    TPro  5.1<L>  /  Alb  3.5  /  TBili  1.2  /  DBili  x   /  AST  11  /  ALT  <5  /  AlkPhos  59  09-18    GI PCR Panel: [facility]: GI Panel PCR evaluates for:   Campylobacter, Plesiomonas shigelloides, Salmonella, Vibrio, Yersinia   enterocolitica, Enteroaggregative Escherichia (EAEC), Enteropathogenic E.   coli (EPEC), Enterotoxigenic E. coli (ETEC), Shiga-like toxin producing   E.coli (STEC), E. coli O157, Shigella/Enteroinvasive E. coli (EIEC),   Adenovirus, Astrovirus, Norovirus, Rotavirus, Sapovirus, Cryptosporidium,   Cyclospora cayetanensis, Entamoeba histolytica, Giardia lamblia.   For culture and susceptibility reports refer to "reflex stool culture". (09.17.23 @ 08:57)      PTT - ( 16 Sep 2023 10:05 )  PTT:46.8 sec  Urinalysis Basic - ( 17 Sep 2023 08:18 )    Color: x / Appearance: x / SG: x / pH: x  Gluc: 143 mg/dL / Ketone: x  / Bili: x / Urobili: x   Blood: x / Protein: x / Nitrite: x   Leuk Esterase: x / RBC: x / WBC x   Sq Epi: x / Non Sq Epi: x / Bacteria: x    RADIOLOGY & ADDITIONAL TESTS:    < from: CT Angio Chest PE Protocol w/ IV Cont (09.16.23 @ 11:29) >    IMPRESSION:  1.  No pulmonary embolus.  2.  Since March 29, 2023, new and increased patchy bilateral nodular   opacities, which may be infectious/inflammatory or neoplastic in etiology.  3.  Chronic bilateral mosaic attenuation, interlobular septal thickening   and fibrotic changes.  4.  Unchanged bulky lower cervical and thoracic lymphadenopathy.    < end of copied text >  < from: Xray Chest 1 View- PORTABLE-Urgent (09.16.23 @ 09:01) >    Impression:    Bilateral opacities/left focal atelectasis. Cardiomegaly..    < end of copied text >  < from: TTE Echo Complete w/o Contrast w/ Doppler (03.29.23 @ 09:22) >      Summary:   1. LV Ejection Fraction by Rodríguez's Method with a biplane EF of 56 %.   2. Mild to moderately enlarged left atrium.   3. Mildly enlarged right atrium.   4. There is no evidence of pericardial effusion.   5. Mild mitral annular calcification.   6. Moderate mitral valve regurgitation.   7. Moderate-severe tricuspid regurgitation.   8. Normal trileaflet aortic valve with normal opening.   9. Estimated pulmonary artery systolic pressure is 36.8 mmHg assuming a   right atrial pressure of 3 mmHg, which is consistent with borderline   pulmonary hypertension.      MEDICATIONS  (STANDING):  allopurinol 100 milliGRAM(s) Oral daily  cefepime   IVPB 1000 milliGRAM(s) IV Intermittent every 8 hours  guaifenesin/dextromethorphan Oral Liquid 10 milliLiter(s) Oral every 8 hours  influenza  Vaccine (HIGH DOSE) 0.7 milliLiter(s) IntraMuscular once  magnesium sulfate  IVPB 2 Gram(s) IV Intermittent once  metoprolol tartrate 50 milliGRAM(s) Oral every 12 hours  pantoprazole    Tablet 40 milliGRAM(s) Oral before breakfast  sodium chloride 0.9% for Nebulization 3 milliLiter(s) Nebulizer every 6 hours  vancomycin  IVPB 750 milliGRAM(s) IV Intermittent every 12 hours  warfarin 1 milliGRAM(s) Oral once    MEDICATIONS  (PRN):  acetaminophen     Tablet .. 650 milliGRAM(s) Oral every 6 hours PRN Temp greater or equal to 38C (100.4F), Mild Pain (1 - 3)  aluminum hydroxide/magnesium hydroxide/simethicone Suspension 30 milliLiter(s) Oral every 4 hours PRN Dyspepsia  bacitracin   Ointment 1 Application(s) Topical two times a day PRN post IV wound care  melatonin 3 milliGRAM(s) Oral at bedtime PRN Insomnia  ondansetron Injectable 4 milliGRAM(s) IV Push every 8 hours PRN Nausea and/or Vomiting

## 2023-09-19 NOTE — DISCHARGE NOTE PROVIDER - NSDCMRMEDTOKEN_GEN_ALL_CORE_FT
allopurinol 100 mg oral tablet: orally once a day  bacitracin 500 units/g topical ointment: 1 Apply topically to affected area 2 times a day As needed post IV wound care  hydroCHLOROthiazide 25 mg oral tablet: 1 orally  metoprolol succinate 100 mg oral tablet, extended release: 1 tab(s) orally once a day  warfarin 2.5 mg oral tablet: 1 tab(s) orally once a day (at bedtime) -- currently held as INR is 3.2   allopurinol 100 mg oral tablet: orally once a day  bacitracin 500 units/g topical ointment: 1 Apply topically to affected area 2 times a day As needed post IV wound care  hydroCHLOROthiazide 25 mg oral tablet: 1 orally  levoFLOXacin 750 mg oral tablet: 1 tab(s) orally every 24 hours  metoprolol succinate 100 mg oral tablet, extended release: 1 tab(s) orally once a day  warfarin 2.5 mg oral tablet: 1 tab(s) orally once a day (at bedtime) -- currently held as INR is 3.2

## 2023-09-19 NOTE — PHARMACOTHERAPY INTERVENTION NOTE - COMMENTS
83yFemale      Indication:  INR Goal: 2.5-3.5  Home Dose: 2 mg on Sun and Tue, 1 mg for the rest of the week  Bridge Therapy:      acetaminophen     Tablet .. 650 milliGRAM(s) Oral every 6 hours PRN  allopurinol 100 milliGRAM(s) Oral daily  aluminum hydroxide/magnesium hydroxide/simethicone Suspension 30 milliLiter(s) Oral every 4 hours PRN  bacitracin   Ointment 1 Application(s) Topical two times a day PRN  influenza  Vaccine (HIGH DOSE) 0.7 milliLiter(s) IntraMuscular once  levoFLOXacin  Tablet 750 milliGRAM(s) Oral every 24 hours  melatonin 3 milliGRAM(s) Oral at bedtime PRN  metoprolol tartrate 50 milliGRAM(s) Oral every 12 hours  ondansetron Injectable 4 milliGRAM(s) IV Push every 8 hours PRN  pantoprazole    Tablet 40 milliGRAM(s) Oral before breakfast  sodium chloride 0.9% for Nebulization 3 milliLiter(s) Nebulizer every 6 hours  warfarin 2 milliGRAM(s) Oral once        Drug Interactions:       H/H:   PLT:   GFR:    Date---------------INR-----------------Dose    INR: 2.53 ratio (09-19-23 @ 14:29)  INR: 2.94 ratio (09-18-23 @ 06:20)  INR: 3.53 ratio (09-17-23 @ 08:18)  INR: 2.94 ratio (09-16-23 @ 10:05)      1. Recommend Warfarin   2 mg   PO x 1   2. Obtain INR tomorrow AM

## 2023-09-19 NOTE — DISCHARGE NOTE PROVIDER - NSDCFUSCHEDAPPT_GEN_ALL_CORE_FT
Alyssa Hwang  [facility] PreAdmits  Scheduled Appointment: 09/21/2023    [facility]  MED[facility]  Donavan Valencia  Scheduled Appointment: 09/21/2023

## 2023-09-19 NOTE — DISCHARGE NOTE NURSING/CASE MANAGEMENT/SOCIAL WORK - NSDCPEFALRISK_GEN_ALL_CORE
For information on Fall & Injury Prevention, visit: [email or web address] OR  [email or web address] OR  https://www.cdc.gov/steadi/patient.html

## 2023-09-21 ENCOUNTER — APPOINTMENT (OUTPATIENT)
Dept: MEDICATION MANAGEMENT | Facility: CLINIC | Age: 83
End: 2023-09-21

## 2023-09-21 ENCOUNTER — OUTPATIENT (OUTPATIENT)
Dept: OUTPATIENT SERVICES | Facility: HOSPITAL | Age: 83
LOS: 1 days | End: 2023-09-21
Payer: MEDICARE

## 2023-09-21 DIAGNOSIS — Z90.710 ACQUIRED ABSENCE OF BOTH CERVIX AND UTERUS: Chronic | ICD-10-CM

## 2023-09-21 DIAGNOSIS — I48.91 UNSPECIFIED ATRIAL FIBRILLATION: ICD-10-CM

## 2023-09-21 DIAGNOSIS — Z79.01 LONG TERM (CURRENT) USE OF ANTICOAGULANTS: ICD-10-CM

## 2023-09-21 DIAGNOSIS — Z90.49 ACQUIRED ABSENCE OF OTHER SPECIFIED PARTS OF DIGESTIVE TRACT: Chronic | ICD-10-CM

## 2023-09-21 LAB
CULTURE RESULTS: SIGNIFICANT CHANGE UP
CULTURE RESULTS: SIGNIFICANT CHANGE UP
INR PPP: 3.6 RATIO
SPECIMEN SOURCE: SIGNIFICANT CHANGE UP
SPECIMEN SOURCE: SIGNIFICANT CHANGE UP

## 2023-09-21 PROCEDURE — G0463: CPT

## 2023-09-29 ENCOUNTER — APPOINTMENT (OUTPATIENT)
Dept: MEDICATION MANAGEMENT | Facility: CLINIC | Age: 83
End: 2023-09-29

## 2023-09-29 ENCOUNTER — OUTPATIENT (OUTPATIENT)
Dept: OUTPATIENT SERVICES | Facility: HOSPITAL | Age: 83
LOS: 1 days | End: 2023-09-29
Payer: MEDICARE

## 2023-09-29 DIAGNOSIS — I48.92 UNSPECIFIED ATRIAL FLUTTER: ICD-10-CM

## 2023-09-29 DIAGNOSIS — Z90.710 ACQUIRED ABSENCE OF BOTH CERVIX AND UTERUS: Chronic | ICD-10-CM
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
## 2023-09-29 DIAGNOSIS — Z79.01 LONG TERM (CURRENT) USE OF ANTICOAGULANTS: ICD-10-CM

## 2023-09-29 DIAGNOSIS — Z90.89 ACQUIRED ABSENCE OF OTHER ORGANS: Chronic | ICD-10-CM

## 2023-09-29 DIAGNOSIS — Z86.718 PERSONAL HISTORY OF OTHER VENOUS THROMBOSIS AND EMBOLISM: ICD-10-CM

## 2023-09-29 DIAGNOSIS — I48.0 PAROXYSMAL ATRIAL FIBRILLATION: ICD-10-CM

## 2023-09-29 DIAGNOSIS — I48.91 UNSPECIFIED ATRIAL FIBRILLATION: ICD-10-CM

## 2023-09-29 DIAGNOSIS — T36.95XA ADVERSE EFFECT OF UNSPECIFIED SYSTEMIC ANTIBIOTIC, INITIAL ENCOUNTER: ICD-10-CM

## 2023-09-29 DIAGNOSIS — I12.9 HYPERTENSIVE CHRONIC KIDNEY DISEASE WITH STAGE 1 THROUGH STAGE 4 CHRONIC KIDNEY DISEASE, OR UNSPECIFIED CHRONIC KIDNEY DISEASE: ICD-10-CM

## 2023-09-29 DIAGNOSIS — A41.50 GRAM-NEGATIVE SEPSIS, UNSPECIFIED: ICD-10-CM

## 2023-09-29 DIAGNOSIS — Z90.710 ACQUIRED ABSENCE OF BOTH CERVIX AND UTERUS: ICD-10-CM

## 2023-09-29 DIAGNOSIS — N17.9 ACUTE KIDNEY FAILURE, UNSPECIFIED: ICD-10-CM

## 2023-09-29 DIAGNOSIS — D68.52 PROTHROMBIN GENE MUTATION: ICD-10-CM

## 2023-09-29 DIAGNOSIS — Z86.711 PERSONAL HISTORY OF PULMONARY EMBOLISM: ICD-10-CM

## 2023-09-29 DIAGNOSIS — Z87.891 PERSONAL HISTORY OF NICOTINE DEPENDENCE: ICD-10-CM

## 2023-09-29 DIAGNOSIS — M10.9 GOUT, UNSPECIFIED: ICD-10-CM

## 2023-09-29 DIAGNOSIS — E66.9 OBESITY, UNSPECIFIED: ICD-10-CM

## 2023-09-29 DIAGNOSIS — D64.9 ANEMIA, UNSPECIFIED: ICD-10-CM

## 2023-09-29 DIAGNOSIS — Z66 DO NOT RESUSCITATE: ICD-10-CM

## 2023-09-29 DIAGNOSIS — N18.30 CHRONIC KIDNEY DISEASE, STAGE 3 UNSPECIFIED: ICD-10-CM

## 2023-09-29 DIAGNOSIS — Z79.899 OTHER LONG TERM (CURRENT) DRUG THERAPY: ICD-10-CM

## 2023-09-29 DIAGNOSIS — Z90.49 ACQUIRED ABSENCE OF OTHER SPECIFIED PARTS OF DIGESTIVE TRACT: ICD-10-CM

## 2023-09-29 DIAGNOSIS — R19.7 DIARRHEA, UNSPECIFIED: ICD-10-CM

## 2023-09-29 DIAGNOSIS — Z90.49 ACQUIRED ABSENCE OF OTHER SPECIFIED PARTS OF DIGESTIVE TRACT: Chronic | ICD-10-CM

## 2023-09-29 DIAGNOSIS — J18.9 PNEUMONIA, UNSPECIFIED ORGANISM: ICD-10-CM

## 2023-09-29 LAB
INR PPP: 3.7 RATIO
QUALITY CONTROL: YES

## 2023-09-29 PROCEDURE — G0463: CPT

## 2023-10-03 NOTE — CHART NOTE - NSCHARTNOTEFT_GEN_A_CORE
Sepsis was ruled out, pt had a SIRS on admission due to suspected PNA ( pt has a h/o necrotizing PNA on prior admission)

## 2023-10-05 ENCOUNTER — APPOINTMENT (OUTPATIENT)
Dept: MEDICATION MANAGEMENT | Facility: CLINIC | Age: 83
End: 2023-10-05

## 2023-10-05 ENCOUNTER — OUTPATIENT (OUTPATIENT)
Dept: OUTPATIENT SERVICES | Facility: HOSPITAL | Age: 83
LOS: 1 days | End: 2023-10-05
Payer: MEDICARE

## 2023-10-05 DIAGNOSIS — I48.91 UNSPECIFIED ATRIAL FIBRILLATION: ICD-10-CM

## 2023-10-05 DIAGNOSIS — Z79.01 LONG TERM (CURRENT) USE OF ANTICOAGULANTS: ICD-10-CM

## 2023-10-05 DIAGNOSIS — Z90.49 ACQUIRED ABSENCE OF OTHER SPECIFIED PARTS OF DIGESTIVE TRACT: Chronic | ICD-10-CM

## 2023-10-05 DIAGNOSIS — Z90.89 ACQUIRED ABSENCE OF OTHER ORGANS: Chronic | ICD-10-CM

## 2023-10-05 DIAGNOSIS — Z90.710 ACQUIRED ABSENCE OF BOTH CERVIX AND UTERUS: Chronic | ICD-10-CM

## 2023-10-05 LAB
INR PPP: 2.5 RATIO
QUALITY CONTROL: YES

## 2023-10-05 PROCEDURE — G0463: CPT

## 2023-10-10 ENCOUNTER — OUTPATIENT (OUTPATIENT)
Dept: OUTPATIENT SERVICES | Facility: HOSPITAL | Age: 83
LOS: 1 days | End: 2023-10-10

## 2023-10-10 ENCOUNTER — APPOINTMENT (OUTPATIENT)
Dept: MEDICATION MANAGEMENT | Facility: CLINIC | Age: 83
End: 2023-10-10

## 2023-10-10 DIAGNOSIS — Z90.710 ACQUIRED ABSENCE OF BOTH CERVIX AND UTERUS: Chronic | ICD-10-CM

## 2023-10-10 DIAGNOSIS — Z90.49 ACQUIRED ABSENCE OF OTHER SPECIFIED PARTS OF DIGESTIVE TRACT: Chronic | ICD-10-CM

## 2023-10-10 DIAGNOSIS — I48.91 UNSPECIFIED ATRIAL FIBRILLATION: ICD-10-CM

## 2023-10-10 DIAGNOSIS — Z79.01 LONG TERM (CURRENT) USE OF ANTICOAGULANTS: ICD-10-CM

## 2023-10-10 DIAGNOSIS — Z90.89 ACQUIRED ABSENCE OF OTHER ORGANS: Chronic | ICD-10-CM

## 2023-10-10 LAB
INR PPP: 3.7 RATIO
QUALITY CONTROL: YES

## 2023-10-10 PROCEDURE — G0463: CPT

## 2023-10-16 ENCOUNTER — APPOINTMENT (OUTPATIENT)
Dept: MEDICATION MANAGEMENT | Facility: CLINIC | Age: 83
End: 2023-10-16

## 2023-10-16 ENCOUNTER — OUTPATIENT (OUTPATIENT)
Dept: OUTPATIENT SERVICES | Facility: HOSPITAL | Age: 83
LOS: 1 days | End: 2023-10-16

## 2023-10-16 DIAGNOSIS — I48.91 UNSPECIFIED ATRIAL FIBRILLATION: ICD-10-CM

## 2023-10-16 DIAGNOSIS — Z90.89 ACQUIRED ABSENCE OF OTHER ORGANS: Chronic | ICD-10-CM

## 2023-10-16 DIAGNOSIS — Z79.01 LONG TERM (CURRENT) USE OF ANTICOAGULANTS: ICD-10-CM

## 2023-10-16 DIAGNOSIS — Z90.710 ACQUIRED ABSENCE OF BOTH CERVIX AND UTERUS: Chronic | ICD-10-CM

## 2023-10-16 DIAGNOSIS — Z90.49 ACQUIRED ABSENCE OF OTHER SPECIFIED PARTS OF DIGESTIVE TRACT: Chronic | ICD-10-CM

## 2023-10-16 LAB
INR PPP: 2.9 RATIO
QUALITY CONTROL: YES

## 2023-10-16 PROCEDURE — G0463: CPT

## 2023-10-30 ENCOUNTER — OUTPATIENT (OUTPATIENT)
Dept: OUTPATIENT SERVICES | Facility: HOSPITAL | Age: 83
LOS: 1 days | End: 2023-10-30
Payer: MEDICARE

## 2023-10-30 ENCOUNTER — APPOINTMENT (OUTPATIENT)
Dept: MEDICATION MANAGEMENT | Facility: CLINIC | Age: 83
End: 2023-10-30

## 2023-10-30 DIAGNOSIS — Z79.01 LONG TERM (CURRENT) USE OF ANTICOAGULANTS: ICD-10-CM

## 2023-10-30 DIAGNOSIS — Z90.49 ACQUIRED ABSENCE OF OTHER SPECIFIED PARTS OF DIGESTIVE TRACT: Chronic | ICD-10-CM

## 2023-10-30 DIAGNOSIS — I48.91 UNSPECIFIED ATRIAL FIBRILLATION: ICD-10-CM

## 2023-10-30 DIAGNOSIS — Z90.710 ACQUIRED ABSENCE OF BOTH CERVIX AND UTERUS: Chronic | ICD-10-CM

## 2023-10-30 DIAGNOSIS — Z90.89 ACQUIRED ABSENCE OF OTHER ORGANS: Chronic | ICD-10-CM

## 2023-10-30 LAB
INR PPP: 2.6 RATIO
QUALITY CONTROL: YES

## 2023-10-30 PROCEDURE — G0463: CPT

## 2023-11-13 ENCOUNTER — APPOINTMENT (OUTPATIENT)
Dept: MEDICATION MANAGEMENT | Facility: CLINIC | Age: 83
End: 2023-11-13

## 2023-11-13 ENCOUNTER — OUTPATIENT (OUTPATIENT)
Dept: OUTPATIENT SERVICES | Facility: HOSPITAL | Age: 83
LOS: 1 days | End: 2023-11-13
Payer: MEDICARE

## 2023-11-13 DIAGNOSIS — I48.91 UNSPECIFIED ATRIAL FIBRILLATION: ICD-10-CM

## 2023-11-13 DIAGNOSIS — Z90.89 ACQUIRED ABSENCE OF OTHER ORGANS: Chronic | ICD-10-CM

## 2023-11-13 DIAGNOSIS — Z79.01 LONG TERM (CURRENT) USE OF ANTICOAGULANTS: ICD-10-CM

## 2023-11-13 LAB
INR PPP: 2.7 RATIO
QUALITY CONTROL: YES

## 2023-11-13 PROCEDURE — G0463: CPT

## 2023-11-27 ENCOUNTER — APPOINTMENT (OUTPATIENT)
Dept: MEDICATION MANAGEMENT | Facility: CLINIC | Age: 83
End: 2023-11-27

## 2023-11-27 ENCOUNTER — OUTPATIENT (OUTPATIENT)
Dept: OUTPATIENT SERVICES | Facility: HOSPITAL | Age: 83
LOS: 1 days | End: 2023-11-27
Payer: MEDICARE

## 2023-11-27 DIAGNOSIS — Z90.710 ACQUIRED ABSENCE OF BOTH CERVIX AND UTERUS: Chronic | ICD-10-CM

## 2023-11-27 DIAGNOSIS — I48.91 UNSPECIFIED ATRIAL FIBRILLATION: ICD-10-CM

## 2023-11-27 DIAGNOSIS — Z79.01 LONG TERM (CURRENT) USE OF ANTICOAGULANTS: ICD-10-CM

## 2023-11-27 DIAGNOSIS — Z90.89 ACQUIRED ABSENCE OF OTHER ORGANS: Chronic | ICD-10-CM

## 2023-11-27 LAB
INR PPP: 3 RATIO
QUALITY CONTROL: YES

## 2023-11-27 PROCEDURE — G0463: CPT

## 2023-12-11 ENCOUNTER — OUTPATIENT (OUTPATIENT)
Dept: OUTPATIENT SERVICES | Facility: HOSPITAL | Age: 83
LOS: 1 days | End: 2023-12-11
Payer: MEDICARE

## 2023-12-11 ENCOUNTER — APPOINTMENT (OUTPATIENT)
Dept: MEDICATION MANAGEMENT | Facility: CLINIC | Age: 83
End: 2023-12-11

## 2023-12-11 DIAGNOSIS — R79.1 ABNORMAL COAGULATION PROFILE: ICD-10-CM

## 2023-12-11 DIAGNOSIS — I48.91 UNSPECIFIED ATRIAL FIBRILLATION: ICD-10-CM

## 2023-12-11 DIAGNOSIS — Z79.01 LONG TERM (CURRENT) USE OF ANTICOAGULANTS: ICD-10-CM

## 2023-12-11 DIAGNOSIS — Z90.49 ACQUIRED ABSENCE OF OTHER SPECIFIED PARTS OF DIGESTIVE TRACT: Chronic | ICD-10-CM

## 2023-12-11 DIAGNOSIS — Z90.89 ACQUIRED ABSENCE OF OTHER ORGANS: Chronic | ICD-10-CM

## 2023-12-11 DIAGNOSIS — Z90.710 ACQUIRED ABSENCE OF BOTH CERVIX AND UTERUS: Chronic | ICD-10-CM

## 2023-12-11 LAB
INR PPP: 2.6 RATIO
QUALITY CONTROL: YES

## 2023-12-11 PROCEDURE — G0463: CPT

## 2023-12-28 ENCOUNTER — APPOINTMENT (OUTPATIENT)
Dept: MEDICATION MANAGEMENT | Facility: CLINIC | Age: 83
End: 2023-12-28

## 2023-12-28 ENCOUNTER — OUTPATIENT (OUTPATIENT)
Dept: OUTPATIENT SERVICES | Facility: HOSPITAL | Age: 83
LOS: 1 days | End: 2023-12-28
Payer: MEDICARE

## 2023-12-28 DIAGNOSIS — Z90.710 ACQUIRED ABSENCE OF BOTH CERVIX AND UTERUS: Chronic | ICD-10-CM

## 2023-12-28 DIAGNOSIS — Z90.49 ACQUIRED ABSENCE OF OTHER SPECIFIED PARTS OF DIGESTIVE TRACT: Chronic | ICD-10-CM

## 2023-12-28 DIAGNOSIS — Z90.89 ACQUIRED ABSENCE OF OTHER ORGANS: Chronic | ICD-10-CM

## 2023-12-28 DIAGNOSIS — I48.91 UNSPECIFIED ATRIAL FIBRILLATION: ICD-10-CM

## 2023-12-28 DIAGNOSIS — Z79.01 LONG TERM (CURRENT) USE OF ANTICOAGULANTS: ICD-10-CM

## 2023-12-28 LAB
INR PPP: 3 RATIO
QUALITY CONTROL: YES

## 2023-12-28 PROCEDURE — G0463: CPT

## 2024-01-16 ENCOUNTER — APPOINTMENT (OUTPATIENT)
Dept: MEDICATION MANAGEMENT | Facility: CLINIC | Age: 84
End: 2024-01-16

## 2024-01-16 ENCOUNTER — OUTPATIENT (OUTPATIENT)
Dept: OUTPATIENT SERVICES | Facility: HOSPITAL | Age: 84
LOS: 1 days | End: 2024-01-16
Payer: MEDICARE

## 2024-01-16 DIAGNOSIS — Z79.01 LONG TERM (CURRENT) USE OF ANTICOAGULANTS: ICD-10-CM

## 2024-01-16 DIAGNOSIS — Z90.89 ACQUIRED ABSENCE OF OTHER ORGANS: Chronic | ICD-10-CM

## 2024-01-16 DIAGNOSIS — I48.91 UNSPECIFIED ATRIAL FIBRILLATION: ICD-10-CM

## 2024-01-16 DIAGNOSIS — Z90.49 ACQUIRED ABSENCE OF OTHER SPECIFIED PARTS OF DIGESTIVE TRACT: Chronic | ICD-10-CM

## 2024-01-16 DIAGNOSIS — Z90.710 ACQUIRED ABSENCE OF BOTH CERVIX AND UTERUS: Chronic | ICD-10-CM

## 2024-01-16 LAB
INR PPP: 2.4 RATIO
QUALITY CONTROL: YES

## 2024-01-16 PROCEDURE — G0463: CPT

## 2024-01-22 ENCOUNTER — APPOINTMENT (OUTPATIENT)
Dept: MEDICATION MANAGEMENT | Facility: CLINIC | Age: 84
End: 2024-01-22

## 2024-01-22 ENCOUNTER — OUTPATIENT (OUTPATIENT)
Dept: OUTPATIENT SERVICES | Facility: HOSPITAL | Age: 84
LOS: 1 days | End: 2024-01-22
Payer: MEDICARE

## 2024-01-22 DIAGNOSIS — Z90.710 ACQUIRED ABSENCE OF BOTH CERVIX AND UTERUS: Chronic | ICD-10-CM

## 2024-01-22 DIAGNOSIS — I48.91 UNSPECIFIED ATRIAL FIBRILLATION: ICD-10-CM

## 2024-01-22 DIAGNOSIS — Z90.49 ACQUIRED ABSENCE OF OTHER SPECIFIED PARTS OF DIGESTIVE TRACT: Chronic | ICD-10-CM

## 2024-01-22 DIAGNOSIS — Z79.01 LONG TERM (CURRENT) USE OF ANTICOAGULANTS: ICD-10-CM

## 2024-01-22 DIAGNOSIS — Z90.89 ACQUIRED ABSENCE OF OTHER ORGANS: Chronic | ICD-10-CM

## 2024-01-22 LAB
INR PPP: 2.1 RATIO
QUALITY CONTROL: YES

## 2024-01-22 PROCEDURE — G0463: CPT

## 2024-01-29 ENCOUNTER — APPOINTMENT (OUTPATIENT)
Dept: MEDICATION MANAGEMENT | Facility: CLINIC | Age: 84
End: 2024-01-29

## 2024-01-29 ENCOUNTER — OUTPATIENT (OUTPATIENT)
Dept: OUTPATIENT SERVICES | Facility: HOSPITAL | Age: 84
LOS: 1 days | End: 2024-01-29
Payer: MEDICARE

## 2024-01-29 DIAGNOSIS — I48.91 UNSPECIFIED ATRIAL FIBRILLATION: ICD-10-CM

## 2024-01-29 DIAGNOSIS — Z90.710 ACQUIRED ABSENCE OF BOTH CERVIX AND UTERUS: Chronic | ICD-10-CM

## 2024-01-29 DIAGNOSIS — Z79.01 LONG TERM (CURRENT) USE OF ANTICOAGULANTS: ICD-10-CM

## 2024-01-29 LAB
INR PPP: 3.5 RATIO
QUALITY CONTROL: YES

## 2024-01-29 PROCEDURE — G0463: CPT

## 2024-02-07 ENCOUNTER — OUTPATIENT (OUTPATIENT)
Dept: OUTPATIENT SERVICES | Facility: HOSPITAL | Age: 84
LOS: 1 days | End: 2024-02-07
Payer: MEDICARE

## 2024-02-07 ENCOUNTER — APPOINTMENT (OUTPATIENT)
Dept: MEDICATION MANAGEMENT | Facility: CLINIC | Age: 84
End: 2024-02-07

## 2024-02-07 DIAGNOSIS — Z90.49 ACQUIRED ABSENCE OF OTHER SPECIFIED PARTS OF DIGESTIVE TRACT: Chronic | ICD-10-CM

## 2024-02-07 DIAGNOSIS — I48.91 UNSPECIFIED ATRIAL FIBRILLATION: ICD-10-CM

## 2024-02-07 DIAGNOSIS — Z90.710 ACQUIRED ABSENCE OF BOTH CERVIX AND UTERUS: Chronic | ICD-10-CM

## 2024-02-07 DIAGNOSIS — Z79.01 LONG TERM (CURRENT) USE OF ANTICOAGULANTS: ICD-10-CM

## 2024-02-07 DIAGNOSIS — Z90.89 ACQUIRED ABSENCE OF OTHER ORGANS: Chronic | ICD-10-CM

## 2024-02-07 LAB
INR PPP: 2.3 RATIO
QUALITY CONTROL: YES

## 2024-02-07 PROCEDURE — G0463: CPT

## 2024-02-07 NOTE — DISCHARGE NOTE PROVIDER - NSDCHOSPICE_GEN_A_CORE
Detail Level: Detailed Quality 431: Preventive Care And Screening: Unhealthy Alcohol Use - Screening: Patient not identified as an unhealthy alcohol user when screened for unhealthy alcohol use using a systematic screening method Quality 110: Preventive Care And Screening: Influenza Immunization: Influenza Immunization Administered during Influenza season Quality 226: Preventive Care And Screening: Tobacco Use: Screening And Cessation Intervention: Patient screened for tobacco use and is an ex/non-smoker No

## 2024-02-13 ENCOUNTER — OUTPATIENT (OUTPATIENT)
Dept: OUTPATIENT SERVICES | Facility: HOSPITAL | Age: 84
LOS: 1 days | End: 2024-02-13
Payer: MEDICARE

## 2024-02-13 ENCOUNTER — APPOINTMENT (OUTPATIENT)
Dept: MEDICATION MANAGEMENT | Facility: CLINIC | Age: 84
End: 2024-02-13

## 2024-02-13 DIAGNOSIS — I48.91 UNSPECIFIED ATRIAL FIBRILLATION: ICD-10-CM

## 2024-02-13 DIAGNOSIS — Z79.01 LONG TERM (CURRENT) USE OF ANTICOAGULANTS: ICD-10-CM

## 2024-02-13 DIAGNOSIS — Z90.89 ACQUIRED ABSENCE OF OTHER ORGANS: Chronic | ICD-10-CM

## 2024-02-13 DIAGNOSIS — Z90.49 ACQUIRED ABSENCE OF OTHER SPECIFIED PARTS OF DIGESTIVE TRACT: Chronic | ICD-10-CM

## 2024-02-13 LAB
INR PPP: 2.8 RATIO
QUALITY CONTROL: YES

## 2024-02-13 PROCEDURE — G0463: CPT

## 2024-02-27 ENCOUNTER — APPOINTMENT (OUTPATIENT)
Dept: MEDICATION MANAGEMENT | Facility: CLINIC | Age: 84
End: 2024-02-27

## 2024-02-27 ENCOUNTER — OUTPATIENT (OUTPATIENT)
Dept: OUTPATIENT SERVICES | Facility: HOSPITAL | Age: 84
LOS: 1 days | End: 2024-02-27
Payer: MEDICARE

## 2024-02-27 DIAGNOSIS — I48.91 UNSPECIFIED ATRIAL FIBRILLATION: ICD-10-CM

## 2024-02-27 DIAGNOSIS — Z79.01 LONG TERM (CURRENT) USE OF ANTICOAGULANTS: ICD-10-CM

## 2024-02-27 DIAGNOSIS — Z90.89 ACQUIRED ABSENCE OF OTHER ORGANS: Chronic | ICD-10-CM

## 2024-02-27 DIAGNOSIS — Z90.710 ACQUIRED ABSENCE OF BOTH CERVIX AND UTERUS: Chronic | ICD-10-CM

## 2024-02-27 LAB
INR PPP: 4.9 RATIO
QUALITY CONTROL: YES

## 2024-02-27 PROCEDURE — G0463: CPT

## 2024-03-04 ENCOUNTER — APPOINTMENT (OUTPATIENT)
Dept: MEDICATION MANAGEMENT | Facility: CLINIC | Age: 84
End: 2024-03-04

## 2024-03-04 ENCOUNTER — OUTPATIENT (OUTPATIENT)
Dept: OUTPATIENT SERVICES | Facility: HOSPITAL | Age: 84
LOS: 1 days | End: 2024-03-04
Payer: MEDICARE

## 2024-03-04 DIAGNOSIS — Z90.49 ACQUIRED ABSENCE OF OTHER SPECIFIED PARTS OF DIGESTIVE TRACT: Chronic | ICD-10-CM

## 2024-03-04 DIAGNOSIS — Z90.710 ACQUIRED ABSENCE OF BOTH CERVIX AND UTERUS: Chronic | ICD-10-CM

## 2024-03-04 DIAGNOSIS — I48.91 UNSPECIFIED ATRIAL FIBRILLATION: ICD-10-CM

## 2024-03-04 DIAGNOSIS — Z79.01 LONG TERM (CURRENT) USE OF ANTICOAGULANTS: ICD-10-CM

## 2024-03-04 DIAGNOSIS — Z90.89 ACQUIRED ABSENCE OF OTHER ORGANS: Chronic | ICD-10-CM

## 2024-03-04 LAB — INR PPP: 6.2 RATIO

## 2024-03-04 PROCEDURE — G0463: CPT

## 2024-03-05 ENCOUNTER — INPATIENT (INPATIENT)
Facility: HOSPITAL | Age: 84
LOS: 2 days | Discharge: HOME HEALTH | DRG: 291 | End: 2024-03-08
Attending: HOSPITALIST | Admitting: STUDENT IN AN ORGANIZED HEALTH CARE EDUCATION/TRAINING PROGRAM
Payer: MEDICARE

## 2024-03-05 VITALS
RESPIRATION RATE: 18 BRPM | TEMPERATURE: 98 F | OXYGEN SATURATION: 95 % | DIASTOLIC BLOOD PRESSURE: 82 MMHG | HEART RATE: 84 BPM | WEIGHT: 164.91 LBS | SYSTOLIC BLOOD PRESSURE: 117 MMHG

## 2024-03-05 DIAGNOSIS — Z90.89 ACQUIRED ABSENCE OF OTHER ORGANS: Chronic | ICD-10-CM

## 2024-03-05 DIAGNOSIS — I50.9 HEART FAILURE, UNSPECIFIED: ICD-10-CM

## 2024-03-05 DIAGNOSIS — Z90.710 ACQUIRED ABSENCE OF BOTH CERVIX AND UTERUS: Chronic | ICD-10-CM

## 2024-03-05 DIAGNOSIS — Z90.49 ACQUIRED ABSENCE OF OTHER SPECIFIED PARTS OF DIGESTIVE TRACT: Chronic | ICD-10-CM

## 2024-03-05 LAB
ALBUMIN SERPL ELPH-MCNC: 3.6 G/DL — SIGNIFICANT CHANGE UP (ref 3.5–5.2)
ALP SERPL-CCNC: 69 U/L — SIGNIFICANT CHANGE UP (ref 30–115)
ALT FLD-CCNC: <5 U/L — SIGNIFICANT CHANGE UP (ref 0–41)
ANION GAP SERPL CALC-SCNC: 13 MMOL/L — SIGNIFICANT CHANGE UP (ref 7–14)
APTT BLD: 35.2 SEC — SIGNIFICANT CHANGE UP (ref 27–39.2)
AST SERPL-CCNC: 17 U/L — SIGNIFICANT CHANGE UP (ref 0–41)
BASE EXCESS BLDV CALC-SCNC: -0.4 MMOL/L — SIGNIFICANT CHANGE UP (ref -2–3)
BASOPHILS # BLD AUTO: 0.03 K/UL — SIGNIFICANT CHANGE UP (ref 0–0.2)
BASOPHILS NFR BLD AUTO: 0.3 % — SIGNIFICANT CHANGE UP (ref 0–1)
BILIRUB SERPL-MCNC: 2.2 MG/DL — HIGH (ref 0.2–1.2)
BUN SERPL-MCNC: 29 MG/DL — HIGH (ref 10–20)
CA-I SERPL-SCNC: 1.18 MMOL/L — SIGNIFICANT CHANGE UP (ref 1.15–1.33)
CALCIUM SERPL-MCNC: 8.9 MG/DL — SIGNIFICANT CHANGE UP (ref 8.4–10.5)
CHLORIDE SERPL-SCNC: 107 MMOL/L — SIGNIFICANT CHANGE UP (ref 98–110)
CO2 SERPL-SCNC: 23 MMOL/L — SIGNIFICANT CHANGE UP (ref 17–32)
CREAT SERPL-MCNC: 1.1 MG/DL — SIGNIFICANT CHANGE UP (ref 0.7–1.5)
EGFR: 50 ML/MIN/1.73M2 — LOW
EOSINOPHIL # BLD AUTO: 0.27 K/UL — SIGNIFICANT CHANGE UP (ref 0–0.7)
EOSINOPHIL NFR BLD AUTO: 2.7 % — SIGNIFICANT CHANGE UP (ref 0–8)
FLUAV AG NPH QL: SIGNIFICANT CHANGE UP
FLUBV AG NPH QL: SIGNIFICANT CHANGE UP
GAS PNL BLDV: 139 MMOL/L — SIGNIFICANT CHANGE UP (ref 136–145)
GAS PNL BLDV: SIGNIFICANT CHANGE UP
GLUCOSE SERPL-MCNC: 106 MG/DL — HIGH (ref 70–99)
HCO3 BLDV-SCNC: 25 MMOL/L — SIGNIFICANT CHANGE UP (ref 22–29)
HCT VFR BLD CALC: 36.4 % — LOW (ref 37–47)
HCT VFR BLDA CALC: 31 % — LOW (ref 34.5–46.5)
HGB BLD CALC-MCNC: 10.3 G/DL — LOW (ref 11.7–16.1)
HGB BLD-MCNC: 10.8 G/DL — LOW (ref 12–16)
IMM GRANULOCYTES NFR BLD AUTO: 0.4 % — HIGH (ref 0.1–0.3)
INR BLD: 3.04 RATIO — HIGH (ref 0.65–1.3)
LACTATE BLDV-MCNC: 1.5 MMOL/L — SIGNIFICANT CHANGE UP (ref 0.5–2)
LACTATE SERPL-SCNC: 1.4 MMOL/L — SIGNIFICANT CHANGE UP (ref 0.7–2)
LYMPHOCYTES # BLD AUTO: 3.93 K/UL — HIGH (ref 1.2–3.4)
LYMPHOCYTES # BLD AUTO: 38.8 % — SIGNIFICANT CHANGE UP (ref 20.5–51.1)
MAGNESIUM SERPL-MCNC: 2 MG/DL — SIGNIFICANT CHANGE UP (ref 1.8–2.4)
MCHC RBC-ENTMCNC: 25 PG — LOW (ref 27–31)
MCHC RBC-ENTMCNC: 29.7 G/DL — LOW (ref 32–37)
MCV RBC AUTO: 84.3 FL — SIGNIFICANT CHANGE UP (ref 81–99)
MONOCYTES # BLD AUTO: 0.65 K/UL — HIGH (ref 0.1–0.6)
MONOCYTES NFR BLD AUTO: 6.4 % — SIGNIFICANT CHANGE UP (ref 1.7–9.3)
NEUTROPHILS # BLD AUTO: 5.22 K/UL — SIGNIFICANT CHANGE UP (ref 1.4–6.5)
NEUTROPHILS NFR BLD AUTO: 51.4 % — SIGNIFICANT CHANGE UP (ref 42.2–75.2)
NRBC # BLD: 0 /100 WBCS — SIGNIFICANT CHANGE UP (ref 0–0)
NT-PROBNP SERPL-SCNC: 5146 PG/ML — HIGH (ref 0–300)
PCO2 BLDV: 42 MMHG — SIGNIFICANT CHANGE UP (ref 39–42)
PH BLDV: 7.38 — SIGNIFICANT CHANGE UP (ref 7.32–7.43)
PHOSPHATE SERPL-MCNC: 3.4 MG/DL — SIGNIFICANT CHANGE UP (ref 2.1–4.9)
PLATELET # BLD AUTO: 282 K/UL — SIGNIFICANT CHANGE UP (ref 130–400)
PMV BLD: 11.1 FL — HIGH (ref 7.4–10.4)
PO2 BLDV: 19 MMHG — LOW (ref 25–45)
POTASSIUM BLDV-SCNC: 3.8 MMOL/L — SIGNIFICANT CHANGE UP (ref 3.5–5.1)
POTASSIUM SERPL-MCNC: 3.9 MMOL/L — SIGNIFICANT CHANGE UP (ref 3.5–5)
POTASSIUM SERPL-SCNC: 3.9 MMOL/L — SIGNIFICANT CHANGE UP (ref 3.5–5)
PROT SERPL-MCNC: 5.9 G/DL — LOW (ref 6–8)
PROTHROM AB SERPL-ACNC: 35 SEC — HIGH (ref 9.95–12.87)
RBC # BLD: 4.32 M/UL — SIGNIFICANT CHANGE UP (ref 4.2–5.4)
RBC # FLD: 19.9 % — HIGH (ref 11.5–14.5)
RSV RNA NPH QL NAA+NON-PROBE: SIGNIFICANT CHANGE UP
SAO2 % BLDV: 16.5 % — LOW (ref 67–88)
SARS-COV-2 RNA SPEC QL NAA+PROBE: SIGNIFICANT CHANGE UP
SODIUM SERPL-SCNC: 143 MMOL/L — SIGNIFICANT CHANGE UP (ref 135–146)
TROPONIN T, HIGH SENSITIVITY RESULT: 43 NG/L — HIGH (ref 6–13)
TROPONIN T, HIGH SENSITIVITY RESULT: 45 NG/L — HIGH (ref 6–13)
WBC # BLD: 10.14 K/UL — SIGNIFICANT CHANGE UP (ref 4.8–10.8)
WBC # FLD AUTO: 10.14 K/UL — SIGNIFICANT CHANGE UP (ref 4.8–10.8)

## 2024-03-05 PROCEDURE — 83735 ASSAY OF MAGNESIUM: CPT

## 2024-03-05 PROCEDURE — 97110 THERAPEUTIC EXERCISES: CPT | Mod: GP

## 2024-03-05 PROCEDURE — 99285 EMERGENCY DEPT VISIT HI MDM: CPT

## 2024-03-05 PROCEDURE — 87186 SC STD MICRODIL/AGAR DIL: CPT

## 2024-03-05 PROCEDURE — 85730 THROMBOPLASTIN TIME PARTIAL: CPT

## 2024-03-05 PROCEDURE — 85027 COMPLETE CBC AUTOMATED: CPT

## 2024-03-05 PROCEDURE — 84100 ASSAY OF PHOSPHORUS: CPT

## 2024-03-05 PROCEDURE — 87077 CULTURE AEROBIC IDENTIFY: CPT

## 2024-03-05 PROCEDURE — 99222 1ST HOSP IP/OBS MODERATE 55: CPT

## 2024-03-05 PROCEDURE — 81001 URINALYSIS AUTO W/SCOPE: CPT

## 2024-03-05 PROCEDURE — 85610 PROTHROMBIN TIME: CPT

## 2024-03-05 PROCEDURE — 97162 PT EVAL MOD COMPLEX 30 MIN: CPT | Mod: GP

## 2024-03-05 PROCEDURE — 93306 TTE W/DOPPLER COMPLETE: CPT

## 2024-03-05 PROCEDURE — 85025 COMPLETE CBC W/AUTO DIFF WBC: CPT

## 2024-03-05 PROCEDURE — 82553 CREATINE MB FRACTION: CPT

## 2024-03-05 PROCEDURE — 84484 ASSAY OF TROPONIN QUANT: CPT

## 2024-03-05 PROCEDURE — 71045 X-RAY EXAM CHEST 1 VIEW: CPT | Mod: 26

## 2024-03-05 PROCEDURE — 36415 COLL VENOUS BLD VENIPUNCTURE: CPT

## 2024-03-05 PROCEDURE — 87086 URINE CULTURE/COLONY COUNT: CPT

## 2024-03-05 PROCEDURE — 82550 ASSAY OF CK (CPK): CPT

## 2024-03-05 PROCEDURE — 80048 BASIC METABOLIC PNL TOTAL CA: CPT

## 2024-03-05 RX ORDER — ALLOPURINOL 300 MG
100 TABLET ORAL DAILY
Refills: 0 | Status: DISCONTINUED | OUTPATIENT
Start: 2024-03-05 | End: 2024-03-05

## 2024-03-05 RX ORDER — ACETAMINOPHEN 500 MG
650 TABLET ORAL EVERY 6 HOURS
Refills: 0 | Status: DISCONTINUED | OUTPATIENT
Start: 2024-03-05 | End: 2024-03-08

## 2024-03-05 RX ORDER — ALLOPURINOL 300 MG
50 TABLET ORAL DAILY
Refills: 0 | Status: DISCONTINUED | OUTPATIENT
Start: 2024-03-05 | End: 2024-03-08

## 2024-03-05 RX ORDER — METOPROLOL TARTRATE 50 MG
1 TABLET ORAL
Qty: 0 | Refills: 0 | DISCHARGE

## 2024-03-05 RX ORDER — METOPROLOL TARTRATE 50 MG
50 TABLET ORAL DAILY
Refills: 0 | Status: DISCONTINUED | OUTPATIENT
Start: 2024-03-05 | End: 2024-03-06

## 2024-03-05 RX ORDER — FUROSEMIDE 40 MG
40 TABLET ORAL ONCE
Refills: 0 | Status: COMPLETED | OUTPATIENT
Start: 2024-03-05 | End: 2024-03-05

## 2024-03-05 RX ORDER — FUROSEMIDE 40 MG
40 TABLET ORAL DAILY
Refills: 0 | Status: DISCONTINUED | OUTPATIENT
Start: 2024-03-05 | End: 2024-03-06

## 2024-03-05 RX ADMIN — Medication 40 MILLIGRAM(S): at 18:55

## 2024-03-05 NOTE — H&P ADULT - NSHPPHYSICALEXAM_GEN_ALL_CORE
VITALS:   T(C): 36.8 (03-05-24 @ 15:36), Max: 36.8 (03-05-24 @ 15:36)  HR: 84 (03-05-24 @ 15:36) (84 - 84)  BP: 117/82 (03-05-24 @ 15:36) (117/82 - 117/82)  RR: 18 (03-05-24 @ 15:36) (18 - 18)  SpO2: 95% (03-05-24 @ 15:36) (95% - 95%)    GENERAL: NAD, lying in bed comfortably  HEAD:  Atraumatic, normocephalic  EYES: EOMI, PERRLA, conjunctiva and sclera clear  ENT: Moist mucous membranes  HEART: Regular rate and rhythm  LUNGS: Unlabored respirations.  Clear to auscultation bilaterally, on 2L nc   ABDOMEN: Soft, nontender, nondistended,   EXTREMITIES: trace pedal edema   NERVOUS SYSTEM:  A&Ox3, no focal deficits   SKIN: No rashes or lesions

## 2024-03-05 NOTE — ED PROVIDER NOTE - CLINICAL SUMMARY MEDICAL DECISION MAKING FREE TEXT BOX
83 year old female presented today with sob / bl edema. Patient appears to have acute fluid overload likely secondary to CHF. Patient started on lasix and admitted for further evaluation and care.    EKG interpretation:  NSR, no ST elevations as interpreted by Dante Meredith DO    Independent interpretation of the Xray film(s) performed by: Dante Meredith.     Interpretation: Chest XR positive: bilateral opacities

## 2024-03-05 NOTE — ED PROVIDER NOTE - OBJECTIVE STATEMENT
83yoF PMHx A-Fib on Coumadin, recent h/o necrotizing pneumonia 2/2 strep pneumo resulting in septic shocking requiring ICU, h/o anemia requiring transfusions, HTN, HLD, gout and h/o cholecystectomy presenting for worsening shortness of breath, fatigue and bilateral lower leg swelling for the past week. Also reports dry cough, congestion. Last checked her INR at home on sunday, was 9.  Denies any fever, nausea, vomiting, chest pain, diarrhea,

## 2024-03-05 NOTE — H&P ADULT - NS ATTEND AMEND GEN_ALL_CORE FT
Acute Hypoxic Respiratory Failure, found to be 80% on RA per EMS >>3LNC   CHF exacerbation, likely from Flash Pulmonary Edema from AFIB, given papillations the day prior to presentation   CXR with Pulm Vascular congestion, Mild crackles on exam with 1+ pitting edema , elevated BNP   Elevated Troponin, likely demand, Trend and monitor on tele,   Denies C,  EKG with AFIB w/o signs of ischemia.   IV Lasix for now >>transition to oral  2D ECHO while in House  On Coumadin for AFIB

## 2024-03-05 NOTE — H&P ADULT - ASSESSMENT
83yoF PMHx dvt pe and A-Fib on Coumadin, HTN, HLD, gout presents for worsening shortness of breath, fatigue and bilateral lower leg swelling for the past week.     #chf exacerbation   - elevated bnp and trop  - repeat troponin   - check echo   - iv lasix   - hold hctz while on iv diuretics    #afib   - tele monitoring   - metoprolol   - cont warfarin   - check inr daily to guide dosing     #gout   - allopurinol     dnr dni trial niv

## 2024-03-05 NOTE — H&P ADULT - NSHPLABSRESULTS_GEN_ALL_CORE
10.8   10.14 )-----------( 282      ( 05 Mar 2024 16:29 )             36.4       03-05    143  |  107  |  29<H>  ----------------------------<  106<H>  3.9   |  23  |  1.1    Ca    8.9      05 Mar 2024 16:29  Phos  3.4     03-05  Mg     2.0     03-05    TPro  5.9<L>  /  Alb  3.6  /  TBili  2.2<H>  /  DBili  x   /  AST  17  /  ALT  <5  /  AlkPhos  69  03-05          Magnesium: 2.0 mg/dL (03-05-24 @ 16:29)  Phosphorus: 3.4 mg/dL (03-05-24 @ 16:29)          Urinalysis Basic - ( 05 Mar 2024 16:29 )    Color: x / Appearance: x / SG: x / pH: x  Gluc: 106 mg/dL / Ketone: x  / Bili: x / Urobili: x   Blood: x / Protein: x / Nitrite: x   Leuk Esterase: x / RBC: x / WBC x   Sq Epi: x / Non Sq Epi: x / Bacteria: x    PT/INR - ( 05 Mar 2024 16:29 )   PT: 35.00 sec;   INR: 3.04 ratio         PTT - ( 05 Mar 2024 16:29 )  PTT:35.2 sec    Lactate Trend  03-05 @ 16:29 Lactate:1.4

## 2024-03-06 LAB
ANION GAP SERPL CALC-SCNC: 12 MMOL/L — SIGNIFICANT CHANGE UP (ref 7–14)
APTT BLD: 34.7 SEC — SIGNIFICANT CHANGE UP (ref 27–39.2)
BASOPHILS # BLD AUTO: 0.01 K/UL — SIGNIFICANT CHANGE UP (ref 0–0.2)
BASOPHILS NFR BLD AUTO: 0.1 % — SIGNIFICANT CHANGE UP (ref 0–1)
BUN SERPL-MCNC: 29 MG/DL — HIGH (ref 10–20)
CALCIUM SERPL-MCNC: 8.5 MG/DL — SIGNIFICANT CHANGE UP (ref 8.4–10.5)
CHLORIDE SERPL-SCNC: 107 MMOL/L — SIGNIFICANT CHANGE UP (ref 98–110)
CK MB CFR SERPL CALC: 1.9 NG/ML — SIGNIFICANT CHANGE UP (ref 0.6–6.3)
CK SERPL-CCNC: 21 U/L — SIGNIFICANT CHANGE UP (ref 0–225)
CO2 SERPL-SCNC: 22 MMOL/L — SIGNIFICANT CHANGE UP (ref 17–32)
CREAT SERPL-MCNC: 1.2 MG/DL — SIGNIFICANT CHANGE UP (ref 0.7–1.5)
EGFR: 45 ML/MIN/1.73M2 — LOW
EOSINOPHIL # BLD AUTO: 0.27 K/UL — SIGNIFICANT CHANGE UP (ref 0–0.7)
EOSINOPHIL NFR BLD AUTO: 3.3 % — SIGNIFICANT CHANGE UP (ref 0–8)
GLUCOSE SERPL-MCNC: 91 MG/DL — SIGNIFICANT CHANGE UP (ref 70–99)
HCT VFR BLD CALC: 33.2 % — LOW (ref 37–47)
HGB BLD-MCNC: 9.6 G/DL — LOW (ref 12–16)
IMM GRANULOCYTES NFR BLD AUTO: 0.2 % — SIGNIFICANT CHANGE UP (ref 0.1–0.3)
INR BLD: 2.61 RATIO — HIGH (ref 0.65–1.3)
LYMPHOCYTES # BLD AUTO: 2.9 K/UL — SIGNIFICANT CHANGE UP (ref 1.2–3.4)
LYMPHOCYTES # BLD AUTO: 35.2 % — SIGNIFICANT CHANGE UP (ref 20.5–51.1)
MCHC RBC-ENTMCNC: 24.1 PG — LOW (ref 27–31)
MCHC RBC-ENTMCNC: 28.9 G/DL — LOW (ref 32–37)
MCV RBC AUTO: 83.4 FL — SIGNIFICANT CHANGE UP (ref 81–99)
MONOCYTES # BLD AUTO: 0.43 K/UL — SIGNIFICANT CHANGE UP (ref 0.1–0.6)
MONOCYTES NFR BLD AUTO: 5.2 % — SIGNIFICANT CHANGE UP (ref 1.7–9.3)
NEUTROPHILS # BLD AUTO: 4.6 K/UL — SIGNIFICANT CHANGE UP (ref 1.4–6.5)
NEUTROPHILS NFR BLD AUTO: 56 % — SIGNIFICANT CHANGE UP (ref 42.2–75.2)
NRBC # BLD: 0 /100 WBCS — SIGNIFICANT CHANGE UP (ref 0–0)
PLATELET # BLD AUTO: 224 K/UL — SIGNIFICANT CHANGE UP (ref 130–400)
PMV BLD: 11 FL — HIGH (ref 7.4–10.4)
POTASSIUM SERPL-MCNC: 3.9 MMOL/L — SIGNIFICANT CHANGE UP (ref 3.5–5)
POTASSIUM SERPL-SCNC: 3.9 MMOL/L — SIGNIFICANT CHANGE UP (ref 3.5–5)
PROTHROM AB SERPL-ACNC: 30.1 SEC — HIGH (ref 9.95–12.87)
RBC # BLD: 3.98 M/UL — LOW (ref 4.2–5.4)
RBC # FLD: 19.9 % — HIGH (ref 11.5–14.5)
SODIUM SERPL-SCNC: 141 MMOL/L — SIGNIFICANT CHANGE UP (ref 135–146)
TROPONIN SAMPLING TIME: SIGNIFICANT CHANGE UP
TROPONIN SAMPLING TIME: SIGNIFICANT CHANGE UP
TROPONIN T, HIGH SENSITIVITY RESULT: 45 NG/L — HIGH (ref 6–13)
TROPONIN T, HIGH SENSITIVITY RESULT: 50 NG/L — HIGH (ref 6–13)
WBC # BLD: 8.23 K/UL — SIGNIFICANT CHANGE UP (ref 4.8–10.8)
WBC # FLD AUTO: 8.23 K/UL — SIGNIFICANT CHANGE UP (ref 4.8–10.8)

## 2024-03-06 PROCEDURE — 93306 TTE W/DOPPLER COMPLETE: CPT | Mod: 26

## 2024-03-06 PROCEDURE — 99222 1ST HOSP IP/OBS MODERATE 55: CPT

## 2024-03-06 PROCEDURE — 99232 SBSQ HOSP IP/OBS MODERATE 35: CPT

## 2024-03-06 RX ORDER — FUROSEMIDE 40 MG
40 TABLET ORAL DAILY
Refills: 0 | Status: DISCONTINUED | OUTPATIENT
Start: 2024-03-06 | End: 2024-03-08

## 2024-03-06 RX ORDER — FUROSEMIDE 40 MG
40 TABLET ORAL DAILY
Refills: 0 | Status: DISCONTINUED | OUTPATIENT
Start: 2024-03-06 | End: 2024-03-06

## 2024-03-06 RX ORDER — INFLUENZA VIRUS VACCINE 15; 15; 15; 15 UG/.5ML; UG/.5ML; UG/.5ML; UG/.5ML
0.7 SUSPENSION INTRAMUSCULAR ONCE
Refills: 0 | Status: DISCONTINUED | OUTPATIENT
Start: 2024-03-06 | End: 2024-03-08

## 2024-03-06 RX ORDER — METOPROLOL TARTRATE 50 MG
50 TABLET ORAL DAILY
Refills: 0 | Status: DISCONTINUED | OUTPATIENT
Start: 2024-03-06 | End: 2024-03-08

## 2024-03-06 RX ADMIN — Medication 650 MILLIGRAM(S): at 21:02

## 2024-03-06 RX ADMIN — Medication 50 MILLIGRAM(S): at 12:09

## 2024-03-06 RX ADMIN — Medication 650 MILLIGRAM(S): at 20:15

## 2024-03-06 NOTE — CONSULT NOTE ADULT - NS ATTEND AMEND GEN_ALL_CORE FT
83yoF PMHx dvt pe and A-Fib on Coumadin, HTN, HLD, gout presents for worsening shortness of breath, fatigue and bilateral lower leg swelling for the past week. Patient stoppped diuretic on own at home. Her legs were no longer swollen. Now after lasix better. Follow lytes . Can resume po HCTZ soon

## 2024-03-06 NOTE — PATIENT PROFILE ADULT - FUNCTIONAL ASSESSMENT - BASIC MOBILITY 6.
2-calculated by average/Not able to assess (calculate score using [facility] averaging method)

## 2024-03-06 NOTE — CONSULT NOTE ADULT - SUBJECTIVE AND OBJECTIVE BOX
HPI:  83yoF PMHx dvt pe and A-Fib on Coumadin, HTN, HLD, gout presents for worsening shortness of breath, fatigue and bilateral lower leg swelling for the past week. Patient is mostly home bound and gets around the house with difficulty but notices and increased work of breathing and breathlessness with activity. Patient reports she stopped taking HCTZ 2 weeks ago because she was urinating too frequently. Patient also states that she has been having palpitations lately and was told to take an extra dose of metoprolol but she did not think it helped. Patient denies chest pain shortness of breath nausea vomiting.  (05 Mar 2024 18:54)        HPI-Cardiology   Pt with the above Hx and HPI, evaluated at bedside. Pt presented for eval of worsening shortness of breath, fatigue and bilateral lower leg swelling for the past week. Pt states she takes HcTz, and she stopped it on her own because her legs weren't swelling up. She noticed after stoping the HcTz that after a few days noticed worsening SOB and LE edema. Denies any CP or any associated symptoms. Radiology tests and hospital records, were reviewed, as well as previous notes on this patient.          PAST MEDICAL & SURGICAL HISTORY  H/O prothrombin mutation    Pulmonary embolism    HTN (hypertension)    Anemia requiring transfusions    Deep vein thrombosis (DVT)    S/P tonsillectomy    H/O: hysterectomy    History of cholecystectomy          ALLERGIES:  penicillin (Unknown)      MEDICATIONS:  MEDICATIONS  (STANDING):  allopurinol 50 milliGRAM(s) Oral daily  influenza  Vaccine (HIGH DOSE) 0.7 milliLiter(s) IntraMuscular once    MEDICATIONS  (PRN):  acetaminophen     Tablet .. 650 milliGRAM(s) Oral every 6 hours PRN Temp greater or equal to 38C (100.4F), Mild Pain (1 - 3)      HOME MEDICATIONS:  Home Medications:  allopurinol 100 mg oral tablet: orally once a day (11 Sep 2023 17:45)  hydroCHLOROthiazide 25 mg oral tablet: 1 orally (11 Sep 2023 17:45)  metoprolol succinate 50 mg oral capsule, extended release: 1 cap(s) orally once a day (05 Mar 2024 19:00)  warfarin 2.5 mg oral tablet: 1 tab(s) orally once a day (at bedtime) -- currently held as INR is 3.2 (11 Sep 2023 17:45)      VITALS:   T(F): 97.9 (03-06 @ 15:19), Max: 98.2 (03-05 @ 15:36)  HR: 96 (03-06 @ 15:19) (84 - 100)  BP: 90/57 (03-06 @ 15:19) (88/50 - 117/82)  BP(mean): --  RR: 18 (03-06 @ 15:19) (18 - 18)  SpO2: 94% (03-06 @ 15:19) (94% - 96%)    I&O's Summary    05 Mar 2024 07:01  -  06 Mar 2024 07:00  --------------------------------------------------------  IN: 0 mL / OUT: 1100 mL / NET: -1100 mL        REVIEW OF SYSTEMS:  See HPI        PHYSICAL EXAM:  NEURO: patient is awake , alert and oriented  GEN: Not in acute distress  NECK: no thyroid enlargement, no JVD  LUNGS: Mild rales to asc B/L  CARDIOVASCULAR: S1/S2 present, Irregular rate and rhtyhm , no murmurs or rubs, no carotid bruits,  + PP bilaterally  ABD: Soft, non-tender, non-distended, +BS  EXT: No MARICEL  SKIN: Intact          LABS:                        9.6    8.23  )-----------( 224      ( 06 Mar 2024 05:53 )             33.2     03-06    141  |  107  |  29<H>  ----------------------------<  91  3.9   |  22  |  1.2    Ca    8.5      06 Mar 2024 05:53  Phos  3.4     03-05  Mg     2.0     03-05    TPro  5.9<L>  /  Alb  3.6  /  TBili  2.2<H>  /  DBili  x   /  AST  17  /  ALT  <5  /  AlkPhos  69  03-05    PT/INR - ( 06 Mar 2024 05:53 )   PT: 30.10 sec;   INR: 2.61 ratio         PTT - ( 06 Mar 2024 05:53 )  PTT:34.7 sec  Creatine Kinase, Serum: 21 U/L (03-06-24 @ 05:53)  Lactate, Blood: 1.4 mmol/L (03-05-24 @ 16:29)    CARDIAC MARKERS ( 06 Mar 2024 05:53 )  x     / x     / 21 U/L / x     / 1.9 ng/mL        RADIOLOGY:  -CXR:  < from: Xray Chest 1 View-PORTABLE IMMEDIATE (Xray Chest 1 View-PORTABLE IMMEDIATE .) (03.05.24 @ 17:16) >  Impression:    Extensive bilateral opacities. Recommend follow-up.    --- End of Report ---    < end of copied text >            < from: TTE Echo Complete w/o Contrast w/ Doppler (03.06.24 @ 13:11) >      Summary:   1. Left ventricular ejection fraction, by visual estimation, is 55 to   60%.   2. Mildly enlarged right atrium.   3. Normal left atrial size.   4. Mild mitral annular calcification.   5. Mild mitral valve regurgitation.   6. Moderate tricuspid regurgitation.   7. Sclerotic aortic valve with normal opening.   8. Ascendingaorta 3.8 cm.   9. Estimated pulmonary artery systolic pressure is 55.1 mmHg assuming a   right atrial pressure of 3 mmHg, which is consistent with moderate   pulmonary hypertension.    < end of copied text >      ECG:  < from: 12 Lead ECG (03.05.24 @ 15:44) >  Atrial fibrillation  Possible Right ventricular hypertrophy  Septal infarct , age undetermined  Abnormal ECG    Confirmed by Leonardo Eisenberg ([id number]) on 3/5/2024 3:51:32 PM    < end of copied text >

## 2024-03-06 NOTE — CONSULT NOTE ADULT - ASSESSMENT
83yoF PMHx dvt pe and A-Fib on Coumadin, HTN, HLD, gout presents for worsening shortness of breath, fatigue and bilateral lower leg swelling for the past week      Impression:  #SOB: Acute on chronic HFpEF likely to medication non adherence   #Permanent Afib on Coumadin  #Hx DVT/PE on Coumadin   #HTN/HLD      Pt reports breathing has improved  Exam with mild sisgns of volume overload  pBNP 5126, which is lower than previous  Cxr: Extensive bilateral opacities  ECHO: EF 55-60%, Mod TR, Mod PHTN  ECG: Afib, no acute ischemic changes   On tele appears in Afib and rate controlled with HR 80-90's  Trop mildly elevated and stable. 43-->50-- >45. Elevated likely 2/2 to demand in the setting of CHF. Elevated in the past      Plan:  Cont w/ Lasix 40 IVP daily. Can likely transition to Home dose HcTz in 1-2 days   Strict I&O's, daily weights, maintain euvolemic  Cont w/ Metoprolol for rate control with holding Parameters  Cont /w Coumadin. Check INR daily   Check lipid profile, HgA1C, TSH  Monitor lytes

## 2024-03-06 NOTE — PATIENT PROFILE ADULT - FALL HARM RISK - FACTORS NURSING JUDGEMENT
Recommended switching potassium formulation to liquid/powder because of GI bleed risk with multiple anticholinergic medications. Yes

## 2024-03-07 LAB
AMORPH SED URNS QL MICRO: PRESENT
ANION GAP SERPL CALC-SCNC: 11 MMOL/L — SIGNIFICANT CHANGE UP (ref 7–14)
APPEARANCE UR: ABNORMAL
BACTERIA # UR AUTO: ABNORMAL /HPF
BILIRUB UR-MCNC: ABNORMAL
BUN SERPL-MCNC: 27 MG/DL — HIGH (ref 10–20)
CALCIUM SERPL-MCNC: 8.4 MG/DL — SIGNIFICANT CHANGE UP (ref 8.4–10.5)
CHLORIDE SERPL-SCNC: 106 MMOL/L — SIGNIFICANT CHANGE UP (ref 98–110)
CO2 SERPL-SCNC: 27 MMOL/L — SIGNIFICANT CHANGE UP (ref 17–32)
COLOR SPEC: SIGNIFICANT CHANGE UP
COMMENT - URINE: SIGNIFICANT CHANGE UP
CREAT SERPL-MCNC: 1.1 MG/DL — SIGNIFICANT CHANGE UP (ref 0.7–1.5)
DIFF PNL FLD: ABNORMAL
EGFR: 50 ML/MIN/1.73M2 — LOW
EPI CELLS # UR: PRESENT
GLUCOSE SERPL-MCNC: 91 MG/DL — SIGNIFICANT CHANGE UP (ref 70–99)
GLUCOSE UR QL: NEGATIVE MG/DL — SIGNIFICANT CHANGE UP
HCT VFR BLD CALC: 35.6 % — LOW (ref 37–47)
HGB BLD-MCNC: 10.1 G/DL — LOW (ref 12–16)
KETONES UR-MCNC: NEGATIVE MG/DL — SIGNIFICANT CHANGE UP
LEUKOCYTE ESTERASE UR-ACNC: ABNORMAL
MAGNESIUM SERPL-MCNC: 1.8 MG/DL — SIGNIFICANT CHANGE UP (ref 1.8–2.4)
MCHC RBC-ENTMCNC: 24.3 PG — LOW (ref 27–31)
MCHC RBC-ENTMCNC: 28.4 G/DL — LOW (ref 32–37)
MCV RBC AUTO: 85.6 FL — SIGNIFICANT CHANGE UP (ref 81–99)
NITRITE UR-MCNC: POSITIVE
NRBC # BLD: 0 /100 WBCS — SIGNIFICANT CHANGE UP (ref 0–0)
PH UR: 5.5 — SIGNIFICANT CHANGE UP (ref 5–8)
PHOSPHATE SERPL-MCNC: 4 MG/DL — SIGNIFICANT CHANGE UP (ref 2.1–4.9)
PLATELET # BLD AUTO: 219 K/UL — SIGNIFICANT CHANGE UP (ref 130–400)
PMV BLD: 10.8 FL — HIGH (ref 7.4–10.4)
POTASSIUM SERPL-MCNC: 3.7 MMOL/L — SIGNIFICANT CHANGE UP (ref 3.5–5)
POTASSIUM SERPL-SCNC: 3.7 MMOL/L — SIGNIFICANT CHANGE UP (ref 3.5–5)
PROT UR-MCNC: 30 MG/DL
RBC # BLD: 4.16 M/UL — LOW (ref 4.2–5.4)
RBC # FLD: 19.3 % — HIGH (ref 11.5–14.5)
RBC CASTS # UR COMP ASSIST: 10 /HPF — HIGH (ref 0–4)
SODIUM SERPL-SCNC: 144 MMOL/L — SIGNIFICANT CHANGE UP (ref 135–146)
SP GR SPEC: 1.02 — SIGNIFICANT CHANGE UP (ref 1–1.03)
SQUAMOUS # UR AUTO: 8 /HPF — HIGH (ref 0–5)
UROBILINOGEN FLD QL: 1 MG/DL — SIGNIFICANT CHANGE UP (ref 0.2–1)
WBC # BLD: 8.26 K/UL — SIGNIFICANT CHANGE UP (ref 4.8–10.8)
WBC # FLD AUTO: 8.26 K/UL — SIGNIFICANT CHANGE UP (ref 4.8–10.8)
WBC UR QL: 10 /HPF — HIGH (ref 0–5)

## 2024-03-07 PROCEDURE — 99232 SBSQ HOSP IP/OBS MODERATE 35: CPT

## 2024-03-07 RX ORDER — WARFARIN SODIUM 2.5 MG/1
2.5 TABLET ORAL AT BEDTIME
Refills: 0 | Status: DISCONTINUED | OUTPATIENT
Start: 2024-03-07 | End: 2024-03-08

## 2024-03-07 RX ADMIN — Medication 50 MILLIGRAM(S): at 09:19

## 2024-03-07 RX ADMIN — Medication 650 MILLIGRAM(S): at 08:19

## 2024-03-07 RX ADMIN — WARFARIN SODIUM 2.5 MILLIGRAM(S): 2.5 TABLET ORAL at 21:31

## 2024-03-07 RX ADMIN — Medication 50 MILLIGRAM(S): at 11:12

## 2024-03-07 RX ADMIN — Medication 40 MILLIGRAM(S): at 09:19

## 2024-03-07 NOTE — PHYSICAL THERAPY INITIAL EVALUATION ADULT - PERTINENT HX OF CURRENT PROBLEM, REHAB EVAL
82 y/o female admitted with diagnosis of Heart failure, presented to ED with worsening shortness of breath, fatigue, BLE swelling with dry cough and congestion for past week, found to have CHF exacerbation

## 2024-03-07 NOTE — PROGRESS NOTE ADULT - SUBJECTIVE AND OBJECTIVE BOX
JOEY KNAPP  83y  Female      Patient is a 83y old  Female who presents with a chief complaint of chf exacerbation (05 Mar 2024 18:54)      INTERVAL HPI/OVERNIGHT EVENTS:  Pt seen and examined. She c/o feeling tired and SOB.     Vital Signs Last 24 Hrs  T(C): 36.6 (06 Mar 2024 15:19), Max: 36.6 (05 Mar 2024 22:00)  T(F): 97.9 (06 Mar 2024 15:19), Max: 97.9 (06 Mar 2024 06:37)  HR: 96 (06 Mar 2024 15:19) (85 - 100)  BP: 90/57 (06 Mar 2024 15:19) (88/50 - 102/73)  BP(mean): --  RR: 18 (06 Mar 2024 15:19) (18 - 18)  SpO2: 94% (06 Mar 2024 15:19) (94% - 96%)    Parameters below as of 06 Mar 2024 15:19  Patient On (Oxygen Delivery Method): nasal cannula  O2 Flow (L/min): 3      PHYSICAL EXAM:  Gen: NAD, resting in bed  HEENT: Normocephalic, atraumatic  Neck: supple, no lymphadenopathy  CV: Regular rate & regular rhythm  Lungs: CTABL no wheeze  Abdomen: Soft, NTND+ BS present  Ext: Warm, well perfused no CCE  Neuro: non focal, awake, CN II-XII intact   Skin: no rash, no erythema  Psych: no SI, HI, Hallucination     Consultant(s) Notes Reviewed:  [x ] YES  [ ] NO  Care Discussed with Consultants/Other Providers [ x] YES  [ ] NO    LABS:                        9.6    8.23  )-----------( 224      ( 06 Mar 2024 05:53 )             33.2     03-06    141  |  107  |  29<H>  ----------------------------<  91  3.9   |  22  |  1.2    Ca    8.5      06 Mar 2024 05:53  Phos  3.4     03-05  Mg     2.0     03-05    TPro  5.9<L>  /  Alb  3.6  /  TBili  2.2<H>  /  DBili  x   /  AST  17  /  ALT  <5  /  AlkPhos  69  03-05    PT/INR - ( 06 Mar 2024 05:53 )   PT: 30.10 sec;   INR: 2.61 ratio           ASSESSMENT AND PLAN:    · Assessment	  83yoF PMHx dvt pe and A-Fib on Coumadin, HTN, HLD, gout presents for worsening shortness of breath, fatigue and bilateral lower leg swelling for the past week.     #chf exacerbation   - elevated bnp and trop  - trop 40-50-40  - check echo, shows:   1. Left ventricular ejection fraction, by visual estimation, is 55 to 60%.   2. Mildly enlarged right atrium.   3. Normal left atrial size.   4. Mild mitral annular calcification.   5. Mild mitral valve regurgitation.   6. Moderate tricuspid regurgitation.   7. Sclerotic aortic valve with normal opening.   8. Ascending aorta 3.8 cm.   9. Estimated pulmonary artery systolic pressure is 55.1 mmHg assuming a   right atrial pressure of 3 mmHg, which is consistent with moderate   pulmonary hypertension.  - hold lasix and metoprolol due to hypotension  - hold hctz while on iv diuretics  - will consult Cardiology    #afib   - tele monitoring   - metoprolol   - cont warfarin   - check inr daily to guide dosing     #gout   - allopurinol     dnr dni trial niv     
JOEY KNAPP  83y  Female      Patient is a 83y old  Female who presents with a chief complaint of chf exacerbation (06 Mar 2024 15:50)      INTERVAL HPI/OVERNIGHT EVENTS:  Pt seen and examined. Pt c/o feeling tired.       Vital Signs Last 24 Hrs  T(C): 35.7 (07 Mar 2024 05:02), Max: 36.7 (06 Mar 2024 21:07)  T(F): 96.3 (07 Mar 2024 05:02), Max: 98.1 (06 Mar 2024 21:07)  HR: 106 (07 Mar 2024 09:00) (84 - 106)  BP: 101/64 (07 Mar 2024 09:00) (90/57 - 101/67)  BP(mean): --  RR: 18 (07 Mar 2024 05:02) (18 - 18)  SpO2: 83% (07 Mar 2024 13:20) (83% - 97%)    Parameters below as of 07 Mar 2024 13:20  Patient On (Oxygen Delivery Method): room air        PHYSICAL EXAM:  Gen: NAD, resting in bed  HEENT: Normocephalic, atraumatic  Neck: supple, no lymphadenopathy  CV: Regular rate & regular rhythm  Lungs: bibasilar rales  Abdomen: Soft, NTND+ BS present  Ext: Warm, well perfused no CCE  Neuro: non focal, awake, CN II-XII intact   Skin: no rash, no erythema  Psych: no SI, HI, Hallucination     Consultant(s) Notes Reviewed:  [x ] YES  [ ] NO  Care Discussed with Consultants/Other Providers [ x] YES  [ ] NO    LABS:                        10.1   8.26  )-----------( 219      ( 07 Mar 2024 07:09 )             35.6     03-07    144  |  106  |  27<H>  ----------------------------<  91  3.7   |  27  |  1.1    Ca    8.4      07 Mar 2024 07:09  Phos  4.0     03-07  Mg     1.8     03-07    TPro  5.9<L>  /  Alb  3.6  /  TBili  2.2<H>  /  DBili  x   /  AST  17  /  ALT  <5  /  AlkPhos  69  03-05    PT/INR - ( 06 Mar 2024 05:53 )   PT: 30.10 sec;   INR: 2.61 ratio           ASSESSMENT AND PLAN:      · Assessment	  83yoF PMHx dvt pe and A-Fib on Coumadin, HTN, HLD, gout presents for worsening shortness of breath, fatigue and bilateral lower leg swelling for the past week.     #acute hypoxemic resp failure  - pt desaturated to 83% on RA at rest, improves to 95% on 3L via NC.  Patient will require home o2 for discharge.  Patient is aware and agreeable to home o2.  Patient is in a chronic stable state of CHF.     #chf exacerbation   - elevated bnp and trop  - trop 40-50-40  - check echo, shows:   1. Left ventricular ejection fraction, by visual estimation, is 55 to 60%.   2. Mildly enlarged right atrium.   3. Normal left atrial size.   4. Mild mitral annular calcification.   5. Mild mitral valve regurgitation.   6. Moderate tricuspid regurgitation.   7. Sclerotic aortic valve with normal opening.   8. Ascending aorta 3.8 cm.   9. Estimated pulmonary artery systolic pressure is 55.1 mmHg assuming a   right atrial pressure of 3 mmHg, which is consistent with moderate   pulmonary hypertension.  - resume lasix and metoprolol with hold parameters  - hold hctz while on iv diuretics  - appreciate consult Cardiology    #afib   - tele monitoring   - metoprolol   - cont warfarin   - check inr daily to guide dosing     #gout   - allopurinol     dnr dni trial niv

## 2024-03-07 NOTE — PHYSICAL THERAPY INITIAL EVALUATION ADULT - GAIT DEVIATIONS NOTED, PT EVAL
stooped/kyphotic posture, dec heel strike/pushoff/decreased ashley/decreased step length/decreased weight-shifting ability

## 2024-03-07 NOTE — PHYSICAL THERAPY INITIAL EVALUATION ADULT - ADDITIONAL COMMENTS
Per patient, they live in private home with one high step outside with no rail so patient uses a patio chair to hold on to and another high step where patient holds on door on (R) side, with family assistance as needed; was using a rolling walker

## 2024-03-07 NOTE — CHART NOTE - NSCHARTNOTEFT_GEN_A_CORE
Despite IV diuretics, patient desaturates to 83% on RA, improves to 95% on 3L via NC.  Patient will require home o2 for discharge.  Patient is aware and agreeable to home o2.  Patient is in a chronic stable state of CHF. Despite IV diuretics, patient desaturates to 83% on RA at rest, improves to 95% on 3L via NC.  Patient will require home o2 for discharge.  Patient is aware and agreeable to home o2.  Patient is in a chronic stable state of CHF.

## 2024-03-07 NOTE — PHYSICAL THERAPY INITIAL EVALUATION ADULT - SOCIAL CONCERNS
Left completed Physicians Statement with PSR since patient is coming in today and the company is in [place]. None

## 2024-03-07 NOTE — PHYSICAL THERAPY INITIAL EVALUATION ADULT - GENERAL OBSERVATIONS, REHAB EVAL
10:45-11:15 Chart reviewed. Pt encountered semireclined in bed, may be seen by Physical Therapist as confirmed with Nurse. Patient denied pain and ready to get up now; +tele/ O2 via NC/ heplock LUE/ Primafit

## 2024-03-08 ENCOUNTER — TRANSCRIPTION ENCOUNTER (OUTPATIENT)
Age: 84
End: 2024-03-08

## 2024-03-08 VITALS
HEART RATE: 98 BPM | OXYGEN SATURATION: 98 % | TEMPERATURE: 97 F | RESPIRATION RATE: 18 BRPM | SYSTOLIC BLOOD PRESSURE: 88 MMHG | DIASTOLIC BLOOD PRESSURE: 54 MMHG

## 2024-03-08 LAB
ANION GAP SERPL CALC-SCNC: 11 MMOL/L — SIGNIFICANT CHANGE UP (ref 7–14)
BUN SERPL-MCNC: 21 MG/DL — HIGH (ref 10–20)
CALCIUM SERPL-MCNC: 8.4 MG/DL — SIGNIFICANT CHANGE UP (ref 8.4–10.5)
CHLORIDE SERPL-SCNC: 100 MMOL/L — SIGNIFICANT CHANGE UP (ref 98–110)
CO2 SERPL-SCNC: 27 MMOL/L — SIGNIFICANT CHANGE UP (ref 17–32)
CREAT SERPL-MCNC: 1.1 MG/DL — SIGNIFICANT CHANGE UP (ref 0.7–1.5)
EGFR: 50 ML/MIN/1.73M2 — LOW
GLUCOSE SERPL-MCNC: 133 MG/DL — HIGH (ref 70–99)
HCT VFR BLD CALC: 37.9 % — SIGNIFICANT CHANGE UP (ref 37–47)
HGB BLD-MCNC: 11.4 G/DL — LOW (ref 12–16)
INR BLD: 2.05 RATIO — HIGH (ref 0.65–1.3)
MCHC RBC-ENTMCNC: 24.7 PG — LOW (ref 27–31)
MCHC RBC-ENTMCNC: 30.1 G/DL — LOW (ref 32–37)
MCV RBC AUTO: 82 FL — SIGNIFICANT CHANGE UP (ref 81–99)
NRBC # BLD: 0 /100 WBCS — SIGNIFICANT CHANGE UP (ref 0–0)
PLATELET # BLD AUTO: 241 K/UL — SIGNIFICANT CHANGE UP (ref 130–400)
PMV BLD: 10.6 FL — HIGH (ref 7.4–10.4)
POTASSIUM SERPL-MCNC: 3.6 MMOL/L — SIGNIFICANT CHANGE UP (ref 3.5–5)
POTASSIUM SERPL-SCNC: 3.6 MMOL/L — SIGNIFICANT CHANGE UP (ref 3.5–5)
PROTHROM AB SERPL-ACNC: 23.5 SEC — HIGH (ref 9.95–12.87)
RBC # BLD: 4.62 M/UL — SIGNIFICANT CHANGE UP (ref 4.2–5.4)
RBC # FLD: 19.2 % — HIGH (ref 11.5–14.5)
SODIUM SERPL-SCNC: 138 MMOL/L — SIGNIFICANT CHANGE UP (ref 135–146)
WBC # BLD: 9.12 K/UL — SIGNIFICANT CHANGE UP (ref 4.8–10.8)
WBC # FLD AUTO: 9.12 K/UL — SIGNIFICANT CHANGE UP (ref 4.8–10.8)

## 2024-03-08 PROCEDURE — 99239 HOSP IP/OBS DSCHRG MGMT >30: CPT

## 2024-03-08 RX ADMIN — Medication 40 MILLIGRAM(S): at 05:27

## 2024-03-08 RX ADMIN — Medication 50 MILLIGRAM(S): at 05:26

## 2024-03-08 RX ADMIN — Medication 50 MILLIGRAM(S): at 12:36

## 2024-03-08 NOTE — DISCHARGE NOTE PROVIDER - HOSPITAL COURSE
83yoF PMHx dvt pe and A-Fib on Coumadin, HTN, HLD, gout presents for worsening shortness of breath, fatigue and bilateral lower leg swelling for the past week. Patient is mostly home bound and gets around the house with difficulty but notices and increased work of breathing and breathlessness with activity. Patient reports she stopped taking HCTZ 2 weeks ago because she was urinating too frequently. Patient also states that she has been having palpitations lately and was told to take an extra dose of metoprolol but she did not think it helped. Patient denies chest pain shortness of breath nausea vomiting.     She presented in AFib with HR 100s. She was volume overloaded on exam. CXR showed "Extensive bilateral opacities".  Cardiology was consulted and she was diuresed with IV lasix with significant improvement in symptoms. She was continued on toprol XL 50mg for her AFib with good rate control.     She had an echo which showed:    1. Left ventricular ejection fraction, by visual estimation, is 55 to 60%.   2. Mildly enlarged right atrium.   3. Normal left atrial size.   4. Mild mitral annular calcification.   5. Mild mitral valve regurgitation.   6. Moderate tricuspid regurgitation.   7. Sclerotic aortic valve with normal opening.   8. Ascending aorta 3.8 cm.   9. Estimated pulmonary artery systolic pressure is 55.1 mmHg assuming a right atrial pressure of 3 mmHg, which is consistent with moderate pulmonary hypertension.    Cardiology recommended pt continue her HCTZ and Metoprolol XL on discharge. During hospitalization patient desaturated to 83% on RA at rest, improved to 95% on 3L via NC.  Patient qualified for home o2 for discharge which was set up for her. She will follow-up with her PMD and Cardiologist.

## 2024-03-08 NOTE — DISCHARGE NOTE NURSING/CASE MANAGEMENT/SOCIAL WORK - NSDCVIVACCINE_GEN_ALL_CORE_FT
Tdap; 21-Aug-2022 14:03; Kecia Cedillo (RN); [employer]; [id number] (Exp. Date: 22-Sep-2024); IntraMuscular; Deltoid Left.; 0.5 milliLiter(s); VIS (VIS Published: 09-May-2013, VIS Presented: 21-Aug-2022);

## 2024-03-08 NOTE — DISCHARGE NOTE NURSING/CASE MANAGEMENT/SOCIAL WORK - NSDCPEFALRISK_GEN_ALL_CORE
For information on Fall & Injury Prevention, visit: [email or web address] OR  [email or web address] OR  https://www.cdc.gov/steadi/patient.html

## 2024-03-08 NOTE — DISCHARGE NOTE PROVIDER - NSDCFUSCHEDAPPT_GEN_ALL_CORE_FT
[facility]  Scheduled Appointment: 03/11/2024    [facility]  [facility]  Donavan Valencia  Scheduled Appointment: 03/11/2024

## 2024-03-08 NOTE — DISCHARGE NOTE PROVIDER - NSDCCPCAREPLAN_GEN_ALL_CORE_FT
PRINCIPAL DISCHARGE DIAGNOSIS  Diagnosis: Acute CHF  Assessment and Plan of Treatment: You were found to have congestive heart failure. You were given IV lasix with signfiicant improvement in fluid status. Continue taking hydrochlorothiazide at home and eat a low salt diet. Follow-up with your cardiologist.      SECONDARY DISCHARGE DIAGNOSES  Diagnosis: Chronic atrial fibrillation  Assessment and Plan of Treatment: You have afib, continue taking metoprolol XL 50mg daily.    Diagnosis: Acute respiratory failure with hypoxia  Assessment and Plan of Treatment: You had low oxygen levels likely due to congestive heart failure. You need to wear supplemental oxygen 3L which was set up for you for home.  You can follow-up with your primary care doctor in 2-3 weeks to see if you can come off oxygen.

## 2024-03-08 NOTE — DISCHARGE NOTE PROVIDER - NSDCMRMEDTOKEN_GEN_ALL_CORE_FT
allopurinol 100 mg oral tablet: orally once a day  hydroCHLOROthiazide 25 mg oral tablet: 1 orally  metoprolol succinate 50 mg oral capsule, extended release: 1 cap(s) orally once a day  warfarin 2.5 mg oral tablet: 1 tab(s) orally once a day (at bedtime)

## 2024-03-08 NOTE — DISCHARGE NOTE PROVIDER - ATTENDING DISCHARGE PHYSICAL EXAMINATION:
Gen: NAD, resting in bed  HEENT: Normocephalic, atraumatic  CV: Regular rate & regular rhythm  Lungs: Minimal basilar rales  Abdomen: Soft, NTND+ BS present  Ext: Warm, well perfused  Neuro: non focal, awake, CN II-XII intact   Skin: no visible rash, no erythema  Psych: no SI, HI, Hallucination

## 2024-03-08 NOTE — DISCHARGE NOTE NURSING/CASE MANAGEMENT/SOCIAL WORK - PATIENT PORTAL LINK FT
You can access the FollowMyHealth Patient Portal offered by [facility] by registering at the following website: [email or web address]. By joining [employer]’s FollowMyHealth portal, you will also be able to view your health information using other applications (apps) compatible with our system.

## 2024-03-12 ENCOUNTER — OUTPATIENT (OUTPATIENT)
Dept: OUTPATIENT SERVICES | Facility: HOSPITAL | Age: 84
LOS: 1 days | End: 2024-03-12
Payer: MEDICARE

## 2024-03-12 ENCOUNTER — APPOINTMENT (OUTPATIENT)
Dept: MEDICATION MANAGEMENT | Facility: CLINIC | Age: 84
End: 2024-03-12

## 2024-03-12 DIAGNOSIS — Z90.49 ACQUIRED ABSENCE OF OTHER SPECIFIED PARTS OF DIGESTIVE TRACT: Chronic | ICD-10-CM

## 2024-03-12 DIAGNOSIS — I48.91 UNSPECIFIED ATRIAL FIBRILLATION: ICD-10-CM

## 2024-03-12 DIAGNOSIS — Z90.710 ACQUIRED ABSENCE OF BOTH CERVIX AND UTERUS: Chronic | ICD-10-CM

## 2024-03-12 DIAGNOSIS — Z79.01 LONG TERM (CURRENT) USE OF ANTICOAGULANTS: ICD-10-CM

## 2024-03-12 DIAGNOSIS — Z90.89 ACQUIRED ABSENCE OF OTHER ORGANS: Chronic | ICD-10-CM

## 2024-03-12 LAB
INR PPP: 3 RATIO
QUALITY CONTROL: YES

## 2024-03-12 PROCEDURE — G0463: CPT

## 2024-03-13 RX ORDER — WARFARIN 2 MG/1
2 TABLET ORAL DAILY
Qty: 90 | Refills: 2 | Status: ACTIVE | COMMUNITY
Start: 2022-12-19 | End: 1900-01-01

## 2024-03-14 DIAGNOSIS — Z79.899 OTHER LONG TERM (CURRENT) DRUG THERAPY: ICD-10-CM

## 2024-03-14 DIAGNOSIS — I11.0 HYPERTENSIVE HEART DISEASE WITH HEART FAILURE: ICD-10-CM

## 2024-03-14 DIAGNOSIS — I08.3 COMBINED RHEUMATIC DISORDERS OF MITRAL, AORTIC AND TRICUSPID VALVES: ICD-10-CM

## 2024-03-14 DIAGNOSIS — Z98.890 OTHER SPECIFIED POSTPROCEDURAL STATES: ICD-10-CM

## 2024-03-14 DIAGNOSIS — Z91.148 PATIENT'S OTHER NONCOMPLIANCE WITH MEDICATION REGIMEN FOR OTHER REASON: ICD-10-CM

## 2024-03-14 DIAGNOSIS — Z79.01 LONG TERM (CURRENT) USE OF ANTICOAGULANTS: ICD-10-CM

## 2024-03-14 DIAGNOSIS — E78.5 HYPERLIPIDEMIA, UNSPECIFIED: ICD-10-CM

## 2024-03-14 DIAGNOSIS — Z86.711 PERSONAL HISTORY OF PULMONARY EMBOLISM: ICD-10-CM

## 2024-03-14 DIAGNOSIS — I24.89 OTHER FORMS OF ACUTE ISCHEMIC HEART DISEASE: ICD-10-CM

## 2024-03-14 DIAGNOSIS — I50.33 ACUTE ON CHRONIC DIASTOLIC (CONGESTIVE) HEART FAILURE: ICD-10-CM

## 2024-03-14 DIAGNOSIS — M10.9 GOUT, UNSPECIFIED: ICD-10-CM

## 2024-03-14 DIAGNOSIS — Z86.718 PERSONAL HISTORY OF OTHER VENOUS THROMBOSIS AND EMBOLISM: ICD-10-CM

## 2024-03-14 DIAGNOSIS — I48.21 PERMANENT ATRIAL FIBRILLATION: ICD-10-CM

## 2024-03-14 DIAGNOSIS — Z66 DO NOT RESUSCITATE: ICD-10-CM

## 2024-03-14 DIAGNOSIS — J96.01 ACUTE RESPIRATORY FAILURE WITH HYPOXIA: ICD-10-CM

## 2024-03-14 DIAGNOSIS — I27.20 PULMONARY HYPERTENSION, UNSPECIFIED: ICD-10-CM

## 2024-03-14 DIAGNOSIS — D64.9 ANEMIA, UNSPECIFIED: ICD-10-CM

## 2024-03-14 DIAGNOSIS — D68.52 PROTHROMBIN GENE MUTATION: ICD-10-CM

## 2024-03-14 DIAGNOSIS — Z88.0 ALLERGY STATUS TO PENICILLIN: ICD-10-CM

## 2024-03-14 DIAGNOSIS — Z90.49 ACQUIRED ABSENCE OF OTHER SPECIFIED PARTS OF DIGESTIVE TRACT: ICD-10-CM

## 2024-03-14 DIAGNOSIS — Z90.710 ACQUIRED ABSENCE OF BOTH CERVIX AND UTERUS: ICD-10-CM

## 2024-03-19 ENCOUNTER — APPOINTMENT (OUTPATIENT)
Dept: MEDICATION MANAGEMENT | Facility: CLINIC | Age: 84
End: 2024-03-19

## 2024-03-19 ENCOUNTER — OUTPATIENT (OUTPATIENT)
Dept: OUTPATIENT SERVICES | Facility: HOSPITAL | Age: 84
LOS: 1 days | End: 2024-03-19
Payer: MEDICARE

## 2024-03-19 DIAGNOSIS — I48.91 UNSPECIFIED ATRIAL FIBRILLATION: ICD-10-CM

## 2024-03-19 DIAGNOSIS — Z90.49 ACQUIRED ABSENCE OF OTHER SPECIFIED PARTS OF DIGESTIVE TRACT: Chronic | ICD-10-CM

## 2024-03-19 DIAGNOSIS — Z90.710 ACQUIRED ABSENCE OF BOTH CERVIX AND UTERUS: Chronic | ICD-10-CM

## 2024-03-19 DIAGNOSIS — Z79.01 LONG TERM (CURRENT) USE OF ANTICOAGULANTS: ICD-10-CM

## 2024-03-19 DIAGNOSIS — Z90.89 ACQUIRED ABSENCE OF OTHER ORGANS: Chronic | ICD-10-CM

## 2024-03-19 LAB
INR PPP: 4.2 RATIO
QUALITY CONTROL: YES

## 2024-03-19 PROCEDURE — G0463: CPT

## 2024-03-25 ENCOUNTER — OUTPATIENT (OUTPATIENT)
Dept: OUTPATIENT SERVICES | Facility: HOSPITAL | Age: 84
LOS: 1 days | End: 2024-03-25
Payer: MEDICARE

## 2024-03-25 ENCOUNTER — APPOINTMENT (OUTPATIENT)
Dept: MEDICATION MANAGEMENT | Facility: CLINIC | Age: 84
End: 2024-03-25

## 2024-03-25 DIAGNOSIS — Z90.49 ACQUIRED ABSENCE OF OTHER SPECIFIED PARTS OF DIGESTIVE TRACT: Chronic | ICD-10-CM

## 2024-03-25 DIAGNOSIS — Z79.01 LONG TERM (CURRENT) USE OF ANTICOAGULANTS: ICD-10-CM

## 2024-03-25 DIAGNOSIS — Z90.89 ACQUIRED ABSENCE OF OTHER ORGANS: Chronic | ICD-10-CM

## 2024-03-25 DIAGNOSIS — I48.91 UNSPECIFIED ATRIAL FIBRILLATION: ICD-10-CM

## 2024-03-25 LAB
INR PPP: 3.9 RATIO
QUALITY CONTROL: YES

## 2024-03-25 PROCEDURE — G0463: CPT

## 2024-04-03 ENCOUNTER — APPOINTMENT (OUTPATIENT)
Dept: MEDICATION MANAGEMENT | Facility: CLINIC | Age: 84
End: 2024-04-03

## 2024-04-03 ENCOUNTER — OUTPATIENT (OUTPATIENT)
Dept: OUTPATIENT SERVICES | Facility: HOSPITAL | Age: 84
LOS: 1 days | End: 2024-04-03
Payer: MEDICARE

## 2024-04-03 DIAGNOSIS — Z79.01 LONG TERM (CURRENT) USE OF ANTICOAGULANTS: ICD-10-CM

## 2024-04-03 DIAGNOSIS — Z90.710 ACQUIRED ABSENCE OF BOTH CERVIX AND UTERUS: Chronic | ICD-10-CM

## 2024-04-03 DIAGNOSIS — Z90.49 ACQUIRED ABSENCE OF OTHER SPECIFIED PARTS OF DIGESTIVE TRACT: Chronic | ICD-10-CM

## 2024-04-03 DIAGNOSIS — I48.91 UNSPECIFIED ATRIAL FIBRILLATION: ICD-10-CM

## 2024-04-03 LAB
INR PPP: 3.9 RATIO
QUALITY CONTROL: YES

## 2024-04-03 PROCEDURE — G0463: CPT

## 2024-04-18 ENCOUNTER — APPOINTMENT (OUTPATIENT)
Dept: MEDICATION MANAGEMENT | Facility: CLINIC | Age: 84
End: 2024-04-18

## 2024-04-18 ENCOUNTER — OUTPATIENT (OUTPATIENT)
Dept: OUTPATIENT SERVICES | Facility: HOSPITAL | Age: 84
LOS: 1 days | End: 2024-04-18
Payer: MEDICARE

## 2024-04-18 DIAGNOSIS — Z79.01 LONG TERM (CURRENT) USE OF ANTICOAGULANTS: ICD-10-CM

## 2024-04-18 DIAGNOSIS — I48.91 UNSPECIFIED ATRIAL FIBRILLATION: ICD-10-CM

## 2024-04-18 DIAGNOSIS — Z90.49 ACQUIRED ABSENCE OF OTHER SPECIFIED PARTS OF DIGESTIVE TRACT: Chronic | ICD-10-CM

## 2024-04-18 DIAGNOSIS — Z90.89 ACQUIRED ABSENCE OF OTHER ORGANS: Chronic | ICD-10-CM

## 2024-04-18 LAB
INR PPP: 2.8 RATIO
QUALITY CONTROL: YES

## 2024-04-18 PROCEDURE — G0463: CPT

## 2024-05-06 ENCOUNTER — OUTPATIENT (OUTPATIENT)
Dept: OUTPATIENT SERVICES | Facility: HOSPITAL | Age: 84
LOS: 1 days | End: 2024-05-06
Payer: MEDICARE

## 2024-05-06 ENCOUNTER — APPOINTMENT (OUTPATIENT)
Dept: MEDICATION MANAGEMENT | Facility: CLINIC | Age: 84
End: 2024-05-06

## 2024-05-06 DIAGNOSIS — I48.91 UNSPECIFIED ATRIAL FIBRILLATION: ICD-10-CM

## 2024-05-06 DIAGNOSIS — Z79.01 LONG TERM (CURRENT) USE OF ANTICOAGULANTS: ICD-10-CM

## 2024-05-06 DIAGNOSIS — Z90.89 ACQUIRED ABSENCE OF OTHER ORGANS: Chronic | ICD-10-CM

## 2024-05-06 DIAGNOSIS — Z90.49 ACQUIRED ABSENCE OF OTHER SPECIFIED PARTS OF DIGESTIVE TRACT: Chronic | ICD-10-CM

## 2024-05-06 LAB
INR PPP: 2.3 RATIO
QUALITY CONTROL: YES

## 2024-05-06 PROCEDURE — G0463: CPT

## 2024-05-15 ENCOUNTER — APPOINTMENT (OUTPATIENT)
Dept: MEDICATION MANAGEMENT | Facility: CLINIC | Age: 84
End: 2024-05-15

## 2024-05-15 ENCOUNTER — OUTPATIENT (OUTPATIENT)
Dept: OUTPATIENT SERVICES | Facility: HOSPITAL | Age: 84
LOS: 1 days | End: 2024-05-15
Payer: MEDICARE

## 2024-05-15 DIAGNOSIS — Z79.01 LONG TERM (CURRENT) USE OF ANTICOAGULANTS: ICD-10-CM

## 2024-05-15 DIAGNOSIS — Z90.49 ACQUIRED ABSENCE OF OTHER SPECIFIED PARTS OF DIGESTIVE TRACT: Chronic | ICD-10-CM

## 2024-05-15 DIAGNOSIS — Z90.710 ACQUIRED ABSENCE OF BOTH CERVIX AND UTERUS: Chronic | ICD-10-CM

## 2024-05-15 DIAGNOSIS — I48.91 UNSPECIFIED ATRIAL FIBRILLATION: ICD-10-CM

## 2024-05-15 DIAGNOSIS — Z90.89 ACQUIRED ABSENCE OF OTHER ORGANS: Chronic | ICD-10-CM

## 2024-05-15 LAB
INR PPP: 2 RATIO
QUALITY CONTROL: YES

## 2024-05-15 PROCEDURE — G0463: CPT

## 2024-05-28 ENCOUNTER — OUTPATIENT (OUTPATIENT)
Dept: OUTPATIENT SERVICES | Facility: HOSPITAL | Age: 84
LOS: 1 days | End: 2024-05-28
Payer: MEDICARE

## 2024-05-28 ENCOUNTER — APPOINTMENT (OUTPATIENT)
Dept: MEDICATION MANAGEMENT | Facility: CLINIC | Age: 84
End: 2024-05-28

## 2024-05-28 DIAGNOSIS — Z79.01 LONG TERM (CURRENT) USE OF ANTICOAGULANTS: ICD-10-CM

## 2024-05-28 DIAGNOSIS — I48.91 UNSPECIFIED ATRIAL FIBRILLATION: ICD-10-CM

## 2024-05-28 DIAGNOSIS — Z90.89 ACQUIRED ABSENCE OF OTHER ORGANS: Chronic | ICD-10-CM

## 2024-05-28 DIAGNOSIS — Z90.710 ACQUIRED ABSENCE OF BOTH CERVIX AND UTERUS: Chronic | ICD-10-CM

## 2024-05-28 DIAGNOSIS — Z90.49 ACQUIRED ABSENCE OF OTHER SPECIFIED PARTS OF DIGESTIVE TRACT: Chronic | ICD-10-CM

## 2024-05-28 LAB
INR PPP: 4 RATIO
QUALITY CONTROL: YES

## 2024-05-28 PROCEDURE — G0463: CPT

## 2024-06-05 ENCOUNTER — APPOINTMENT (OUTPATIENT)
Dept: MEDICATION MANAGEMENT | Facility: CLINIC | Age: 84
End: 2024-06-05

## 2024-06-05 ENCOUNTER — OUTPATIENT (OUTPATIENT)
Dept: OUTPATIENT SERVICES | Facility: HOSPITAL | Age: 84
LOS: 1 days | End: 2024-06-05
Payer: MEDICARE

## 2024-06-05 DIAGNOSIS — Z90.710 ACQUIRED ABSENCE OF BOTH CERVIX AND UTERUS: Chronic | ICD-10-CM

## 2024-06-05 DIAGNOSIS — Z90.49 ACQUIRED ABSENCE OF OTHER SPECIFIED PARTS OF DIGESTIVE TRACT: Chronic | ICD-10-CM

## 2024-06-05 DIAGNOSIS — Z79.01 LONG TERM (CURRENT) USE OF ANTICOAGULANTS: ICD-10-CM

## 2024-06-05 DIAGNOSIS — Z90.89 ACQUIRED ABSENCE OF OTHER ORGANS: Chronic | ICD-10-CM

## 2024-06-05 DIAGNOSIS — I48.91 UNSPECIFIED ATRIAL FIBRILLATION: ICD-10-CM

## 2024-06-05 LAB
INR PPP: 3.5 RATIO
QUALITY CONTROL: YES

## 2024-06-05 PROCEDURE — G0463: CPT

## 2024-06-08 ENCOUNTER — EMERGENCY (EMERGENCY)
Facility: HOSPITAL | Age: 84
LOS: 0 days | Discharge: HOME | End: 2024-06-08
Attending: EMERGENCY MEDICINE
Payer: MEDICARE

## 2024-06-08 VITALS
TEMPERATURE: 98 F | SYSTOLIC BLOOD PRESSURE: 106 MMHG | HEART RATE: 107 BPM | OXYGEN SATURATION: 96 % | RESPIRATION RATE: 189 BRPM | DIASTOLIC BLOOD PRESSURE: 72 MMHG | WEIGHT: 149.91 LBS | HEIGHT: 64 IN

## 2024-06-08 VITALS
HEART RATE: 71 BPM | DIASTOLIC BLOOD PRESSURE: 80 MMHG | SYSTOLIC BLOOD PRESSURE: 117 MMHG | TEMPERATURE: 98 F | OXYGEN SATURATION: 95 % | RESPIRATION RATE: 16 BRPM

## 2024-06-08 DIAGNOSIS — Z88.0 ALLERGY STATUS TO PENICILLIN: ICD-10-CM

## 2024-06-08 DIAGNOSIS — Z90.89 ACQUIRED ABSENCE OF OTHER ORGANS: Chronic | ICD-10-CM

## 2024-06-08 DIAGNOSIS — Z90.710 ACQUIRED ABSENCE OF BOTH CERVIX AND UTERUS: Chronic | ICD-10-CM

## 2024-06-08 DIAGNOSIS — Z90.49 ACQUIRED ABSENCE OF OTHER SPECIFIED PARTS OF DIGESTIVE TRACT: Chronic | ICD-10-CM

## 2024-06-08 DIAGNOSIS — L03.032 CELLULITIS OF LEFT TOE: ICD-10-CM

## 2024-06-08 DIAGNOSIS — M79.675 PAIN IN LEFT TOE(S): ICD-10-CM

## 2024-06-08 PROCEDURE — 10160 PNXR ASPIR ABSC HMTMA BULLA: CPT

## 2024-06-08 PROCEDURE — 73620 X-RAY EXAM OF FOOT: CPT | Mod: LT

## 2024-06-08 PROCEDURE — 99284 EMERGENCY DEPT VISIT MOD MDM: CPT | Mod: 25

## 2024-06-08 PROCEDURE — 73620 X-RAY EXAM OF FOOT: CPT | Mod: 26,LT

## 2024-06-08 PROCEDURE — 10060 I&D ABSCESS SIMPLE/SINGLE: CPT

## 2024-06-08 PROCEDURE — 99283 EMERGENCY DEPT VISIT LOW MDM: CPT | Mod: 25

## 2024-06-08 NOTE — ED ADULT NURSE NOTE - NSFALLHARMRISKINTERV_ED_ALL_ED

## 2024-06-08 NOTE — ED PROVIDER NOTE - ATTENDING APP SHARED VISIT CONTRIBUTION OF CARE
I have personally performed a history and physical exam on this patient and personally directed the management of the patient. patient is an 83 yo f who presents for evaluation of left toe pain which is moderate and throbbing in nature no fevers no chills no vomiting states she has a history of gout in the past but is well controlled with allopurinol denies any trauma or falls she denies any fb    left great toe medial aspect reveals areas of induration with several areas of fluctuance of white material no signs of vascular compromise no signs of ascending cellulitis pedal pulses 2 +=   she is able to bear weight normally    a/p patients presentation is most consistent with localized cellulitis but no signs of abscess she is able to bear weight, no evidence of vascular compromise no evidence of infection although she has a history of gout this is not a classic presentation we she underwent successful I/D we have initiated po antibiotics and advised return visit in the next 24 for repeat examination and wound check patient and son agree with plan of care

## 2024-06-08 NOTE — ED PROVIDER NOTE - PATIENT PORTAL LINK FT
You can access the FollowMyHealth Patient Portal offered by [facility] by registering at the following website: [email or web address]. By joining [employer]’s FollowMyHealth portal, you will also be able to view your health information using other applications (apps) compatible with our system.

## 2024-06-08 NOTE — ED PROVIDER NOTE - CLINICAL SUMMARY MEDICAL DECISION MAKING FREE TEXT BOX
. patient is an 85 yo f who presents for evaluation of left toe pain which is moderate and throbbing in nature no fevers no chills no vomiting states she has a history of gout in the past but is well controlled with allopurinol denies any trauma or falls she denies any fb    left great toe medial aspect reveals areas of induration with several areas of fluctuance of white material no signs of vascular compromise no signs of ascending cellulitis pedal pulses 2 +=   she is able to bear weight normally    a/p patients presentation is most consistent with localized cellulitis but no signs of abscess she is able to bear weight, no evidence of vascular compromise no evidence of infection although she has a history of gout this is not a classic presentation we she underwent successful I/D we have initiated po antibiotics and advised return visit in the next 24 for repeat examination and wound check patient and son agree with plan of care

## 2024-06-08 NOTE — ED PROVIDER NOTE - PHYSICAL EXAMINATION
left great toe medial aspect reveals areas of induration with several areas of fluctuance of white material no signs of vascular compromise no signs of ascending cellulitis pedal pulses 2 +=   she is able to bear weight normally

## 2024-06-08 NOTE — ED PROVIDER NOTE - OBJECTIVE STATEMENT
patient is an 85 yo f who presents for evaluation of left toe pain which is moderate and throbbing in nature no fevers no chills no vomiting states she has a history of gout in the past but is well controlled with allopurinol denies any trauma or falls she denies any fb

## 2024-06-10 ENCOUNTER — EMERGENCY (EMERGENCY)
Facility: HOSPITAL | Age: 84
LOS: 0 days | Discharge: HOME | End: 2024-06-10
Attending: EMERGENCY MEDICINE
Payer: MEDICARE

## 2024-06-10 VITALS
SYSTOLIC BLOOD PRESSURE: 119 MMHG | TEMPERATURE: 98 F | OXYGEN SATURATION: 98 % | HEART RATE: 68 BPM | DIASTOLIC BLOOD PRESSURE: 72 MMHG | HEIGHT: 64 IN | RESPIRATION RATE: 18 BRPM

## 2024-06-10 DIAGNOSIS — Z90.49 ACQUIRED ABSENCE OF OTHER SPECIFIED PARTS OF DIGESTIVE TRACT: Chronic | ICD-10-CM

## 2024-06-10 DIAGNOSIS — L03.116 CELLULITIS OF LEFT LOWER LIMB: ICD-10-CM

## 2024-06-10 DIAGNOSIS — Z88.0 ALLERGY STATUS TO PENICILLIN: ICD-10-CM

## 2024-06-10 DIAGNOSIS — Z90.89 ACQUIRED ABSENCE OF OTHER ORGANS: Chronic | ICD-10-CM

## 2024-06-10 DIAGNOSIS — Z90.710 ACQUIRED ABSENCE OF BOTH CERVIX AND UTERUS: Chronic | ICD-10-CM

## 2024-06-10 PROCEDURE — 99212 OFFICE O/P EST SF 10 MIN: CPT

## 2024-06-10 PROCEDURE — 99282 EMERGENCY DEPT VISIT SF MDM: CPT

## 2024-06-10 NOTE — ED PROVIDER NOTE - NSFOLLOWUPINSTRUCTIONS_ED_ALL_ED_FT
SEE YOUR DOCTOR IN THE NEXT 24-48 HOURS   CONTINUE TO MONITOR   CONTINUE TAKE ANTIBIOTICS     Cellulitis    Cellulitis is a skin infection caused by bacteria. This condition occurs most often in the arms and lower legs but can occur anywhere over the body. Symptoms include redness, swelling, warm skin, tenderness, and chills/fever. If you were prescribed an antibiotic medicine, take it as told by your health care provider. Do not stop taking the antibiotic even if you start to feel better.    SEEK IMMEDIATE MEDICAL CARE IF YOU HAVE ANY OF THE FOLLOWING SYMPTOMS: worsening fever, red streaks coming from affected area, vomiting or diarrhea, or dizziness/lightheadedness.

## 2024-06-10 NOTE — ED PROVIDER NOTE - CLINICAL SUMMARY MEDICAL DECISION MAKING FREE TEXT BOX
patient is improving no signs of worsening infection afebrile pain improved advised to continue to monitor, low threshold for return to the ED

## 2024-06-10 NOTE — ED PROVIDER NOTE - OBJECTIVE STATEMENT
83 yo patient present for recheck of left lower foot cellulitis it has improved it is not worsening.  no fevers no chills no vomiting, no signs of ascending infection at this time. patient is  able to bear weight and complete adl she is taking antibiotics

## 2024-06-12 ENCOUNTER — APPOINTMENT (OUTPATIENT)
Dept: MEDICATION MANAGEMENT | Facility: CLINIC | Age: 84
End: 2024-06-12

## 2024-06-12 ENCOUNTER — OUTPATIENT (OUTPATIENT)
Dept: OUTPATIENT SERVICES | Facility: HOSPITAL | Age: 84
LOS: 1 days | End: 2024-06-12
Payer: MEDICARE

## 2024-06-12 DIAGNOSIS — I48.91 UNSPECIFIED ATRIAL FIBRILLATION: ICD-10-CM

## 2024-06-12 DIAGNOSIS — Z90.89 ACQUIRED ABSENCE OF OTHER ORGANS: Chronic | ICD-10-CM

## 2024-06-12 DIAGNOSIS — Z90.710 ACQUIRED ABSENCE OF BOTH CERVIX AND UTERUS: Chronic | ICD-10-CM

## 2024-06-12 DIAGNOSIS — Z79.01 LONG TERM (CURRENT) USE OF ANTICOAGULANTS: ICD-10-CM

## 2024-06-12 LAB
INR PPP: 3.5 RATIO
QUALITY CONTROL: YES

## 2024-06-12 PROCEDURE — G0463: CPT

## 2024-06-22 ENCOUNTER — INPATIENT (INPATIENT)
Facility: HOSPITAL | Age: 84
LOS: 3 days | Discharge: HOME | DRG: 440 | End: 2024-06-26
Attending: HOSPITALIST | Admitting: INTERNAL MEDICINE
Payer: MEDICARE

## 2024-06-22 VITALS
HEIGHT: 64 IN | HEART RATE: 76 BPM | TEMPERATURE: 98 F | WEIGHT: 149.91 LBS | DIASTOLIC BLOOD PRESSURE: 79 MMHG | OXYGEN SATURATION: 96 % | RESPIRATION RATE: 18 BRPM | SYSTOLIC BLOOD PRESSURE: 130 MMHG

## 2024-06-22 DIAGNOSIS — Z90.49 ACQUIRED ABSENCE OF OTHER SPECIFIED PARTS OF DIGESTIVE TRACT: Chronic | ICD-10-CM

## 2024-06-22 DIAGNOSIS — Z90.710 ACQUIRED ABSENCE OF BOTH CERVIX AND UTERUS: Chronic | ICD-10-CM

## 2024-06-22 DIAGNOSIS — Z90.89 ACQUIRED ABSENCE OF OTHER ORGANS: Chronic | ICD-10-CM

## 2024-06-22 DIAGNOSIS — K85.90 ACUTE PANCREATITIS WITHOUT NECROSIS OR INFECTION, UNSPECIFIED: ICD-10-CM

## 2024-06-22 LAB
ALBUMIN SERPL ELPH-MCNC: 3.7 G/DL — SIGNIFICANT CHANGE UP (ref 3.5–5.2)
ALP SERPL-CCNC: 76 U/L — SIGNIFICANT CHANGE UP (ref 30–115)
ALT FLD-CCNC: 6 U/L — SIGNIFICANT CHANGE UP (ref 0–41)
ANION GAP SERPL CALC-SCNC: 8 MMOL/L — SIGNIFICANT CHANGE UP (ref 7–14)
APPEARANCE UR: CLEAR — SIGNIFICANT CHANGE UP
APTT BLD: 40.6 SEC — HIGH (ref 27–39.2)
AST SERPL-CCNC: 23 U/L — SIGNIFICANT CHANGE UP (ref 0–41)
BACTERIA # UR AUTO: NEGATIVE /HPF — SIGNIFICANT CHANGE UP
BASOPHILS # BLD AUTO: 0.02 K/UL — SIGNIFICANT CHANGE UP (ref 0–0.2)
BASOPHILS NFR BLD AUTO: 0.2 % — SIGNIFICANT CHANGE UP (ref 0–1)
BILIRUB SERPL-MCNC: 1.2 MG/DL — SIGNIFICANT CHANGE UP (ref 0.2–1.2)
BILIRUB UR-MCNC: NEGATIVE — SIGNIFICANT CHANGE UP
BUN SERPL-MCNC: 26 MG/DL — HIGH (ref 10–20)
CALCIUM SERPL-MCNC: 9.2 MG/DL — SIGNIFICANT CHANGE UP (ref 8.4–10.5)
CHLORIDE SERPL-SCNC: 104 MMOL/L — SIGNIFICANT CHANGE UP (ref 98–110)
CO2 SERPL-SCNC: 27 MMOL/L — SIGNIFICANT CHANGE UP (ref 17–32)
COLOR SPEC: YELLOW — SIGNIFICANT CHANGE UP
CREAT SERPL-MCNC: 1 MG/DL — SIGNIFICANT CHANGE UP (ref 0.7–1.5)
DIFF PNL FLD: ABNORMAL
EGFR: 55 ML/MIN/1.73M2 — LOW
EOSINOPHIL # BLD AUTO: 0.03 K/UL — SIGNIFICANT CHANGE UP (ref 0–0.7)
EOSINOPHIL NFR BLD AUTO: 0.3 % — SIGNIFICANT CHANGE UP (ref 0–8)
EPI CELLS # UR: PRESENT
GLUCOSE SERPL-MCNC: 107 MG/DL — HIGH (ref 70–99)
GLUCOSE UR QL: NEGATIVE MG/DL — SIGNIFICANT CHANGE UP
HCT VFR BLD CALC: 46.9 % — SIGNIFICANT CHANGE UP (ref 37–47)
HGB BLD-MCNC: 14.2 G/DL — SIGNIFICANT CHANGE UP (ref 12–16)
IMM GRANULOCYTES NFR BLD AUTO: 0.3 % — SIGNIFICANT CHANGE UP (ref 0.1–0.3)
INR BLD: 2.83 RATIO — HIGH (ref 0.65–1.3)
KETONES UR-MCNC: ABNORMAL MG/DL
LACTATE SERPL-SCNC: 1.4 MMOL/L — SIGNIFICANT CHANGE UP (ref 0.7–2)
LEUKOCYTE ESTERASE UR-ACNC: NEGATIVE — SIGNIFICANT CHANGE UP
LIDOCAIN IGE QN: 1179 U/L — HIGH (ref 7–60)
LYMPHOCYTES # BLD AUTO: 1.95 K/UL — SIGNIFICANT CHANGE UP (ref 1.2–3.4)
LYMPHOCYTES # BLD AUTO: 19.9 % — LOW (ref 20.5–51.1)
MAGNESIUM SERPL-MCNC: 1.7 MG/DL — LOW (ref 1.8–2.4)
MCHC RBC-ENTMCNC: 25.9 PG — LOW (ref 27–31)
MCHC RBC-ENTMCNC: 30.3 G/DL — LOW (ref 32–37)
MCV RBC AUTO: 85.6 FL — SIGNIFICANT CHANGE UP (ref 81–99)
MONOCYTES # BLD AUTO: 0.51 K/UL — SIGNIFICANT CHANGE UP (ref 0.1–0.6)
MONOCYTES NFR BLD AUTO: 5.2 % — SIGNIFICANT CHANGE UP (ref 1.7–9.3)
NEUTROPHILS # BLD AUTO: 7.28 K/UL — HIGH (ref 1.4–6.5)
NEUTROPHILS NFR BLD AUTO: 74.1 % — SIGNIFICANT CHANGE UP (ref 42.2–75.2)
NITRITE UR-MCNC: NEGATIVE — SIGNIFICANT CHANGE UP
NRBC # BLD: 0 /100 WBCS — SIGNIFICANT CHANGE UP (ref 0–0)
PH UR: 5.5 — SIGNIFICANT CHANGE UP (ref 5–8)
PLATELET # BLD AUTO: 152 K/UL — SIGNIFICANT CHANGE UP (ref 130–400)
PMV BLD: 11.5 FL — HIGH (ref 7.4–10.4)
POTASSIUM SERPL-MCNC: 5.4 MMOL/L — HIGH (ref 3.5–5)
POTASSIUM SERPL-SCNC: 5.4 MMOL/L — HIGH (ref 3.5–5)
PROT SERPL-MCNC: 5.9 G/DL — LOW (ref 6–8)
PROT UR-MCNC: 30 MG/DL
PROTHROM AB SERPL-ACNC: 32.6 SEC — HIGH (ref 9.95–12.87)
RBC # BLD: 5.48 M/UL — HIGH (ref 4.2–5.4)
RBC # FLD: 21.2 % — HIGH (ref 11.5–14.5)
RBC CASTS # UR COMP ASSIST: 9 /HPF — HIGH (ref 0–4)
SODIUM SERPL-SCNC: 139 MMOL/L — SIGNIFICANT CHANGE UP (ref 135–146)
SP GR SPEC: 1.02 — SIGNIFICANT CHANGE UP (ref 1–1.03)
TRIGL SERPL-MCNC: 61 MG/DL — SIGNIFICANT CHANGE UP
UROBILINOGEN FLD QL: 0.2 MG/DL — SIGNIFICANT CHANGE UP (ref 0.2–1)
WBC # BLD: 9.82 K/UL — SIGNIFICANT CHANGE UP (ref 4.8–10.8)
WBC # FLD AUTO: 9.82 K/UL — SIGNIFICANT CHANGE UP (ref 4.8–10.8)
WBC UR QL: 4 /HPF — SIGNIFICANT CHANGE UP (ref 0–5)

## 2024-06-22 PROCEDURE — 76705 ECHO EXAM OF ABDOMEN: CPT | Mod: 26

## 2024-06-22 PROCEDURE — 85610 PROTHROMBIN TIME: CPT

## 2024-06-22 PROCEDURE — 93970 EXTREMITY STUDY: CPT

## 2024-06-22 PROCEDURE — 93010 ELECTROCARDIOGRAM REPORT: CPT

## 2024-06-22 PROCEDURE — C9113: CPT

## 2024-06-22 PROCEDURE — 86901 BLOOD TYPING SEROLOGIC RH(D): CPT

## 2024-06-22 PROCEDURE — 80053 COMPREHEN METABOLIC PANEL: CPT

## 2024-06-22 PROCEDURE — 74177 CT ABD & PELVIS W/CONTRAST: CPT | Mod: 26,MC

## 2024-06-22 PROCEDURE — 86900 BLOOD TYPING SEROLOGIC ABO: CPT

## 2024-06-22 PROCEDURE — 99285 EMERGENCY DEPT VISIT HI MDM: CPT

## 2024-06-22 PROCEDURE — 74181 MRI ABDOMEN W/O CONTRAST: CPT | Mod: MC

## 2024-06-22 PROCEDURE — 80048 BASIC METABOLIC PNL TOTAL CA: CPT

## 2024-06-22 PROCEDURE — 82150 ASSAY OF AMYLASE: CPT

## 2024-06-22 PROCEDURE — 86850 RBC ANTIBODY SCREEN: CPT

## 2024-06-22 PROCEDURE — 36415 COLL VENOUS BLD VENIPUNCTURE: CPT

## 2024-06-22 PROCEDURE — 84478 ASSAY OF TRIGLYCERIDES: CPT

## 2024-06-22 PROCEDURE — 83690 ASSAY OF LIPASE: CPT

## 2024-06-22 PROCEDURE — 83880 ASSAY OF NATRIURETIC PEPTIDE: CPT

## 2024-06-22 PROCEDURE — 85730 THROMBOPLASTIN TIME PARTIAL: CPT

## 2024-06-22 PROCEDURE — 83735 ASSAY OF MAGNESIUM: CPT

## 2024-06-22 PROCEDURE — 85025 COMPLETE CBC W/AUTO DIFF WBC: CPT

## 2024-06-22 PROCEDURE — 99222 1ST HOSP IP/OBS MODERATE 55: CPT

## 2024-06-22 RX ORDER — MORPHINE SULFATE 100 MG/1
4 TABLET, EXTENDED RELEASE ORAL ONCE
Refills: 0 | Status: DISCONTINUED | OUTPATIENT
Start: 2024-06-22 | End: 2024-06-22

## 2024-06-22 RX ORDER — MAGNESIUM, ALUMINUM HYDROXIDE 400-400
30 TABLET,CHEWABLE ORAL EVERY 4 HOURS
Refills: 0 | Status: DISCONTINUED | OUTPATIENT
Start: 2024-06-22 | End: 2024-06-26

## 2024-06-22 RX ORDER — ACETAMINOPHEN 325 MG
650 TABLET ORAL EVERY 6 HOURS
Refills: 0 | Status: DISCONTINUED | OUTPATIENT
Start: 2024-06-22 | End: 2024-06-26

## 2024-06-22 RX ORDER — ONDANSETRON HYDROCHLORIDE 2 MG/ML
4 INJECTION INTRAMUSCULAR; INTRAVENOUS EVERY 8 HOURS
Refills: 0 | Status: DISCONTINUED | OUTPATIENT
Start: 2024-06-22 | End: 2024-06-26

## 2024-06-22 RX ORDER — DEXTROSE MONOHYDRATE AND SODIUM CHLORIDE 5; .3 G/100ML; G/100ML
1000 INJECTION, SOLUTION INTRAVENOUS ONCE
Refills: 0 | Status: COMPLETED | OUTPATIENT
Start: 2024-06-22 | End: 2024-06-22

## 2024-06-22 RX ORDER — ONDANSETRON HYDROCHLORIDE 2 MG/ML
4 INJECTION INTRAMUSCULAR; INTRAVENOUS ONCE
Refills: 0 | Status: COMPLETED | OUTPATIENT
Start: 2024-06-22 | End: 2024-06-22

## 2024-06-22 RX ADMIN — ONDANSETRON HYDROCHLORIDE 4 MILLIGRAM(S): 2 INJECTION INTRAMUSCULAR; INTRAVENOUS at 18:57

## 2024-06-22 RX ADMIN — MORPHINE SULFATE 4 MILLIGRAM(S): 100 TABLET, EXTENDED RELEASE ORAL at 18:45

## 2024-06-22 RX ADMIN — DEXTROSE MONOHYDRATE AND SODIUM CHLORIDE 1000 MILLILITER(S): 5; .3 INJECTION, SOLUTION INTRAVENOUS at 18:57

## 2024-06-22 RX ADMIN — MORPHINE SULFATE 4 MILLIGRAM(S): 100 TABLET, EXTENDED RELEASE ORAL at 23:27

## 2024-06-23 LAB
ALBUMIN SERPL ELPH-MCNC: 3.4 G/DL — LOW (ref 3.5–5.2)
ALP SERPL-CCNC: 71 U/L — SIGNIFICANT CHANGE UP (ref 30–115)
ALT FLD-CCNC: 5 U/L — SIGNIFICANT CHANGE UP (ref 0–41)
AMYLASE P1 CFR SERPL: 401 U/L — CRITICAL HIGH (ref 25–115)
ANION GAP SERPL CALC-SCNC: 15 MMOL/L — HIGH (ref 7–14)
ANISOCYTOSIS BLD QL: SLIGHT — SIGNIFICANT CHANGE UP
AST SERPL-CCNC: 16 U/L — SIGNIFICANT CHANGE UP (ref 0–41)
BASOPHILS # BLD AUTO: 0 K/UL — SIGNIFICANT CHANGE UP (ref 0–0.2)
BASOPHILS NFR BLD AUTO: 0 % — SIGNIFICANT CHANGE UP (ref 0–1)
BILIRUB SERPL-MCNC: 1.1 MG/DL — SIGNIFICANT CHANGE UP (ref 0.2–1.2)
BLD GP AB SCN SERPL QL: SIGNIFICANT CHANGE UP
BUN SERPL-MCNC: 23 MG/DL — HIGH (ref 10–20)
CALCIUM SERPL-MCNC: 8.9 MG/DL — SIGNIFICANT CHANGE UP (ref 8.4–10.5)
CHLORIDE SERPL-SCNC: 101 MMOL/L — SIGNIFICANT CHANGE UP (ref 98–110)
CO2 SERPL-SCNC: 24 MMOL/L — SIGNIFICANT CHANGE UP (ref 17–32)
CREAT SERPL-MCNC: 0.9 MG/DL — SIGNIFICANT CHANGE UP (ref 0.7–1.5)
EGFR: 63 ML/MIN/1.73M2 — SIGNIFICANT CHANGE UP
EOSINOPHIL # BLD AUTO: 0.22 K/UL — SIGNIFICANT CHANGE UP (ref 0–0.7)
EOSINOPHIL NFR BLD AUTO: 2 % — SIGNIFICANT CHANGE UP (ref 0–8)
GLUCOSE SERPL-MCNC: 70 MG/DL — SIGNIFICANT CHANGE UP (ref 70–99)
HCT VFR BLD CALC: 44.3 % — SIGNIFICANT CHANGE UP (ref 37–47)
HGB BLD-MCNC: 13.2 G/DL — SIGNIFICANT CHANGE UP (ref 12–16)
INR BLD: 3.33 RATIO — HIGH (ref 0.65–1.3)
LIDOCAIN IGE QN: 843 U/L — HIGH (ref 7–60)
LYMPHOCYTES # BLD AUTO: 2.91 K/UL — SIGNIFICANT CHANGE UP (ref 1.2–3.4)
LYMPHOCYTES # BLD AUTO: 27 % — SIGNIFICANT CHANGE UP (ref 20.5–51.1)
MAGNESIUM SERPL-MCNC: 2.3 MG/DL — SIGNIFICANT CHANGE UP (ref 1.8–2.4)
MCHC RBC-ENTMCNC: 25.6 PG — LOW (ref 27–31)
MCHC RBC-ENTMCNC: 29.8 G/DL — LOW (ref 32–37)
MCV RBC AUTO: 85.9 FL — SIGNIFICANT CHANGE UP (ref 81–99)
MONOCYTES # BLD AUTO: 0.65 K/UL — HIGH (ref 0.1–0.6)
MONOCYTES NFR BLD AUTO: 6 % — SIGNIFICANT CHANGE UP (ref 1.7–9.3)
NEUTROPHILS # BLD AUTO: 6.79 K/UL — HIGH (ref 1.4–6.5)
NEUTROPHILS NFR BLD AUTO: 60 % — SIGNIFICANT CHANGE UP (ref 42.2–75.2)
NEUTS BAND # BLD: 3 % — SIGNIFICANT CHANGE UP (ref 0–6)
NRBC # BLD: 0 /100 WBCS — SIGNIFICANT CHANGE UP (ref 0–0)
NRBC # BLD: SIGNIFICANT CHANGE UP /100 WBCS (ref 0–0)
NT-PROBNP SERPL-SCNC: 1117 PG/ML — HIGH (ref 0–300)
PLAT MORPH BLD: NORMAL — SIGNIFICANT CHANGE UP
PLATELET # BLD AUTO: 117 K/UL — LOW (ref 130–400)
PLATELET COUNT - ESTIMATE: ABNORMAL
PMV BLD: SIGNIFICANT CHANGE UP (ref 7.4–10.4)
POTASSIUM SERPL-MCNC: 4 MMOL/L — SIGNIFICANT CHANGE UP (ref 3.5–5)
POTASSIUM SERPL-SCNC: 4 MMOL/L — SIGNIFICANT CHANGE UP (ref 3.5–5)
PROT SERPL-MCNC: 5.3 G/DL — LOW (ref 6–8)
PROTHROM AB SERPL-ACNC: 38.4 SEC — HIGH (ref 9.95–12.87)
RBC # BLD: 5.16 M/UL — SIGNIFICANT CHANGE UP (ref 4.2–5.4)
RBC # FLD: 21.2 % — HIGH (ref 11.5–14.5)
RBC BLD AUTO: ABNORMAL
SMUDGE CELLS # BLD: PRESENT — SIGNIFICANT CHANGE UP
SODIUM SERPL-SCNC: 140 MMOL/L — SIGNIFICANT CHANGE UP (ref 135–146)
VARIANT LYMPHS # BLD: 2 % — SIGNIFICANT CHANGE UP (ref 0–5)
WBC # BLD: 10.78 K/UL — SIGNIFICANT CHANGE UP (ref 4.8–10.8)
WBC # FLD AUTO: 10.78 K/UL — SIGNIFICANT CHANGE UP (ref 4.8–10.8)

## 2024-06-23 PROCEDURE — 99233 SBSQ HOSP IP/OBS HIGH 50: CPT

## 2024-06-23 PROCEDURE — 93970 EXTREMITY STUDY: CPT | Mod: 26

## 2024-06-23 RX ORDER — METOPROLOL TARTRATE 50 MG
1 TABLET ORAL
Refills: 0 | DISCHARGE

## 2024-06-23 RX ORDER — WARFARIN SODIUM 4 MG/1
0 TABLET ORAL
Refills: 0 | DISCHARGE

## 2024-06-23 RX ORDER — WARFARIN SODIUM 4 MG/1
2 TABLET ORAL ONCE
Refills: 0 | Status: DISCONTINUED | OUTPATIENT
Start: 2024-06-23 | End: 2024-06-23

## 2024-06-23 RX ORDER — WARFARIN SODIUM 2.5 MG/1
1 TABLET ORAL
Qty: 0 | Refills: 0 | DISCHARGE

## 2024-06-23 RX ORDER — DEXTROSE MONOHYDRATE AND SODIUM CHLORIDE 5; .3 G/100ML; G/100ML
1000 INJECTION, SOLUTION INTRAVENOUS
Refills: 0 | Status: DISCONTINUED | OUTPATIENT
Start: 2024-06-23 | End: 2024-06-23

## 2024-06-23 RX ORDER — METOPROLOL TARTRATE 50 MG
50 TABLET ORAL DAILY
Refills: 0 | Status: DISCONTINUED | OUTPATIENT
Start: 2024-06-23 | End: 2024-06-26

## 2024-06-23 RX ORDER — MORPHINE SULFATE 100 MG/1
4 TABLET, EXTENDED RELEASE ORAL EVERY 6 HOURS
Refills: 0 | Status: DISCONTINUED | OUTPATIENT
Start: 2024-06-23 | End: 2024-06-25

## 2024-06-23 RX ORDER — PANTOPRAZOLE SODIUM 40 MG/10ML
40 INJECTION, POWDER, FOR SOLUTION INTRAVENOUS DAILY
Refills: 0 | Status: DISCONTINUED | OUTPATIENT
Start: 2024-06-23 | End: 2024-06-26

## 2024-06-23 RX ORDER — MORPHINE SULFATE 100 MG/1
2 TABLET, EXTENDED RELEASE ORAL EVERY 6 HOURS
Refills: 0 | Status: DISCONTINUED | OUTPATIENT
Start: 2024-06-23 | End: 2024-06-23

## 2024-06-23 RX ORDER — WARFARIN SODIUM 4 MG/1
2 TABLET ORAL ONCE
Refills: 0 | Status: COMPLETED | OUTPATIENT
Start: 2024-06-23 | End: 2024-06-23

## 2024-06-23 RX ORDER — ALLOPURINOL 300 MG/1
100 TABLET ORAL DAILY
Refills: 0 | Status: DISCONTINUED | OUTPATIENT
Start: 2024-06-23 | End: 2024-06-26

## 2024-06-23 RX ORDER — ALLOPURINOL 300 MG/1
1 TABLET ORAL
Refills: 0 | DISCHARGE

## 2024-06-23 RX ORDER — DEXTROSE MONOHYDRATE AND SODIUM CHLORIDE 5; .3 G/100ML; G/100ML
1000 INJECTION, SOLUTION INTRAVENOUS
Refills: 0 | Status: DISCONTINUED | OUTPATIENT
Start: 2024-06-23 | End: 2024-06-26

## 2024-06-23 RX ORDER — POLYETHYLENE GLYCOL 3350 1 G/G
17 POWDER ORAL
Refills: 0 | Status: DISCONTINUED | OUTPATIENT
Start: 2024-06-23 | End: 2024-06-26

## 2024-06-23 RX ORDER — ALLOPURINOL 300 MG
0 TABLET ORAL
Qty: 0 | Refills: 0 | DISCHARGE

## 2024-06-23 RX ORDER — MORPHINE SULFATE 100 MG/1
4 TABLET, EXTENDED RELEASE ORAL ONCE
Refills: 0 | Status: DISCONTINUED | OUTPATIENT
Start: 2024-06-23 | End: 2024-06-23

## 2024-06-23 RX ORDER — MAGNESIUM SULFATE 100 %
2 POWDER (GRAM) MISCELLANEOUS ONCE
Refills: 0 | Status: COMPLETED | OUTPATIENT
Start: 2024-06-23 | End: 2024-06-23

## 2024-06-23 RX ADMIN — MORPHINE SULFATE 2 MILLIGRAM(S): 100 TABLET, EXTENDED RELEASE ORAL at 08:11

## 2024-06-23 RX ADMIN — DEXTROSE MONOHYDRATE AND SODIUM CHLORIDE 50 MILLILITER(S): 5; .3 INJECTION, SOLUTION INTRAVENOUS at 21:15

## 2024-06-23 RX ADMIN — ALLOPURINOL 100 MILLIGRAM(S): 300 TABLET ORAL at 12:20

## 2024-06-23 RX ADMIN — MORPHINE SULFATE 2 MILLIGRAM(S): 100 TABLET, EXTENDED RELEASE ORAL at 11:09

## 2024-06-23 RX ADMIN — Medication 25 GRAM(S): at 01:23

## 2024-06-23 RX ADMIN — PANTOPRAZOLE SODIUM 40 MILLIGRAM(S): 40 INJECTION, POWDER, FOR SOLUTION INTRAVENOUS at 12:22

## 2024-06-23 RX ADMIN — DEXTROSE MONOHYDRATE AND SODIUM CHLORIDE 50 MILLILITER(S): 5; .3 INJECTION, SOLUTION INTRAVENOUS at 12:22

## 2024-06-23 RX ADMIN — MORPHINE SULFATE 4 MILLIGRAM(S): 100 TABLET, EXTENDED RELEASE ORAL at 16:41

## 2024-06-23 RX ADMIN — Medication 50 MILLIGRAM(S): at 06:13

## 2024-06-23 RX ADMIN — MORPHINE SULFATE 4 MILLIGRAM(S): 100 TABLET, EXTENDED RELEASE ORAL at 13:17

## 2024-06-23 RX ADMIN — DEXTROSE MONOHYDRATE AND SODIUM CHLORIDE 45 MILLILITER(S): 5; .3 INJECTION, SOLUTION INTRAVENOUS at 05:31

## 2024-06-23 RX ADMIN — WARFARIN SODIUM 2 MILLIGRAM(S): 4 TABLET ORAL at 02:07

## 2024-06-24 LAB
ALBUMIN SERPL ELPH-MCNC: 3.3 G/DL — LOW (ref 3.5–5.2)
ALP SERPL-CCNC: 66 U/L — SIGNIFICANT CHANGE UP (ref 30–115)
ALT FLD-CCNC: <5 U/L — SIGNIFICANT CHANGE UP (ref 0–41)
AMYLASE P1 CFR SERPL: 273 U/L — HIGH (ref 25–115)
ANION GAP SERPL CALC-SCNC: 13 MMOL/L — SIGNIFICANT CHANGE UP (ref 7–14)
APTT BLD: 51 SEC — HIGH (ref 27–39.2)
AST SERPL-CCNC: 16 U/L — SIGNIFICANT CHANGE UP (ref 0–41)
BASOPHILS # BLD AUTO: 0.02 K/UL — SIGNIFICANT CHANGE UP (ref 0–0.2)
BASOPHILS NFR BLD AUTO: 0.2 % — SIGNIFICANT CHANGE UP (ref 0–1)
BILIRUB SERPL-MCNC: 1.2 MG/DL — SIGNIFICANT CHANGE UP (ref 0.2–1.2)
BUN SERPL-MCNC: 23 MG/DL — HIGH (ref 10–20)
CALCIUM SERPL-MCNC: 8.6 MG/DL — SIGNIFICANT CHANGE UP (ref 8.4–10.5)
CHLORIDE SERPL-SCNC: 99 MMOL/L — SIGNIFICANT CHANGE UP (ref 98–110)
CO2 SERPL-SCNC: 23 MMOL/L — SIGNIFICANT CHANGE UP (ref 17–32)
CREAT SERPL-MCNC: 0.9 MG/DL — SIGNIFICANT CHANGE UP (ref 0.7–1.5)
EGFR: 63 ML/MIN/1.73M2 — SIGNIFICANT CHANGE UP
EOSINOPHIL # BLD AUTO: 0.15 K/UL — SIGNIFICANT CHANGE UP (ref 0–0.7)
EOSINOPHIL NFR BLD AUTO: 1.4 % — SIGNIFICANT CHANGE UP (ref 0–8)
GLUCOSE SERPL-MCNC: 66 MG/DL — LOW (ref 70–99)
HCT VFR BLD CALC: 41.1 % — SIGNIFICANT CHANGE UP (ref 37–47)
HGB BLD-MCNC: 12.6 G/DL — SIGNIFICANT CHANGE UP (ref 12–16)
IMM GRANULOCYTES NFR BLD AUTO: 0.4 % — HIGH (ref 0.1–0.3)
INR BLD: 5.01 RATIO — CRITICAL HIGH (ref 0.65–1.3)
LIDOCAIN IGE QN: 205 U/L — HIGH (ref 7–60)
LYMPHOCYTES # BLD AUTO: 38.6 % — SIGNIFICANT CHANGE UP (ref 20.5–51.1)
LYMPHOCYTES # BLD AUTO: 4.15 K/UL — HIGH (ref 1.2–3.4)
MCHC RBC-ENTMCNC: 25.8 PG — LOW (ref 27–31)
MCHC RBC-ENTMCNC: 30.7 G/DL — LOW (ref 32–37)
MCV RBC AUTO: 84.2 FL — SIGNIFICANT CHANGE UP (ref 81–99)
MONOCYTES # BLD AUTO: 0.81 K/UL — HIGH (ref 0.1–0.6)
MONOCYTES NFR BLD AUTO: 7.5 % — SIGNIFICANT CHANGE UP (ref 1.7–9.3)
NEUTROPHILS # BLD AUTO: 5.58 K/UL — SIGNIFICANT CHANGE UP (ref 1.4–6.5)
NEUTROPHILS NFR BLD AUTO: 51.9 % — SIGNIFICANT CHANGE UP (ref 42.2–75.2)
NRBC # BLD: 0 /100 WBCS — SIGNIFICANT CHANGE UP (ref 0–0)
PLATELET # BLD AUTO: 118 K/UL — LOW (ref 130–400)
PMV BLD: SIGNIFICANT CHANGE UP (ref 7.4–10.4)
POTASSIUM SERPL-MCNC: 4.3 MMOL/L — SIGNIFICANT CHANGE UP (ref 3.5–5)
POTASSIUM SERPL-SCNC: 4.3 MMOL/L — SIGNIFICANT CHANGE UP (ref 3.5–5)
PROT SERPL-MCNC: 4.8 G/DL — LOW (ref 6–8)
PROTHROM AB SERPL-ACNC: >40 SEC — HIGH (ref 9.95–12.87)
RBC # BLD: 4.88 M/UL — SIGNIFICANT CHANGE UP (ref 4.2–5.4)
RBC # FLD: 20.9 % — HIGH (ref 11.5–14.5)
SODIUM SERPL-SCNC: 135 MMOL/L — SIGNIFICANT CHANGE UP (ref 135–146)
TRIGL SERPL-MCNC: 76 MG/DL — SIGNIFICANT CHANGE UP
WBC # BLD: 10.75 K/UL — SIGNIFICANT CHANGE UP (ref 4.8–10.8)
WBC # FLD AUTO: 10.75 K/UL — SIGNIFICANT CHANGE UP (ref 4.8–10.8)

## 2024-06-24 PROCEDURE — 99223 1ST HOSP IP/OBS HIGH 75: CPT

## 2024-06-24 PROCEDURE — 99232 SBSQ HOSP IP/OBS MODERATE 35: CPT

## 2024-06-24 RX ORDER — TRAMADOL HYDROCHLORIDE 50 MG/1
50 TABLET, FILM COATED ORAL EVERY 6 HOURS
Refills: 0 | Status: DISCONTINUED | OUTPATIENT
Start: 2024-06-24 | End: 2024-06-26

## 2024-06-24 RX ORDER — WARFARIN SODIUM 4 MG/1
2 TABLET ORAL ONCE
Refills: 0 | Status: DISCONTINUED | OUTPATIENT
Start: 2024-06-24 | End: 2024-06-24

## 2024-06-24 RX ADMIN — MORPHINE SULFATE 4 MILLIGRAM(S): 100 TABLET, EXTENDED RELEASE ORAL at 15:56

## 2024-06-24 RX ADMIN — DEXTROSE MONOHYDRATE AND SODIUM CHLORIDE 50 MILLILITER(S): 5; .3 INJECTION, SOLUTION INTRAVENOUS at 16:43

## 2024-06-24 RX ADMIN — ALLOPURINOL 100 MILLIGRAM(S): 300 TABLET ORAL at 11:46

## 2024-06-24 RX ADMIN — Medication 50 MILLIGRAM(S): at 05:08

## 2024-06-24 RX ADMIN — MORPHINE SULFATE 4 MILLIGRAM(S): 100 TABLET, EXTENDED RELEASE ORAL at 16:01

## 2024-06-24 RX ADMIN — PANTOPRAZOLE SODIUM 40 MILLIGRAM(S): 40 INJECTION, POWDER, FOR SOLUTION INTRAVENOUS at 11:46

## 2024-06-25 LAB
ALBUMIN SERPL ELPH-MCNC: 2.8 G/DL — LOW (ref 3.5–5.2)
ALP SERPL-CCNC: 63 U/L — SIGNIFICANT CHANGE UP (ref 30–115)
ALT FLD-CCNC: <5 U/L — SIGNIFICANT CHANGE UP (ref 0–41)
ANION GAP SERPL CALC-SCNC: 9 MMOL/L — SIGNIFICANT CHANGE UP (ref 7–14)
ANION GAP SERPL CALC-SCNC: 9 MMOL/L — SIGNIFICANT CHANGE UP (ref 7–14)
AST SERPL-CCNC: 16 U/L — SIGNIFICANT CHANGE UP (ref 0–41)
BASOPHILS # BLD AUTO: 0.01 K/UL — SIGNIFICANT CHANGE UP (ref 0–0.2)
BASOPHILS NFR BLD AUTO: 0.1 % — SIGNIFICANT CHANGE UP (ref 0–1)
BILIRUB SERPL-MCNC: 1.4 MG/DL — HIGH (ref 0.2–1.2)
BUN SERPL-MCNC: 17 MG/DL — SIGNIFICANT CHANGE UP (ref 10–20)
BUN SERPL-MCNC: 18 MG/DL — SIGNIFICANT CHANGE UP (ref 10–20)
CALCIUM SERPL-MCNC: 7.7 MG/DL — LOW (ref 8.4–10.5)
CALCIUM SERPL-MCNC: 8.3 MG/DL — LOW (ref 8.4–10.5)
CHLORIDE SERPL-SCNC: 100 MMOL/L — SIGNIFICANT CHANGE UP (ref 98–110)
CHLORIDE SERPL-SCNC: 100 MMOL/L — SIGNIFICANT CHANGE UP (ref 98–110)
CO2 SERPL-SCNC: 26 MMOL/L — SIGNIFICANT CHANGE UP (ref 17–32)
CO2 SERPL-SCNC: 27 MMOL/L — SIGNIFICANT CHANGE UP (ref 17–32)
CREAT SERPL-MCNC: 0.8 MG/DL — SIGNIFICANT CHANGE UP (ref 0.7–1.5)
CREAT SERPL-MCNC: 0.8 MG/DL — SIGNIFICANT CHANGE UP (ref 0.7–1.5)
EGFR: 72 ML/MIN/1.73M2 — SIGNIFICANT CHANGE UP
EGFR: 72 ML/MIN/1.73M2 — SIGNIFICANT CHANGE UP
EOSINOPHIL # BLD AUTO: 0.12 K/UL — SIGNIFICANT CHANGE UP (ref 0–0.7)
EOSINOPHIL NFR BLD AUTO: 1.2 % — SIGNIFICANT CHANGE UP (ref 0–8)
GLUCOSE SERPL-MCNC: 75 MG/DL — SIGNIFICANT CHANGE UP (ref 70–99)
GLUCOSE SERPL-MCNC: 76 MG/DL — SIGNIFICANT CHANGE UP (ref 70–99)
HCT VFR BLD CALC: 40.1 % — SIGNIFICANT CHANGE UP (ref 37–47)
HGB BLD-MCNC: 12.5 G/DL — SIGNIFICANT CHANGE UP (ref 12–16)
IMM GRANULOCYTES NFR BLD AUTO: 0.3 % — SIGNIFICANT CHANGE UP (ref 0.1–0.3)
INR BLD: 4.18 RATIO — HIGH (ref 0.65–1.3)
LYMPHOCYTES # BLD AUTO: 3.99 K/UL — HIGH (ref 1.2–3.4)
LYMPHOCYTES # BLD AUTO: 38.9 % — SIGNIFICANT CHANGE UP (ref 20.5–51.1)
MCHC RBC-ENTMCNC: 26.2 PG — LOW (ref 27–31)
MCHC RBC-ENTMCNC: 31.2 G/DL — LOW (ref 32–37)
MCV RBC AUTO: 83.9 FL — SIGNIFICANT CHANGE UP (ref 81–99)
MONOCYTES # BLD AUTO: 0.83 K/UL — HIGH (ref 0.1–0.6)
MONOCYTES NFR BLD AUTO: 8.1 % — SIGNIFICANT CHANGE UP (ref 1.7–9.3)
NEUTROPHILS # BLD AUTO: 5.27 K/UL — SIGNIFICANT CHANGE UP (ref 1.4–6.5)
NEUTROPHILS NFR BLD AUTO: 51.4 % — SIGNIFICANT CHANGE UP (ref 42.2–75.2)
NRBC # BLD: 0 /100 WBCS — SIGNIFICANT CHANGE UP (ref 0–0)
PLATELET # BLD AUTO: 114 K/UL — LOW (ref 130–400)
PMV BLD: SIGNIFICANT CHANGE UP (ref 7.4–10.4)
POTASSIUM SERPL-MCNC: 4.2 MMOL/L — SIGNIFICANT CHANGE UP (ref 3.5–5)
POTASSIUM SERPL-MCNC: 4.4 MMOL/L — SIGNIFICANT CHANGE UP (ref 3.5–5)
POTASSIUM SERPL-SCNC: 4.2 MMOL/L — SIGNIFICANT CHANGE UP (ref 3.5–5)
POTASSIUM SERPL-SCNC: 4.4 MMOL/L — SIGNIFICANT CHANGE UP (ref 3.5–5)
PROT SERPL-MCNC: 4.8 G/DL — LOW (ref 6–8)
PROTHROM AB SERPL-ACNC: >40 SEC — HIGH (ref 9.95–12.87)
RBC # BLD: 4.78 M/UL — SIGNIFICANT CHANGE UP (ref 4.2–5.4)
RBC # FLD: 20.5 % — HIGH (ref 11.5–14.5)
SODIUM SERPL-SCNC: 135 MMOL/L — SIGNIFICANT CHANGE UP (ref 135–146)
SODIUM SERPL-SCNC: 136 MMOL/L — SIGNIFICANT CHANGE UP (ref 135–146)
WBC # BLD: 10.25 K/UL — SIGNIFICANT CHANGE UP (ref 4.8–10.8)
WBC # FLD AUTO: 10.25 K/UL — SIGNIFICANT CHANGE UP (ref 4.8–10.8)

## 2024-06-25 PROCEDURE — 74181 MRI ABDOMEN W/O CONTRAST: CPT | Mod: 26

## 2024-06-25 PROCEDURE — 99233 SBSQ HOSP IP/OBS HIGH 50: CPT

## 2024-06-25 RX ADMIN — Medication 50 MILLIGRAM(S): at 05:06

## 2024-06-25 RX ADMIN — Medication 650 MILLIGRAM(S): at 13:20

## 2024-06-25 RX ADMIN — Medication 650 MILLIGRAM(S): at 21:14

## 2024-06-25 RX ADMIN — DEXTROSE MONOHYDRATE AND SODIUM CHLORIDE 50 MILLILITER(S): 5; .3 INJECTION, SOLUTION INTRAVENOUS at 13:17

## 2024-06-25 RX ADMIN — Medication 650 MILLIGRAM(S): at 22:04

## 2024-06-25 RX ADMIN — ALLOPURINOL 100 MILLIGRAM(S): 300 TABLET ORAL at 13:17

## 2024-06-25 RX ADMIN — PANTOPRAZOLE SODIUM 40 MILLIGRAM(S): 40 INJECTION, POWDER, FOR SOLUTION INTRAVENOUS at 13:15

## 2024-06-26 ENCOUNTER — TRANSCRIPTION ENCOUNTER (OUTPATIENT)
Age: 84
End: 2024-06-26

## 2024-06-26 VITALS
DIASTOLIC BLOOD PRESSURE: 72 MMHG | SYSTOLIC BLOOD PRESSURE: 104 MMHG | RESPIRATION RATE: 18 BRPM | TEMPERATURE: 98 F | HEART RATE: 103 BPM

## 2024-06-26 LAB
ALBUMIN SERPL ELPH-MCNC: 2.9 G/DL — LOW (ref 3.5–5.2)
ALP SERPL-CCNC: 61 U/L — SIGNIFICANT CHANGE UP (ref 30–115)
ALT FLD-CCNC: <5 U/L — SIGNIFICANT CHANGE UP (ref 0–41)
AMYLASE P1 CFR SERPL: 135 U/L — HIGH (ref 25–115)
ANION GAP SERPL CALC-SCNC: 12 MMOL/L — SIGNIFICANT CHANGE UP (ref 7–14)
AST SERPL-CCNC: 15 U/L — SIGNIFICANT CHANGE UP (ref 0–41)
BASOPHILS # BLD AUTO: 0.01 K/UL — SIGNIFICANT CHANGE UP (ref 0–0.2)
BASOPHILS NFR BLD AUTO: 0.1 % — SIGNIFICANT CHANGE UP (ref 0–1)
BILIRUB SERPL-MCNC: 1.2 MG/DL — SIGNIFICANT CHANGE UP (ref 0.2–1.2)
BUN SERPL-MCNC: 15 MG/DL — SIGNIFICANT CHANGE UP (ref 10–20)
CALCIUM SERPL-MCNC: 8.1 MG/DL — LOW (ref 8.4–10.5)
CHLORIDE SERPL-SCNC: 103 MMOL/L — SIGNIFICANT CHANGE UP (ref 98–110)
CO2 SERPL-SCNC: 25 MMOL/L — SIGNIFICANT CHANGE UP (ref 17–32)
CREAT SERPL-MCNC: 0.8 MG/DL — SIGNIFICANT CHANGE UP (ref 0.7–1.5)
EGFR: 72 ML/MIN/1.73M2 — SIGNIFICANT CHANGE UP
EOSINOPHIL # BLD AUTO: 0.28 K/UL — SIGNIFICANT CHANGE UP (ref 0–0.7)
EOSINOPHIL NFR BLD AUTO: 3.7 % — SIGNIFICANT CHANGE UP (ref 0–8)
GLUCOSE SERPL-MCNC: 71 MG/DL — SIGNIFICANT CHANGE UP (ref 70–99)
HCT VFR BLD CALC: 40.8 % — SIGNIFICANT CHANGE UP (ref 37–47)
HGB BLD-MCNC: 12.3 G/DL — SIGNIFICANT CHANGE UP (ref 12–16)
IMM GRANULOCYTES NFR BLD AUTO: 0.3 % — SIGNIFICANT CHANGE UP (ref 0.1–0.3)
INR BLD: 3.46 RATIO — HIGH (ref 0.65–1.3)
LIDOCAIN IGE QN: 221 U/L — HIGH (ref 7–60)
LYMPHOCYTES # BLD AUTO: 3.06 K/UL — SIGNIFICANT CHANGE UP (ref 1.2–3.4)
LYMPHOCYTES # BLD AUTO: 40.3 % — SIGNIFICANT CHANGE UP (ref 20.5–51.1)
MCHC RBC-ENTMCNC: 25.7 PG — LOW (ref 27–31)
MCHC RBC-ENTMCNC: 30.1 G/DL — LOW (ref 32–37)
MCV RBC AUTO: 85.4 FL — SIGNIFICANT CHANGE UP (ref 81–99)
MONOCYTES # BLD AUTO: 0.5 K/UL — SIGNIFICANT CHANGE UP (ref 0.1–0.6)
MONOCYTES NFR BLD AUTO: 6.6 % — SIGNIFICANT CHANGE UP (ref 1.7–9.3)
NEUTROPHILS # BLD AUTO: 3.73 K/UL — SIGNIFICANT CHANGE UP (ref 1.4–6.5)
NEUTROPHILS NFR BLD AUTO: 49 % — SIGNIFICANT CHANGE UP (ref 42.2–75.2)
NRBC # BLD: 0 /100 WBCS — SIGNIFICANT CHANGE UP (ref 0–0)
PLATELET # BLD AUTO: 103 K/UL — LOW (ref 130–400)
PMV BLD: SIGNIFICANT CHANGE UP (ref 7.4–10.4)
POTASSIUM SERPL-MCNC: 4.1 MMOL/L — SIGNIFICANT CHANGE UP (ref 3.5–5)
POTASSIUM SERPL-SCNC: 4.1 MMOL/L — SIGNIFICANT CHANGE UP (ref 3.5–5)
PROT SERPL-MCNC: 4.8 G/DL — LOW (ref 6–8)
PROTHROM AB SERPL-ACNC: 39.9 SEC — HIGH (ref 9.95–12.87)
RBC # BLD: 4.78 M/UL — SIGNIFICANT CHANGE UP (ref 4.2–5.4)
RBC # FLD: 20.3 % — HIGH (ref 11.5–14.5)
SODIUM SERPL-SCNC: 140 MMOL/L — SIGNIFICANT CHANGE UP (ref 135–146)
TRIGL SERPL-MCNC: 78 MG/DL — SIGNIFICANT CHANGE UP
WBC # BLD: 7.6 K/UL — SIGNIFICANT CHANGE UP (ref 4.8–10.8)
WBC # FLD AUTO: 7.6 K/UL — SIGNIFICANT CHANGE UP (ref 4.8–10.8)

## 2024-06-26 PROCEDURE — 99239 HOSP IP/OBS DSCHRG MGMT >30: CPT

## 2024-06-26 PROCEDURE — 99233 SBSQ HOSP IP/OBS HIGH 50: CPT

## 2024-06-26 RX ORDER — HYDROCHLOROTHIAZIDE 25 MG
1 TABLET ORAL
Refills: 0 | DISCHARGE

## 2024-06-26 RX ADMIN — ALLOPURINOL 100 MILLIGRAM(S): 300 TABLET ORAL at 11:47

## 2024-06-26 RX ADMIN — PANTOPRAZOLE SODIUM 40 MILLIGRAM(S): 40 INJECTION, POWDER, FOR SOLUTION INTRAVENOUS at 11:45

## 2024-06-26 RX ADMIN — DEXTROSE MONOHYDRATE AND SODIUM CHLORIDE 50 MILLILITER(S): 5; .3 INJECTION, SOLUTION INTRAVENOUS at 10:04

## 2024-06-26 RX ADMIN — Medication 50 MILLIGRAM(S): at 05:07

## 2024-06-27 ENCOUNTER — OUTPATIENT (OUTPATIENT)
Dept: OUTPATIENT SERVICES | Facility: HOSPITAL | Age: 84
LOS: 1 days | End: 2024-06-27
Payer: MEDICARE

## 2024-06-27 ENCOUNTER — APPOINTMENT (OUTPATIENT)
Dept: MEDICATION MANAGEMENT | Facility: CLINIC | Age: 84
End: 2024-06-27

## 2024-06-27 DIAGNOSIS — Z90.49 ACQUIRED ABSENCE OF OTHER SPECIFIED PARTS OF DIGESTIVE TRACT: Chronic | ICD-10-CM

## 2024-06-27 DIAGNOSIS — I48.91 UNSPECIFIED ATRIAL FIBRILLATION: ICD-10-CM

## 2024-06-27 DIAGNOSIS — Z79.01 LONG TERM (CURRENT) USE OF ANTICOAGULANTS: ICD-10-CM

## 2024-06-27 LAB
INR PPP: 2.7 RATIO
QUALITY CONTROL: YES

## 2024-06-27 PROCEDURE — G0463: CPT

## 2024-07-01 DIAGNOSIS — Z90.710 ACQUIRED ABSENCE OF BOTH CERVIX AND UTERUS: ICD-10-CM

## 2024-07-01 DIAGNOSIS — Z79.01 LONG TERM (CURRENT) USE OF ANTICOAGULANTS: ICD-10-CM

## 2024-07-01 DIAGNOSIS — R91.8 OTHER NONSPECIFIC ABNORMAL FINDING OF LUNG FIELD: ICD-10-CM

## 2024-07-01 DIAGNOSIS — I11.0 HYPERTENSIVE HEART DISEASE WITH HEART FAILURE: ICD-10-CM

## 2024-07-01 DIAGNOSIS — Z88.0 ALLERGY STATUS TO PENICILLIN: ICD-10-CM
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
## 2024-07-01 DIAGNOSIS — Z86.711 PERSONAL HISTORY OF PULMONARY EMBOLISM: ICD-10-CM

## 2024-07-01 DIAGNOSIS — Z87.891 PERSONAL HISTORY OF NICOTINE DEPENDENCE: ICD-10-CM

## 2024-07-01 DIAGNOSIS — D68.52 PROTHROMBIN GENE MUTATION: ICD-10-CM

## 2024-07-01 DIAGNOSIS — T50.2X5A ADVERSE EFFECT OF CARBONIC-ANHYDRASE INHIBITORS, BENZOTHIADIAZIDES AND OTHER DIURETICS, INITIAL ENCOUNTER: ICD-10-CM

## 2024-07-01 DIAGNOSIS — I27.20 PULMONARY HYPERTENSION, UNSPECIFIED: ICD-10-CM

## 2024-07-01 DIAGNOSIS — I50.32 CHRONIC DIASTOLIC (CONGESTIVE) HEART FAILURE: ICD-10-CM

## 2024-07-01 DIAGNOSIS — K85.90 ACUTE PANCREATITIS WITHOUT NECROSIS OR INFECTION, UNSPECIFIED: ICD-10-CM

## 2024-07-01 DIAGNOSIS — Z90.49 ACQUIRED ABSENCE OF OTHER SPECIFIED PARTS OF DIGESTIVE TRACT: ICD-10-CM

## 2024-07-01 DIAGNOSIS — E83.42 HYPOMAGNESEMIA: ICD-10-CM

## 2024-07-01 DIAGNOSIS — E87.5 HYPERKALEMIA: ICD-10-CM

## 2024-07-01 DIAGNOSIS — Z86.718 PERSONAL HISTORY OF OTHER VENOUS THROMBOSIS AND EMBOLISM: ICD-10-CM

## 2024-07-01 DIAGNOSIS — M10.9 GOUT, UNSPECIFIED: ICD-10-CM

## 2024-07-03 ENCOUNTER — APPOINTMENT (OUTPATIENT)
Dept: MEDICATION MANAGEMENT | Facility: CLINIC | Age: 84
End: 2024-07-03

## 2024-07-03 ENCOUNTER — OUTPATIENT (OUTPATIENT)
Dept: OUTPATIENT SERVICES | Facility: HOSPITAL | Age: 84
LOS: 1 days | End: 2024-07-03
Payer: MEDICARE

## 2024-07-03 DIAGNOSIS — Z90.710 ACQUIRED ABSENCE OF BOTH CERVIX AND UTERUS: Chronic | ICD-10-CM

## 2024-07-03 DIAGNOSIS — I48.91 UNSPECIFIED ATRIAL FIBRILLATION: ICD-10-CM

## 2024-07-03 DIAGNOSIS — Z79.01 LONG TERM (CURRENT) USE OF ANTICOAGULANTS: ICD-10-CM

## 2024-07-03 DIAGNOSIS — Z90.49 ACQUIRED ABSENCE OF OTHER SPECIFIED PARTS OF DIGESTIVE TRACT: Chronic | ICD-10-CM

## 2024-07-03 DIAGNOSIS — Z90.89 ACQUIRED ABSENCE OF OTHER ORGANS: Chronic | ICD-10-CM

## 2024-07-03 LAB
INR PPP: 2.9 RATIO
QUALITY CONTROL: YES

## 2024-07-03 PROCEDURE — G0463: CPT

## 2024-07-16 ENCOUNTER — OUTPATIENT (OUTPATIENT)
Dept: OUTPATIENT SERVICES | Facility: HOSPITAL | Age: 84
LOS: 1 days | End: 2024-07-16
Payer: MEDICARE

## 2024-07-16 ENCOUNTER — APPOINTMENT (OUTPATIENT)
Dept: MEDICATION MANAGEMENT | Facility: CLINIC | Age: 84
End: 2024-07-16

## 2024-07-16 DIAGNOSIS — Z79.01 LONG TERM (CURRENT) USE OF ANTICOAGULANTS: ICD-10-CM

## 2024-07-16 DIAGNOSIS — I48.91 UNSPECIFIED ATRIAL FIBRILLATION: ICD-10-CM

## 2024-07-16 DIAGNOSIS — Z90.710 ACQUIRED ABSENCE OF BOTH CERVIX AND UTERUS: Chronic | ICD-10-CM

## 2024-07-16 DIAGNOSIS — Z90.89 ACQUIRED ABSENCE OF OTHER ORGANS: Chronic | ICD-10-CM

## 2024-07-16 LAB
INR PPP: 3.2 RATIO
QUALITY CONTROL: YES

## 2024-07-16 PROCEDURE — G0463: CPT

## 2024-07-30 ENCOUNTER — OUTPATIENT (OUTPATIENT)
Dept: OUTPATIENT SERVICES | Facility: HOSPITAL | Age: 84
LOS: 1 days | End: 2024-07-30
Payer: MEDICARE

## 2024-07-30 ENCOUNTER — APPOINTMENT (OUTPATIENT)
Dept: MEDICATION MANAGEMENT | Facility: CLINIC | Age: 84
End: 2024-07-30

## 2024-07-30 DIAGNOSIS — Z90.710 ACQUIRED ABSENCE OF BOTH CERVIX AND UTERUS: Chronic | ICD-10-CM

## 2024-07-30 DIAGNOSIS — Z79.01 LONG TERM (CURRENT) USE OF ANTICOAGULANTS: ICD-10-CM

## 2024-07-30 DIAGNOSIS — I48.91 UNSPECIFIED ATRIAL FIBRILLATION: ICD-10-CM

## 2024-07-30 DIAGNOSIS — Z90.49 ACQUIRED ABSENCE OF OTHER SPECIFIED PARTS OF DIGESTIVE TRACT: Chronic | ICD-10-CM

## 2024-07-30 DIAGNOSIS — Z90.89 ACQUIRED ABSENCE OF OTHER ORGANS: Chronic | ICD-10-CM

## 2024-07-30 LAB
INR PPP: 3 RATIO
QUALITY CONTROL: YES

## 2024-07-30 PROCEDURE — G0463: CPT

## 2024-08-13 ENCOUNTER — APPOINTMENT (OUTPATIENT)
Dept: MEDICATION MANAGEMENT | Facility: CLINIC | Age: 84
End: 2024-08-13

## 2024-08-13 LAB
INR PPP: 3 RATIO
QUALITY CONTROL: YES

## 2024-08-27 ENCOUNTER — APPOINTMENT (OUTPATIENT)
Dept: MEDICATION MANAGEMENT | Facility: CLINIC | Age: 84
End: 2024-08-27

## 2024-08-27 ENCOUNTER — OUTPATIENT (OUTPATIENT)
Dept: OUTPATIENT SERVICES | Facility: HOSPITAL | Age: 84
LOS: 1 days | End: 2024-08-27
Payer: MEDICARE

## 2024-08-27 DIAGNOSIS — Z79.01 LONG TERM (CURRENT) USE OF ANTICOAGULANTS: ICD-10-CM

## 2024-08-27 DIAGNOSIS — Z90.710 ACQUIRED ABSENCE OF BOTH CERVIX AND UTERUS: Chronic | ICD-10-CM

## 2024-08-27 DIAGNOSIS — I48.91 UNSPECIFIED ATRIAL FIBRILLATION: ICD-10-CM

## 2024-08-27 DIAGNOSIS — Z90.89 ACQUIRED ABSENCE OF OTHER ORGANS: Chronic | ICD-10-CM

## 2024-08-27 LAB
INR PPP: 3.6 RATIO
QUALITY CONTROL: YES

## 2024-08-27 PROCEDURE — G0463: CPT

## 2024-09-09 ENCOUNTER — APPOINTMENT (OUTPATIENT)
Dept: MEDICATION MANAGEMENT | Facility: CLINIC | Age: 84
End: 2024-09-09

## 2024-09-09 ENCOUNTER — OUTPATIENT (OUTPATIENT)
Dept: OUTPATIENT SERVICES | Facility: HOSPITAL | Age: 84
LOS: 1 days | End: 2024-09-09
Payer: MEDICARE

## 2024-09-09 DIAGNOSIS — I48.91 UNSPECIFIED ATRIAL FIBRILLATION: ICD-10-CM

## 2024-09-09 DIAGNOSIS — Z79.01 LONG TERM (CURRENT) USE OF ANTICOAGULANTS: ICD-10-CM

## 2024-09-09 DIAGNOSIS — Z90.710 ACQUIRED ABSENCE OF BOTH CERVIX AND UTERUS: Chronic | ICD-10-CM

## 2024-09-09 LAB
INR PPP: 4.1 RATIO
QUALITY CONTROL: YES

## 2024-09-09 PROCEDURE — G0463: CPT

## 2024-09-16 ENCOUNTER — APPOINTMENT (OUTPATIENT)
Dept: MEDICATION MANAGEMENT | Facility: CLINIC | Age: 84
End: 2024-09-16

## 2024-09-16 ENCOUNTER — OUTPATIENT (OUTPATIENT)
Dept: OUTPATIENT SERVICES | Facility: HOSPITAL | Age: 84
LOS: 1 days | End: 2024-09-16
Payer: MEDICARE

## 2024-09-16 DIAGNOSIS — Z90.89 ACQUIRED ABSENCE OF OTHER ORGANS: Chronic | ICD-10-CM

## 2024-09-16 DIAGNOSIS — Z79.01 LONG TERM (CURRENT) USE OF ANTICOAGULANTS: ICD-10-CM

## 2024-09-16 DIAGNOSIS — I48.91 UNSPECIFIED ATRIAL FIBRILLATION: ICD-10-CM

## 2024-09-16 DIAGNOSIS — Z90.710 ACQUIRED ABSENCE OF BOTH CERVIX AND UTERUS: Chronic | ICD-10-CM

## 2024-09-16 LAB
INR PPP: 2.4 RATIO
QUALITY CONTROL: YES

## 2024-09-16 PROCEDURE — G0463: CPT

## 2024-10-02 ENCOUNTER — APPOINTMENT (OUTPATIENT)
Dept: MEDICATION MANAGEMENT | Facility: CLINIC | Age: 84
End: 2024-10-02

## 2024-10-02 ENCOUNTER — INPATIENT (INPATIENT)
Facility: HOSPITAL | Age: 84
LOS: 7 days | Discharge: HOME | DRG: 872 | End: 2024-10-10
Attending: STUDENT IN AN ORGANIZED HEALTH CARE EDUCATION/TRAINING PROGRAM | Admitting: FAMILY MEDICINE
Payer: MEDICARE

## 2024-10-02 VITALS
OXYGEN SATURATION: 94 % | HEART RATE: 145 BPM | WEIGHT: 160.06 LBS | RESPIRATION RATE: 22 BRPM | DIASTOLIC BLOOD PRESSURE: 82 MMHG | TEMPERATURE: 105 F | SYSTOLIC BLOOD PRESSURE: 150 MMHG

## 2024-10-02 DIAGNOSIS — Z90.710 ACQUIRED ABSENCE OF BOTH CERVIX AND UTERUS: Chronic | ICD-10-CM

## 2024-10-02 DIAGNOSIS — Z90.89 ACQUIRED ABSENCE OF OTHER ORGANS: Chronic | ICD-10-CM

## 2024-10-02 DIAGNOSIS — Z90.49 ACQUIRED ABSENCE OF OTHER SPECIFIED PARTS OF DIGESTIVE TRACT: Chronic | ICD-10-CM

## 2024-10-02 LAB
ALBUMIN SERPL ELPH-MCNC: 3.8 G/DL — SIGNIFICANT CHANGE UP (ref 3.5–5.2)
ALP SERPL-CCNC: 74 U/L — SIGNIFICANT CHANGE UP (ref 30–115)
ALT FLD-CCNC: 5 U/L — SIGNIFICANT CHANGE UP (ref 0–41)
ANION GAP SERPL CALC-SCNC: 18 MMOL/L — HIGH (ref 7–14)
APTT BLD: 30.7 SEC — SIGNIFICANT CHANGE UP (ref 27–39.2)
AST SERPL-CCNC: 16 U/L — SIGNIFICANT CHANGE UP (ref 0–41)
BASE EXCESS BLDV CALC-SCNC: -1.9 MMOL/L — SIGNIFICANT CHANGE UP (ref -2–3)
BASOPHILS # BLD AUTO: 0 K/UL — SIGNIFICANT CHANGE UP (ref 0–0.2)
BASOPHILS NFR BLD AUTO: 0 % — SIGNIFICANT CHANGE UP (ref 0–1)
BILIRUB SERPL-MCNC: 2.7 MG/DL — HIGH (ref 0.2–1.2)
BUN SERPL-MCNC: 37 MG/DL — HIGH (ref 10–20)
CALCIUM SERPL-MCNC: 8.7 MG/DL — SIGNIFICANT CHANGE UP (ref 8.4–10.5)
CHLORIDE SERPL-SCNC: 99 MMOL/L — SIGNIFICANT CHANGE UP (ref 98–110)
CO2 SERPL-SCNC: 22 MMOL/L — SIGNIFICANT CHANGE UP (ref 17–32)
CREAT SERPL-MCNC: 1.1 MG/DL — SIGNIFICANT CHANGE UP (ref 0.7–1.5)
EGFR: 49 ML/MIN/1.73M2 — LOW
EOSINOPHIL NFR BLD AUTO: 0 % — SIGNIFICANT CHANGE UP (ref 0–8)
FLUAV AG NPH QL: SIGNIFICANT CHANGE UP
FLUBV AG NPH QL: SIGNIFICANT CHANGE UP
GAS PNL BLDV: 132 MMOL/L — LOW (ref 136–145)
GAS PNL BLDV: SIGNIFICANT CHANGE UP
GLUCOSE SERPL-MCNC: 135 MG/DL — HIGH (ref 70–99)
HCO3 BLDV-SCNC: 22 MMOL/L — SIGNIFICANT CHANGE UP (ref 22–29)
HCT VFR BLD CALC: 38.3 % — SIGNIFICANT CHANGE UP (ref 37–47)
HGB BLD-MCNC: 12 G/DL — SIGNIFICANT CHANGE UP (ref 12–16)
INR BLD: 5.4 RATIO — CRITICAL HIGH (ref 0.65–1.3)
LACTATE BLDV-MCNC: 4 MMOL/L — CRITICAL HIGH (ref 0.5–2)
LACTATE SERPL-SCNC: 3.8 MMOL/L — HIGH (ref 0.7–2)
LYMPHOCYTES # BLD AUTO: 2.03 K/UL — SIGNIFICANT CHANGE UP (ref 1.2–3.4)
LYMPHOCYTES # BLD AUTO: 20 % — LOW (ref 20.5–51.1)
MANUAL SMEAR VERIFICATION: SIGNIFICANT CHANGE UP
MCHC RBC-ENTMCNC: 26.5 PG — LOW (ref 27–31)
MCHC RBC-ENTMCNC: 31.3 G/DL — LOW (ref 32–37)
MCV RBC AUTO: 84.5 FL — SIGNIFICANT CHANGE UP (ref 81–99)
MONOCYTES # BLD AUTO: 0.71 K/UL — HIGH (ref 0.1–0.6)
MONOCYTES NFR BLD AUTO: 7 % — SIGNIFICANT CHANGE UP (ref 1.7–9.3)
MYELOCYTES NFR BLD: 1 % — HIGH (ref 0–0)
NEUTROPHILS # BLD AUTO: 7.01 K/UL — HIGH (ref 1.4–6.5)
NEUTROPHILS NFR BLD AUTO: 52 % — SIGNIFICANT CHANGE UP (ref 42.2–75.2)
NEUTS BAND # BLD: 17 % — HIGH (ref 0–6)
NRBC # BLD: 0 /100 WBCS — SIGNIFICANT CHANGE UP (ref 0–0)
NRBC # BLD: SIGNIFICANT CHANGE UP /100 WBCS (ref 0–0)
NT-PROBNP SERPL-SCNC: 5010 PG/ML — HIGH (ref 0–300)
PCO2 BLDV: 35 MMHG — LOW (ref 39–42)
PH BLDV: 7.41 — SIGNIFICANT CHANGE UP (ref 7.32–7.43)
PLAT MORPH BLD: NORMAL — SIGNIFICANT CHANGE UP
PLATELET # BLD AUTO: 187 K/UL — SIGNIFICANT CHANGE UP (ref 130–400)
PLATELET CLUMP BLD QL SMEAR: ABNORMAL
PLATELET COUNT - ESTIMATE: NORMAL — SIGNIFICANT CHANGE UP
PMV BLD: 12.5 FL — HIGH (ref 7.4–10.4)
PO2 BLDV: 39 MMHG — SIGNIFICANT CHANGE UP (ref 25–45)
POTASSIUM BLDV-SCNC: 4.2 MMOL/L — SIGNIFICANT CHANGE UP (ref 3.5–5.1)
POTASSIUM SERPL-MCNC: 4 MMOL/L — SIGNIFICANT CHANGE UP (ref 3.5–5)
POTASSIUM SERPL-SCNC: 4 MMOL/L — SIGNIFICANT CHANGE UP (ref 3.5–5)
PROT SERPL-MCNC: 6 G/DL — SIGNIFICANT CHANGE UP (ref 6–8)
PROTHROM AB SERPL-ACNC: >40 SEC — HIGH (ref 9.95–12.87)
RBC # BLD: 4.53 M/UL — SIGNIFICANT CHANGE UP (ref 4.2–5.4)
RBC # FLD: 18.8 % — HIGH (ref 11.5–14.5)
RBC BLD AUTO: NORMAL — SIGNIFICANT CHANGE UP
RSV RNA NPH QL NAA+NON-PROBE: SIGNIFICANT CHANGE UP
SAO2 % BLDV: 70.5 % — SIGNIFICANT CHANGE UP (ref 67–88)
SARS-COV-2 RNA SPEC QL NAA+PROBE: SIGNIFICANT CHANGE UP
SODIUM SERPL-SCNC: 139 MMOL/L — SIGNIFICANT CHANGE UP (ref 135–146)
TROPONIN T, HIGH SENSITIVITY RESULT: 51 NG/L — HIGH (ref 6–13)
VARIANT LYMPHS # BLD: 3 % — SIGNIFICANT CHANGE UP (ref 0–5)
WBC # BLD: 10.16 K/UL — SIGNIFICANT CHANGE UP (ref 4.8–10.8)
WBC # FLD AUTO: 10.16 K/UL — SIGNIFICANT CHANGE UP (ref 4.8–10.8)

## 2024-10-02 PROCEDURE — 99291 CRITICAL CARE FIRST HOUR: CPT

## 2024-10-02 PROCEDURE — 71045 X-RAY EXAM CHEST 1 VIEW: CPT | Mod: 26

## 2024-10-02 RX ORDER — AZITHROMYCIN 250 MG/1
500 TABLET, FILM COATED ORAL ONCE
Refills: 0 | Status: COMPLETED | OUTPATIENT
Start: 2024-10-02 | End: 2024-10-02

## 2024-10-02 RX ORDER — FUROSEMIDE 10 MG/ML
20 INJECTION INTRAVENOUS ONCE
Refills: 0 | Status: COMPLETED | OUTPATIENT
Start: 2024-10-02 | End: 2024-10-02

## 2024-10-02 RX ORDER — SODIUM CHLORIDE 0.9 % (FLUSH) 0.9 %
2300 SYRINGE (ML) INJECTION ONCE
Refills: 0 | Status: DISCONTINUED | OUTPATIENT
Start: 2024-10-02 | End: 2024-10-02

## 2024-10-02 RX ORDER — CEFTRIAXONE SODIUM 1 G
1000 VIAL (EA) INJECTION ONCE
Refills: 0 | Status: COMPLETED | OUTPATIENT
Start: 2024-10-02 | End: 2024-10-02

## 2024-10-02 RX ORDER — ACETAMINOPHEN 325 MG
650 TABLET ORAL ONCE
Refills: 0 | Status: DISCONTINUED | OUTPATIENT
Start: 2024-10-02 | End: 2024-10-02

## 2024-10-02 RX ORDER — ACETAMINOPHEN 325 MG
975 TABLET ORAL ONCE
Refills: 0 | Status: COMPLETED | OUTPATIENT
Start: 2024-10-02 | End: 2024-10-02

## 2024-10-02 RX ADMIN — AZITHROMYCIN 255 MILLIGRAM(S): 250 TABLET, FILM COATED ORAL at 23:16

## 2024-10-02 RX ADMIN — Medication 100 MILLIGRAM(S): at 23:16

## 2024-10-02 RX ADMIN — Medication 975 MILLIGRAM(S): at 22:56

## 2024-10-02 NOTE — ED PROVIDER NOTE - CARE PLAN
1 Principal Discharge DX:	Sepsis  Secondary Diagnosis:	Atrial fibrillation  Secondary Diagnosis:	Acute CHF

## 2024-10-02 NOTE — ED PROVIDER NOTE - OBJECTIVE STATEMENT
84 year old Female past medical history of  prothrombin gene mutation, h/o DVT/PE,  on Coumadin Diastolic CHF- on HCTZ, HTN,  Afib/flutter cholecystectomy, pancreatitis, comes to emergency room for 5 days of generalized weakness,  dry cough -  sw home visit doctor  prescribed Zpak,  Pt here now for continues weakness,  now shortness of breath.

## 2024-10-02 NOTE — ED PROVIDER NOTE - CLINICAL SUMMARY MEDICAL DECISION MAKING FREE TEXT BOX
83 y/o F PMH of + prothrombin gene mutation, h/o DVT/PE,  on Coumadin Diastolic CHF- on HCTZ, HTN,  Afib/flutter cholecystectomy cb pancreatitis , pw 5 days of generalized weakness,  dry cough -  sw home visit doctor  prescribed Zpak,  Pt here now for continues weakness,  now SOB. in ED pt  rapid afib, febrile 105,  hypoxic 90% on 6L NC, 150 systolic   crackles bl, bedside sono b line bl, decreased  cardiac pump,  pt alert well appearing nontoxic although  tachypneic  speaking  full sentences, pleasant ,   abd soft nontender +  diarrhea   Pt placed on bipap,   abx given  IVF held given  pulm edema. 83 y/o F PMH of + prothrombin gene mutation, h/o DVT/PE,  on Coumadin Diastolic CHF- on HCTZ, HTN,  Afib/flutter cholecystectomy cb pancreatitis , pw 5 days of generalized weakness,  dry cough -  sw home visit doctor  prescribed Zpak,  Pt here now for continues weakness,  now SOB. in ED pt  rapid afib, febrile 105,  hypoxic 90% on 6L NC, 150 systolic   crackles bl, bedside sono b line bl, decreased  cardiac pump,  pt alert well appearing nontoxic although  tachypneic  speaking  full sentences, pleasant ,   abd soft nontender +  diarrhea   Pt placed on bipap,   abx given  IVF held given  pulm edema.  ICU consulte d- admitted to stepdown

## 2024-10-02 NOTE — ED PROVIDER NOTE - PHYSICAL EXAMINATION
Physical Exam    Vital Signs: I have reviewed the initial vital signs.  Constitutional: mild acute distress  Eyes: Conjunctiva pink, Sclera clear, PERRLA, EOMI.  Cardiovascular:  tachy S1 and S2, irregular rate, irregular rhythm, well-perfused extremities, radial pulses equal and 2+  Respiratory: + labored respiratory effort, diminshed b/l with rales  Gastrointestinal: soft, non-tender abdomen, no pulsatile mass, normal bowl sounds  Musculoskeletal: supple neck, no lower extremity edema, no midline tenderness  Integumentary: warm, dry, no rash  Neurologic: awake, alert, cranial nerves II-XII grossly intact, extremities’ motor and sensory functions grossly intact  Psychiatric: appropriate mood, appropriate affect

## 2024-10-03 ENCOUNTER — RESULT REVIEW (OUTPATIENT)
Age: 84
End: 2024-10-03

## 2024-10-03 DIAGNOSIS — A41.9 SEPSIS, UNSPECIFIED ORGANISM: ICD-10-CM

## 2024-10-03 LAB
A1C WITH ESTIMATED AVERAGE GLUCOSE RESULT: 5.3 % — SIGNIFICANT CHANGE UP (ref 4–5.6)
ANION GAP SERPL CALC-SCNC: 16 MMOL/L — HIGH (ref 7–14)
APTT BLD: 45.8 SEC — HIGH (ref 27–39.2)
BUN SERPL-MCNC: 44 MG/DL — HIGH (ref 10–20)
CALCIUM SERPL-MCNC: 8.5 MG/DL — SIGNIFICANT CHANGE UP (ref 8.4–10.5)
CHLORIDE SERPL-SCNC: 99 MMOL/L — SIGNIFICANT CHANGE UP (ref 98–110)
CHOLEST SERPL-MCNC: 76 MG/DL — SIGNIFICANT CHANGE UP
CO2 SERPL-SCNC: 25 MMOL/L — SIGNIFICANT CHANGE UP (ref 17–32)
CREAT SERPL-MCNC: 1.4 MG/DL — SIGNIFICANT CHANGE UP (ref 0.7–1.5)
EGFR: 37 ML/MIN/1.73M2 — LOW
ESTIMATED AVERAGE GLUCOSE: 105 MG/DL — SIGNIFICANT CHANGE UP (ref 68–114)
GLUCOSE SERPL-MCNC: 115 MG/DL — HIGH (ref 70–99)
HCT VFR BLD CALC: 35.3 % — LOW (ref 37–47)
HDLC SERPL-MCNC: 38 MG/DL — LOW
HGB BLD-MCNC: 11.1 G/DL — LOW (ref 12–16)
INR BLD: 5.32 RATIO — CRITICAL HIGH (ref 0.65–1.3)
LACTATE SERPL-SCNC: 2 MMOL/L — SIGNIFICANT CHANGE UP (ref 0.7–2)
LIPID PNL WITH DIRECT LDL SERPL: 26 MG/DL — SIGNIFICANT CHANGE UP
MAGNESIUM SERPL-MCNC: 1.6 MG/DL — LOW (ref 1.8–2.4)
MCHC RBC-ENTMCNC: 26.7 PG — LOW (ref 27–31)
MCHC RBC-ENTMCNC: 31.4 G/DL — LOW (ref 32–37)
MCV RBC AUTO: 85.1 FL — SIGNIFICANT CHANGE UP (ref 81–99)
MRSA PCR RESULT.: NEGATIVE — SIGNIFICANT CHANGE UP
NON HDL CHOLESTEROL: 38 MG/DL — SIGNIFICANT CHANGE UP
NRBC # BLD: 0 /100 WBCS — SIGNIFICANT CHANGE UP (ref 0–0)
PHOSPHATE SERPL-MCNC: 4.3 MG/DL — SIGNIFICANT CHANGE UP (ref 2.1–4.9)
PLATELET # BLD AUTO: 156 K/UL — SIGNIFICANT CHANGE UP (ref 130–400)
PMV BLD: 11.8 FL — HIGH (ref 7.4–10.4)
POTASSIUM SERPL-MCNC: 3.9 MMOL/L — SIGNIFICANT CHANGE UP (ref 3.5–5)
POTASSIUM SERPL-SCNC: 3.9 MMOL/L — SIGNIFICANT CHANGE UP (ref 3.5–5)
PROTHROM AB SERPL-ACNC: >40 SEC — HIGH (ref 9.95–12.87)
RBC # BLD: 4.15 M/UL — LOW (ref 4.2–5.4)
RBC # FLD: 18.9 % — HIGH (ref 11.5–14.5)
SODIUM SERPL-SCNC: 140 MMOL/L — SIGNIFICANT CHANGE UP (ref 135–146)
TRIGL SERPL-MCNC: 61 MG/DL — SIGNIFICANT CHANGE UP
TROPONIN T, HIGH SENSITIVITY RESULT: 52 NG/L — CRITICAL HIGH (ref 6–13)
TSH SERPL-MCNC: 0.68 UIU/ML — SIGNIFICANT CHANGE UP (ref 0.27–4.2)
WBC # BLD: 8.44 K/UL — SIGNIFICANT CHANGE UP (ref 4.8–10.8)
WBC # FLD AUTO: 8.44 K/UL — SIGNIFICANT CHANGE UP (ref 4.8–10.8)

## 2024-10-03 PROCEDURE — 97110 THERAPEUTIC EXERCISES: CPT | Mod: GP

## 2024-10-03 PROCEDURE — 80048 BASIC METABOLIC PNL TOTAL CA: CPT

## 2024-10-03 PROCEDURE — 87641 MR-STAPH DNA AMP PROBE: CPT

## 2024-10-03 PROCEDURE — 84443 ASSAY THYROID STIM HORMONE: CPT

## 2024-10-03 PROCEDURE — 36415 COLL VENOUS BLD VENIPUNCTURE: CPT

## 2024-10-03 PROCEDURE — 85610 PROTHROMBIN TIME: CPT

## 2024-10-03 PROCEDURE — 84484 ASSAY OF TROPONIN QUANT: CPT

## 2024-10-03 PROCEDURE — 93306 TTE W/DOPPLER COMPLETE: CPT | Mod: 26

## 2024-10-03 PROCEDURE — 94010 BREATHING CAPACITY TEST: CPT

## 2024-10-03 PROCEDURE — 85025 COMPLETE CBC W/AUTO DIFF WBC: CPT

## 2024-10-03 PROCEDURE — 87449 NOS EACH ORGANISM AG IA: CPT

## 2024-10-03 PROCEDURE — 92526 ORAL FUNCTION THERAPY: CPT | Mod: GN

## 2024-10-03 PROCEDURE — 93306 TTE W/DOPPLER COMPLETE: CPT

## 2024-10-03 PROCEDURE — 86900 BLOOD TYPING SEROLOGIC ABO: CPT

## 2024-10-03 PROCEDURE — 86901 BLOOD TYPING SEROLOGIC RH(D): CPT

## 2024-10-03 PROCEDURE — 71045 X-RAY EXAM CHEST 1 VIEW: CPT

## 2024-10-03 PROCEDURE — 84100 ASSAY OF PHOSPHORUS: CPT

## 2024-10-03 PROCEDURE — 97162 PT EVAL MOD COMPLEX 30 MIN: CPT | Mod: GP

## 2024-10-03 PROCEDURE — 99222 1ST HOSP IP/OBS MODERATE 55: CPT

## 2024-10-03 PROCEDURE — 84145 PROCALCITONIN (PCT): CPT

## 2024-10-03 PROCEDURE — 94760 N-INVAS EAR/PLS OXIMETRY 1: CPT

## 2024-10-03 PROCEDURE — 85730 THROMBOPLASTIN TIME PARTIAL: CPT

## 2024-10-03 PROCEDURE — 83036 HEMOGLOBIN GLYCOSYLATED A1C: CPT

## 2024-10-03 PROCEDURE — 80053 COMPREHEN METABOLIC PANEL: CPT

## 2024-10-03 PROCEDURE — 83605 ASSAY OF LACTIC ACID: CPT

## 2024-10-03 PROCEDURE — 85027 COMPLETE CBC AUTOMATED: CPT

## 2024-10-03 PROCEDURE — 87899 AGENT NOS ASSAY W/OPTIC: CPT

## 2024-10-03 PROCEDURE — 86850 RBC ANTIBODY SCREEN: CPT

## 2024-10-03 PROCEDURE — 80061 LIPID PANEL: CPT

## 2024-10-03 PROCEDURE — 87640 STAPH A DNA AMP PROBE: CPT

## 2024-10-03 PROCEDURE — 99223 1ST HOSP IP/OBS HIGH 75: CPT

## 2024-10-03 PROCEDURE — 0225U NFCT DS DNA&RNA 21 SARSCOV2: CPT

## 2024-10-03 PROCEDURE — 99291 CRITICAL CARE FIRST HOUR: CPT

## 2024-10-03 PROCEDURE — 97116 GAIT TRAINING THERAPY: CPT | Mod: GP

## 2024-10-03 PROCEDURE — 92610 EVALUATE SWALLOWING FUNCTION: CPT | Mod: GN

## 2024-10-03 PROCEDURE — 83735 ASSAY OF MAGNESIUM: CPT

## 2024-10-03 RX ORDER — METHYLPREDNISOLONE ACETATE 80 MG/ML
60 INJECTION, SUSPENSION INTRA-ARTICULAR; INTRALESIONAL; INTRAMUSCULAR; SOFT TISSUE EVERY 12 HOURS
Refills: 0 | Status: DISCONTINUED | OUTPATIENT
Start: 2024-10-03 | End: 2024-10-09

## 2024-10-03 RX ORDER — METOPROLOL TARTRATE 50 MG
25 TABLET ORAL EVERY 6 HOURS
Refills: 0 | Status: DISCONTINUED | OUTPATIENT
Start: 2024-10-03 | End: 2024-10-03

## 2024-10-03 RX ORDER — AZITHROMYCIN 250 MG/1
500 TABLET, FILM COATED ORAL EVERY 24 HOURS
Refills: 0 | Status: DISCONTINUED | OUTPATIENT
Start: 2024-10-03 | End: 2024-10-07

## 2024-10-03 RX ORDER — CHLORHEXIDINE GLUCONATE ORAL RINSE 1.2 MG/ML
1 SOLUTION DENTAL
Refills: 0 | Status: DISCONTINUED | OUTPATIENT
Start: 2024-10-03 | End: 2024-10-10

## 2024-10-03 RX ORDER — HYDROCHLOROTHIAZIDE 50 MG/1
1 TABLET ORAL
Refills: 0 | DISCHARGE

## 2024-10-03 RX ORDER — METOPROLOL TARTRATE 50 MG
25 TABLET ORAL EVERY 6 HOURS
Refills: 0 | Status: DISCONTINUED | OUTPATIENT
Start: 2024-10-03 | End: 2024-10-04

## 2024-10-03 RX ORDER — METOPROLOL TARTRATE 50 MG
50 TABLET ORAL DAILY
Refills: 0 | Status: DISCONTINUED | OUTPATIENT
Start: 2024-10-03 | End: 2024-10-03

## 2024-10-03 RX ORDER — FUROSEMIDE 10 MG/ML
20 INJECTION INTRAVENOUS ONCE
Refills: 0 | Status: COMPLETED | OUTPATIENT
Start: 2024-10-03 | End: 2024-10-03

## 2024-10-03 RX ORDER — MAGNESIUM SULFATE 500 MG/ML
2 VIAL (ML) INJECTION ONCE
Refills: 0 | Status: COMPLETED | OUTPATIENT
Start: 2024-10-03 | End: 2024-10-03

## 2024-10-03 RX ORDER — CEFTRIAXONE SODIUM 1 G
1000 VIAL (EA) INJECTION EVERY 24 HOURS
Refills: 0 | Status: COMPLETED | OUTPATIENT
Start: 2024-10-03 | End: 2024-10-07

## 2024-10-03 RX ORDER — PANTOPRAZOLE SODIUM 40 MG/1
40 TABLET, DELAYED RELEASE ORAL DAILY
Refills: 0 | Status: DISCONTINUED | OUTPATIENT
Start: 2024-10-03 | End: 2024-10-05

## 2024-10-03 RX ADMIN — Medication 25 GRAM(S): at 09:37

## 2024-10-03 RX ADMIN — Medication 100 MILLIGRAM(S): at 20:20

## 2024-10-03 RX ADMIN — CHLORHEXIDINE GLUCONATE ORAL RINSE 1 APPLICATION(S): 1.2 SOLUTION DENTAL at 05:40

## 2024-10-03 RX ADMIN — METHYLPREDNISOLONE ACETATE 60 MILLIGRAM(S): 80 INJECTION, SUSPENSION INTRA-ARTICULAR; INTRALESIONAL; INTRAMUSCULAR; SOFT TISSUE at 09:38

## 2024-10-03 RX ADMIN — PANTOPRAZOLE SODIUM 40 MILLIGRAM(S): 40 TABLET, DELAYED RELEASE ORAL at 11:45

## 2024-10-03 RX ADMIN — Medication 50 MILLIGRAM(S): at 05:39

## 2024-10-03 RX ADMIN — FUROSEMIDE 20 MILLIGRAM(S): 10 INJECTION INTRAVENOUS at 09:38

## 2024-10-03 RX ADMIN — Medication 1000 MILLIGRAM(S): at 00:49

## 2024-10-03 RX ADMIN — FUROSEMIDE 20 MILLIGRAM(S): 10 INJECTION INTRAVENOUS at 00:09

## 2024-10-03 RX ADMIN — AZITHROMYCIN 500 MILLIGRAM(S): 250 TABLET, FILM COATED ORAL at 00:25

## 2024-10-03 RX ADMIN — METHYLPREDNISOLONE ACETATE 60 MILLIGRAM(S): 80 INJECTION, SUSPENSION INTRA-ARTICULAR; INTRALESIONAL; INTRAMUSCULAR; SOFT TISSUE at 20:19

## 2024-10-03 RX ADMIN — Medication 200 MILLIGRAM(S): at 11:45

## 2024-10-03 RX ADMIN — Medication 975 MILLIGRAM(S): at 00:50

## 2024-10-03 RX ADMIN — AZITHROMYCIN 255 MILLIGRAM(S): 250 TABLET, FILM COATED ORAL at 20:20

## 2024-10-03 NOTE — DIETITIAN INITIAL EVALUATION ADULT - NSPROEDALEARNPREF_GEN_A_NUR
high protein high kcal foods reviewed with pt and family at bedside for post d/c to aid with wound healing./verbal instruction

## 2024-10-03 NOTE — H&P ADULT - HISTORY OF PRESENT ILLNESS
85 y/o F PMH of + prothrombin gene mutation, h/o DVT/PE,  on Coumadin Diastolic CHF- on HCTZ, HTN,  Afib/flutter cholecystectomy cb pancreatitis , BIBEMS for eval of SOB. Patient reports shortness of breath began 1 week ago and has steadily progressed worse with laying flat + exertion. A/w non-productive cough. States that she has been having generalized weakness over the past 5-7 days. Visited pcp 2 days ago, started on Azithromycin PO.   Denies chest pain, fever, chills, cough, N/V/D, dizziness, weakness, and any other acute complaints at this time.

## 2024-10-03 NOTE — DIETITIAN INITIAL EVALUATION ADULT - PERTINENT LABORATORY DATA
10-03    140  |  99  |  44[H]  ----------------------------<  115[H]  3.9   |  25  |  1.4    Ca    8.5      03 Oct 2024 05:20  Phos  4.3     10-03  Mg     1.6     10-03    TPro  6.0  /  Alb  3.8  /  TBili  2.7[H]  /  DBili  x   /  AST  16  /  ALT  5   /  AlkPhos  74  10-02  A1C with Estimated Average Glucose Result: 5.3 % (10-03-24 @ 05:20)                          11.1   8.44  )-----------( 156      ( 03 Oct 2024 05:20 )             35.3

## 2024-10-03 NOTE — CONSULT NOTE ADULT - ASSESSMENT
85 y/o F PMH Afib/flutter, DVT/PE on Coumadin, HFpEF, HTN, of + prothrombin gene mutation, CCY cb pancreatitis, recent sick contacts BIBEMS for eval of SOB.     Cards Dr Chandler  BNP: 5010  TTE 03/06/24: LVEF 55-60% mod TR mod pHTN    Impression/Plan  # Afib/rvr in setting of infection  # supratherapeutic INR  # Sepsis / PNA    - pt presents with sob and pulmonary congestion likely due to infection  - on exam mild crackles, no edema. She appears near euvolemic   - would treat infection  - can continue on home po diuretic. Monitor BP  - consider switching MTP to short acting 25mg q6 for adequate rate control HR goal <110  - hold coumadin for now resume when INR in therapeutic range  - consider repeat ECHO  - OP cardiology follow up       Discussed with Dr Eisenberg

## 2024-10-03 NOTE — H&P ADULT - NSHPPHYSICALEXAM_GEN_ALL_CORE
ICU Vital Signs Last 24 Hrs  T(C): 40.6 (02 Oct 2024 22:23), Max: 40.6 (02 Oct 2024 22:23)  T(F): 105 (02 Oct 2024 22:23), Max: 105 (02 Oct 2024 22:23)  HR: 128 (03 Oct 2024 00:30) (128 - 146)  BP: 105/69 (03 Oct 2024 00:30) (97/63 - 150/82)  BP(mean): --  ABP: --  ABP(mean): --  RR: 24 (02 Oct 2024 23:14) (22 - 24)  SpO2: 98% (02 Oct 2024 23:14) (94% - 98%)    O2 Parameters below as of 02 Oct 2024 22:57  Patient On (Oxygen Delivery Method): BiPAP/CPAP        PHYSICAL EXAM:    Constitutional: NAD, A + O x 3. Breathing comfortably on bipap     Eyes: PEERL, EOM intact b/l.     Neck: Supple, non-tender, trachea midline.     Respiratory: Rales in lung bases b/l. No rhonchi/ wheeze.     Cardiovascular: tachycardic to 120s bpm. irregular rate + rhythm. S1/ S2 present and clear, no murmur, gallops, or rub.     Gastrointestinal: Abd soft + non-tender. BS NA x 4 quadrants.     Extremities: No edema, clubbing, or cyanosis in Ext x 4.     Vascular: DP + radial Pulse 2+ b/l.     Skin: No rash/ focal lesions.     Neuro: Speech clear. CN II-XII intact b/l. Sensation intact to soft touch + Strength 5/5 in ext x 4.

## 2024-10-03 NOTE — CONSULT NOTE ADULT - ASSESSMENT
IMPRESSION:  SOb   acute resp failure b/l infiltrate = fluid overload /pulmonary edema/ CHF possible underlying ILD   PNA still in diff diagnosis   rapid AFib   hx DVT/ PE   elevated INR   PLAN:    CNS: no sedation     HEENT:oral care     PULMONARY: taper oxygen to keep pox >92%   i reviewed previous cxr and ct scan she has chronic b/l groundglass opacity now worsen possible ILD   start solumedrol 60 mg Q12 hrs for now   cxr [place]     CARDIOVASCULAR: keep IS < OS   rate controlled   on lopressors   cardiology consult   echo n  try lasix 40 mg once     GI: GI prophylaxis.  Feeding     RENAL: follow is and os follow lytes   lasix 40 mg   keep is < os     INFECTIOUS DISEASE:rocephine and azithromycin   send urine legionella and strep antigen   nasal mrsa   ID consult   procal   HEMATOLOGICAL:  DVT prophylaxis. hold coumadin follow INR   h/h     ENDOCRINE:  Follow up FS.  Insulin protocol if needed.    SDU         CRITICAL CARE TIME SPENT: ***     IMPRESSION:  SOb   acute resp failure b/l infiltrate = fluid overload /pulmonary edema/ CHF possible underlying ILD   PNA still in diff diagnosis   rapid AFib   hx DVT/ PE   elevated INR   PLAN:    CNS: no sedation     HEENT:oral care     PULMONARY: taper oxygen to keep pox >92%   i reviewed previous cxr and ct scan she has chronic b/l groundglass opacity now worsen possible ILD   start solumedrol 60 mg Q12 hrs for now   cxr [place]     CARDIOVASCULAR: keep IS < OS   rate controlled   on lopressors  as per cardiology   cardiology consult   echo n  try lasix 40 mg once   [place]   GI: GI prophylaxis.  Feeding     RENAL: follow is and os follow lytes   lasix 40 mg   keep is < os     INFECTIOUS DISEASE:rocephine and azithromycin   send urine legionella and strep antigen   full RVP   nasal mrsa   ID consult   procal   HEMATOLOGICAL:  DVT prophylaxis. hold coumadin follow INR   h/h     ENDOCRINE:  Follow up FS.  Insulin protocol if needed.    SDU         CRITICAL CARE TIME SPENT: ***

## 2024-10-03 NOTE — DIETITIAN INITIAL EVALUATION ADULT - OTHER INFO
pt is 84 year old female with hx of prothrombin gene mutation, DVT/PE, CHF, HTN, afib/flutter, choley c/b pancreatitis, BIBA for SOB with generalized weakness for 5-7 days. admitted with ARF 2/2 PNA with CHF component, afib with RVR.

## 2024-10-03 NOTE — DIETITIAN INITIAL EVALUATION ADULT - NS FNS ENTERAL CURRENT ORDER NOT
Current diet order does not meet estimated nutrient requirements Eucrisa Counseling: Patient may experience a mild burning sensation during topical application. Eucrisa is not approved in children less than 2 years of age.

## 2024-10-03 NOTE — H&P ADULT - NSHPLABSRESULTS_GEN_ALL_CORE
LABS:                          12.0   10.16 )-----------( 187      ( 02 Oct 2024 22:42 )             38.3     10-02    139  |  99  |  37[H]  ----------------------------<  135[H]  4.0   |  22  |  1.1    Ca    8.7      02 Oct 2024 22:42    TPro  6.0  /  Alb  3.8  /  TBili  2.7[H]  /  DBili  x   /  AST  16  /  ALT  5   /  AlkPhos  74  10-02    LIVER FUNCTIONS - ( 02 Oct 2024 22:42 )  Alb: 3.8 g/dL / Pro: 6.0 g/dL / ALK PHOS: 74 U/L / ALT: 5 U/L / AST: 16 U/L / GGT: x           PT/INR - ( 02 Oct 2024 22:42 )   PT: >40.00 sec;   INR: 5.40 ratio         PTT - ( 02 Oct 2024 22:42 )  PTT:30.7 sec  Urinalysis Basic - ( 02 Oct 2024 22:42 )    Color: x / Appearance: x / SG: x / pH: x  Gluc: 135 mg/dL / Ketone: x  / Bili: x / Urobili: x   Blood: x / Protein: x / Nitrite: x   Leuk Esterase: x / RBC: x / WBC x   Sq Epi: x / Non Sq Epi: x / Bacteria: x

## 2024-10-03 NOTE — CONSULT NOTE ADULT - SUBJECTIVE AND OBJECTIVE BOX
HPI:  85 y/o F PMH of + prothrombin gene mutation, h/o DVT/PE,  on Coumadin Diastolic CHF- on HCTZ, HTN,  Afib/flutter cholecystectomy cb pancreatitis , BIBEMS for eval of SOB. Patient reports shortness of breath began 1 week ago and has steadily progressed worse with laying flat + exertion. A/w non-productive cough. States that she has been having generalized weakness over the past 5-7 days. Visited pcp 2 days ago, started on Azithromycin PO.   Denies chest pain, fever, chills, cough, N/V/D, dizziness, weakness, and any other acute complaints at this time.    (03 Oct 2024 01:09)                    PAST MEDICAL & SURGICAL HISTORY  H/O prothrombin mutation    Pulmonary embolism    HTN (hypertension)    Anemia requiring transfusions    Deep vein thrombosis (DVT)    S/P tonsillectomy    H/O: hysterectomy    History of cholecystectomy        FAMILY HISTORY:  FAMILY HISTORY:      SOCIAL HISTORY:  []smoker  []Alcohol  []Drug    ALLERGIES:  penicillin (Unknown)      MEDICATIONS:  MEDICATIONS  (STANDING):  allopurinol 200 milliGRAM(s) Oral daily  azithromycin  IVPB 500 milliGRAM(s) IV Intermittent every 24 hours  cefTRIAXone   IVPB 1000 milliGRAM(s) IV Intermittent every 24 hours  chlorhexidine 2% Cloths 1 Application(s) Topical <User Schedule>  furosemide   Injectable 20 milliGRAM(s) IV Push once  magnesium sulfate  IVPB 2 Gram(s) IV Intermittent once  methylPREDNISolone sodium succinate Injectable 60 milliGRAM(s) IV Push every 12 hours  pantoprazole  Injectable 40 milliGRAM(s) IV Push daily    MEDICATIONS  (PRN):      HOME MEDICATIONS:  Home Medications:  allopurinol 100 mg oral tablet: 2 tab(s) orally once a day (03 Oct 2024 01:26)  hydroCHLOROthiazide 25 mg oral tablet: 1 tab(s) orally once a day (03 Oct 2024 01:27)  metoprolol succinate 50 mg oral capsule, extended release: 1 cap(s) orally once a day (23 Jun 2024 00:07)  warfarin 2 mg oral tablet: 1 tablet on Mondays, Thursdays, Saturdays and Sundays (23 Jun 2024 01:16)      VITALS:   T(F): 98.2 (10-03 @ 07:23), Max: 105 (10-02 @ 22:23)  HR: 111 (10-03 @ 07:23) (111 - 146)  BP: 88/61 (10-03 @ 07:23) (88/61 - 150/82)  BP(mean): 70 (10-03 @ 07:23) (70 - 95)  RR: 32 (10-03 @ 07:23) (22 - 37)  SpO2: 94% (10-03 @ 07:23) (92% - 98%)    I&O's Summary      REVIEW OF SYSTEMS:  CONSTITUTIONAL: No weakness, fevers or chills  EYES: No visual changes  ENT: No vertigo or throat pain   NECK: No pain or stiffness  RESPIRATORY: No cough, wheezing, hemoptysis; No shortness of breath  CARDIOVASCULAR: No chest pain or palpitations  GASTROINTESTINAL: No abdominal or epigastric pain. No nausea, vomiting, or hematemesis; No diarrhea or constipation. No melena or hematochezia.  GENITOURINARY: No dysuria, frequency or hematuria  NEUROLOGICAL: No numbness or weakness  SKIN: No itching, no rashes  MSK: no    PHYSICAL EXAM:  NEURO: patient is awake , alert and oriented  GEN: Not in acute distress  NECK: no thyroid enlargement, no JVD  LUNGS: Clear to auscultation bilaterally   CARDIOVASCULAR: S1/S2 present, RRR , no murmurs or rubs, no carotid bruits,  + PP bilaterally  ABD: Soft, non-tender, non-distended, +BS  EXT: No MARICEL  SKIN: Intact    LABS:                        11.1   8.44  )-----------( 156      ( 03 Oct 2024 05:20 )             35.3     10-03    140  |  99  |  44[H]  ----------------------------<  115[H]  3.9   |  25  |  1.4    Ca    8.5      03 Oct 2024 05:20  Phos  4.3     10-03  Mg     1.6     10-03    TPro  6.0  /  Alb  3.8  /  TBili  2.7[H]  /  DBili  x   /  AST  16  /  ALT  5   /  AlkPhos  74  10-02    PT/INR - ( 03 Oct 2024 05:20 )   PT: >40.00 sec;   INR: 5.32 ratio         PTT - ( 03 Oct 2024 05:20 )  PTT:45.8 sec  Lactate, Blood: 2.0 mmol/L (10-03-24 @ 01:55)  Lactate, Blood: 3.8 mmol/L *H* (10-02-24 @ 22:42)          Troponin trend:      10-03 Chol 76 LDL -- HDL 38[L] Trig 61      RADIOLOGY:  < from: Xray Chest 1 View-PORTABLE IMMEDIATE (10.02.24 @ 23:14) >  Impression: Bilateral opacities/nodules. Left calcified granulomata.   Hiatal hernia.        --- End of Report ---    < end of copied text >    < from: TTE Echo Complete w/o Contrast w/ Doppler (03.06.24 @ 13:11) >        Summary:   1. Left ventricular ejection fraction, by visual estimation, is 55 to   60%.   2. Mildly enlarged right atrium.   3. Normal left atrial size.   4. Mild mitral annular calcification.   5. Mild mitral valve regurgitation.   6. Moderate tricuspid regurgitation.   7. Sclerotic aortic valve with normal opening.   8. Ascendingaorta 3.8 cm.   9. Estimated pulmonary artery systolic pressure is 55.1 mmHg assuming a   right atrial pressure of 3 mmHg, which is consistent with moderate   pulmonary hypertension.    PHYSICIAN INTERPRETATION:  Left Ventricle: The left ventricular internal cavity size is normal. Left   ventricular wall thickness is normal. Left ventricular ejection fraction,   by visual estimation, is 55 to 60%. Normal segmental left ventricular   systolic function.  Left Atrium: Normal left atrial size.  RightAtrium: Mildly enlarged right atrium.  Mitral Valve: There is mild mitral annular calcification. Mild mitral   valve regurgitation is seen.  Tricuspid Valve: Moderate tricuspid regurgitation is visualized.   Estimated pulmonary artery systolic pressure is 55.1 mmHg assuming a   right atrial pressure of 3 mmHg, which is consistent with moderate   pulmonary hypertension.  Aortic Valve: Sclerotic aortic valve with normal opening.  Aorta: Ascending aorta 3.8 cm.    < end of copied text >    ECG:    TELEMETRY EVENTS:

## 2024-10-03 NOTE — CONSULT NOTE ADULT - NS ATTEND AMEND GEN_ALL_CORE FT
Patient seen and examined. Pertinent labs, imaging and telemetry reviewed. I agree with the above:     Patient feeling better. Some SOB and chest pressure.   Patient with known pHTN and dilated RV, per patient.   -She follows with Dr. Arias and had TTE in September and was told all was stable.   -Pt with significant pHTN on TTE with PASP >70 mmHg.   -Given history of PE, could represent CTEPH. Unclear if work up has been done.   -Pt without peripheral edema and crackles B/L R>L.   -CXR with possible multifocal PNA and maybe some pulmonary edema.   -Can increase BB for improved HR control with AF.   -Hold coumadin until INR 2-3.   -Trops minimally elevated, likely in setting of demand ischemia.   -Tx for PNA.   -Consider CTA PE or VQ scan given history of PE, pHTN and SOB.   -Can continue on lasix 20mg IVP daily and keep pt net even to slightly negative.

## 2024-10-03 NOTE — CONSULT NOTE ADULT - SUBJECTIVE AND OBJECTIVE BOX
Patient is a 84y old  Female who presents with a chief complaint of     HPI:  83 y/o F PMH of + prothrombin gene mutation, h/o DVT/PE,  on Coumadin Diastolic CHF- on HCTZ, HTN,  Afib/flutter cholecystectomy cb pancreatitis , BIBEMS for eval of SOB. Patient reports shortness of breath began 1 week ago and has steadily progressed worse with laying flat + exertion. A/w non-productive cough. States that she has been having generalized weakness over the past 5-7 days. Visited pcp 2 days ago, started on Azithromycin PO.   Denies chest pain, fever, chills, cough, N/V/D, dizziness, weakness, and any other acute complaints at this time.    (03 Oct 2024 01:09)  not on pressors     PAST MEDICAL & SURGICAL HISTORY:  H/O prothrombin mutation      Pulmonary embolism      HTN (hypertension)      Anemia requiring transfusions      Deep vein thrombosis (DVT)      S/P tonsillectomy      H/O: hysterectomy      History of cholecystectomy        Allergies    penicillin (Unknown)    Intolerances      Family history : no cardiovscular family history   Home Medications:  allopurinol 100 mg oral tablet: 2 tab(s) orally once a day (03 Oct 2024 01:26)  hydroCHLOROthiazide 25 mg oral tablet: 1 tab(s) orally once a day (03 Oct 2024 01:27)  metoprolol succinate 50 mg oral capsule, extended release: 1 cap(s) orally once a day (23 Jun 2024 00:07)  warfarin 2 mg oral tablet: 1 tablet on Mondays, Thursdays, Saturdays and Sundays (23 Jun 2024 01:16)    Occupation:  Alochol: Denied  Smoking: Denied  Drug Use: Denied  Marital Status:         ROS: as in HPI; All other systems reviewed are negative    ICU Vital Signs Last 24 Hrs  T(C): 38.1 (03 Oct 2024 02:37), Max: 40.6 (02 Oct 2024 22:23)  T(F): 100.6 (03 Oct 2024 02:37), Max: 105 (02 Oct 2024 22:23)  HR: 122 (03 Oct 2024 05:40) (121 - 146)  BP: 95/63 (03 Oct 2024 05:40) (95/63 - 150/82)  BP(mean): 73 (03 Oct 2024 05:40) (73 - 95)  ABP: --  ABP(mean): --  RR: 33 (03 Oct 2024 05:40) (22 - 37)  SpO2: 93% (03 Oct 2024 05:40) (92% - 98%)    O2 Parameters below as of 03 Oct 2024 05:40  Patient On (Oxygen Delivery Method): nasal cannula  O2 Flow (L/min): 5            Physical Examination:    General: No acute distress.  Alert, oriented, interactive, nonfocal    HEENT: Pupils equal, reactive to light.  Symmetric.    PULM: b/l crackles     CVS: IR Regular rate and rhythm,      ABD: Soft, nondistended, nontender, normoactive bowel sounds, no masses    EXT: No edema, nontender, no clubbing     SKIN: Warm and well perfused, no rashes noted.    Neurology : no motor or sensory deficit                    I&O's Detail        LABS:                        11.1   8.44  )-----------( 156      ( 03 Oct 2024 05:20 )             35.3     02 Oct 2024 22:42    139    |  99     |  37     ----------------------------<  135    4.0     |  22     |  1.1      Ca    8.7        02 Oct 2024 22:42    TPro  6.0    /  Alb  3.8    /  TBili  2.7    /  DBili  x      /  AST  16     /  ALT  5      /  AlkPhos  74     02 Oct 2024 22:42  Amylase x     lipase x              CAPILLARY BLOOD GLUCOSE        PT/INR - ( 02 Oct 2024 22:42 )   PT: >40.00 sec;   INR: 5.40 ratio         PTT - ( 02 Oct 2024 22:42 )  PTT:30.7 sec  Urinalysis Basic - ( 02 Oct 2024 22:42 )    Color: x / Appearance: x / SG: x / pH: x  Gluc: 135 mg/dL / Ketone: x  / Bili: x / Urobili: x   Blood: x / Protein: x / Nitrite: x   Leuk Esterase: x / RBC: x / WBC x   Sq Epi: x / Non Sq Epi: x / Bacteria: x      Culture    Lactate, Blood: 2.0 mmol/L (10-03-24 @ 01:55)  Lactate, Blood: 3.8 mmol/L (10-02-24 @ 22:42)      MEDICATIONS  (STANDING):  allopurinol 200 milliGRAM(s) Oral daily  azithromycin  IVPB 500 milliGRAM(s) IV Intermittent every 24 hours  cefTRIAXone   IVPB 1000 milliGRAM(s) IV Intermittent every 24 hours  chlorhexidine 2% Cloths 1 Application(s) Topical <User Schedule>  metoprolol succinate ER 50 milliGRAM(s) Oral daily  pantoprazole  Injectable 40 milliGRAM(s) IV Push daily    MEDICATIONS  (PRN):        RADIOLOGY: ***     CXR: reviewed by me b/l opacity increased from june 2024 and 2023   TLC:  OG:  ET tube:        CAM ICU:  ECHO:

## 2024-10-03 NOTE — DIETITIAN INITIAL EVALUATION ADULT - PERTINENT MEDS FT
MEDICATIONS  (STANDING):  allopurinol 200 milliGRAM(s) Oral daily  azithromycin  IVPB 500 milliGRAM(s) IV Intermittent every 24 hours  cefTRIAXone   IVPB 1000 milliGRAM(s) IV Intermittent every 24 hours  chlorhexidine 2% Cloths 1 Application(s) Topical <User Schedule>  methylPREDNISolone sodium succinate Injectable 60 milliGRAM(s) IV Push every 12 hours  metoprolol tartrate 25 milliGRAM(s) Oral every 6 hours  pantoprazole  Injectable 40 milliGRAM(s) IV Push daily    MEDICATIONS  (PRN):

## 2024-10-03 NOTE — PATIENT PROFILE ADULT - FALL HARM RISK - HARM RISK INTERVENTIONS

## 2024-10-03 NOTE — CONSULT NOTE ADULT - SUBJECTIVE AND OBJECTIVE BOX
HPI:   Patient is a 85 y/o F PMH of + prothrombin gene mutation, h/o DVT/PE,  on Coumadin Diastolic CHF- on HCTZ, HTN,  Afib/flutter cholecystectomy cb pancreatitis , BIBEMS for eval of SOB. Patient reports shortness of breath began 1 week ago and has steadily progressed worse with laying flat + exertion. A/w non-productive cough. States that she has been having generalized weakness over the past 5-7 days. Visited pcp 2 days ago, started on Azithromycin PO.   Denies chest pain, fever, chills, cough, N/V/D, dizziness, weakness, and any other acute complaints at this time.    (03 Oct 2024 01:09)      PAST MEDICAL & SURGICAL HISTORY:  H/O prothrombin mutation  Pulmonary embolism  HTN (hypertension)  Anemia requiring transfusions  Deep vein thrombosis (DVT)  S/P tonsillectomy  H/O: hysterectomy  History of cholecystectomy    REVIEW OF SYSTEMS: All other systems negative    MEDICATIONS  (STANDING):  allopurinol 200 milliGRAM(s) Oral daily  azithromycin  IVPB 500 milliGRAM(s) IV Intermittent every 24 hours  cefTRIAXone   IVPB 1000 milliGRAM(s) IV Intermittent every 24 hours  chlorhexidine 2% Cloths 1 Application(s) Topical <User Schedule>  methylPREDNISolone sodium succinate Injectable 60 milliGRAM(s) IV Push every 12 hours  pantoprazole  Injectable 40 milliGRAM(s) IV Push daily    MEDICATIONS  (PRN):      Allergies  penicillin (Unknown)  Intolerances    SOCIAL HISTORY:   From Home     FAMILY HISTORY:  No pertinent family history noted     PHYSICAL EXAM:  Vital Signs Last 24 Hrs  T(C): 36.8 (03 Oct 2024 07:23), Max: 40.6 (02 Oct 2024 22:23)  T(F): 98.2 (03 Oct 2024 07:23), Max: 105 (02 Oct 2024 22:23)  HR: 106 (03 Oct 2024 09:37) (106 - 146)  BP: 100/66 (03 Oct 2024 09:37) (88/61 - 150/82)  BP(mean): 79 (03 Oct 2024 09:37) (70 - 95)  RR: 31 (03 Oct 2024 09:37) (22 - 37)  SpO2: 94% (03 Oct 2024 09:37) (92% - 98%)    Parameters below as of 03 Oct 2024 09:37  Patient On (Oxygen Delivery Method): nasal cannula  O2 Flow (L/min): 5      General : NAD   HEENT:  NC/AT, PERRL, EOMI, sclera clear, mucosa moist, throat clear, trachea midline, neck supple  Cardiovascular: RRR   Respiratory: equal chest rise  Gastrointestinal : Soft NT/ND (+)BS    Neurology:  Weakened strength & sensation   Psych: Calm  Musculoskeletal:  FROM,   Skin:  Wound , MASD      LABS/ CULTURES/ RADIOLOGY:                        11.1   8.44  )-----------( 156      ( 03 Oct 2024 05:20 )             35.3       140  |  99  |  44  ----------------------------<  115      [10-03-24 @ 05:20]  3.9   |  25  |  1.4        Ca     8.5     [10-03-24 @ 05:20]      Mg     1.6     [10-03-24 @ 05:20]      Phos  4.3     [10-03-24 @ 05:20]    TPro  6.0  /  Alb  3.8  /  TBili  2.7  /  DBili  x   /  AST  16  /  ALT  5   /  AlkPhos  74  [10-02-24 @ 22:42]    PT/INR: PT >40.00, INR 5.32       [10-03-24 @ 05:20]  PTT: 45.8       [10-03-24 @ 05:20]

## 2024-10-03 NOTE — DIETITIAN INITIAL EVALUATION ADULT - PATIENT MEETS CRITERIA FOR MALNUTRITION
Telephone Note    I spoke with Marcin's mom regarding his echo results:     Normal echocardiogram. There is normal appearance and motion of the tricuspid,  mitral, pulmonary and aortic valves. No atrial, ventricular or arterial level  shunting. The calculated biplane left ventricular ejection fraction is 65 %.    He does not need further follow-up in cardiology clinic unless additional concerns arise.      Sebastian Su M.D.  , Pediatric Cardiology  [facility]'[address] Office [address] 4th floor, [place] [address]  Phone [phone]  Fax [phone]    
yes

## 2024-10-03 NOTE — DIETITIAN INITIAL EVALUATION ADULT - ORAL INTAKE PTA/DIET HISTORY
pt consumes a regular diet PTA in which pt consumes 2-3 meals per day. pt has noticed a decline in her appetite over the past few months, EMR weight comparison shows an unintentional weight loss of 24# since March. NKFA or intolerances.     Presently on a soft and bite sized diet tolerating well consumes >50% of meals. pt is receptive to magic cup to help meet increased protein needs

## 2024-10-03 NOTE — H&P ADULT - NSHPREVIEWOFSYSTEMS_GEN_ALL_CORE
REVIEW OF SYSTEMS:    CONSTITUTIONAL:  weakness x 5-7 days as above. No fevers or chills  EYES/ENT:  No visual changes;  No vertigo or throat pain   NECK:  No pain or stiffness  RESPIRATORY: cough + SOB as above. No hemoptysis   CARDIOVASCULAR:  No chest pain or palpitations  GASTROINTESTINAL:  No abdominal or epigastric pain. No nausea, vomiting, or hematemesis; No diarrhea or constipation. No melena or hematochezia.  GENITOURINARY:  No dysuria, frequency or hematuria  NEUROLOGICAL:  No numbness or weakness  SKIN:  No itching, rashes

## 2024-10-03 NOTE — H&P ADULT - ASSESSMENT
85 y/o F PMH of + prothrombin gene mutation, h/o DVT/PE,  on Coumadin Diastolic CHF- on HCTZ, HTN,  Afib/flutter cholecystectomy cb pancreatitis ,  Presents with SOB x 5 days, On arrival to ED was found to be hypoxemic, placed on bipap. Significantly improved respiratory status at this time. BP stable.      Acute hypoxemic respiratory failure- 2/2 Pneumonia +/- CHF component   Trend WBC/ Procal / Lactate   BCx/UCx  Serial CXR  Cont Azithro + Rocephin   Hold IVF in setting of pulmonary edema   Cont bipap overnight    Afib + RVR   Serial EKG/ Tele monitoring  Cont home Metoprolol   Trend Cardiac enzymes   TTE     Supratherapeutic INR  Hold Coumadin  Trend CBC + coags     Hold DVT PPX in setting of elevated INR   Dispo:Admit to SDU   Case Discussed with Intensivist on Duty     [place] ROGERS

## 2024-10-03 NOTE — H&P ADULT - NS ATTEND AMEND GEN_ALL_CORE FT
85 y/o F PMH of + prothrombin gene mutation, h/o DVT/PE,  on Coumadin Diastolic CHF- on HCTZ, HTN,  Afib/flutter cholecystectomy cb pancreatitis ,  Presents with SOB x 5 days, On arrival to ED was found to be hypoxemic, placed on bipap. Significantly improved respiratory status at this time. BP stable.      Acute hypoxemic respiratory failure- 2/2 Pneumonia +/- CHF component   Trend WBC/ Procal / Lactate   BCx/UCx  Serial CXR  Cont Azithro + Rocephin   Hold IVF in setting of pulmonary edema   Cont bipap overnight    Afib + RVR   Serial EKG/ Tele monitoring  Cont home Metoprolol   Trend Cardiac enzymes   TTE     Supratherapeutic INR  Hold Coumadin  Trend CBC + coags     Hold DVT PPX in setting of elevated INR   Dispo:Admit to SDU

## 2024-10-03 NOTE — CONSULT NOTE ADULT - ASSESSMENT
High risk for pressur injury development or progression   Skin assessed- ?? 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   Clean coccyx and B/l buttock With soap and  water, pat dry apply triad hydrophilic dressing   Pressure  injury  preventive  measures  Skin  and incontinence care   Assess wound and inform primary provider of any changes   Case discussed with primary Rn   Wound/ ostomy specialist  to f/u as needed     Offloading: [x ] Frequent position changes [ ] Devices/Equipment  Cleansing: [ ] Saline [x ] Soap/Water [ ] Other: ______  Topicals: [x ] Barrier Cream [ ] Antimicrobial [ ] Enzymatic Wound Debridement [] Moist  wound Healing   Dressings: [ ] Dry, sterile [ ] Allevyn  Foam [ ] Absorbant Pads [ ] Collagenase    Other Recs.   Per Primary team   Total time for bedside assessment  , review of medical records  and  discussion of plan of care with primary team greater than 35 min

## 2024-10-04 LAB
ALBUMIN SERPL ELPH-MCNC: 3.3 G/DL — LOW (ref 3.5–5.2)
ALP SERPL-CCNC: 65 U/L — SIGNIFICANT CHANGE UP (ref 30–115)
ALT FLD-CCNC: <5 U/L — SIGNIFICANT CHANGE UP (ref 0–41)
ANION GAP SERPL CALC-SCNC: 13 MMOL/L — SIGNIFICANT CHANGE UP (ref 7–14)
APTT BLD: 41.6 SEC — HIGH (ref 27–39.2)
AST SERPL-CCNC: 11 U/L — SIGNIFICANT CHANGE UP (ref 0–41)
BASOPHILS # BLD AUTO: 0.01 K/UL — SIGNIFICANT CHANGE UP (ref 0–0.2)
BASOPHILS NFR BLD AUTO: 0.2 % — SIGNIFICANT CHANGE UP (ref 0–1)
BILIRUB SERPL-MCNC: 1.4 MG/DL — HIGH (ref 0.2–1.2)
BUN SERPL-MCNC: 55 MG/DL — HIGH (ref 10–20)
CALCIUM SERPL-MCNC: 8.6 MG/DL — SIGNIFICANT CHANGE UP (ref 8.4–10.5)
CHLORIDE SERPL-SCNC: 99 MMOL/L — SIGNIFICANT CHANGE UP (ref 98–110)
CO2 SERPL-SCNC: 27 MMOL/L — SIGNIFICANT CHANGE UP (ref 17–32)
CREAT SERPL-MCNC: 1.2 MG/DL — SIGNIFICANT CHANGE UP (ref 0.7–1.5)
EGFR: 44 ML/MIN/1.73M2 — LOW
EOSINOPHIL # BLD AUTO: 0 K/UL — SIGNIFICANT CHANGE UP (ref 0–0.7)
EOSINOPHIL NFR BLD AUTO: 0 % — SIGNIFICANT CHANGE UP (ref 0–8)
GLUCOSE SERPL-MCNC: 139 MG/DL — HIGH (ref 70–99)
HCT VFR BLD CALC: 34.4 % — LOW (ref 37–47)
HGB BLD-MCNC: 10.6 G/DL — LOW (ref 12–16)
IMM GRANULOCYTES NFR BLD AUTO: 0.3 % — SIGNIFICANT CHANGE UP (ref 0.1–0.3)
INR BLD: 5.52 RATIO — CRITICAL HIGH (ref 0.65–1.3)
LEGIONELLA AG UR QL: NEGATIVE — SIGNIFICANT CHANGE UP
LYMPHOCYTES # BLD AUTO: 2.09 K/UL — SIGNIFICANT CHANGE UP (ref 1.2–3.4)
LYMPHOCYTES # BLD AUTO: 35.4 % — SIGNIFICANT CHANGE UP (ref 20.5–51.1)
MAGNESIUM SERPL-MCNC: 2.4 MG/DL — SIGNIFICANT CHANGE UP (ref 1.8–2.4)
MANUAL SMEAR VERIFICATION: SIGNIFICANT CHANGE UP
MCHC RBC-ENTMCNC: 26.6 PG — LOW (ref 27–31)
MCHC RBC-ENTMCNC: 30.8 G/DL — LOW (ref 32–37)
MCV RBC AUTO: 86.4 FL — SIGNIFICANT CHANGE UP (ref 81–99)
MONOCYTES # BLD AUTO: 0.27 K/UL — SIGNIFICANT CHANGE UP (ref 0.1–0.6)
MONOCYTES NFR BLD AUTO: 4.6 % — SIGNIFICANT CHANGE UP (ref 1.7–9.3)
NEUTROPHILS # BLD AUTO: 3.51 K/UL — SIGNIFICANT CHANGE UP (ref 1.4–6.5)
NEUTROPHILS NFR BLD AUTO: 59.5 % — SIGNIFICANT CHANGE UP (ref 42.2–75.2)
NRBC # BLD: 0 /100 WBCS — SIGNIFICANT CHANGE UP (ref 0–0)
PLAT MORPH BLD: NORMAL — SIGNIFICANT CHANGE UP
PLATELET # BLD AUTO: 155 K/UL — SIGNIFICANT CHANGE UP (ref 130–400)
PMV BLD: 12.6 FL — HIGH (ref 7.4–10.4)
POTASSIUM SERPL-MCNC: 4.1 MMOL/L — SIGNIFICANT CHANGE UP (ref 3.5–5)
POTASSIUM SERPL-SCNC: 4.1 MMOL/L — SIGNIFICANT CHANGE UP (ref 3.5–5)
PROCALCITONIN SERPL-MCNC: 6.19 NG/ML — HIGH (ref 0.02–0.1)
PROT SERPL-MCNC: 5.2 G/DL — LOW (ref 6–8)
PROTHROM AB SERPL-ACNC: >40 SEC — HIGH (ref 9.95–12.87)
RAPID RVP RESULT: SIGNIFICANT CHANGE UP
RBC # BLD: 3.98 M/UL — LOW (ref 4.2–5.4)
RBC # FLD: 18.9 % — HIGH (ref 11.5–14.5)
RBC BLD AUTO: NORMAL — SIGNIFICANT CHANGE UP
S PNEUM AG UR QL: NEGATIVE — SIGNIFICANT CHANGE UP
SARS-COV-2 RNA SPEC QL NAA+PROBE: SIGNIFICANT CHANGE UP
SODIUM SERPL-SCNC: 139 MMOL/L — SIGNIFICANT CHANGE UP (ref 135–146)
WBC # BLD: 5.9 K/UL — SIGNIFICANT CHANGE UP (ref 4.8–10.8)
WBC # FLD AUTO: 5.9 K/UL — SIGNIFICANT CHANGE UP (ref 4.8–10.8)

## 2024-10-04 PROCEDURE — 99222 1ST HOSP IP/OBS MODERATE 55: CPT

## 2024-10-04 PROCEDURE — 71045 X-RAY EXAM CHEST 1 VIEW: CPT | Mod: 26

## 2024-10-04 PROCEDURE — 99233 SBSQ HOSP IP/OBS HIGH 50: CPT

## 2024-10-04 RX ORDER — FUROSEMIDE 10 MG/ML
40 INJECTION INTRAVENOUS ONCE
Refills: 0 | Status: COMPLETED | OUTPATIENT
Start: 2024-10-04 | End: 2024-10-04

## 2024-10-04 RX ORDER — METOPROLOL TARTRATE 50 MG
25 TABLET ORAL EVERY 12 HOURS
Refills: 0 | Status: DISCONTINUED | OUTPATIENT
Start: 2024-10-04 | End: 2024-10-10

## 2024-10-04 RX ADMIN — Medication 25 MILLIGRAM(S): at 17:07

## 2024-10-04 RX ADMIN — METHYLPREDNISOLONE ACETATE 60 MILLIGRAM(S): 80 INJECTION, SUSPENSION INTRA-ARTICULAR; INTRALESIONAL; INTRAMUSCULAR; SOFT TISSUE at 05:17

## 2024-10-04 RX ADMIN — METHYLPREDNISOLONE ACETATE 60 MILLIGRAM(S): 80 INJECTION, SUSPENSION INTRA-ARTICULAR; INTRALESIONAL; INTRAMUSCULAR; SOFT TISSUE at 17:07

## 2024-10-04 RX ADMIN — Medication 100 MILLIGRAM(S): at 21:26

## 2024-10-04 RX ADMIN — FUROSEMIDE 40 MILLIGRAM(S): 10 INJECTION INTRAVENOUS at 09:15

## 2024-10-04 RX ADMIN — PANTOPRAZOLE SODIUM 40 MILLIGRAM(S): 40 TABLET, DELAYED RELEASE ORAL at 11:01

## 2024-10-04 RX ADMIN — AZITHROMYCIN 255 MILLIGRAM(S): 250 TABLET, FILM COATED ORAL at 21:26

## 2024-10-04 RX ADMIN — Medication 200 MILLIGRAM(S): at 11:02

## 2024-10-04 RX ADMIN — CHLORHEXIDINE GLUCONATE ORAL RINSE 1 APPLICATION(S): 1.2 SOLUTION DENTAL at 05:21

## 2024-10-04 NOTE — SWALLOW BEDSIDE ASSESSMENT ADULT - SWALLOW EVAL: DIAGNOSIS
+toleration observed w/o s/s of aspiration/penetration for puree, soft and bite, and thin liquids
+toleration observed w/o s/s of aspiration/penetration for soft and bite and thin liquids

## 2024-10-04 NOTE — PROGRESS NOTE ADULT - SUBJECTIVE AND OBJECTIVE BOX
Patient is a 84y old  Female who presents with a chief complaint of Sepsis     (03 Oct 2024 20:06)      INTERVAL HPI/OVERNIGHT EVENTS:   No overnight events   Afebrile, hemodynamically stable     ICU Vital Signs Last 24 Hrs  T(C): 35.8 (04 Oct 2024 07:11), Max: 36 (03 Oct 2024 15:00)  T(F): 96.5 (04 Oct 2024 07:11), Max: 96.8 (03 Oct 2024 15:00)  HR: 94 (04 Oct 2024 08:00) (77 - 112)  BP: 94/58 (04 Oct 2024 08:00) (83/55 - 104/65)  BP(mean): 71 (04 Oct 2024 08:00) (65 - 79)  ABP: --  ABP(mean): --  RR: 29 (04 Oct 2024 08:00) (16 - 32)  SpO2: 93% (04 Oct 2024 08:00) (86% - 97%)    O2 Parameters below as of 04 Oct 2024 08:00  Patient On (Oxygen Delivery Method): nasal cannula, 5L          I&O's Summary    03 Oct 2024 07:01  -  04 Oct 2024 07:00  --------------------------------------------------------  IN: 50 mL / OUT: 1050 mL / NET: -1000 mL          LABS:                        10.6   5.90  )-----------( 155      ( 04 Oct 2024 05:17 )             34.4     10-04    139  |  99  |  55[H]  ----------------------------<  139[H]  4.1   |  27  |  1.2    Ca    8.6      04 Oct 2024 05:17  Phos  4.3     10-03  Mg     2.4     10-04    TPro  5.2[L]  /  Alb  3.3[L]  /  TBili  1.4[H]  /  DBili  x   /  AST  11  /  ALT  <5  /  AlkPhos  65  10-04    PT/INR - ( 03 Oct 2024 05:20 )   PT: >40.00 sec;   INR: 5.32 ratio         PTT - ( 03 Oct 2024 05:20 )  PTT:45.8 sec  Urinalysis Basic - ( 04 Oct 2024 05:17 )    Color: x / Appearance: x / SG: x / pH: x  Gluc: 139 mg/dL / Ketone: x  / Bili: x / Urobili: x   Blood: x / Protein: x / Nitrite: x   Leuk Esterase: x / RBC: x / WBC x   Sq Epi: x / Non Sq Epi: x / Bacteria: x      CAPILLARY BLOOD GLUCOSE            RADIOLOGY & ADDITIONAL TESTS:    Consultant(s) Notes Reviewed:  [x ] YES  [ ] NO    MEDICATIONS  (STANDING):  allopurinol 200 milliGRAM(s) Oral daily  azithromycin  IVPB 500 milliGRAM(s) IV Intermittent every 24 hours  cefTRIAXone   IVPB 1000 milliGRAM(s) IV Intermittent every 24 hours  chlorhexidine 2% Cloths 1 Application(s) Topical <User Schedule>  methylPREDNISolone sodium succinate Injectable 60 milliGRAM(s) IV Push every 12 hours  metoprolol tartrate 25 milliGRAM(s) Oral every 12 hours  pantoprazole  Injectable 40 milliGRAM(s) IV Push daily    MEDICATIONS  (PRN):      PHYSICAL EXAM:  GENERAL:   HEAD:  Atraumatic, Normocephalic  EYES: EOMI, PERRLA, conjunctiva and sclera clear  NECK: Supple, No JVD, Normal thyroid, no enlarged nodes  NERVOUS SYSTEM:  Alert & Awake.   CHEST/LUNG: B/L good air entry; No rales, rhonchi, or wheezing  HEART: S1S2 normal, no S3, Regular rate and rhythm; No murmurs  ABDOMEN: Soft, Nontender, Nondistended; Bowel sounds present  EXTREMITIES:  2+ Peripheral Pulses, No clubbing, cyanosis, or edema  LYMPH: No lymphadenopathy noted  SKIN: No rashes or lesions    Care Discussed with Consultants/Other Providers [ x] YES  [ ] NO

## 2024-10-04 NOTE — PROGRESS NOTE ADULT - ASSESSMENT
IMPRESSION:  SOb   acute resp failure b/l infiltrate = fluid overload /pulmonary edema/ CHF possible underlying ILD   PNA still in diff diagnosis   rapid AFib   hx DVT/ PE   elevated INR   pulm htn   PLAN:    CNS: no sedation     HEENT:oral care     PULMONARY: taper oxygen to keep pox >92%   i reviewed previous cxr and ct scan she has chronic b/l groundglass opacity now worsen possible ILD   continue  solumedrol 60 mg Q12 hrs for now [place] can switch to Q24 hrs and taper   cxr [place]     CARDIOVASCULAR: keep IS < OS   rate controlled   on lopressors  as per cardiology   cardiology consult reviewed and case discussed   echo n  try lasix 40 mg once today   [place]   GI: GI prophylaxis.  Feeding     RENAL: follow is and os follow lytes   give lasix 40 mg   keep is < os     INFECTIOUS DISEASE:rocephine and azithromycin   send urine legionella and strep antigen   full RVP   nasal mrsa   ID consult   procal elevated   HEMATOLOGICAL:  DVT prophylaxis. hold coumadin follow INR daily when < 2.5 can resume   h/h     ENDOCRINE:  Follow up FS.  Insulin protocol if needed.    SDU         CRITICAL CARE TIME SPENT: ***     IMPRESSION:  SOb   acute resp failure b/l infiltrate = fluid overload /pulmonary edema/ CHF possible underlying ILD   PNA still in diff diagnosis   rapid AFib   hx DVT/ PE   elevated INR   pulm htn   PLAN:    CNS: no sedation     HEENT:oral care     PULMONARY: taper oxygen to keep pox >92%   i reviewed previous cxr and ct scan she has chronic b/l groundglass opacity now worsen possible ILD   continue  solumedrol 60 mg Q12 hrs for now [place] can switch to Q24 hrs and taper   cxr [place]     CARDIOVASCULAR: keep IS < OS   rate controlled   on lopressors  as per cardiology   cardiology consult reviewed and case discussed   echo n  try lasix 40 mg once today   [place]   GI: GI prophylaxis.  Feeding     RENAL: follow is and os follow lytes   give lasix 40 mg   keep is < os     INFECTIOUS DISEASE:rocephine and azithromycin   send urine legionella and strep antigen   full RVP   nasal mrsa   ID consult   procal elevated   HEMATOLOGICAL:  DVT prophylaxis. hold coumadin follow INR daily when < 2.5 can resume   h/h     ENDOCRINE:  Follow up FS.  Insulin protocol if needed.    can downgrade to tele if ok by cardiology         CRITICAL CARE TIME SPENT: ***     IMPRESSION:  SOb   acute resp failure b/l infiltrate = fluid overload /pulmonary edema/ CHF possible underlying ILD   PNA still in diff diagnosis   rapid AFib   hx DVT/ PE   elevated INR   pulm htn   PLAN:    CNS: no sedation     HEENT:oral care     PULMONARY: taper oxygen to keep pox >92%   i reviewed previous cxr and ct scan she has chronic b/l groundglass opacity now worsen possible ILD   continue  solumedrol 60 mg Q12 hrs for now [place] can switch to Q24 hrs and taper   cxr [place]     CARDIOVASCULAR: keep IS < OS   rate controlled   on lopressors  as per cardiology   cardiology consult reviewed and case discussed   echo note reviewed   try lasix 40 mg once today   [place]   GI: GI prophylaxis.  Feeding     RENAL: follow is and os follow lytes   give lasix 40 mg   keep is < os     INFECTIOUS DISEASE:rocephine and azithromycin   send urine legionella and strep antigen   full RVP   nasal mrsa   ID consult   procal elevated   HEMATOLOGICAL:  DVT prophylaxis. hold coumadin follow INR daily when < 2.5 can resume   h/h     ENDOCRINE:  Follow up FS.  Insulin protocol if needed.    can downgrade to tele if ok by cardiology         CRITICAL CARE TIME SPENT: ***

## 2024-10-04 NOTE — SWALLOW BEDSIDE ASSESSMENT ADULT - NS SPL SWALLOW CLINIC TRIAL FT
+toleration observed w/o s/s of aspiration/penetration
+toleration observed w/o s/s of aspiration/penetration

## 2024-10-04 NOTE — SWALLOW BEDSIDE ASSESSMENT ADULT - SLP PERTINENT HISTORY OF CURRENT PROBLEM
85 y/o F PMH of + prothrombin gene mutation, h/o DVT/PE,  on Coumadin Diastolic CHF- on HCTZ, HTN,  Afib/flutter cholecystectomy cb pancreatitis , BIBEMS for eval of SOB. Patient reports shortness of breath began 1 week ago and has steadily progressed worse with laying flat + exertion. A/w non-productive cough. States that she has been having generalized weakness over the past 5-7 days. acute resp failure b/l infiltrate = fluid overload /pulmonary edema/ CHF possible underlying ILD
85 y/o F PMH of + prothrombin gene mutation, h/o DVT/PE,  on Coumadin Diastolic CHF- on HCTZ, HTN,  Afib/flutter cholecystectomy cb pancreatitis , BIBEMS for eval of SOB. Patient reports shortness of breath began 1 week ago and has steadily progressed worse with laying flat + exertion. A/w non-productive cough. States that she has been having generalized weakness over the past 5-7 days. acute resp failure b/l infiltrate = fluid overload /pulmonary edema/ CHF possible underlying ILD

## 2024-10-04 NOTE — SWALLOW BEDSIDE ASSESSMENT ADULT - SLP GENERAL OBSERVATIONS
Pt received awake, alert, 5L O2 via NC, AOx4, hoarse vocal quality, NAD.
Pt received awake, alert, 5L O2 via NC, AOx4, hoarse vocal quality, NAD.

## 2024-10-04 NOTE — PROGRESS NOTE ADULT - ATTENDING COMMENTS
pt seen and examined in icu - discussed with pulm - cc team  #acute hypoxic resp failure  -possible gnr pna - cont iv abxs  - ground glass opacities - started on iv steroids for possible ILD  - acute on chronic diastolic chf, pulm htn , valvular dysfuntion - cont lasix   - discussed with Dr palm - no need for PE study - due to CXR opacities and inr supratherapeutic  - on nc - wean down as possible    #Chronic afib with rvr - rate controlled now, inr still elevated   #hx of prothrombin mutation /dvt/pe - coumadin life long - goal 2.5-3.5 per pt, monitor inr daily  still supratherapeutic , no active bleeding  DNR/I from prior molst - pt confirms

## 2024-10-04 NOTE — SWALLOW BEDSIDE ASSESSMENT ADULT - CONSISTENCIES ADMINISTERED
Min intake 2' c/o nausea/thin liquid/pureed/soft & bite-sized/slightly thick liquid (Pediatrics only)
thin liquid/soft & bite-sized

## 2024-10-04 NOTE — GOALS OF CARE CONVERSATION - ADVANCED CARE PLANNING - DECISION MAKER
Problem: Fatigue  Goal: Improved Activity Tolerance  Outcome: Progressing  Intervention: Promote Improved Energy  Flowsheets (Taken 9/12/2024 1402)  Fatigue Management: frequent rest breaks encouraged  Sleep/Rest Enhancement: regular sleep/rest pattern promoted  Activity Management: Ambulated -L4  Environmental Support: rest periods encouraged      New onset , with mild RVR , with normal ventricular systolic function. Patient now in sinus rhythm off tele continue metoprolol, eliquis low dose given age & CASSI. Patient

## 2024-10-04 NOTE — GOALS OF CARE CONVERSATION - ADVANCED CARE PLANNING - CONVERSATION DETAILS
Discussed pt's current condition and prognosis. Discussed the options on MOLST  Pt says she had already signed a DNR/DNI before, but since no chart available will do it again    in view of advanced age, pt says she does not want to go through CPR and intubation and would like to be DNR / DNI    she is AAx 3 at encounter, pleasant  signed DNR/DNI

## 2024-10-04 NOTE — PROGRESS NOTE ADULT - ASSESSMENT
85 y/o F PMH Afib/flutter, DVT/PE on Coumadin, HFpEF, HTN, of + prothrombin gene mutation, CCY cb pancreatitis, recent sick contacts BIBEMS for eval of SOB.     #Acute hypoxemic respiratory failure   #Acute HFpEF exacerbation   #possible pna   #SOB   #Pulmonary HTN   #Afib with RVR- on Coumadin   #HO HTN   - CXR stable. O2 sat stable.   - BNP 5k. Procal 6.19  - TTE: EF 61%, enlarged R hrt side. Severe TR. severe pulm HTN   - pulm HTN mostly 2/2 to chronic PEs   - Follows up with Cards Dr Chandler  - continue abx   - One dose of 40 lasix again today    #HO of PE/DVT   #prothrombin gene mutation   - Holding warfarin now given high INR   - FU INR today, if <2.5 will resume warfarin.

## 2024-10-04 NOTE — PROGRESS NOTE ADULT - SUBJECTIVE AND OBJECTIVE BOX
Patient is a 84y old  Female who presents with a chief complaint of Sepsis     (03 Oct 2024 20:06)      Over Night Events:  Patient seen and examined.   feel better   on NC   no pressors     ROS:  See HPI    PHYSICAL EXAM    ICU Vital Signs Last 24 Hrs  T(C): 35.8 (04 Oct 2024 07:11), Max: 36 (03 Oct 2024 15:00)  T(F): 96.5 (04 Oct 2024 07:11), Max: 96.8 (03 Oct 2024 15:00)  HR: 85 (04 Oct 2024 05:15) (85 - 113)  BP: 93/58 (04 Oct 2024 05:15) (83/55 - 104/65)  BP(mean): 70 (04 Oct 2024 05:15) (65 - 79)  ABP: --  ABP(mean): --  RR: 27 (04 Oct 2024 07:11) (16 - 37)  SpO2: 97% (04 Oct 2024 05:15) (86% - 97%)    O2 Parameters below as of 04 Oct 2024 05:15  Patient On (Oxygen Delivery Method): nasal cannula  O2 Flow (L/min): 5          General:awake   HEENT:      lo          Lymph Nodes: NO cervical LN   Lungs: Bilateral BS  Cardiovascular: Regular   Abdomen: Soft, Positive BS  Extremities: No clubbing   Skin: warm   Neurological: no focal deficit   Musculoskeletal: move all ext     I&O's Detail    03 Oct 2024 07:01  -  04 Oct 2024 07:00  --------------------------------------------------------  IN:    IV PiggyBack: 50 mL  Total IN: 50 mL    OUT:    Voided (mL): 1050 mL  Total OUT: 1050 mL    Total NET: -1000 mL          LABS:                          10.6   5.90  )-----------( 155      ( 04 Oct 2024 05:17 )             34.4         04 Oct 2024 05:17    139    |  99     |  55     ----------------------------<  139    4.1     |  27     |  1.2      Ca    8.6        04 Oct 2024 05:17  Phos  4.3       03 Oct 2024 05:20  Mg     2.4       04 Oct 2024 05:17    TPro  5.2    /  Alb  3.3    /  TBili  1.4    /  DBili  x      /  AST  11     /  ALT  <5     /  AlkPhos  65     04 Oct 2024 05:17  Amylase x     lipase x                                                 PT/INR - ( 03 Oct 2024 05:20 )   PT: >40.00 sec;   INR: 5.32 ratio         PTT - ( 03 Oct 2024 05:20 )  PTT:45.8 sec                                       Urinalysis Basic - ( 04 Oct 2024 05:17 )    Color: x / Appearance: x / SG: x / pH: x  Gluc: 139 mg/dL / Ketone: x  / Bili: x / Urobili: x   Blood: x / Protein: x / Nitrite: x   Leuk Esterase: x / RBC: x / WBC x   Sq Epi: x / Non Sq Epi: x / Bacteria: x        Lactate, Blood: 2.0 mmol/L (10-03-24 @ 01:55)  Lactate, Blood: 3.8 mmol/L (10-02-24 @ 22:42)                                                                                                                                               MEDICATIONS  (STANDING):  allopurinol 200 milliGRAM(s) Oral daily  azithromycin  IVPB 500 milliGRAM(s) IV Intermittent every 24 hours  cefTRIAXone   IVPB 1000 milliGRAM(s) IV Intermittent every 24 hours  chlorhexidine 2% Cloths 1 Application(s) Topical <User Schedule>  methylPREDNISolone sodium succinate Injectable 60 milliGRAM(s) IV Push every 12 hours  metoprolol tartrate 25 milliGRAM(s) Oral every 6 hours  pantoprazole  Injectable 40 milliGRAM(s) IV Push daily    MEDICATIONS  (PRN):          Xrays:   reviewed by me mild decrease in b/l opacity                                                                                ECHO:  [facility] ICU:

## 2024-10-05 LAB
ALBUMIN SERPL ELPH-MCNC: 3.3 G/DL — LOW (ref 3.5–5.2)
ALP SERPL-CCNC: 63 U/L — SIGNIFICANT CHANGE UP (ref 30–115)
ALT FLD-CCNC: 6 U/L — SIGNIFICANT CHANGE UP (ref 0–41)
ANION GAP SERPL CALC-SCNC: 14 MMOL/L — SIGNIFICANT CHANGE UP (ref 7–14)
APTT BLD: 37.6 SEC — SIGNIFICANT CHANGE UP (ref 27–39.2)
AST SERPL-CCNC: 14 U/L — SIGNIFICANT CHANGE UP (ref 0–41)
BASOPHILS # BLD AUTO: 0.02 K/UL — SIGNIFICANT CHANGE UP (ref 0–0.2)
BASOPHILS NFR BLD AUTO: 0.2 % — SIGNIFICANT CHANGE UP (ref 0–1)
BILIRUB SERPL-MCNC: 1.3 MG/DL — HIGH (ref 0.2–1.2)
BUN SERPL-MCNC: 66 MG/DL — CRITICAL HIGH (ref 10–20)
CALCIUM SERPL-MCNC: 8.7 MG/DL — SIGNIFICANT CHANGE UP (ref 8.4–10.5)
CHLORIDE SERPL-SCNC: 99 MMOL/L — SIGNIFICANT CHANGE UP (ref 98–110)
CO2 SERPL-SCNC: 26 MMOL/L — SIGNIFICANT CHANGE UP (ref 17–32)
CREAT SERPL-MCNC: 1.1 MG/DL — SIGNIFICANT CHANGE UP (ref 0.7–1.5)
EGFR: 49 ML/MIN/1.73M2 — LOW
EOSINOPHIL # BLD AUTO: 0 K/UL — SIGNIFICANT CHANGE UP (ref 0–0.7)
EOSINOPHIL NFR BLD AUTO: 0 % — SIGNIFICANT CHANGE UP (ref 0–8)
GLUCOSE SERPL-MCNC: 136 MG/DL — HIGH (ref 70–99)
HCT VFR BLD CALC: 35.2 % — LOW (ref 37–47)
HGB BLD-MCNC: 11 G/DL — LOW (ref 12–16)
IMM GRANULOCYTES NFR BLD AUTO: 1 % — HIGH (ref 0.1–0.3)
INR BLD: 5.38 RATIO — CRITICAL HIGH (ref 0.65–1.3)
LYMPHOCYTES # BLD AUTO: 2.72 K/UL — SIGNIFICANT CHANGE UP (ref 1.2–3.4)
LYMPHOCYTES # BLD AUTO: 24.4 % — SIGNIFICANT CHANGE UP (ref 20.5–51.1)
MAGNESIUM SERPL-MCNC: 2.2 MG/DL — SIGNIFICANT CHANGE UP (ref 1.8–2.4)
MCHC RBC-ENTMCNC: 26.7 PG — LOW (ref 27–31)
MCHC RBC-ENTMCNC: 31.3 G/DL — LOW (ref 32–37)
MCV RBC AUTO: 85.4 FL — SIGNIFICANT CHANGE UP (ref 81–99)
MONOCYTES # BLD AUTO: 0.32 K/UL — SIGNIFICANT CHANGE UP (ref 0.1–0.6)
MONOCYTES NFR BLD AUTO: 2.9 % — SIGNIFICANT CHANGE UP (ref 1.7–9.3)
NEUTROPHILS # BLD AUTO: 7.99 K/UL — HIGH (ref 1.4–6.5)
NEUTROPHILS NFR BLD AUTO: 71.5 % — SIGNIFICANT CHANGE UP (ref 42.2–75.2)
NRBC # BLD: 0 /100 WBCS — SIGNIFICANT CHANGE UP (ref 0–0)
PLATELET # BLD AUTO: 242 K/UL — SIGNIFICANT CHANGE UP (ref 130–400)
PMV BLD: 12.4 FL — HIGH (ref 7.4–10.4)
POTASSIUM SERPL-MCNC: 3.7 MMOL/L — SIGNIFICANT CHANGE UP (ref 3.5–5)
POTASSIUM SERPL-SCNC: 3.7 MMOL/L — SIGNIFICANT CHANGE UP (ref 3.5–5)
PROT SERPL-MCNC: 5.5 G/DL — LOW (ref 6–8)
PROTHROM AB SERPL-ACNC: >40 SEC — HIGH (ref 9.95–12.87)
RBC # BLD: 4.12 M/UL — LOW (ref 4.2–5.4)
RBC # FLD: 18.8 % — HIGH (ref 11.5–14.5)
SODIUM SERPL-SCNC: 139 MMOL/L — SIGNIFICANT CHANGE UP (ref 135–146)
WBC # BLD: 11.16 K/UL — HIGH (ref 4.8–10.8)
WBC # FLD AUTO: 11.16 K/UL — HIGH (ref 4.8–10.8)

## 2024-10-05 PROCEDURE — 99233 SBSQ HOSP IP/OBS HIGH 50: CPT

## 2024-10-05 RX ORDER — PANTOPRAZOLE SODIUM 40 MG/1
40 TABLET, DELAYED RELEASE ORAL
Refills: 0 | Status: DISCONTINUED | OUTPATIENT
Start: 2024-10-05 | End: 2024-10-10

## 2024-10-05 RX ADMIN — METHYLPREDNISOLONE ACETATE 60 MILLIGRAM(S): 80 INJECTION, SUSPENSION INTRA-ARTICULAR; INTRALESIONAL; INTRAMUSCULAR; SOFT TISSUE at 05:42

## 2024-10-05 RX ADMIN — Medication 200 MILLIGRAM(S): at 12:07

## 2024-10-05 RX ADMIN — CHLORHEXIDINE GLUCONATE ORAL RINSE 1 APPLICATION(S): 1.2 SOLUTION DENTAL at 05:48

## 2024-10-05 RX ADMIN — Medication 25 MILLIGRAM(S): at 18:00

## 2024-10-05 RX ADMIN — Medication 25 MILLIGRAM(S): at 05:41

## 2024-10-05 RX ADMIN — METHYLPREDNISOLONE ACETATE 60 MILLIGRAM(S): 80 INJECTION, SUSPENSION INTRA-ARTICULAR; INTRALESIONAL; INTRAMUSCULAR; SOFT TISSUE at 17:59

## 2024-10-05 RX ADMIN — Medication 100 MILLIGRAM(S): at 19:49

## 2024-10-05 RX ADMIN — AZITHROMYCIN 255 MILLIGRAM(S): 250 TABLET, FILM COATED ORAL at 19:49

## 2024-10-05 NOTE — PROGRESS NOTE ADULT - ASSESSMENT
#acute hypoxic resp failure  -possible gnr pna - cont iv abxs  - ground glass opacities - started on iv steroids for possible ILD  - acute on chronic diastolic chf, pulm htn , valvular dysfuntion - cont lasix   - team discussed with pulm - no need for PE study - due to CXR opacities and inr supratherapeutic  - on nc - wean down as possible    #Chronic afib with rvr - rate controlled now, inr still elevated , monitor off coumadin     #hx of prothrombin mutation /dvt/pe - coumadin life long - goal 2.5-3.5 per pt, monitor inr daily  still supratherapeutic , no active bleeding    DNR/I from prior [facility]

## 2024-10-05 NOTE — CONSULT NOTE ADULT - SUBJECTIVE AND OBJECTIVE BOX
Podiatry Consult Note    Subjective:    JOEY KNAPP is a 84y year old Female seen at bedside with a chief complaint of  painful thickened, dystrophic, ingrowing and long toenails digits 1-5 bilaterally  and preventative foot examination. Patient is medically managed  by Medicine/Hospitalists.  Patient denies any history of trauma to both feet. Patient has no other pedal complaints.  Patient is experiencing pain while standing, walking and in shoe gear.     PMH: H/O prothrombin mutation    Pulmonary embolism    HTN (hypertension)    Anemia requiring transfusions    Deep vein thrombosis (DVT)     PAST MEDICAL & SURGICAL HISTORY:  H/O prothrombin mutation      Pulmonary embolism      HTN (hypertension)      Anemia requiring transfusions      Deep vein thrombosis (DVT)      S/P tonsillectomy      H/O: hysterectomy      History of cholecystectomy        PSH:S/P tonsillectomy    H/O: hysterectomy    History of cholecystectomy      Allergies:penicillin (Unknown)      Labs:                        11.0   11.16 )-----------( 242      ( 05 Oct 2024 05:52 )             35.2       WBC Trend  11.16[H] Date (10-05 @ 05:52)  5.90 Date (10-04 @ 05:17)  8.44 Date (10-03 @ 05:20)  10.16 Date (10-02 @ 22:42)      Chem  10-05    139  |  99  |  66[HH]  ----------------------------<  136[H]  3.7   |  26  |  1.1    Ca    8.7      05 Oct 2024 05:52  Mg     2.2     10-05    TPro  5.5[L]  /  Alb  3.3[L]  /  TBili  1.3[H]  /  DBili  x   /  AST  14  /  ALT  6   /  AlkPhos  63  10-05      T(F): 97 (10-05-24 @ 07:30), Max: 97.3 (10-04-24 @ 15:01)  HR: 84 (10-05-24 @ 07:56) (84 - 98)  BP: 97/71 (10-05-24 @ 07:56) (93/55 - 110/66)  RR: 31 (10-05-24 @ 07:56) (17 - 33)  SpO2: 96% (10-05-24 @ 07:56) (94% - 97%)  Wt(kg): --    Objective:   DERM:  Skin warm, dry and supple bilateral.  No open lesions or inter-digital macerations noted bilateral.   Toenails 1-5 Right and Left feet thickened, elongated, discolored, and dystrophic with subungual debris. There is pain upon palpation of all fungal and ingrowing nails 1-5 bilaterally.   VASC: Dorsalis Pedis and Posterior Tibial pulses 2/4.  Capillary re-fill time less than 3 seconds digits 1-5 bilateral.   NEURO: Protective sensation intact to the level of the digits bilateral.  MSK: Muscle strength 5/5 all major muscle groups bilateral. No structural abnormality, bilaterally    Assessment:   Bilateral dystrophic toenails consistent with  Onychomycosis.   Pain from Elongated nails, ingrowing, incurvated,  and dystrophic nails upon palpation of nails.  Xerosis of bilateral plantar feet.     Plan:   Discussed diagnosis and treatment with patient  Aseptic debridement and curettage of all fungal and ingrowing nails 1-5 bilateral with sterile nail nipper.  Discussed importance of daily foot examinations, drying of feet after bathing and proper shoe gear.  Patient Would Benefit From Periodic Debridements; Can Follow Up as Outpatient w/  Post Discharge   Discussed Plan w/ Dr. Jorge Jefferson;

## 2024-10-05 NOTE — PROGRESS NOTE ADULT - SUBJECTIVE AND OBJECTIVE BOX
SUBJECTIVE:    Patient is a 84y old Female who presents with a chief complaint of Sepsis     (03 Oct 2024 20:06)    Currently admitted to medicine with the primary diagnosis of Sepsis       Today is hospital day 2d. This morning she is resting comfortably in bed and reports no new issues or overnight events.     PAST MEDICAL & SURGICAL HISTORY  H/O prothrombin mutation    Pulmonary embolism    HTN (hypertension)    Anemia requiring transfusions    Deep vein thrombosis (DVT)    S/P tonsillectomy    H/O: hysterectomy    History of cholecystectomy      SOCIAL HISTORY:  Negative for smoking/alcohol/drug use.     ALLERGIES:  penicillin (Unknown)    MEDICATIONS:  STANDING MEDICATIONS  allopurinol 200 milliGRAM(s) Oral daily  azithromycin  IVPB 500 milliGRAM(s) IV Intermittent every 24 hours  cefTRIAXone   IVPB 1000 milliGRAM(s) IV Intermittent every 24 hours  chlorhexidine 2% Cloths 1 Application(s) Topical <User Schedule>  methylPREDNISolone sodium succinate Injectable 60 milliGRAM(s) IV Push every 12 hours  metoprolol tartrate 25 milliGRAM(s) Oral every 12 hours  pantoprazole  Injectable 40 milliGRAM(s) IV Push daily    PRN MEDICATIONS    VITALS:   T(F): 97  HR: 84  BP: 97/71  RR: 31  SpO2: 96%    LABS:                        11.0   11.16 )-----------( 242      ( 05 Oct 2024 05:52 )             35.2     10-05    139  |  99  |  66[HH]  ----------------------------<  136[H]  3.7   |  26  |  1.1    Ca    8.7      05 Oct 2024 05:52  Mg     2.2     10-05    TPro  5.5[L]  /  Alb  3.3[L]  /  TBili  1.3[H]  /  DBili  x   /  AST  14  /  ALT  6   /  AlkPhos  63  10-05    PT/INR - ( 04 Oct 2024 11:22 )   PT: >40.00 sec;   INR: 5.52 ratio         PTT - ( 04 Oct 2024 11:22 )  PTT:41.6 sec  Urinalysis Basic - ( 05 Oct 2024 05:52 )    Color: x / Appearance: x / SG: x / pH: x  Gluc: 136 mg/dL / Ketone: x  / Bili: x / Urobili: x   Blood: x / Protein: x / Nitrite: x   Leuk Esterase: x / RBC: x / WBC x   Sq Epi: x / Non Sq Epi: x / Bacteria: x            Culture - Blood (collected 02 Oct 2024 22:42)  Source: .Blood BLOOD  Preliminary Report (04 Oct 2024 17:01):    No growth at 24 hours    Culture - Blood (collected 02 Oct 2024 22:42)  Source: .Blood BLOOD  Preliminary Report (04 Oct 2024 17:01):    No growth at 24 hours    < from: Xray Chest 1 View- PORTABLE-Routine (10.04.24 @ 06:26) >    Impression:    Redemonstration bilateral opacities/nodules, left calcified granulomata,   hiatal hernia      < end of copied text >         PHYSICAL EXAM:  GEN: No acute distress, on NC   LUNGS: chest rise noted   HEART: S1/S2 present. RRR.   ABD: Soft, non-tender, non-distended. Bowel sounds present  EXT: No LE edema   NEURO: AAOX3

## 2024-10-06 LAB
ALBUMIN SERPL ELPH-MCNC: 3.2 G/DL — LOW (ref 3.5–5.2)
ALP SERPL-CCNC: 66 U/L — SIGNIFICANT CHANGE UP (ref 30–115)
ALT FLD-CCNC: 13 U/L — SIGNIFICANT CHANGE UP (ref 0–41)
ANION GAP SERPL CALC-SCNC: 9 MMOL/L — SIGNIFICANT CHANGE UP (ref 7–14)
APTT BLD: 44 SEC — HIGH (ref 27–39.2)
AST SERPL-CCNC: 20 U/L — SIGNIFICANT CHANGE UP (ref 0–41)
BASOPHILS # BLD AUTO: 0.05 K/UL — SIGNIFICANT CHANGE UP (ref 0–0.2)
BASOPHILS NFR BLD AUTO: 0.4 % — SIGNIFICANT CHANGE UP (ref 0–1)
BILIRUB SERPL-MCNC: 1.1 MG/DL — SIGNIFICANT CHANGE UP (ref 0.2–1.2)
BUN SERPL-MCNC: 59 MG/DL — HIGH (ref 10–20)
CALCIUM SERPL-MCNC: 9 MG/DL — SIGNIFICANT CHANGE UP (ref 8.4–10.5)
CHLORIDE SERPL-SCNC: 98 MMOL/L — SIGNIFICANT CHANGE UP (ref 98–110)
CO2 SERPL-SCNC: 29 MMOL/L — SIGNIFICANT CHANGE UP (ref 17–32)
CREAT SERPL-MCNC: 1 MG/DL — SIGNIFICANT CHANGE UP (ref 0.7–1.5)
EGFR: 55 ML/MIN/1.73M2 — LOW
EOSINOPHIL # BLD AUTO: 0 K/UL — SIGNIFICANT CHANGE UP (ref 0–0.7)
EOSINOPHIL NFR BLD AUTO: 0 % — SIGNIFICANT CHANGE UP (ref 0–8)
GLUCOSE SERPL-MCNC: 131 MG/DL — HIGH (ref 70–99)
HCT VFR BLD CALC: 38.6 % — SIGNIFICANT CHANGE UP (ref 37–47)
HGB BLD-MCNC: 11.8 G/DL — LOW (ref 12–16)
IMM GRANULOCYTES NFR BLD AUTO: 2 % — HIGH (ref 0.1–0.3)
INR BLD: 5.98 RATIO — CRITICAL HIGH (ref 0.65–1.3)
LYMPHOCYTES # BLD AUTO: 26.1 % — SIGNIFICANT CHANGE UP (ref 20.5–51.1)
LYMPHOCYTES # BLD AUTO: 3.32 K/UL — SIGNIFICANT CHANGE UP (ref 1.2–3.4)
MAGNESIUM SERPL-MCNC: 2.3 MG/DL — SIGNIFICANT CHANGE UP (ref 1.8–2.4)
MCHC RBC-ENTMCNC: 26.9 PG — LOW (ref 27–31)
MCHC RBC-ENTMCNC: 30.6 G/DL — LOW (ref 32–37)
MCV RBC AUTO: 87.9 FL — SIGNIFICANT CHANGE UP (ref 81–99)
MONOCYTES # BLD AUTO: 0.36 K/UL — SIGNIFICANT CHANGE UP (ref 0.1–0.6)
MONOCYTES NFR BLD AUTO: 2.8 % — SIGNIFICANT CHANGE UP (ref 1.7–9.3)
NEUTROPHILS # BLD AUTO: 8.72 K/UL — HIGH (ref 1.4–6.5)
NEUTROPHILS NFR BLD AUTO: 68.7 % — SIGNIFICANT CHANGE UP (ref 42.2–75.2)
NRBC # BLD: 0 /100 WBCS — SIGNIFICANT CHANGE UP (ref 0–0)
PLATELET # BLD AUTO: 270 K/UL — SIGNIFICANT CHANGE UP (ref 130–400)
PMV BLD: 12 FL — HIGH (ref 7.4–10.4)
POTASSIUM SERPL-MCNC: 4 MMOL/L — SIGNIFICANT CHANGE UP (ref 3.5–5)
POTASSIUM SERPL-SCNC: 4 MMOL/L — SIGNIFICANT CHANGE UP (ref 3.5–5)
PROT SERPL-MCNC: 5.3 G/DL — LOW (ref 6–8)
PROTHROM AB SERPL-ACNC: >40 SEC — HIGH (ref 9.95–12.87)
RBC # BLD: 4.39 M/UL — SIGNIFICANT CHANGE UP (ref 4.2–5.4)
RBC # FLD: 19 % — HIGH (ref 11.5–14.5)
SODIUM SERPL-SCNC: 136 MMOL/L — SIGNIFICANT CHANGE UP (ref 135–146)
WBC # BLD: 12.71 K/UL — HIGH (ref 4.8–10.8)
WBC # FLD AUTO: 12.71 K/UL — HIGH (ref 4.8–10.8)

## 2024-10-06 PROCEDURE — 99232 SBSQ HOSP IP/OBS MODERATE 35: CPT

## 2024-10-06 RX ADMIN — AZITHROMYCIN 255 MILLIGRAM(S): 250 TABLET, FILM COATED ORAL at 20:05

## 2024-10-06 RX ADMIN — Medication 100 MILLIGRAM(S): at 20:05

## 2024-10-06 RX ADMIN — PANTOPRAZOLE SODIUM 40 MILLIGRAM(S): 40 TABLET, DELAYED RELEASE ORAL at 05:28

## 2024-10-06 RX ADMIN — Medication 200 MILLIGRAM(S): at 12:30

## 2024-10-06 RX ADMIN — Medication 25 MILLIGRAM(S): at 18:20

## 2024-10-06 RX ADMIN — Medication 25 MILLIGRAM(S): at 05:31

## 2024-10-06 RX ADMIN — CHLORHEXIDINE GLUCONATE ORAL RINSE 1 APPLICATION(S): 1.2 SOLUTION DENTAL at 05:50

## 2024-10-06 RX ADMIN — METHYLPREDNISOLONE ACETATE 60 MILLIGRAM(S): 80 INJECTION, SUSPENSION INTRA-ARTICULAR; INTRALESIONAL; INTRAMUSCULAR; SOFT TISSUE at 18:20

## 2024-10-06 RX ADMIN — METHYLPREDNISOLONE ACETATE 60 MILLIGRAM(S): 80 INJECTION, SUSPENSION INTRA-ARTICULAR; INTRALESIONAL; INTRAMUSCULAR; SOFT TISSUE at 05:28

## 2024-10-06 NOTE — PHYSICAL THERAPY INITIAL EVALUATION ADULT - ASSISTIVE DEVICE:SIT/SUPINE, REHAB EVAL
How Many Skin Cancers Have You Had?: one What Is The Reason For Today's Visit?: Follow Up Non-Melanoma Skin Cancer When Was Your Last Cancer Diagnosed?: 3 years bed rails

## 2024-10-06 NOTE — PHYSICAL THERAPY INITIAL EVALUATION ADULT - TRANSFER SAFETY CONCERNS NOTED: SIT/STAND, REHAB EVAL
decreased balance during turns/decreased proprioception/decreased step length/stepping too close to front of assistive device

## 2024-10-06 NOTE — PHYSICAL THERAPY INITIAL EVALUATION ADULT - PERTINENT HX OF CURRENT PROBLEM, REHAB EVAL
83 y/o F PMH of + prothrombin gene mutation, h/o DVT/PE,  on Coumadin Diastolic CHF- on HCTZ, HTN,  Afib/flutter cholecystectomy cb pancreatitis , BIBEMS for eval of SOB. Patient reports shortness of breath began 1 week ago and has steadily progressed worse with laying flat + exertion. A/w non-productive cough. States that she has been having generalized weakness over the past 5-7 days. Visited pcp 2 days ago, started on Azithromycin PO.   Denies chest pain, fever, chills, cough, N/V/D, dizziness, weakness, and any other acute complaints at this time.

## 2024-10-06 NOTE — PHYSICAL THERAPY INITIAL EVALUATION ADULT - GENERAL OBSERVATIONS, REHAB EVAL
9:00-9:26, chart thoroughly reviewed, pt ok to be seen for PT as per LAUREEN Tejeda, pt in agreement with PT.  Pt received in bed +primafit, +O2, +heplock, +tele.  Pt left in bed, all lines in tact, RN April present at bedside reinserting primafit, LAUREEN Tejeda notified at end of session

## 2024-10-06 NOTE — PROGRESS NOTE ADULT - SUBJECTIVE AND OBJECTIVE BOX
SUBJECTIVE:    Patient is a 84y old Female who presents with a chief complaint of Sepsis     (03 Oct 2024 20:06)    Currently admitted to medicine with the primary diagnosis of Sepsis       Today is hospital day 3d. This morning she is resting comfortably in bed and reports no new issues or overnight events.     PAST MEDICAL & SURGICAL HISTORY  H/O prothrombin mutation    Pulmonary embolism    HTN (hypertension)    Anemia requiring transfusions    Deep vein thrombosis (DVT)    S/P tonsillectomy    H/O: hysterectomy    History of cholecystectomy      SOCIAL HISTORY:  Negative for smoking/alcohol/drug use.     ALLERGIES:  penicillin (Unknown)    MEDICATIONS:  STANDING MEDICATIONS  allopurinol 200 milliGRAM(s) Oral daily  azithromycin  IVPB 500 milliGRAM(s) IV Intermittent every 24 hours  cefTRIAXone   IVPB 1000 milliGRAM(s) IV Intermittent every 24 hours  chlorhexidine 2% Cloths 1 Application(s) Topical <User Schedule>  methylPREDNISolone sodium succinate Injectable 60 milliGRAM(s) IV Push every 12 hours  metoprolol tartrate 25 milliGRAM(s) Oral every 12 hours  pantoprazole    Tablet 40 milliGRAM(s) Oral before breakfast    PRN MEDICATIONS    VITALS:   T(F): 96.7  HR: 83  BP: 108/76  RR: 15  SpO2: 91%    LABS:                        11.8   12.71 )-----------( 270      ( 06 Oct 2024 05:53 )             38.6     10-06    136  |  98  |  59[H]  ----------------------------<  131[H]  4.0   |  29  |  1.0    Ca    9.0      06 Oct 2024 05:53  Mg     2.3     10-06    TPro  5.3[L]  /  Alb  3.2[L]  /  TBili  1.1  /  DBili  x   /  AST  20  /  ALT  13  /  AlkPhos  66  10-06    PT/INR - ( 06 Oct 2024 05:53 )   PT: >40.00 sec;   INR: 5.98 ratio         PTT - ( 06 Oct 2024 05:53 )  PTT:44.0 sec  Urinalysis Basic - ( 06 Oct 2024 05:53 )    Color: x / Appearance: x / SG: x / pH: x  Gluc: 131 mg/dL / Ketone: x  / Bili: x / Urobili: x   Blood: x / Protein: x / Nitrite: x   Leuk Esterase: x / RBC: x / WBC x   Sq Epi: x / Non Sq Epi: x / Bacteria: x          PHYSICAL EXAM:  GEN: No acute distress, on NC   LUNGS: chest rise noted   HEART: S1/S2 present. RRR.   ABD: Soft, non-tender, non-distended. Bowel sounds present  EXT: No LE edema   NEURO: AAOX3

## 2024-10-06 NOTE — PHYSICAL THERAPY INITIAL EVALUATION ADULT - ADDITIONAL COMMENTS
Patient states PTA she lives in a private home with her daughter and son in law.  Pt states there is 1 step to enter the house without a handrail, but she thinks they will be installing one soon.  Pt states inside the house there are no stairs.  Pt states she used a RW at home and was managing with ADLs.

## 2024-10-06 NOTE — PHYSICAL THERAPY INITIAL EVALUATION ADULT - PREDICTED DURATION OF THERAPY (DAYS/WKS), PT EVAL
Mom calls and leaves a voicemail stating pt is having issues with taking her medication. Mom thinks she needs a chewable or a patch. She has a sensitive gag and swallowing issues. Returned call. Mom states pt has started Adderall this last week. Mom cannot get her to swallow the Adderall. They tried having her try it will jello. They ended up opening the capsule and sprinkling it, but she did not take all of it. Mom is wondering if Adderall comes in a chewable or patch??    Last refill of Adderall 7/28/23  Please advise.  (fyi..family is presently in [place] so is not sure of pharmacy if Corina needs a new script)        Last video visit 5/18/23   during hospitalization

## 2024-10-06 NOTE — PROGRESS NOTE ADULT - ASSESSMENT
#acute hypoxic resp failure  - possible gnr pna - cont iv abxs  - ground glass opacities - started on iv steroids for possible ILD  - acute on chronic diastolic chf, pulm htn , valvular dysfuntion - cont lasix   - team discussed with pulm - no need for PE study - due to CXR opacities and inr supratherapeutic  - on nc - wean down as possible    #Chronic afib with rvr - rate controlled now, inr still elevated , monitor off coumadin     #hx of prothrombin mutation /dvt/pe - coumadin life long - goal 2.5-3.5 per pt, monitor inr daily  still supratherapeutic , no active bleeding    dvt: scd  diet: soft and bite sized  GI: PPI  activity as tolerated  Code: DNR/DNI   Handoff: c.w IV antibiotics, steriod, wean off NC as tolerated, PT consult

## 2024-10-07 LAB
ALBUMIN SERPL ELPH-MCNC: 3.1 G/DL — LOW (ref 3.5–5.2)
ALP SERPL-CCNC: 62 U/L — SIGNIFICANT CHANGE UP (ref 30–115)
ALT FLD-CCNC: 18 U/L — SIGNIFICANT CHANGE UP (ref 0–41)
ANION GAP SERPL CALC-SCNC: 12 MMOL/L — SIGNIFICANT CHANGE UP (ref 7–14)
APTT BLD: 38.4 SEC — SIGNIFICANT CHANGE UP (ref 27–39.2)
AST SERPL-CCNC: 26 U/L — SIGNIFICANT CHANGE UP (ref 0–41)
BASOPHILS # BLD AUTO: 0 K/UL — SIGNIFICANT CHANGE UP (ref 0–0.2)
BASOPHILS NFR BLD AUTO: 0 % — SIGNIFICANT CHANGE UP (ref 0–1)
BILIRUB SERPL-MCNC: 0.9 MG/DL — SIGNIFICANT CHANGE UP (ref 0.2–1.2)
BUN SERPL-MCNC: 60 MG/DL — HIGH (ref 10–20)
CALCIUM SERPL-MCNC: 8.6 MG/DL — SIGNIFICANT CHANGE UP (ref 8.4–10.5)
CHLORIDE SERPL-SCNC: 98 MMOL/L — SIGNIFICANT CHANGE UP (ref 98–110)
CO2 SERPL-SCNC: 28 MMOL/L — SIGNIFICANT CHANGE UP (ref 17–32)
CREAT SERPL-MCNC: 0.9 MG/DL — SIGNIFICANT CHANGE UP (ref 0.7–1.5)
EGFR: 63 ML/MIN/1.73M2 — SIGNIFICANT CHANGE UP
EOSINOPHIL NFR BLD AUTO: 0 % — SIGNIFICANT CHANGE UP (ref 0–8)
GLUCOSE SERPL-MCNC: 124 MG/DL — HIGH (ref 70–99)
HCT VFR BLD CALC: 36.5 % — LOW (ref 37–47)
HGB BLD-MCNC: 11.6 G/DL — LOW (ref 12–16)
INR BLD: 5.26 RATIO — CRITICAL HIGH (ref 0.65–1.3)
LYMPHOCYTES # BLD AUTO: 16 % — LOW (ref 20.5–51.1)
LYMPHOCYTES # BLD AUTO: 2.25 K/UL — SIGNIFICANT CHANGE UP (ref 1.2–3.4)
MANUAL SMEAR VERIFICATION: SIGNIFICANT CHANGE UP
MCHC RBC-ENTMCNC: 28 PG — SIGNIFICANT CHANGE UP (ref 27–31)
MCHC RBC-ENTMCNC: 31.8 G/DL — LOW (ref 32–37)
MCV RBC AUTO: 88 FL — SIGNIFICANT CHANGE UP (ref 81–99)
METAMYELOCYTES # FLD: 1 % — HIGH (ref 0–0)
MONOCYTES # BLD AUTO: 0.84 K/UL — HIGH (ref 0.1–0.6)
MONOCYTES NFR BLD AUTO: 6 % — SIGNIFICANT CHANGE UP (ref 1.7–9.3)
NEUTROPHILS # BLD AUTO: 10.81 K/UL — HIGH (ref 1.4–6.5)
NEUTROPHILS NFR BLD AUTO: 77 % — HIGH (ref 42.2–75.2)
NRBC # BLD: 0 /100 WBCS — SIGNIFICANT CHANGE UP (ref 0–0)
NRBC # BLD: SIGNIFICANT CHANGE UP /100 WBCS (ref 0–0)
PLAT MORPH BLD: NORMAL — SIGNIFICANT CHANGE UP
PLATELET # BLD AUTO: 277 K/UL — SIGNIFICANT CHANGE UP (ref 130–400)
PLATELET COUNT - ESTIMATE: NORMAL — SIGNIFICANT CHANGE UP
PMV BLD: 11.5 FL — HIGH (ref 7.4–10.4)
POTASSIUM SERPL-MCNC: 4.6 MMOL/L — SIGNIFICANT CHANGE UP (ref 3.5–5)
POTASSIUM SERPL-SCNC: 4.6 MMOL/L — SIGNIFICANT CHANGE UP (ref 3.5–5)
PROT SERPL-MCNC: 5 G/DL — LOW (ref 6–8)
PROTHROM AB SERPL-ACNC: >40 SEC — HIGH (ref 9.95–12.87)
RBC # BLD: 4.15 M/UL — LOW (ref 4.2–5.4)
RBC # FLD: 18.6 % — HIGH (ref 11.5–14.5)
RBC BLD AUTO: NORMAL — SIGNIFICANT CHANGE UP
SODIUM SERPL-SCNC: 138 MMOL/L — SIGNIFICANT CHANGE UP (ref 135–146)
WBC # BLD: 14.04 K/UL — HIGH (ref 4.8–10.8)
WBC # FLD AUTO: 14.04 K/UL — HIGH (ref 4.8–10.8)

## 2024-10-07 PROCEDURE — 99232 SBSQ HOSP IP/OBS MODERATE 35: CPT

## 2024-10-07 RX ORDER — AZITHROMYCIN 250 MG/1
500 TABLET, FILM COATED ORAL ONCE
Refills: 0 | Status: COMPLETED | OUTPATIENT
Start: 2024-10-07 | End: 2024-10-07

## 2024-10-07 RX ORDER — AZITHROMYCIN 250 MG/1
TABLET, FILM COATED ORAL
Refills: 0 | Status: DISCONTINUED | OUTPATIENT
Start: 2024-10-07 | End: 2024-10-07

## 2024-10-07 RX ADMIN — METHYLPREDNISOLONE ACETATE 60 MILLIGRAM(S): 80 INJECTION, SUSPENSION INTRA-ARTICULAR; INTRALESIONAL; INTRAMUSCULAR; SOFT TISSUE at 16:58

## 2024-10-07 RX ADMIN — Medication 200 MILLIGRAM(S): at 13:13

## 2024-10-07 RX ADMIN — AZITHROMYCIN 255 MILLIGRAM(S): 250 TABLET, FILM COATED ORAL at 09:06

## 2024-10-07 RX ADMIN — Medication 100 MILLIGRAM(S): at 21:06

## 2024-10-07 RX ADMIN — CHLORHEXIDINE GLUCONATE ORAL RINSE 1 APPLICATION(S): 1.2 SOLUTION DENTAL at 05:22

## 2024-10-07 RX ADMIN — PANTOPRAZOLE SODIUM 40 MILLIGRAM(S): 40 TABLET, DELAYED RELEASE ORAL at 05:23

## 2024-10-07 RX ADMIN — Medication 25 MILLIGRAM(S): at 16:59

## 2024-10-07 RX ADMIN — METHYLPREDNISOLONE ACETATE 60 MILLIGRAM(S): 80 INJECTION, SUSPENSION INTRA-ARTICULAR; INTRALESIONAL; INTRAMUSCULAR; SOFT TISSUE at 05:21

## 2024-10-07 NOTE — CONSULT NOTE ADULT - ASSESSMENT
83 y/o F PMH of + prothrombin gene mutation, h/o DVT/PE,  on Coumadin Diastolic CHF- on HCTZ, HTN,  Afib/flutter cholecystectomy cb pancreatitis , BIBEMS for eval of SOB.    ID is consulted for PNA  Febrile to 105 on admission, WBC 8.44 > 14  Was on BiPAP, now on 4L NC  BNP ~5000  RVP negative  Urine Strep Ag negative  Urine Legionella Ag negative  MRSA nare PCR negative  BCx NGTD  Procal 6.19    TTE 61% EF, unable to assess diastolic function, severe pulm HTN    CXR 10/4 Redemonstration bilateral opacities/nodules, left calcified granulomata, hiatal hernia    Antibiotics:  Ceftriaxone 10/2 - 10/6  Azithromycin 10/2 - 10/6      IMPRESSION:      RECOMMENDATIONS:        * THIS IS AN INCOMPLETE NOTE. FINAL RECOMMENDATION IS PENDING *   83 y/o F PMH of + prothrombin gene mutation, h/o DVT/PE,  on Coumadin Diastolic CHF- on HCTZ, HTN,  Afib/flutter cholecystectomy cb pancreatitis , BIBEMS for eval of SOB.    ID is consulted for PNA  Febrile to 105 on admission, WBC 8.44 > 14  Was on BiPAP, now on 4L NC  BNP ~5000  RVP negative  Urine Strep Ag negative  Urine Legionella Ag negative  MRSA nare PCR negative  BCx NGTD  Procal 6.19    TTE 61% EF, unable to assess diastolic function, severe pulm HTN    CXR 10/4 Redemonstration bilateral opacities/nodules, left calcified granulomata, hiatal hernia    Antibiotics:  Ceftriaxone 10/2 - 10/6  Azithromycin 10/2 - 10/6      IMPRESSION:  CHF exacerbation  Leukocytosis  + Prothrombin gene mutation  Immunosuppression / Immunosenescence which could result in poor clinical outcome    RECOMMENDATIONS:  - Completed 5 days of ceftriaxone and azithromycin, improved respiratory status, can d/c abx  - Leukocytosis could be 2/2 steroid use  - Continue diuresis as tolerated  - Wean off O2 as tolerated  - Offloading and frequent position changes, aspiration precaution  - Trend WBC, fever curve, transaminases, creatinine daily      Lizzie Kang D.O.  Attending Physician  Division of Infectious Diseases  [facility]  Please contact me via [employer] Teams

## 2024-10-07 NOTE — PROGRESS NOTE ADULT - SUBJECTIVE AND OBJECTIVE BOX
JOEY KNAPP 84y Female  MRN#: [id number]   Hospital Day: 4d    SUBJECTIVE  Patient is a 84y old Female who presents with a chief complaint of Shortness of breath (07 Oct 2024 11:20)  Currently admitted to medicine with the primary diagnosis of Sepsis      INTERVAL HPI AND OVERNIGHT EVENTS:  Patient was examined and seen at bedside. This morning she is resting comfortably in bed and reports no issues or overnight events.    REVIEW OF SYMPTOMS:  CONSTITUTIONAL: No weakness, fevers or chills; No headaches  EYES: No visual changes, eye pain, or discharge  ENT: No vertigo; No ear pain or change in hearing; No sore throat or difficulty swallowing  NECK: No pain or stiffness  RESPIRATORY: some cough, no SOB   CARDIOVASCULAR: No chest pain or palpitations  GASTROINTESTINAL: No abdominal or epigastric pain; No nausea, vomiting; No diarrhea or constipation  GENITOURINARY: No dysuria, frequency or hematuria  MUSCULOSKELETAL: No joint pain, no muscle pain, no weakness  NEUROLOGICAL: No numbness or weakness  SKIN: No itching or rashes    OBJECTIVE  PAST MEDICAL & SURGICAL HISTORY  H/O prothrombin mutation    Pulmonary embolism    HTN (hypertension)    Anemia requiring transfusions    Deep vein thrombosis (DVT)    S/P tonsillectomy    H/O: hysterectomy    History of cholecystectomy      ALLERGIES:  penicillin (Unknown)    MEDICATIONS:  STANDING MEDICATIONS  allopurinol 200 milliGRAM(s) Oral daily  azithromycin  IVPB      cefTRIAXone   IVPB 1000 milliGRAM(s) IV Intermittent every 24 hours  chlorhexidine 2% Cloths 1 Application(s) Topical <User Schedule>  methylPREDNISolone sodium succinate Injectable 60 milliGRAM(s) IV Push every 12 hours  metoprolol tartrate 25 milliGRAM(s) Oral every 12 hours  pantoprazole    Tablet 40 milliGRAM(s) Oral before breakfast    PRN MEDICATIONS      VITAL SIGNS: Last 24 Hours  T(C): 36.4 (07 Oct 2024 13:50), Max: 36.4 (07 Oct 2024 05:27)  T(F): 97.5 (07 Oct 2024 13:50), Max: 97.5 (07 Oct 2024 05:27)  HR: 79 (07 Oct 2024 13:50) (59 - 83)  BP: 103/64 (07 Oct 2024 13:50) (102/67 - 105/67)  BP(mean): 80 (06 Oct 2024 19:40) (80 - 80)  RR: 18 (07 Oct 2024 13:50) (18 - 29)  SpO2: 99% (07 Oct 2024 13:50) (94% - 99%)    LABS:                        11.6   14.04 )-----------( 277      ( 07 Oct 2024 06:08 )             36.5     10-07    138  |  98  |  60[H]  ----------------------------<  124[H]  4.6   |  28  |  0.9    Ca    8.6      07 Oct 2024 06:08  Mg     2.3     10-06    TPro  5.0[L]  /  Alb  3.1[L]  /  TBili  0.9  /  DBili  x   /  AST  26  /  ALT  18  /  AlkPhos  62  10-07    PT/INR - ( 07 Oct 2024 06:08 )   PT: >40.00 sec;   INR: 5.26 ratio         PTT - ( 07 Oct 2024 06:08 )  PTT:38.4 sec  Urinalysis Basic - ( 07 Oct 2024 06:08 )    Color: x / Appearance: x / SG: x / pH: x  Gluc: 124 mg/dL / Ketone: x  / Bili: x / Urobili: x   Blood: x / Protein: x / Nitrite: x   Leuk Esterase: x / RBC: x / WBC x   Sq Epi: x / Non Sq Epi: x / Bacteria: x                RADIOLOGY:      PHYSICAL EXAM:  GEN: No acute distress, on NC   LUNGS: chest rise noted   HEART: S1/S2 present. RRR.   ABD: Soft, non-tender, non-distended. Bowel sounds present  EXT: No LE edema   NEURO: AAOX3

## 2024-10-07 NOTE — PROGRESS NOTE ADULT - ASSESSMENT
Ms Gordon is an 84 BRANDO w a PMH of prothrombin mutation (on coumadin goal INR 2.5-3.5)m HTN, Anemia, DVT presenting to the ED w acute hypoxic respiratory failure     #acute hypoxic resp failure secondary to decompinsated HF   - discussed w ID Dr Kang  -Labs reviewed, given eosinophil = 0 in setting of steroid administration most likely secondary to steroid use  -patient afebrile, low suspicion of infection  -DC Abx as discussed w ID   - ground glass opacities - started on iv steroids for possible ILD  - acute on chronic diastolic chf, pulm htn , valvular dysfuntion - cont lasix   - team discussed with pulm - no need for PE study - due to CXR opacities and inr supratherapeutic  - on nc - wean down as possible    #Chronic afib with rvr - rate controlled now, inr still elevated , monitor off coumadin     #hx of prothrombin mutation /dvt/pe  - coumadin life long - goal 2.5-3.5 per pt, monitor inr daily  still supratherapeutic , no active bleeding  -f/u INR     dvt: scd  diet: soft and bite sized  GI: PPI  activity as tolerated  Code: DNR/DNI   Handoff: c.w IV antibiotics, steriod, wean off NC as tolerated, PT consult   Ms Gordon is an 84 BRANDO w a PMH of prothrombin mutation (on coumadin goal INR 2.5-3.5)m HTN, Anemia, DVT presenting to the ED w acute hypoxic respiratory failure     #acute hypoxic resp failure secondary toacute on chronic decompinsated HF   - discussed w ID Dr Kang  -Labs reviewed, given eosinophil = 0 in setting of steroid administration most likely secondary to steroid use  -patient afebrile, low suspicion of infection  -DC Abx as discussed w ID   - ground glass opacities - started on iv steroids for possible ILD  - acute on chronic diastolic chf, pulm htn , valvular dysfuntion - cont lasix   - team discussed with pulm - no need for PE study - due to CXR opacities and inr supratherapeutic  - on nc - wean down as possible    #Chronic afib with rvr - rate controlled now, inr still elevated , monitor off coumadin     #hx of prothrombin mutation /dvt/pe  - coumadin life long - goal 2.5-3.5 per pt, monitor inr daily  still supratherapeutic , no active bleeding  -f/u INR     dvt: scd  diet: soft and bite sized  GI: PPI  activity as tolerated  Code: DNR/DNI   Handoff: c.w IV antibiotics, steriod, wean off NC as tolerated, PT consult

## 2024-10-07 NOTE — CHART NOTE - NSCHARTNOTEFT_GEN_A_CORE
d/c'ed azithromycin as per ID and pharm
MICU DOWN GRADE NOTE  Approved by Dr Beaulieu      Assessment and plan:         85 y/o F PMH Afib/flutter, DVT/PE on Coumadin, HFpEF, HTN, of + prothrombin gene mutation, CCY cb pancreatitis, recent sick contacts BIBEMS for eval of SOB.       #Acute HFpEF exacerbation   #possible pna   #SOB   #Pulmonary HTN   #Afib with RVR- on Coumadin   #HO HTN     - CXR stable. O2 sat stable on NC 3L   - BNP 5k. Procal 6.19, lactate normalized  - TTE: EF 61%, enlarged R hrt side. Severe TR. 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     dvt: scd  diet: soft and bite sized  GI: PPI  activity as tolerated  Code: DNR/DNI   Handoff: c.w IV antibiotics, steriod, wean off NC as tolerated, PT consult          REVIEW OF SYSTEMS:  CONSTITUTIONAL: No fever, chills  HEENT:  No blurry vision No sinus or throat pain  NECK: No pain or stiffness  RESPIRATORY: No cough, wheezing, chills or hemoptysis; No shortness of breath  CARDIOVASCULAR: No chest pain, palpitations  GASTROINTESTINAL: No abdominal pain. No nausea, vomiting, or diarrhea  GENITOURINARY: No dysuria  NEUROLOGICAL: No HA, No focal weakness  SKIN: No itching, burning, rashes, or lesions   MUSCULOSKELETAL: No joint pain or swelling; No muscle, back, or extremity pain    MEDICATIONS:  allopurinol 200 milliGRAM(s) Oral daily  azithromycin  IVPB 500 milliGRAM(s) IV Intermittent every 24 hours  cefTRIAXone   IVPB 1000 milliGRAM(s) IV Intermittent every 24 hours  chlorhexidine 2% Cloths 1 Application(s) Topical <User Schedule>  methylPREDNISolone sodium succinate Injectable 60 milliGRAM(s) IV Push every 12 hours  metoprolol tartrate 25 milliGRAM(s) Oral every 12 hours  pantoprazole    Tablet 40 milliGRAM(s) Oral before breakfast      T(C): 36.1 (10-05-24 @ 07:30), Max: 36.3 (10-04-24 @ 15:01)  HR: 84 (10-05-24 @ 07:56) (84 - 102)  BP: 97/71 (10-05-24 @ 07:56) (93/55 - 110/66)  RR: 31 (10-05-24 @ 07:56) (17 - 33)  SpO2: 96% (10-05-24 @ 07:56) (94% - 97%)  Wt(kg): --Vital Signs Last 24 Hrs  T(C): 36.1 (05 Oct 2024 07:30), Max: 36.3 (04 Oct 2024 15:01)  T(F): 97 (05 Oct 2024 07:30), Max: 97.3 (04 Oct 2024 15:01)  HR: 84 (05 Oct 2024 07:56) (84 - 102)  BP: 97/71 (05 Oct 2024 07:56) (93/55 - 110/66)  BP(mean): 80 (05 Oct 2024 07:56) (70 - 83)  RR: 31 (05 Oct 2024 07:56) (17 - 33)  SpO2: 96% (05 Oct 2024 07:56) (94% - 97%)    Parameters below as of 05 Oct 2024 07:56  Patient On (Oxygen Delivery Method): nasal cannula  O2 Flow (L/min): 5      PHYSICAL EXAM:  GENERAL: NAD, well-groomed, well-developed  HEAD:  Atraumatic, Normocephalic  EYES: EOMI, PERRLA, conjunctiva and sclera clear  ENMT:  Moist mucous membranes  NECK: Supple, No JVD,  CHEST/LUNG: Clear to auscultation bilaterally; No rales, rhonchi, wheezing, or rubs  HEART: Regular rate and rhythm; No murmurs, rubs, or gallops  ABDOMEN: Soft, Nontender, Nondistended; Bowel sounds present  NEURO: Alert & Oriented X3  EXTREMITIES: No LE edema, no calf tenderness  LYMPH: No lymphadenopathy noted  SKIN: No rashes or lesions    Consultant(s) Notes Reviewed:  [x ] YES  [ ] NO  Care Discussed with Consultants/Other Providers [ x] YES  [ ] NO    LABS:                        11.0   11.16 )-----------( 242      ( 05 Oct 2024 05:52 )             35.2     10-05    139  |  99  |  66[HH]  ----------------------------<  136[H]  3.7   |  26  |  1.1    Ca    8.7      05 Oct 2024 05:52  Mg     2.2     10-05    TPro  5.5[L]  /  Alb  3.3[L]  /  TBili  1.3[H]  /  DBili  x   /  AST  14  /  ALT  6   /  AlkPhos  63  10-05    PT/INR - ( 04 Oct 2024 11:22 )   PT: >40.00 sec;   INR: 5.52 ratio         PTT - ( 04 Oct 2024 11:22 )  PTT:41.6 sec  Urinalysis Basic - ( 05 Oct 2024 05:52 )    Color: x / Appearance: x / SG: x / pH: x  Gluc: 136 mg/dL / Ketone: x  / Bili: x / Urobili: x   Blood: x / Protein: x / Nitrite: x   Leuk Esterase: x / RBC: x / WBC x   Sq Epi: x / Non Sq Epi: x / Bacteria: x      CAPILLARY BLOOD GLUCOSE            Urinalysis Basic - ( 05 Oct 2024 05:52 )    Color: x / Appearance: x / SG: x / pH: x  Gluc: 136 mg/dL / Ketone: x  / Bili: x / Urobili: x   Blood: x / Protein: x / Nitrite: x   Leuk Esterase: x / RBC: x / WBC x   Sq Epi: x / Non Sq Epi: x / Bacteria: x        RADIOLOGY & ADDITIONAL TESTS:    Imaging Personally Reviewed:  [x ] YES  [ ] NO

## 2024-10-07 NOTE — PHARMACOTHERAPY INTERVENTION NOTE - COMMENTS
Spoke with KEN Mcdonnell, aware of allergy to penicillin, and approves use of ceftriaxone. Patient received cefepime and ceftriaxone in previous admission March 2023
Consistent with ID note, recommended to discontinue the azithromycin order since patient has received 5 days of azithromycin therapy.    Neno Polanco, PharmD, BCIDP  Clinical Pharmacy Specialist, Infectious Diseases  Tele-Antimicrobial Stewardship Program (Tele-ASP)  Tele-ASP Phone: [phone]

## 2024-10-07 NOTE — CONSULT NOTE ADULT - SUBJECTIVE AND OBJECTIVE BOX
INFECTIOUS DISEASE CONSULT NOTE    Patient is a 84y old  Female who presents with a chief complaint of Sepsis     (03 Oct 2024 20:06)    HPI:  85 y/o F PMH of + prothrombin gene mutation, h/o DVT/PE,  on Coumadin Diastolic CHF- on HCTZ, HTN,  Afib/flutter cholecystectomy cb pancreatitis , BIBEMS for eval of SOB. Patient reports shortness of breath began 1 week ago and has steadily progressed worse with laying flat + exertion. A/w non-productive cough. States that she has been having generalized weakness over the past 5-7 days. Visited pcp 2 days ago, started on Azithromycin PO.   Denies chest pain, fever, chills, cough, N/V/D, dizziness, weakness, and any other acute complaints at this time.    (03 Oct 2024 01:09)         Prior hospital charts reviewed [Yes]  Primary team notes reviewed [Yes]  Other consultant notes reviewed [Yes]    REVIEW OF SYSTEMS:      PAST MEDICAL & SURGICAL HISTORY:  H/O prothrombin mutation      Pulmonary embolism      HTN (hypertension)      Anemia requiring transfusions      Deep vein thrombosis (DVT)      S/P tonsillectomy      H/O: hysterectomy      History of cholecystectomy          SOCIAL HISTORY:  - Born in [place], migrated to [place] in 19XX  - Currently working as / Retired  - Lives with _____; no pets  - No recent travel  - Denies tobacco use  - Denies alcohol use  - Denies illicit drug use  - Currently sexually active, has one male/female sexual partner    FAMILY HISTORY:      Allergies:  penicillin (Unknown)      ANTIMICROBIALS:  azithromycin  IVPB    cefTRIAXone   IVPB 1000 every 24 hours      ANTIMICROBIALS (past 90 days):  MEDICATIONS  (STANDING):    azithromycin  IVPB   255 mL/Hr IV Intermittent (10-02-24 @ 23:16)    azithromycin  IVPB   255 mL/Hr IV Intermittent (10-06-24 @ 20:05)   255 mL/Hr IV Intermittent (10-05-24 @ 19:49)   255 mL/Hr IV Intermittent (10-04-24 @ 21:26)   255 mL/Hr IV Intermittent (10-03-24 @ 20:20)    azithromycin  IVPB   255 mL/Hr IV Intermittent (10-07-24 @ 09:06)    cefTRIAXone   IVPB   100 mL/Hr IV Intermittent (10-02-24 @ 23:16)    cefTRIAXone   IVPB   100 mL/Hr IV Intermittent (10-06-24 @ 20:05)   100 mL/Hr IV Intermittent (10-05-24 @ 19:49)   100 mL/Hr IV Intermittent (10-04-24 @ 21:26)   100 mL/Hr IV Intermittent (10-03-24 @ 20:20)        OTHER MEDS:   MEDICATIONS  (STANDING):  allopurinol 200 daily  methylPREDNISolone sodium succinate Injectable 60 every 12 hours  metoprolol tartrate 25 every 12 hours  pantoprazole    Tablet 40 before breakfast      VITALS:  Vital Signs Last 24 Hrs  T(F): 97.5 (10-07-24 @ 05:27), Max: 105 (10-02-24 @ 22:23)    Vital Signs Last 24 Hrs  HR: 59 (10-07-24 @ 05:27) (59 - 83)  BP: 102/67 (10-07-24 @ 05:27) (100/68 - 105/67)  RR: 23 (10-07-24 @ 05:27)  SpO2: 93% (10-07-24 @ 10:42) (93% - 97%)  Wt(kg): --    EXAM:    Labs:                        11.6   14.04 )-----------( 277      ( 07 Oct 2024 06:08 )             36.5     10-07    138  |  98  |  60[H]  ----------------------------<  124[H]  4.6   |  28  |  0.9    Ca    8.6      07 Oct 2024 06:08  Mg     2.3     10-06    TPro  5.0[L]  /  Alb  3.1[L]  /  TBili  0.9  /  DBili  x   /  AST  26  /  ALT  18  /  AlkPhos  62  10-07      WBC Trend:  WBC Count: 14.04 (10-07-24 @ 06:08)  WBC Count: 12.71 (10-06-24 @ 05:53)  WBC Count: 11.16 (10-05-24 @ 05:52)  WBC Count: 5.90 (10-04-24 @ 05:17)      Auto Neutrophil #: 10.81 K/uL (10-07-24 @ 06:08)  Auto Neutrophil #: 8.72 K/uL (10-06-24 @ 05:53)  Auto Neutrophil #: 7.99 K/uL (10-05-24 @ 05:52)  Auto Neutrophil #: 3.51 K/uL (10-04-24 @ 05:17)  Auto Neutrophil #: 7.01 K/uL (10-02-24 @ 22:42)      Creatine Trend:  Creatinine: 0.9 (10-07)  Creatinine: 1.0 (10-06)  Creatinine: 1.1 (10-05)  Creatinine: 1.2 (10-04)      Liver Biochemical Testing Trend:  Alanine Aminotransferase (ALT/SGPT): 18 (10-07)  Alanine Aminotransferase (ALT/SGPT): 13 (10-06)  Alanine Aminotransferase (ALT/SGPT): 6 (10-05)  Alanine Aminotransferase (ALT/SGPT): <5 (10-04)  Alanine Aminotransferase (ALT/SGPT): 5 (10-02)  Aspartate Aminotransferase (AST/SGOT): 26 (10-07-24 @ 06:08)  Aspartate Aminotransferase (AST/SGOT): 20 (10-06-24 @ 05:53)  Aspartate Aminotransferase (AST/SGOT): 14 (10-05-24 @ 05:52)  Aspartate Aminotransferase (AST/SGOT): 11 (10-04-24 @ 05:17)  Aspartate Aminotransferase (AST/SGOT): 16 (10-02-24 @ 22:42)  Bilirubin Total: 0.9 (10-07)  Bilirubin Total: 1.1 (10-06)  Bilirubin Total: 1.3 (10-05)  Bilirubin Total: 1.4 (10-04)  Bilirubin Total: 2.7 (10-02)      Trend LDH  03-28-23 @ 04:30  270[H]      Auto Eosinophil %: 0.0 % (10-07-24 @ 06:08)  Auto Eosinophil %: 0.0 % (10-06-24 @ 05:53)  Auto Eosinophil %: 0.0 % (10-05-24 @ 05:52)      Urinalysis Basic - ( 07 Oct 2024 06:08 )    Color: x / Appearance: x / SG: x / pH: x  Gluc: 124 mg/dL / Ketone: x  / Bili: x / Urobili: x   Blood: x / Protein: x / Nitrite: x   Leuk Esterase: x / RBC: x / WBC x   Sq Epi: x / Non Sq Epi: x / Bacteria: x          MICROBIOLOGY:    MRSA PCR Result.: Negative (10-03-24 @ 08:39)      Culture - Blood (collected 02 Oct 2024 22:42)  Source: .Blood BLOOD  Preliminary Report (06 Oct 2024 17:01):    No growth at 72 Hours    Culture - Blood (collected 02 Oct 2024 22:42)  Source: .Blood BLOOD  Preliminary Report (06 Oct 2024 17:01):    No growth at 72 Hours      Rapid RVP Result: NotDetec (10-03 @ 14:30)  Legionella Antigen, Urine: Negative (10-03 @ 14:00)    Procalcitonin: 6.19 (10-03)    Troponin T, High Sensitivity Result: 52 (10-03)  Troponin T, High Sensitivity Result: 51 (10-02)      A1C with Estimated Average Glucose Result: 5.3 % (10-03-24 @ 05:20)      RADIOLOGY:  imaging below personally reviewed    < from: Xray Chest 1 View-PORTABLE IMMEDIATE (10.02.24 @ 23:14) >  Findings:    Support devices: None.    Cardiac/mediastinum/hilum: Hiatal hernia. Heart size within normal   limits, thoracic aortic calcification.    Lung parenchyma/Pleura: Bilateral opacities.. Left calcified granulomata.    Skeleton/soft tissues: Stable. Dextroscoliosis.    Impression: Bilateral opacities/nodules. Left calcified granulomata.   Hiatal hernia.    < end of copied text >      < from: Xray Chest 1 View- PORTABLE-Routine (10.04.24 @ 06:26) >  Impression:    Redemonstration bilateral opacities/nodules, left calcified granulomata,   hiatal hernia    < end of copied text >      < from: TTE Echo Complete w/o Contrast w/ Doppler (10.03.24 @ 06:37) >  Summary:   1. Patient is in atrial fibrillation during study.   2. Normal global left ventricular systolic function.   3. LV Ejection Fraction by Rodríguez's Method with a biplane EF of 61 %.   4. Right ventricular volume and pressure overload.   5. The mitral in-flow pattern reveals no discernable A-wave, therefore   no comment on diastolic function can be made.   6. Moderately enlarged right ventricle.   7. Mildly increased RV wall thickness.   8. Mildly reduced RV systolic function.   9. Mildly enlarged left atrium. LA volume Index is 39.2 ml/m².  10. Severely enlarged right atrium.  11. Mild mitral valve regurgitation.  12. Severe tricuspid regurgitation.  13. Estimated pulmonary artery systolic pressure is 70.7 mmHg assuming a   right atrial pressure of 15 mmHg, which is consistent with severe   pulmonary hypertension.  14. There is no evidence of pericardial effusion.      < end of copied text >   INFECTIOUS DISEASE CONSULT NOTE    Patient is a 84y old  Female who presents with a chief complaint of Sepsis (03 Oct 2024 20:06)    HPI:  83 y/o F PMH of + prothrombin gene mutation, h/o DVT/PE,  on Coumadin Diastolic CHF- on HCTZ, HTN,  Afib/flutter cholecystectomy cb pancreatitis , BIBEMS for eval of SOB. Patient reports shortness of breath began 1 week ago and has steadily progressed worse with laying flat + exertion. A/w non-productive cough. States that she has been having generalized weakness over the past 5-7 days. Visited pcp 2 days ago, started on Azithromycin PO.   Denies chest pain, fever, chills, cough, N/V/D, dizziness, weakness, and any other acute complaints at this time.    (03 Oct 2024 01:09)    Prior hospital charts reviewed [Yes]  Primary team notes reviewed [Yes]  Other consultant notes reviewed [Yes]    REVIEW OF SYSTEMS:  CONSTITUTIONAL: No fever or chills  HEAD: No lesion on scalp  EYES: No visual disturbance  ENT: No sore throat  RESPIRATORY: No cough, + mild shortness of breath  CARDIOVASCULAR: No chest pain or palpitations  GASTROINTESTINAL: No abdominal or epigastric pain  GENITOURINARY: No dysuria  NEUROLOGICAL: No headache/dizziness  MUSCULOSKELETAL: No joint pain, erythema, or swelling; no back pain  SKIN: No itching, rashes  All other ROS negative except noted above      PAST MEDICAL & SURGICAL HISTORY:  H/O prothrombin mutation      Pulmonary embolism      HTN (hypertension)      Anemia requiring transfusions      Deep vein thrombosis (DVT)      S/P tonsillectomy      H/O: hysterectomy      History of cholecystectomy    SOCIAL HISTORY:  - No recent travel  - Denies tobacco use  - Denies alcohol use  - Denies illicit drug use    FAMILY HISTORY:  No family history of coagulopathy    Allergies:  penicillin (Unknown)      ANTIMICROBIALS:  azithromycin  IVPB    cefTRIAXone   IVPB 1000 every 24 hours      ANTIMICROBIALS (past 90 days):  MEDICATIONS  (STANDING):    azithromycin  IVPB   255 mL/Hr IV Intermittent (10-02-24 @ 23:16)    azithromycin  IVPB   255 mL/Hr IV Intermittent (10-06-24 @ 20:05)   255 mL/Hr IV Intermittent (10-05-24 @ 19:49)   255 mL/Hr IV Intermittent (10-04-24 @ 21:26)   255 mL/Hr IV Intermittent (10-03-24 @ 20:20)    azithromycin  IVPB   255 mL/Hr IV Intermittent (10-07-24 @ 09:06)    cefTRIAXone   IVPB   100 mL/Hr IV Intermittent (10-02-24 @ 23:16)    cefTRIAXone   IVPB   100 mL/Hr IV Intermittent (10-06-24 @ 20:05)   100 mL/Hr IV Intermittent (10-05-24 @ 19:49)   100 mL/Hr IV Intermittent (10-04-24 @ 21:26)   100 mL/Hr IV Intermittent (10-03-24 @ 20:20)        OTHER MEDS:   MEDICATIONS  (STANDING):  allopurinol 200 daily  methylPREDNISolone sodium succinate Injectable 60 every 12 hours  metoprolol tartrate 25 every 12 hours  pantoprazole    Tablet 40 before breakfast      VITALS:  Vital Signs Last 24 Hrs  T(F): 97.5 (10-07-24 @ 05:27), Max: 105 (10-02-24 @ 22:23)    Vital Signs Last 24 Hrs  HR: 59 (10-07-24 @ 05:27) (59 - 83)  BP: 102/67 (10-07-24 @ 05:27) (100/68 - 105/67)  RR: 23 (10-07-24 @ 05:27)  SpO2: 93% (10-07-24 @ 10:42) (93% - 97%)  Wt(kg): --    EXAM:  GENERAL: NAD, lying in bed  HEAD: No head lesions  EYES: Conjunctiva pink and cornea white  EAR, NOSE, MOUTH, THROAT: Normal external ears and nose, no discharges; moist mucous membranes  NECK: Supple, nontender to palpation; no JVD  RESPIRATORY: Bibasilar crackles  CARDIOVASCULAR: S1 S2  GASTROINTESTINAL: Soft, nontender, nondistended; normoactive bowel sounds  GENITOURINARY: No alvarado catheter, no CVA tenderness  EXTREMITIES: No clubbing, cyanosis, + LE edema  NERVOUS SYSTEM: Alert and oriented to person, time, place and situation, speech clear. No focal deficits   MUSCULOSKELETAL: No joint erythema, swelling or pain  SKIN: No rashes or lesions, no superficial thrombophlebitis  PSYCH: Normal affect    Labs:                        11.6   14.04 )-----------( 277      ( 07 Oct 2024 06:08 )             36.5     10-07    138  |  98  |  60[H]  ----------------------------<  124[H]  4.6   |  28  |  0.9    Ca    8.6      07 Oct 2024 06:08  Mg     2.3     10-06    TPro  5.0[L]  /  Alb  3.1[L]  /  TBili  0.9  /  DBili  x   /  AST  26  /  ALT  18  /  AlkPhos  62  10-07      WBC Trend:  WBC Count: 14.04 (10-07-24 @ 06:08)  WBC Count: 12.71 (10-06-24 @ 05:53)  WBC Count: 11.16 (10-05-24 @ 05:52)  WBC Count: 5.90 (10-04-24 @ 05:17)      Auto Neutrophil #: 10.81 K/uL (10-07-24 @ 06:08)  Auto Neutrophil #: 8.72 K/uL (10-06-24 @ 05:53)  Auto Neutrophil #: 7.99 K/uL (10-05-24 @ 05:52)  Auto Neutrophil #: 3.51 K/uL (10-04-24 @ 05:17)  Auto Neutrophil #: 7.01 K/uL (10-02-24 @ 22:42)      Creatine Trend:  Creatinine: 0.9 (10-07)  Creatinine: 1.0 (10-06)  Creatinine: 1.1 (10-05)  Creatinine: 1.2 (10-04)      Liver Biochemical Testing Trend:  Alanine Aminotransferase (ALT/SGPT): 18 (10-07)  Alanine Aminotransferase (ALT/SGPT): 13 (10-06)  Alanine Aminotransferase (ALT/SGPT): 6 (10-05)  Alanine Aminotransferase (ALT/SGPT): <5 (10-04)  Alanine Aminotransferase (ALT/SGPT): 5 (10-02)  Aspartate Aminotransferase (AST/SGOT): 26 (10-07-24 @ 06:08)  Aspartate Aminotransferase (AST/SGOT): 20 (10-06-24 @ 05:53)  Aspartate Aminotransferase (AST/SGOT): 14 (10-05-24 @ 05:52)  Aspartate Aminotransferase (AST/SGOT): 11 (10-04-24 @ 05:17)  Aspartate Aminotransferase (AST/SGOT): 16 (10-02-24 @ 22:42)  Bilirubin Total: 0.9 (10-07)  Bilirubin Total: 1.1 (10-06)  Bilirubin Total: 1.3 (10-05)  Bilirubin Total: 1.4 (10-04)  Bilirubin Total: 2.7 (10-02)      Trend LDH  03-28-23 @ 04:30  270[H]      Auto Eosinophil %: 0.0 % (10-07-24 @ 06:08)  Auto Eosinophil %: 0.0 % (10-06-24 @ 05:53)  Auto Eosinophil %: 0.0 % (10-05-24 @ 05:52)      Urinalysis Basic - ( 07 Oct 2024 06:08 )    Color: x / Appearance: x / SG: x / pH: x  Gluc: 124 mg/dL / Ketone: x  / Bili: x / Urobili: x   Blood: x / Protein: x / Nitrite: x   Leuk Esterase: x / RBC: x / WBC x   Sq Epi: x / Non Sq Epi: x / Bacteria: x          MICROBIOLOGY:    MRSA PCR Result.: Negative (10-03-24 @ 08:39)      Culture - Blood (collected 02 Oct 2024 22:42)  Source: .Blood BLOOD  Preliminary Report (06 Oct 2024 17:01):    No growth at 72 Hours    Culture - Blood (collected 02 Oct 2024 22:42)  Source: .Blood BLOOD  Preliminary Report (06 Oct 2024 17:01):    No growth at 72 Hours      Rapid RVP Result: NotDetec (10-03 @ 14:30)  Legionella Antigen, Urine: Negative (10-03 @ 14:00)    Procalcitonin: 6.19 (10-03)    Troponin T, High Sensitivity Result: 52 (10-03)  Troponin T, High Sensitivity Result: 51 (10-02)      A1C with Estimated Average Glucose Result: 5.3 % (10-03-24 @ 05:20)      RADIOLOGY:  imaging below personally reviewed    < from: Xray Chest 1 View-PORTABLE IMMEDIATE (10.02.24 @ 23:14) >  Findings:    Support devices: None.    Cardiac/mediastinum/hilum: Hiatal hernia. Heart size within normal   limits, thoracic aortic calcification.    Lung parenchyma/Pleura: Bilateral opacities.. Left calcified granulomata.    Skeleton/soft tissues: Stable. Dextroscoliosis.    Impression: Bilateral opacities/nodules. Left calcified granulomata.   Hiatal hernia.    < end of copied text >      < from: Xray Chest 1 View- PORTABLE-Routine (10.04.24 @ 06:26) >  Impression:    Redemonstration bilateral opacities/nodules, left calcified granulomata,   hiatal hernia    < end of copied text >      < from: TTE Echo Complete w/o Contrast w/ Doppler (10.03.24 @ 06:37) >  Summary:   1. Patient is in atrial fibrillation during study.   2. Normal global left ventricular systolic function.   3. LV Ejection Fraction by Rodríguez's Method with a biplane EF of 61 %.   4. Right ventricular volume and pressure overload.   5. The mitral in-flow pattern reveals no discernable A-wave, therefore   no comment on diastolic function can be made.   6. Moderately enlarged right ventricle.   7. Mildly increased RV wall thickness.   8. Mildly reduced RV systolic function.   9. Mildly enlarged left atrium. LA volume Index is 39.2 ml/m².  10. Severely enlarged right atrium.  11. Mild mitral valve regurgitation.  12. Severe tricuspid regurgitation.  13. Estimated pulmonary artery systolic pressure is 70.7 mmHg assuming a   right atrial pressure of 15 mmHg, which is consistent with severe   pulmonary hypertension.  14. There is no evidence of pericardial effusion.      < end of copied text >

## 2024-10-08 LAB
ALBUMIN SERPL ELPH-MCNC: 3 G/DL — LOW (ref 3.5–5.2)
ALP SERPL-CCNC: 64 U/L — SIGNIFICANT CHANGE UP (ref 30–115)
ALT FLD-CCNC: 18 U/L — SIGNIFICANT CHANGE UP (ref 0–41)
ANION GAP SERPL CALC-SCNC: 9 MMOL/L — SIGNIFICANT CHANGE UP (ref 7–14)
APTT BLD: 38.1 SEC — SIGNIFICANT CHANGE UP (ref 27–39.2)
AST SERPL-CCNC: 16 U/L — SIGNIFICANT CHANGE UP (ref 0–41)
BILIRUB SERPL-MCNC: 0.8 MG/DL — SIGNIFICANT CHANGE UP (ref 0.2–1.2)
BUN SERPL-MCNC: 54 MG/DL — HIGH (ref 10–20)
CALCIUM SERPL-MCNC: 8.8 MG/DL — SIGNIFICANT CHANGE UP (ref 8.4–10.5)
CHLORIDE SERPL-SCNC: 98 MMOL/L — SIGNIFICANT CHANGE UP (ref 98–110)
CO2 SERPL-SCNC: 28 MMOL/L — SIGNIFICANT CHANGE UP (ref 17–32)
CREAT SERPL-MCNC: 0.9 MG/DL — SIGNIFICANT CHANGE UP (ref 0.7–1.5)
CULTURE RESULTS: SIGNIFICANT CHANGE UP
CULTURE RESULTS: SIGNIFICANT CHANGE UP
EGFR: 63 ML/MIN/1.73M2 — SIGNIFICANT CHANGE UP
GLUCOSE SERPL-MCNC: 126 MG/DL — HIGH (ref 70–99)
HCT VFR BLD CALC: 40.3 % — SIGNIFICANT CHANGE UP (ref 37–47)
HGB BLD-MCNC: 12.6 G/DL — SIGNIFICANT CHANGE UP (ref 12–16)
INR BLD: 4.43 RATIO — HIGH (ref 0.65–1.3)
MCHC RBC-ENTMCNC: 27.3 PG — SIGNIFICANT CHANGE UP (ref 27–31)
MCHC RBC-ENTMCNC: 31.3 G/DL — LOW (ref 32–37)
MCV RBC AUTO: 87.2 FL — SIGNIFICANT CHANGE UP (ref 81–99)
NRBC # BLD: 0 /100 WBCS — SIGNIFICANT CHANGE UP (ref 0–0)
PLATELET # BLD AUTO: 328 K/UL — SIGNIFICANT CHANGE UP (ref 130–400)
PMV BLD: 11 FL — HIGH (ref 7.4–10.4)
POTASSIUM SERPL-MCNC: 4.8 MMOL/L — SIGNIFICANT CHANGE UP (ref 3.5–5)
POTASSIUM SERPL-SCNC: 4.8 MMOL/L — SIGNIFICANT CHANGE UP (ref 3.5–5)
PROT SERPL-MCNC: 5.1 G/DL — LOW (ref 6–8)
PROTHROM AB SERPL-ACNC: >40 SEC — HIGH (ref 9.95–12.87)
RBC # BLD: 4.62 M/UL — SIGNIFICANT CHANGE UP (ref 4.2–5.4)
RBC # FLD: 18.7 % — HIGH (ref 11.5–14.5)
SODIUM SERPL-SCNC: 135 MMOL/L — SIGNIFICANT CHANGE UP (ref 135–146)
SPECIMEN SOURCE: SIGNIFICANT CHANGE UP
SPECIMEN SOURCE: SIGNIFICANT CHANGE UP
WBC # BLD: 20.03 K/UL — HIGH (ref 4.8–10.8)
WBC # FLD AUTO: 20.03 K/UL — HIGH (ref 4.8–10.8)

## 2024-10-08 PROCEDURE — 99232 SBSQ HOSP IP/OBS MODERATE 35: CPT

## 2024-10-08 RX ORDER — FUROSEMIDE 10 MG/ML
20 INJECTION INTRAVENOUS
Refills: 0 | Status: ACTIVE | OUTPATIENT
Start: 2024-10-08 | End: 2025-09-06

## 2024-10-08 RX ORDER — SENNOSIDES 8.6 MG
2 TABLET ORAL AT BEDTIME
Refills: 0 | Status: DISCONTINUED | OUTPATIENT
Start: 2024-10-08 | End: 2024-10-10

## 2024-10-08 RX ADMIN — CHLORHEXIDINE GLUCONATE ORAL RINSE 1 APPLICATION(S): 1.2 SOLUTION DENTAL at 05:41

## 2024-10-08 RX ADMIN — Medication 200 MILLIGRAM(S): at 11:20

## 2024-10-08 RX ADMIN — METHYLPREDNISOLONE ACETATE 60 MILLIGRAM(S): 80 INJECTION, SUSPENSION INTRA-ARTICULAR; INTRALESIONAL; INTRAMUSCULAR; SOFT TISSUE at 17:20

## 2024-10-08 RX ADMIN — METHYLPREDNISOLONE ACETATE 60 MILLIGRAM(S): 80 INJECTION, SUSPENSION INTRA-ARTICULAR; INTRALESIONAL; INTRAMUSCULAR; SOFT TISSUE at 05:41

## 2024-10-08 RX ADMIN — PANTOPRAZOLE SODIUM 40 MILLIGRAM(S): 40 TABLET, DELAYED RELEASE ORAL at 05:41

## 2024-10-08 RX ADMIN — Medication 25 MILLIGRAM(S): at 17:21

## 2024-10-08 RX ADMIN — FUROSEMIDE 20 MILLIGRAM(S): 10 INJECTION INTRAVENOUS at 17:21

## 2024-10-08 RX ADMIN — Medication 25 MILLIGRAM(S): at 05:40

## 2024-10-08 NOTE — PROGRESS NOTE ADULT - ASSESSMENT
Ms Gordon is an 84 BRANDO w a PMH of prothrombin mutation (on coumadin goal INR 2.5-3.5)m HTN, Anemia, DVT presenting to the ED w acute hypoxic respiratory failure     #acute hypoxic resp failure secondary to acute on chronic decompensated HF   - discussed w ID Dr Kang  -Labs reviewed, given eosinophil = 0 in setting of steroid administration most likely secondary to steroid use  -patient afebrile, low suspicion of infection  -DC Abx as discussed w ID   -ground glass opacities - started on iv steroids for possible ILD  -acute on chronic diastolic chf  -severe pulm HTN  -severe TR  -c/w Lasix 20mg IV BID   -team discussed with pulm - no need for PE study - due to CXR opacities and inr supratherapeutic  - on nc - wean down as possible      #Chronic afib with rvr - rate controlled now, inr still elevated , monitor off coumadin     #hx of prothrombin mutation /dvt/pe  - coumadin life long - goal 2.5-3.5 per pt, monitor inr daily  still supratherapeutic , no active bleeding  -INR remains elevated at 4.43     dvt: scd  diet: soft and bite sized  GI: PPI  activity as tolerated  Code: DNR/DNI   Handoff: c.w IV antibiotics, steriod, wean off NC as tolerated, PT consult   Ms Gordon is an 84 BRANDO w a PMH of prothrombin mutation (on coumadin goal INR 2.5-3.5)m HTN, Anemia, DVT presenting to the ED w acute hypoxic respiratory failure     #acute hypoxic resp failure secondary to acute on chronic decompensated HF   - discussed w ID Dr Kang  -Labs reviewed, given eosinophil = 0 in setting of steroid administration most likely secondary to steroid use  -patient afebrile, low suspicion of infection  -DC Abx as discussed w ID   -ground glass opacities - started on iv steroids for possible ILD  -acute on chronic diastolic chf  -severe pulm HTN  -severe TR  -c/w Lasix 20mg IV BID FOR ONE MORE DAY (STOP 10/9)  -team discussed with pulm - no need for PE study - due to CXR opacities and inr supratherapeutic  -off O2 patient desaturates to 88% at rest, does not normally wear O2       #Chronic afib with rvr   #Supratheraputic INR   patient reports not eating much during the 2-3days prior to admission and that she usually eats leafy greens daily. Leafy greens decrease efficacy of coumadin, which as she was not eating them could explain the INR   denies new medication, dose adjustment, illness, fever   - rate controlled now, inr still elevated , monitor off coumadin     #hx of prothrombin mutation /dvt/pe  - coumadin life long - goal 2.5-3.5 per pt, monitor inr daily  still supratherapeutic , no active bleeding  -INR remains elevated at 4.43, follow daily     dvt: scd  diet: soft and bite sized, PER NUTRITION NO CONCENTRATED K   GI: PPI  activity as tolerated  Code: DNR/DNI   Handoff: c.w IV antibiotics, steriod, wean off NC as tolerated, PT consult, f/u INR    Ms Gordon is an 84 BRANDO w a PMH of prothrombin mutation (on coumadin goal INR 2.5-3.5)m HTN, Anemia, DVT presenting to the ED w acute hypoxic respiratory failure     #acute hypoxic resp failure secondary to acute on chronic decompensated HF   - discussed w ID Dr Kang  -Labs reviewed, given eosinophil = 0 in setting of steroid administration most likely secondary to steroid use  -patient afebrile, low suspicion of infection  -DC Abx as discussed w ID   -ground glass opacities - started on iv steroids for possible ILD  -acute on chronic diastolic chf  -severe pulm HTN  -severe TR  -c/w Lasix 20mg IV BID FOR ONE MORE DAY (STOP 10/9)  -team discussed with pulm - no need for PE study - due to CXR opacities and inr supratherapeutic  -off O2 patient desaturates to 88% at rest, does not normally wear O2   -[employer]. Spriometry   -      #Chronic afib with rvr   #Supratheraputic INR   patient reports not eating much during the 2-3days prior to admission and that she usually eats leafy greens daily. Leafy greens decrease efficacy of coumadin, which as she was not eating them could explain the INR   denies new medication, dose adjustment, illness, fever   - rate controlled now, inr still elevated , monitor off coumadin     #hx of prothrombin mutation /dvt/pe  - coumadin life long - goal 2.5-3.5 per pt, monitor inr daily  still supratherapeutic , no active bleeding  -INR remains elevated at 4.43, follow daily     dvt: scd  diet: soft and bite sized, PER NUTRITION NO CONCENTRATED K given malnutrition   GI: PPI  activity as tolerated  Code: DNR/DNI   Handoff: PT consult, f/u INR PATIENT ANTICIPATED 10/9    Ms Gordon is an 84 BRANDO w a PMH of prothrombin mutation (on coumadin goal INR 2.5-3.5)m HTN, Anemia, DVT presenting to the ED w acute hypoxic respiratory failure     #acute hypoxic resp failure secondary to acute on chronic decompensated HF   - discussed w ID Dr Kang  -Labs reviewed, given eosinophil = 0 in setting of steroid administration most likely secondary to steroid use  -patient afebrile, low suspicion of infection  -DC Abx as discussed w ID   -ground glass opacities - started on iv steroids for possible ILD  -acute on chronic diastolic chf  -severe pulm HTN  -severe TR  -c/w Lasix 20mg IV BID FOR ONE MORE DAY (STOP 10/9)  -team discussed with pulm - no need for PE study - due to CXR opacities and inr supratherapeutic  -off O2 patient desaturates to 88% at rest, does not normally wear O2   -[employer]. Spriometry         #Chronic afib with rvr   #Supratheraputic INR   patient reports not eating much during the 2-3days prior to admission and that she usually eats leafy greens daily. Leafy greens decrease efficacy of coumadin, which as she was not eating them could explain the INR   denies new medication, dose adjustment, illness, fever   - rate controlled now, inr still elevated , monitor off coumadin   -f/u am ptt  -active type and screen (ordered for AM of 10/9)     #hx of prothrombin mutation /dvt/pe  - coumadin life long - goal 2.5-3.5 per pt, monitor inr daily  still supratherapeutic , no active bleeding  -INR remains elevated at 4.43, follow daily     dvt: scd  diet: soft and bite sized, PER NUTRITION NO CONCENTRATED K given malnutrition   GI: PPI  activity as tolerated  Code: DNR/DNI   Handoff: PT consult, f/u INR PATIENT ANTICIPATED 10/9 , Will need O2 on DC

## 2024-10-08 NOTE — PROGRESS NOTE ADULT - SUBJECTIVE AND OBJECTIVE BOX
JOEY KNAPP 84y Female  MRN#: [id number]   Hospital Day: 5d    SUBJECTIVE  Patient is a 84y old Female who presents with a chief complaint of acute hypoxic respiratory failure (07 Oct 2024 16:31)  Currently admitted to medicine with the primary diagnosis of Sepsis      INTERVAL HPI AND OVERNIGHT EVENTS:  Patient was examined and seen at bedside. This morning she is resting comfortably in bed and reports no issues or overnight events.    REVIEW OF SYMPTOMS:  CONSTITUTIONAL: No weakness, fevers or chills; No headaches  EYES: No visual changes, eye pain, or discharge  ENT: No vertigo; No ear pain or change in hearing; No sore throat or difficulty swallowing  NECK: No pain or stiffness  RESPIRATORY: No cough, wheezing, or hemoptysis; No shortness of breath  CARDIOVASCULAR: No chest pain or palpitations  GASTROINTESTINAL: No abdominal or epigastric pain; No nausea, vomiting, or hematemesis; No diarrhea or constipation; No melena or hematochezia  GENITOURINARY: No dysuria, frequency or hematuria  MUSCULOSKELETAL: No joint pain, no muscle pain, no weakness  NEUROLOGICAL: No numbness or weakness  SKIN: No itching or rashes    OBJECTIVE  PAST MEDICAL & SURGICAL HISTORY  H/O prothrombin mutation    Pulmonary embolism    HTN (hypertension)    Anemia requiring transfusions    Deep vein thrombosis (DVT)    S/P tonsillectomy    H/O: hysterectomy    History of cholecystectomy      ALLERGIES:  penicillin (Unknown)    MEDICATIONS:  STANDING MEDICATIONS  allopurinol 200 milliGRAM(s) Oral daily  chlorhexidine 2% Cloths 1 Application(s) Topical <User Schedule>  methylPREDNISolone sodium succinate Injectable 60 milliGRAM(s) IV Push every 12 hours  metoprolol tartrate 25 milliGRAM(s) Oral every 12 hours  pantoprazole    Tablet 40 milliGRAM(s) Oral before breakfast    PRN MEDICATIONS      VITAL SIGNS: Last 24 Hours  T(C): 36.4 (08 Oct 2024 06:02), Max: 36.6 (07 Oct 2024 19:33)  T(F): 97.6 (08 Oct 2024 06:02), Max: 97.9 (07 Oct 2024 19:33)  HR: 74 (08 Oct 2024 06:02) (74 - 87)  BP: 117/78 (08 Oct 2024 06:02) (103/64 - 117/78)  BP(mean): --  RR: 18 (08 Oct 2024 06:02) (18 - 18)  SpO2: 95% (08 Oct 2024 08:23) (94% - 99%)    LABS:                        12.6   20.03 )-----------( 328      ( 08 Oct 2024 06:58 )             40.3     10-08    135  |  98  |  54[H]  ----------------------------<  126[H]  4.8   |  28  |  0.9    Ca    8.8      08 Oct 2024 06:58    TPro  5.1[L]  /  Alb  3.0[L]  /  TBili  0.8  /  DBili  x   /  AST  16  /  ALT  18  /  AlkPhos  64  10-08    PT/INR - ( 08 Oct 2024 06:58 )   PT: >40.00 sec;   INR: 4.43 ratio         PTT - ( 08 Oct 2024 06:58 )  PTT:38.1 sec  Urinalysis Basic - ( 08 Oct 2024 06:58 )    Color: x / Appearance: x / SG: x / pH: x  Gluc: 126 mg/dL / Ketone: x  / Bili: x / Urobili: x   Blood: x / Protein: x / Nitrite: x   Leuk Esterase: x / RBC: x / WBC x   Sq Epi: x / Non Sq Epi: x / Bacteria: x    Prothrombin Time and INR, Plasma in AM (10.08.24 @ 06:58)   Prothrombin Time, Plasma: >40.00 sec  INR: 4.43            RADIOLOGY:  Summary:   1. Patient is in atrial fibrillation during study.   2. Normal global left ventricular systolic function.   3. LV Ejection Fraction by Rodríguez's Method with a biplane EF of 61 %.   4. Right ventricular volume and pressure overload.   5. The mitral in-flow pattern reveals no discernable A-wave, therefore no comment on diastolic function can be made.   6. Moderately enlarged right ventricle.   7. Mildly increased RV wall thickness.   8. Mildly reduced RV systolic function.   9. Mildly enlarged left atrium. LA volume Index is 39.2 ml/mï¿½.  10. Severely enlarged right atrium.  11. Mild mitral valve regurgitation.  12. Severe tricuspid regurgitation.  13. Estimated pulmonary artery systolic pressure is 70.7 mmHg assuming a right atrial pressure of 15 mmHg, which is consistent with severe pulmonary hypertension.  14. There is no evidence of pericardial effusion.    PHYSICAL EXAM:  CONSTITUTIONAL: No acute distress, well-developed, well-groomed, AAOx3  HEAD: Atraumatic, normocephalic  EYES: EOM intact, PERRLA, conjunctiva and sclera clear  ENT: Supple, no masses, no thyromegaly, no bruits, no JVD; moist mucous membranes  PULMONARY: Clear to auscultation bilaterally; no wheezes, rales, or rhonchi  CARDIOVASCULAR: Regular rate and rhythm; no murmurs, rubs, or gallops  GASTROINTESTINAL: Soft, non-tender, non-distended; bowel sounds present  MUSCULOSKELETAL: 2+ peripheral pulses; no clubbing, no cyanosis, no edema  NEUROLOGY: non-focal  SKIN: No rashes or lesions; warm and dry     JOEY KNAPP 84y Female  MRN#: [id number]   Hospital Day: 5d    SUBJECTIVE  Patient is a 84y old Female who presents with a chief complaint of acute hypoxic respiratory failure (07 Oct 2024 16:31)  Currently admitted to medicine with the primary diagnosis of Sepsis      INTERVAL HPI AND OVERNIGHT EVENTS:  Patient was examined and seen at bedside. This morning she is resting comfortably in bed and is wondering why she was placed on a renal diet when she has no renal or potassium issues     REVIEW OF SYMPTOMS:  CONSTITUTIONAL: No weakness, fevers or chills; No headaches  EYES: No visual changes, eye pain, or discharge  ENT: No vertigo; No ear pain or change in hearing; No sore throat or difficulty swallowing  NECK: No pain or stiffness  RESPIRATORY: No cough, wheezing, or hemoptysis; No shortness of breath  CARDIOVASCULAR: No chest pain or palpitations  GASTROINTESTINAL: No abdominal or epigastric pain  GENITOURINARY: No dysuria, frequency or hematuria  MUSCULOSKELETAL: No joint pain, no muscle pain  NEUROLOGICAL: No numbness  SKIN: No itching or rashes    OBJECTIVE  PAST MEDICAL & SURGICAL HISTORY  H/O prothrombin mutation    Pulmonary embolism    HTN (hypertension)    Anemia requiring transfusions    Deep vein thrombosis (DVT)    S/P tonsillectomy    H/O: hysterectomy    History of cholecystectomy      ALLERGIES:  penicillin (Unknown)    MEDICATIONS:  STANDING MEDICATIONS  allopurinol 200 milliGRAM(s) Oral daily  chlorhexidine 2% Cloths 1 Application(s) Topical <User Schedule>  methylPREDNISolone sodium succinate Injectable 60 milliGRAM(s) IV Push every 12 hours  metoprolol tartrate 25 milliGRAM(s) Oral every 12 hours  pantoprazole    Tablet 40 milliGRAM(s) Oral before breakfast    PRN MEDICATIONS      VITAL SIGNS: Last 24 Hours  T(C): 36.4 (08 Oct 2024 06:02), Max: 36.6 (07 Oct 2024 19:33)  T(F): 97.6 (08 Oct 2024 06:02), Max: 97.9 (07 Oct 2024 19:33)  HR: 74 (08 Oct 2024 06:02) (74 - 87)  BP: 117/78 (08 Oct 2024 06:02) (103/64 - 117/78)  BP(mean): --  RR: 18 (08 Oct 2024 06:02) (18 - 18)  SpO2: 95% (08 Oct 2024 08:23) (94% - 99%)    LABS:                        12.6   20.03 )-----------( 328      ( 08 Oct 2024 06:58 )             40.3     10-08    135  |  98  |  54[H]  ----------------------------<  126[H]  4.8   |  28  |  0.9    Ca    8.8      08 Oct 2024 06:58    TPro  5.1[L]  /  Alb  3.0[L]  /  TBili  0.8  /  DBili  x   /  AST  16  /  ALT  18  /  AlkPhos  64  10-08    PT/INR - ( 08 Oct 2024 06:58 )   PT: >40.00 sec;   INR: 4.43 ratio         PTT - ( 08 Oct 2024 06:58 )  PTT:38.1 sec  Urinalysis Basic - ( 08 Oct 2024 06:58 )    Color: x / Appearance: x / SG: x / pH: x  Gluc: 126 mg/dL / Ketone: x  / Bili: x / Urobili: x   Blood: x / Protein: x / Nitrite: x   Leuk Esterase: x / RBC: x / WBC x   Sq Epi: x / Non Sq Epi: x / Bacteria: x    Prothrombin Time and INR, Plasma in AM (10.08.24 @ 06:58)   Prothrombin Time, Plasma: >40.00 sec  INR: 4.43            RADIOLOGY:  Summary:   1. Patient is in atrial fibrillation during study.   2. Normal global left ventricular systolic function.   3. LV Ejection Fraction by Rodríguez's Method with a biplane EF of 61 %.   4. Right ventricular volume and pressure overload.   5. The mitral in-flow pattern reveals no discernable A-wave, therefore no comment on diastolic function can be made.   6. Moderately enlarged right ventricle.   7. Mildly increased RV wall thickness.   8. Mildly reduced RV systolic function.   9. Mildly enlarged left atrium. LA volume Index is 39.2 ml/mï¿½.  10. Severely enlarged right atrium.  11. Mild mitral valve regurgitation.  12. Severe tricuspid regurgitation.  13. Estimated pulmonary artery systolic pressure is 70.7 mmHg assuming a right atrial pressure of 15 mmHg, which is consistent with severe pulmonary hypertension.  14. There is no evidence of pericardial effusion.    PHYSICAL EXAM:  CONSTITUTIONAL: No acute distress, well-developed, well-groomed, AAOx3  HEAD: Atraumatic, normocephalic  EYES: EOM intact, PERRLA, conjunctiva and sclera clear  ENT: Supple, no masses, no thyromegaly, no bruits, no JVD; moist mucous membranes  PULMONARY: Bibasilar crackles, no rhonchi or wheezing   CARDIOVASCULAR:  irregular rhythm; no murmurs, rubs, or gallops  GASTROINTESTINAL: Soft, non-tender, non-distended; bowel sounds present  MUSCULOSKELETAL: 2+ peripheral pulses; no clubbing, no cyanosis, no edema  NEUROLOGY: non-focal  SKIN: No rashes or lesions; warm and dry

## 2024-10-09 ENCOUNTER — TRANSCRIPTION ENCOUNTER (OUTPATIENT)
Age: 84
End: 2024-10-09

## 2024-10-09 LAB
ALBUMIN SERPL ELPH-MCNC: 3 G/DL — LOW (ref 3.5–5.2)
ALP SERPL-CCNC: 66 U/L — SIGNIFICANT CHANGE UP (ref 30–115)
ALT FLD-CCNC: 18 U/L — SIGNIFICANT CHANGE UP (ref 0–41)
ANION GAP SERPL CALC-SCNC: 8 MMOL/L — SIGNIFICANT CHANGE UP (ref 7–14)
APTT BLD: 35.1 SEC — SIGNIFICANT CHANGE UP (ref 27–39.2)
AST SERPL-CCNC: 17 U/L — SIGNIFICANT CHANGE UP (ref 0–41)
BILIRUB SERPL-MCNC: 0.9 MG/DL — SIGNIFICANT CHANGE UP (ref 0.2–1.2)
BLD GP AB SCN SERPL QL: SIGNIFICANT CHANGE UP
BUN SERPL-MCNC: 55 MG/DL — HIGH (ref 10–20)
CALCIUM SERPL-MCNC: 8.3 MG/DL — LOW (ref 8.4–10.5)
CHLORIDE SERPL-SCNC: 98 MMOL/L — SIGNIFICANT CHANGE UP (ref 98–110)
CO2 SERPL-SCNC: 29 MMOL/L — SIGNIFICANT CHANGE UP (ref 17–32)
CREAT SERPL-MCNC: 0.9 MG/DL — SIGNIFICANT CHANGE UP (ref 0.7–1.5)
EGFR: 63 ML/MIN/1.73M2 — SIGNIFICANT CHANGE UP
GLUCOSE SERPL-MCNC: 121 MG/DL — HIGH (ref 70–99)
HCT VFR BLD CALC: 41.7 % — SIGNIFICANT CHANGE UP (ref 37–47)
HGB BLD-MCNC: 12.8 G/DL — SIGNIFICANT CHANGE UP (ref 12–16)
INR BLD: 3.81 RATIO — HIGH (ref 0.65–1.3)
MCHC RBC-ENTMCNC: 27.1 PG — SIGNIFICANT CHANGE UP (ref 27–31)
MCHC RBC-ENTMCNC: 30.7 G/DL — LOW (ref 32–37)
MCV RBC AUTO: 88.3 FL — SIGNIFICANT CHANGE UP (ref 81–99)
NRBC # BLD: 0 /100 WBCS — SIGNIFICANT CHANGE UP (ref 0–0)
PLATELET # BLD AUTO: 326 K/UL — SIGNIFICANT CHANGE UP (ref 130–400)
PMV BLD: 10.8 FL — HIGH (ref 7.4–10.4)
POTASSIUM SERPL-MCNC: 4.7 MMOL/L — SIGNIFICANT CHANGE UP (ref 3.5–5)
POTASSIUM SERPL-SCNC: 4.7 MMOL/L — SIGNIFICANT CHANGE UP (ref 3.5–5)
PROT SERPL-MCNC: 5 G/DL — LOW (ref 6–8)
PROTHROM AB SERPL-ACNC: >40 SEC — HIGH (ref 9.95–12.87)
RBC # BLD: 4.72 M/UL — SIGNIFICANT CHANGE UP (ref 4.2–5.4)
RBC # FLD: 19.3 % — HIGH (ref 11.5–14.5)
SODIUM SERPL-SCNC: 135 MMOL/L — SIGNIFICANT CHANGE UP (ref 135–146)
WBC # BLD: 23.14 K/UL — HIGH (ref 4.8–10.8)
WBC # FLD AUTO: 23.14 K/UL — HIGH (ref 4.8–10.8)

## 2024-10-09 PROCEDURE — 99232 SBSQ HOSP IP/OBS MODERATE 35: CPT

## 2024-10-09 RX ORDER — SENNOSIDES 8.6 MG
2 TABLET ORAL
Qty: 0 | Refills: 0 | DISCHARGE
Start: 2024-10-09

## 2024-10-09 RX ORDER — PREDNISONE 5 MG/1
40 TABLET ORAL DAILY
Refills: 0 | Status: DISCONTINUED | OUTPATIENT
Start: 2024-10-09 | End: 2024-10-10

## 2024-10-09 RX ORDER — PANTOPRAZOLE SODIUM 40 MG/1
1 TABLET, DELAYED RELEASE ORAL
Qty: 0 | Refills: 0 | DISCHARGE
Start: 2024-10-09

## 2024-10-09 RX ADMIN — Medication 25 MILLIGRAM(S): at 17:05

## 2024-10-09 RX ADMIN — FUROSEMIDE 20 MILLIGRAM(S): 10 INJECTION INTRAVENOUS at 05:54

## 2024-10-09 RX ADMIN — METHYLPREDNISOLONE ACETATE 60 MILLIGRAM(S): 80 INJECTION, SUSPENSION INTRA-ARTICULAR; INTRALESIONAL; INTRAMUSCULAR; SOFT TISSUE at 05:54

## 2024-10-09 RX ADMIN — Medication 200 MILLIGRAM(S): at 11:06

## 2024-10-09 RX ADMIN — FUROSEMIDE 20 MILLIGRAM(S): 10 INJECTION INTRAVENOUS at 13:04

## 2024-10-09 RX ADMIN — CHLORHEXIDINE GLUCONATE ORAL RINSE 1 APPLICATION(S): 1.2 SOLUTION DENTAL at 05:56

## 2024-10-09 RX ADMIN — Medication 25 MILLIGRAM(S): at 05:55

## 2024-10-09 RX ADMIN — PANTOPRAZOLE SODIUM 40 MILLIGRAM(S): 40 TABLET, DELAYED RELEASE ORAL at 05:55

## 2024-10-09 NOTE — DISCHARGE NOTE PROVIDER - CARE PROVIDER_API CALL
Diana Wagner  NP in Family Health  [address] [place] [address]  Phone: [phone]  Fax: [phone]  Follow Up Time:    Diana Wagner  NP in Family Health  [address] [place] [address]  Phone: [phone]  Fax: [phone]  Follow Up Time:     Amilcar Mcgee  Critical Care Medicine  [address] [place] [address]  Phone: [phone]  Fax: [phone]  Follow Up Time: 1 week   Diana Wagner  NP in Family Health  [address] [place] [address]  Phone: [phone]  Fax: [phone]  Follow Up Time:     Amilcar Mcgee  Critical Care Medicine  [address] [place] [address]  Phone: [phone]  Fax: [phone]  Follow Up Time: 1 week    Leonardo Eisenberg  Cardiovascular Disease  [address] [place] [address]  Phone: [phone]  Fax: [phone]  Follow Up Time: 2 weeks

## 2024-10-09 NOTE — DISCHARGE NOTE PROVIDER - CARE PROVIDERS DIRECT ADDRESSES
,DirectAddress_Unknown ,DirectAddress_Unknown,[email or web address] ,[email or web address],[email or web address]

## 2024-10-09 NOTE — PROGRESS NOTE ADULT - SUBJECTIVE AND OBJECTIVE BOX
JOEY KNAPP  [facility] 4I 023 B ([facility]-S 4I)            Patient was evaluated and examined  by bedside,                 REVIEW OF SYSTEMS:  please see pertinent positives mentioned above, all other 12 ROS negative      T(C): , Max: 37 (10-08-24 @ 14:00)  HR: 73 (10-09-24 @ 05:49)  BP: 102/66 (10-09-24 @ 05:49)  RR: 18 (10-09-24 @ 05:49)  SpO2: 91% (10-08-24 @ 20:41)  CAPILLARY BLOOD GLUCOSE          PHYSICAL EXAM:  General: NAD, AAOX3, patient is laying comfortably in bed  HEENT: AT, NC, Supple, NO JVD, NO CB  Lungs: CTA B/L, no wheezing, no rhonchi  CVS: normal S1, S2, RRR, NO M/G/R  Abdomen: soft, bowel sounds present, non-tender, non-distended  Extremities: no edema, no clubbing, no cyanosis, positive peripheral pulses b/l  Neuro: no acute focal neurological deficits  Skin: no rush, no ecchymosis      LAB  CBC  Date: 10-08-24 @ 06:58  Mean cell Gsrbenuyjc33.3  Mean cell Hemoglobin Conc31.3  Mean cell Volum 87.2  Platelet count-Automate 328  RBC Count 4.62  Red Cell Distrib Width18.7  WBC Count20.03  % Albumin, Urine--  Hematocrit 40.3  Hemoglobin 12.6  CBC  Date: 10-07-24 @ 06:08  Mean cell Nnfcdyrkgv07.0  Mean cell Hemoglobin Conc31.8  Mean cell Volum 88.0  Platelet count-Automate 277  RBC Count 4.15  Red Cell Distrib Width18.6  WBC Count14.04  % Albumin, Urine--  Hematocrit 36.5  Hemoglobin 11.6  CBC  Date: 10-06-24 @ 05:53  Mean cell Wbfoqjoxzx51.9  Mean cell Hemoglobin Conc30.6  Mean cell Volum 87.9  Platelet count-Automate 270  RBC Count 4.39  Red Cell Distrib Width19.0  WBC Count12.71  % Albumin, Urine--  Hematocrit 38.6  Hemoglobin 11.8  CBC  Date: 10-05-24 @ 05:52  Mean cell Duiwyrfewq30.7  Mean cell Hemoglobin Conc31.3  Mean cell Volum 85.4  Platelet count-Automate 242  RBC Count 4.12  Red Cell Distrib Width18.8  WBC Count11.16  % Albumin, Urine--  Hematocrit 35.2  Hemoglobin 11.0  CBC  Date: 10-04-24 @ 05:17  Mean cell Kgbnarkogr85.6  Mean cell Hemoglobin Conc30.8  Mean cell Volum 86.4  Platelet count-Automate 155  RBC Count 3.98  Red Cell Distrib Width18.9  WBC Count5.90  % Albumin, Urine--  Hematocrit 34.4  Hemoglobin 10.6  CBC  Date: 10-03-24 @ 05:20  Mean cell Imhvoqqrbl65.7  Mean cell Hemoglobin Conc31.4  Mean cell Volum 85.1  Platelet count-Automate 156  RBC Count 4.15  Red Cell Distrib Width18.9  WBC Count8.44  % Albumin, Urine--  Hematocrit 35.3  Hemoglobin 11.1  CBC  Date: 10-02-24 @ 22:42  Mean cell Yttvpmjgbq49.5  Mean cell Hemoglobin Conc31.3  Mean cell Volum 84.5  Platelet count-Automate 187  RBC Count 4.53  Red Cell Distrib Width18.8  WBC Count10.16  % Albumin, Urine--  Hematocrit 38.3  Hemoglobin 12.0    [facility]  10-08-24 @ 06:58  Blood Gas Arterial-Calcium,Ionized--  Blood Urea Nitrogen, Serum 54 mg/dL[H] [10 - 20]  Carbon Dioxide, Serum28 mmol/L [17 - 32]  Chloride, Serum98 mmol/L [98 - 110]  Creatinie, Serum0.9 mg/dL [0.7 - 1.5]  Glucose, Spgdz327 mg/dL[H] [70 - 99]  Potassium, Serum4.8 mmol/L [3.5 - 5.0]  Sodium, Serum 135 mmol/L [135 - 146]  [facility]  10-07-24 @ 06:08  Blood Gas Arterial-Calcium,Ionized--  Blood Urea Nitrogen, Serum 60 mg/dL[H] [10 - 20]  Carbon Dioxide, Serum28 mmol/L [17 - 32]  Chloride, Serum98 mmol/L [98 - 110]  Creatinie, Serum0.9 mg/dL [0.7 - 1.5]  Glucose, Gopjh087 mg/dL[H] [70 - 99]  Potassium, Serum4.6 mmol/L [3.5 - 5.0]  Sodium, Serum 138 mmol/L [135 - 146]  [facility]  10-06-24 @ 05:53  Blood Gas Arterial-Calcium,Ionized--  Blood Urea Nitrogen, Serum 59 mg/dL[H] [10 - 20]  Carbon Dioxide, Serum29 mmol/L [17 - 32]  Chloride, Serum98 mmol/L [98 - 110]  Creatinie, Serum1.0 mg/dL [0.7 - 1.5]  Glucose, Cklhd882 mg/dL[H] [70 - 99]  Potassium, Serum4.0 mmol/L [3.5 - 5.0]  Sodium, Serum 136 mmol/L [135 - 146]  BMP  10-05-24 @ 05:52  Blood Gas Arterial-Calcium,Ionized--  Blood Urea Nitrogen, Serum 66 mg/dL[HH] [10 - 20] [Critical value:]  Carbon Dioxide, Serum26 mmol/L [17 - 32]  Chloride, Serum99 mmol/L [98 - 110]  Creatinie, Serum1.1 mg/dL [0.7 - 1.5]  Glucose, Gbxqw912 mg/dL[H] [70 - 99]  Potassium, Serum3.7 mmol/L [3.5 - 5.0]  Sodium, Serum 139 mmol/L [135 - 146]        PT/INR - ( 08 Oct 2024 06:58 )   PT: >40.00 sec;   INR: 4.43 ratio         PTT - ( 08 Oct 2024 06:58 )  PTT:38.1 sec      Microbiology:    Culture - Blood (collected 10-02-24 @ 22:42)  Source: .Blood BLOOD  Final Report (10-08-24 @ 17:00):    No growth at 5 days    Culture - Blood (collected 10-02-24 @ 22:42)  Source: .Blood BLOOD  Final Report (10-08-24 @ 17:00):    No growth at 5 days        RADIOLOGY & ADDITIONAL TESTS:        Medications:  allopurinol 200 milliGRAM(s) Oral daily  chlorhexidine 2% Cloths 1 Application(s) Topical <User Schedule>  furosemide   Injectable 20 milliGRAM(s) IV Push two times a day  methylPREDNISolone sodium succinate Injectable 60 milliGRAM(s) IV Push every 12 hours  metoprolol tartrate 25 milliGRAM(s) Oral every 12 hours  pantoprazole    Tablet 40 milliGRAM(s) Oral before breakfast  polyethylene glycol 3350 17 Gram(s) Oral at bedtime  senna 2 Tablet(s) Oral at bedtime        Assessment and Plan:  Ms Knapp is an 84 BRANDO w a PMH of prothrombin mutation (on coumadin goal INR 2.5-3.5)m HTN, Anemia, DVT presenting to the ED w acute hypoxic respiratory failure     #acute hypoxic resp failure secondary to acute on chronic decompensated HF   - discussed w ID Dr Kang  -Labs reviewed, given eosinophil = 0 in setting of steroid administration most likely secondary to steroid use  -patient afebrile, low suspicion of infection  -DC Abx as discussed w ID   -ground glass opacities - started on iv steroids solumedrol 60mg IV BID for possible ILD on 10/3, today is day 7 of this dose will start tapering to prednisone 40mg daily.   -acute on chronic diastolic chf  -severe pulm HTN  -severe TR  -c/w Lasix 20mg IV BID FOR ONE MORE DAY (STOP 10/9)  -team discussed with pulm - no need for PE study - due to CXR opacities and inr supratherapeutic  -off O2 patient desaturates to 88% at rest, does not normally wear O2   -[employer]. Spriometry       #Chronic afib with rvr   #Supratheraputic INR   patient reports not eating much during the 2-3days prior to admission and that she usually eats leafy greens daily. Leafy greens decrease efficacy of coumadin, which as she was not eating them could explain the INR   denies new medication, dose adjustment, illness, fever   - rate controlled now, inr still elevated , monitor off coumadin   -f/u am ptt  -active type and screen (ordered for AM of 10/9)     #hx of prothrombin mutation /dvt/pe  - coumadin life long - goal 2.5-3.5 per pt, monitor inr daily  still supratherapeutic , no active bleeding  -INR remains elevated at 4.43, follow daily     dvt: scd  diet: soft and bite sized, PER NUTRITION NO CONCENTRATED K given malnutrition   GI: PPI  activity as tolerated  Code: DNR/DNI   Handoff: PT consult, f/u INR PATIENT ANTICIPATED 10/9 , Will need O2 on DC        JOEY KNAPP  [facility] 4I 023 B ([facility]-S 4I)            Patient was evaluated and examined  by bedside, she is comfortable, on RA                REVIEW OF SYSTEMS:  please see pertinent positives mentioned above, all other 12 ROS negative      T(C): , Max: 37 (10-08-24 @ 14:00)  HR: 73 (10-09-24 @ 05:49)  BP: 102/66 (10-09-24 @ 05:49)  RR: 18 (10-09-24 @ 05:49)  SpO2: 91% (10-08-24 @ 20:41)  CAPILLARY BLOOD GLUCOSE          PHYSICAL EXAM:  General: NAD, patient is laying comfortably in bed  HEENT: NCAT  Lungs: Diminished sounds at anterior lung fields  CVS: RRR  Abdomen: non-distended  Extremities: no edema      LAB  CBC  Date: 10-08-24 @ 06:58  Mean cell Kqdogeiwqc14.3  Mean cell Hemoglobin Conc31.3  Mean cell Volum 87.2  Platelet count-Automate 328  RBC Count 4.62  Red Cell Distrib Width18.7  WBC Count20.03  % Albumin, Urine--  Hematocrit 40.3  Hemoglobin 12.6  CBC  Date: 10-07-24 @ 06:08  Mean cell Feqhefunel62.0  Mean cell Hemoglobin Conc31.8  Mean cell Volum 88.0  Platelet count-Automate 277  RBC Count 4.15  Red Cell Distrib Width18.6  WBC Count14.04  % Albumin, Urine--  Hematocrit 36.5  Hemoglobin 11.6  CBC  Date: 10-06-24 @ 05:53  Mean cell Revmkahkhb21.9  Mean cell Hemoglobin Conc30.6  Mean cell Volum 87.9  Platelet count-Automate 270  RBC Count 4.39  Red Cell Distrib Width19.0  WBC Count12.71  % Albumin, Urine--  Hematocrit 38.6  Hemoglobin 11.8  CBC  Date: 10-05-24 @ 05:52  Mean cell Ogkcqfuyqk70.7  Mean cell Hemoglobin Conc31.3  Mean cell Volum 85.4  Platelet count-Automate 242  RBC Count 4.12  Red Cell Distrib Width18.8  WBC Count11.16  % Albumin, Urine--  Hematocrit 35.2  Hemoglobin 11.0  CBC  Date: 10-04-24 @ 05:17  Mean cell Mwrgknuuwl39.6  Mean cell Hemoglobin Conc30.8  Mean cell Volum 86.4  Platelet count-Automate 155  RBC Count 3.98  Red Cell Distrib Width18.9  WBC Count5.90  % Albumin, Urine--  Hematocrit 34.4  Hemoglobin 10.6  CBC  Date: 10-03-24 @ 05:20  Mean cell Cdpjhbpxjp66.7  Mean cell Hemoglobin Conc31.4  Mean cell Volum 85.1  Platelet count-Automate 156  RBC Count 4.15  Red Cell Distrib Width18.9  WBC Count8.44  % Albumin, Urine--  Hematocrit 35.3  Hemoglobin 11.1  CBC  Date: 10-02-24 @ 22:42  Mean cell Lyfexztxpb16.5  Mean cell Hemoglobin Conc31.3  Mean cell Volum 84.5  Platelet count-Automate 187  RBC Count 4.53  Red Cell Distrib Width18.8  WBC Count10.16  % Albumin, Urine--  Hematocrit 38.3  Hemoglobin 12.0    BMP  10-08-24 @ 06:58  Blood Gas Arterial-Calcium,Ionized--  Blood Urea Nitrogen, Serum 54 mg/dL[H] [10 - 20]  Carbon Dioxide, Serum28 mmol/L [17 - 32]  Chloride, Serum98 mmol/L [98 - 110]  Creatinie, Serum0.9 mg/dL [0.7 - 1.5]  Glucose, Ufbmq231 mg/dL[H] [70 - 99]  Potassium, Serum4.8 mmol/L [3.5 - 5.0]  Sodium, Serum 135 mmol/L [135 - 146]  [facility]  10-07-24 @ 06:08  Blood Gas Arterial-Calcium,Ionized--  Blood Urea Nitrogen, Serum 60 mg/dL[H] [10 - 20]  Carbon Dioxide, Serum28 mmol/L [17 - 32]  Chloride, Serum98 mmol/L [98 - 110]  Creatinie, Serum0.9 mg/dL [0.7 - 1.5]  Glucose, Prskl026 mg/dL[H] [70 - 99]  Potassium, Serum4.6 mmol/L [3.5 - 5.0]  Sodium, Serum 138 mmol/L [135 - 146]  [facility]  10-06-24 @ 05:53  Blood Gas Arterial-Calcium,Ionized--  Blood Urea Nitrogen, Serum 59 mg/dL[H] [10 - 20]  Carbon Dioxide, Serum29 mmol/L [17 - 32]  Chloride, Serum98 mmol/L [98 - 110]  Creatinie, Serum1.0 mg/dL [0.7 - 1.5]  Glucose, Kvhly400 mg/dL[H] [70 - 99]  Potassium, Serum4.0 mmol/L [3.5 - 5.0]  Sodium, Serum 136 mmol/L [135 - 146]  BMP  10-05-24 @ 05:52  Blood Gas Arterial-Calcium,Ionized--  Blood Urea Nitrogen, Serum 66 mg/dL[HH] [10 - 20] [Critical value:]  Carbon Dioxide, Serum26 mmol/L [17 - 32]  Chloride, Serum99 mmol/L [98 - 110]  Creatinie, Serum1.1 mg/dL [0.7 - 1.5]  Glucose, Tizkg130 mg/dL[H] [70 - 99]  Potassium, Serum3.7 mmol/L [3.5 - 5.0]  Sodium, Serum 139 mmol/L [135 - 146]        PT/INR - ( 08 Oct 2024 06:58 )   PT: >40.00 sec;   INR: 4.43 ratio         PTT - ( 08 Oct 2024 06:58 )  PTT:38.1 sec      Microbiology:    Culture - Blood (collected 10-02-24 @ 22:42)  Source: .Blood BLOOD  Final Report (10-08-24 @ 17:00):    No growth at 5 days    Culture - Blood (collected 10-02-24 @ 22:42)  Source: .Blood BLOOD  Final Report (10-08-24 @ 17:00):    No growth at 5 days        RADIOLOGY & ADDITIONAL TESTS:        Medications:  allopurinol 200 milliGRAM(s) Oral daily  chlorhexidine 2% Cloths 1 Application(s) Topical <User Schedule>  furosemide   Injectable 20 milliGRAM(s) IV Push two times a day  methylPREDNISolone sodium succinate Injectable 60 milliGRAM(s) IV Push every 12 hours  metoprolol tartrate 25 milliGRAM(s) Oral every 12 hours  pantoprazole    Tablet 40 milliGRAM(s) Oral before breakfast  polyethylene glycol 3350 17 Gram(s) Oral at bedtime  senna 2 Tablet(s) Oral at bedtime        Assessment and Plan:  Ms Knapp is an 84 BRANDO w a PMH of prothrombin mutation (on coumadin goal INR 2.5-3.5)m HTN, Anemia, DVT presenting to the ED w acute hypoxic respiratory failure     #acute hypoxic resp failure secondary to acute on chronic decompensated HF   - discussed w ID Dr Kang  -Labs reviewed, given eosinophil = 0 in setting of steroid administration most likely secondary to steroid use  -patient afebrile, low suspicion of infection  -DC Abx as discussed w ID   -ground glass opacities - started on iv steroids solumedrol 60mg IV BID for possible ILD on 10/3, today is day 7 of this dose will start tapering to prednisone 40mg daily. If stable from a respiratory standpoint after oral conversion will plan to d/c on 10/10. Will also obtain a home o2 study  -acute on chronic diastolic chf  -severe pulm HTN  -severe TR  -c/w Lasix 20mg IV BID FOR ONE MORE DAY (STOP 10/9)  -team discussed with pulm - no need for PE study - due to CXR opacities and inr supratherapeutic  -off O2 patient desaturates to 88% at rest, does not normally wear O2   -[employer]. Spirometry       #Chronic afib with rvr   #Supratheraputic INR   patient reports not eating much during the 2-3days prior to admission and that she usually eats leafy greens daily. Leafy greens decrease efficacy of coumadin, which as she was not eating them could explain the INR   denies new medication, dose adjustment, illness, fever   - rate controlled now, inr still elevated , monitor off coumadin   -f/u am ptt-->down to 3.88, will discuss with pharmacy about restarting,likely tomorrow. Patient follows with anticoagulation clinic here at   -active type and screen (ordered for AM of 10/9)     #hx of prothrombin mutation /dvt/pe  - coumadin life long - goal 2.5-3.5 per pt, monitor inr daily  still supratherapeutic , no active bleeding  -INR remains elevated at 3.83, follow daily     dvt: scd  diet: soft and bite sized, PER NUTRITION NO CONCENTRATED K given malnutrition   GI: PPI  activity as tolerated  Code: DNR/DNI   Handoff: PT consult, f/u INR PATIENT ANTICIPATED 10/10 , Will need O2 on DC

## 2024-10-09 NOTE — DISCHARGE NOTE PROVIDER - NSDCMRMEDTOKEN_GEN_ALL_CORE_FT
allopurinol 100 mg oral tablet: 2 tab(s) orally once a day  hydroCHLOROthiazide 25 mg oral tablet: 1 tab(s) orally once a day  metoprolol succinate 50 mg oral capsule, extended release: 1 cap(s) orally once a day  pantoprazole 40 mg oral delayed release tablet: 1 tab(s) orally once a day (before a meal)  senna leaf extract oral tablet: 2 tab(s) orally once a day (at bedtime)  warfarin 2 mg oral tablet: 1 tablet on Mondays, Thursdays, Saturdays and Sundays   allopurinol 100 mg oral tablet: 2 tab(s) orally once a day  hydroCHLOROthiazide 25 mg oral tablet: 1 tab(s) orally once a day  metoprolol succinate 50 mg oral capsule, extended release: 1 cap(s) orally once a day  pantoprazole 40 mg oral delayed release tablet: 1 tab(s) orally once a day (before a meal)  predniSONE 10 mg oral tablet: 4 tab(s) orally once a day take 4 tabs orally once a day for 4 days, then 2 tabs orally once a day for 4 days, then 1 tab orally once a day for 4 days  senna leaf extract oral tablet: 2 tab(s) orally once a day (at bedtime)  warfarin 1 mg oral tablet: 3 times a week Every Tuesday, Wednesday and Friday  warfarin 2 mg oral tablet: 1 tablet on Mondays, Thursdays, Saturdays and Sundays

## 2024-10-09 NOTE — DISCHARGE NOTE PROVIDER - NSDCCPCAREPLAN_GEN_ALL_CORE_FT
PRINCIPAL DISCHARGE DIAGNOSIS  Diagnosis: Sepsis  Assessment and Plan of Treatment: treated and resolved      SECONDARY DISCHARGE DIAGNOSES  Diagnosis: Atrial fibrillation  Assessment and Plan of Treatment: continue with medications as directed    Diagnosis: Acute CHF  Assessment and Plan of Treatment: continue with medications as directed     PRINCIPAL DISCHARGE DIAGNOSIS  Diagnosis: Acute hypoxic respiratory failure  Assessment and Plan of Treatment: secondary to CHF exacerbation  treated with Lasix  please continue your home medications as directed  follow up with your PCP and Pulmonary in 1 week for reassessment      SECONDARY DISCHARGE DIAGNOSES  Diagnosis: Atrial fibrillation  Assessment and Plan of Treatment: continue with medications as directed    Diagnosis: Elevated INR  Assessment and Plan of Treatment: coumadin was held while inpatient  please continue upon discharge  please folow up at Coumadin clinic for further monitoring of INR

## 2024-10-09 NOTE — DISCHARGE NOTE PROVIDER - NSDCFUSCHEDAPPT_GEN_ALL_CORE_FT
Alyssa Hwang  [facility]  Scheduled Appointment: 10/17/2024    [facility]  [facility]  Donavan Valencia  Scheduled Appointment: 10/17/2024

## 2024-10-09 NOTE — DISCHARGE NOTE PROVIDER - PROVIDER TOKENS
PROVIDER:[TOKEN:[[id number]]] PROVIDER:[TOKEN:[[id number]]],PROVIDER:[TOKEN:[[id number]],FOLLOWUP:[1 week]] PROVIDER:[TOKEN:[[id number]]],PROVIDER:[TOKEN:[[id number]],FOLLOWUP:[1 week]],PROVIDER:[TOKEN:[[id number]],FOLLOWUP:[2 weeks]]

## 2024-10-09 NOTE — DISCHARGE NOTE PROVIDER - DETAILS OF MALNUTRITION DIAGNOSIS/DIAGNOSES
This patient has been assessed with a concern for Malnutrition and was treated during this hospitalization for the following Nutrition diagnosis/diagnoses:     -  10/03/2024: Severe protein-calorie malnutrition

## 2024-10-09 NOTE — DISCHARGE NOTE PROVIDER - HOSPITAL COURSE
Ms. Gordon is an 84 BRANDO w a PMH of prothrombin mutation (on coumadin goal INR 2.5-3.5), HTN, Anemia, DVT, presenting to the ED w SOB. Patient was admitted for a primary dx of Sepsis, which was resolved with IV Azithromycin and Ceftriaxone. Patient was diagnosed with acute hypoxic respiratory failure secondary to acute on-chronic decompensated HF, CXR showed ground glass opacities. Patient was started on IV solumedrol 60mg IV BID for possible ILD on 10/3, today is day 7 of this dose will start tapering to prednisone 40mg daily. If stable from a respiratory standpoint after oral conversion, will plan to d/c on 10/10. Will also obtain a home O2 study. TTE showed acute on chronic diastolic CHF, severe pulm HTN, and severe tricuspid regurgitation. Patient was given Lasix 20mg IV BID. Regarding the supratherapeutic INR and hx of prothrombin mutation, monitor daily off coumadin. Patient was advised to c/w home medications as directed, c/w activity as tolerated, and use home O2 as directed. 84 BRANDO w a PMH of prothrombin mutation (on coumadin goal INR 2.5-3.5)m HTN, Anemia, DVT presenting to the ED w acute hypoxic respiratory failure     #acute hypoxic resp failure secondary to acute on chronic decompensated HF   - discussed w ID Dr Kang  -Labs reviewed, given eosinophil = 0 in setting of steroid administration most likely secondary to steroid use  -patient afebrile, low suspicion of infection  -DC Abx as discussed w ID   -ground glass opacities - started on iv steroids solumedrol 60mg IV BID for possible ILD on 10/3, today is day 7 of this dose will start tapering to prednisone 40mg daily.  -acute on chronic diastolic chf  -severe pulm HTN  -severe TR  -s/p Lasix 20mg IV BID   -team discussed with pulm - no need for PE study - due to CXR opacities and inr supratherapeutic  -off O2 patient desaturates to 88% at rest, does not normally wear O2   -[employer]. Spirometry       #Chronic afib with rvr   #Supratheraputic INR   patient reports not eating much during the 2-3days prior to admission and that she usually eats leafy greens daily. Leafy greens decrease efficacy of coumadin, which as she was not eating them could explain the INR   denies new medication, dose adjustment, illness, fever   - rate controlled now, inr still elevated , monitor off coumadin   -f/u am ptt-->down to 3.78, will discuss upon dc. Patient follows with anticoagulation clinic here at   -active type and screen (ordered for AM of 10/9)     #hx of prothrombin mutation /dvt/pe  - coumadin life long - goal 2.5-3.5 per pt, monitor inr daily  still supratherapeutic , no active bleeding  -INR remains elevated at 3.78, follow daily     Pt is medically stable for a hospital discharge 84 BRANDO w a PMH of prothrombin mutation (on coumadin goal INR 2.5-3.5)m HTN, Anemia, DVT presenting to the ED w acute hypoxic respiratory failure     #acute hypoxic resp failure secondary to acute on chronic decompensated HF   - discussed w ID Dr Kang  -patient afebrile, low suspicion of infection  -DC Abx as discussed w ID   -ground glass opacities - started on iv steroids solumedrol 60mg IV BID for possible ILD on 10/3-10/8, started oral prednisone 40mg daily on 10/9, recommended taper plan 40mg daily 10/9-10/12, 20mg daily 10/13-10/16 and then 10mg 10/17-10/20 and stop. Pantoprazole 40mg daily. No need for PJP PPx at this time. If stable from a respiratory standpoint after oral conversion will plan to d/c on 10/10.   -acute on chronic diastolic chf-->status post diuresis with Lasix 20mg IV BID. Patient also has concomitant pulmonary hypertension so preload dependent. Can continue oral Lasix 20mg po every other day as an outpatient until follow-up with cardiology.  -severe pulm HTN  -severe TR  -team discussed with pulm - no need for PE study - due to CXR opacities and inr supratherapeutic  -No O2 requirements        #Chronic afib with rvr   #Supratheraputic INR   patient reports not eating much during the 2-3days prior to admission and that she usually eats leafy greens daily. Leafy greens decrease efficacy of coumadin, which as she was not eating them could explain the INR   denies new medication, dose adjustment, illness, fever   - rate controlled now, inr still elevated , monitor off coumadin -->  -f/u am INR-->3.75 today recommend daily INR until <3.5. Discussed with patient's provider at the anticoagulation clinic; when INR <3.5 can restart home coumadin (Tu/We/Fri 1mg, rest of the days 2mg) and recheck INR in 72 hours from initiation (tentatively plan to restart Coumadin on 10/11 at the nursing facility after checking INR). Goal 2.5-3.5. Patient follows with anticoagulation clinic at [place] (Alyssa Hwang NP) phone number [phone]  -active type and screen (ordered for AM of 10/9)     #hx of prothrombin mutation /dvt/pe  - coumadin life long - goal 2.5-3.5         Pt is medically stable for a hospital discharge

## 2024-10-10 ENCOUNTER — TRANSCRIPTION ENCOUNTER (OUTPATIENT)
Age: 84
End: 2024-10-10

## 2024-10-10 VITALS
TEMPERATURE: 97 F | HEART RATE: 69 BPM | OXYGEN SATURATION: 93 % | RESPIRATION RATE: 17 BRPM | SYSTOLIC BLOOD PRESSURE: 106 MMHG | DIASTOLIC BLOOD PRESSURE: 68 MMHG

## 2024-10-10 LAB
ALBUMIN SERPL ELPH-MCNC: 3 G/DL — LOW (ref 3.5–5.2)
ALP SERPL-CCNC: 69 U/L — SIGNIFICANT CHANGE UP (ref 30–115)
ALT FLD-CCNC: 15 U/L — SIGNIFICANT CHANGE UP (ref 0–41)
ANION GAP SERPL CALC-SCNC: 9 MMOL/L — SIGNIFICANT CHANGE UP (ref 7–14)
AST SERPL-CCNC: 14 U/L — SIGNIFICANT CHANGE UP (ref 0–41)
BILIRUB SERPL-MCNC: 1.1 MG/DL — SIGNIFICANT CHANGE UP (ref 0.2–1.2)
BUN SERPL-MCNC: 56 MG/DL — HIGH (ref 10–20)
CALCIUM SERPL-MCNC: 8.4 MG/DL — SIGNIFICANT CHANGE UP (ref 8.4–10.5)
CHLORIDE SERPL-SCNC: 99 MMOL/L — SIGNIFICANT CHANGE UP (ref 98–110)
CO2 SERPL-SCNC: 30 MMOL/L — SIGNIFICANT CHANGE UP (ref 17–32)
CREAT SERPL-MCNC: 1 MG/DL — SIGNIFICANT CHANGE UP (ref 0.7–1.5)
EGFR: 55 ML/MIN/1.73M2 — LOW
GLUCOSE SERPL-MCNC: 97 MG/DL — SIGNIFICANT CHANGE UP (ref 70–99)
HCT VFR BLD CALC: 43.3 % — SIGNIFICANT CHANGE UP (ref 37–47)
HGB BLD-MCNC: 13.6 G/DL — SIGNIFICANT CHANGE UP (ref 12–16)
INR BLD: 3.78 RATIO — HIGH (ref 0.65–1.3)
MCHC RBC-ENTMCNC: 27.3 PG — SIGNIFICANT CHANGE UP (ref 27–31)
MCHC RBC-ENTMCNC: 31.4 G/DL — LOW (ref 32–37)
MCV RBC AUTO: 86.9 FL — SIGNIFICANT CHANGE UP (ref 81–99)
NRBC # BLD: 0 /100 WBCS — SIGNIFICANT CHANGE UP (ref 0–0)
PLATELET # BLD AUTO: 363 K/UL — SIGNIFICANT CHANGE UP (ref 130–400)
PMV BLD: 11.1 FL — HIGH (ref 7.4–10.4)
POTASSIUM SERPL-MCNC: 4.6 MMOL/L — SIGNIFICANT CHANGE UP (ref 3.5–5)
POTASSIUM SERPL-SCNC: 4.6 MMOL/L — SIGNIFICANT CHANGE UP (ref 3.5–5)
PROT SERPL-MCNC: 5.1 G/DL — LOW (ref 6–8)
PROTHROM AB SERPL-ACNC: >40 SEC — HIGH (ref 9.95–12.87)
RBC # BLD: 4.98 M/UL — SIGNIFICANT CHANGE UP (ref 4.2–5.4)
RBC # FLD: 19.8 % — HIGH (ref 11.5–14.5)
SODIUM SERPL-SCNC: 138 MMOL/L — SIGNIFICANT CHANGE UP (ref 135–146)
WBC # BLD: 26.25 K/UL — HIGH (ref 4.8–10.8)
WBC # FLD AUTO: 26.25 K/UL — HIGH (ref 4.8–10.8)

## 2024-10-10 PROCEDURE — 99239 HOSP IP/OBS DSCHRG MGMT >30: CPT

## 2024-10-10 RX ORDER — FUROSEMIDE 10 MG/ML
1 INJECTION INTRAVENOUS
Qty: 0 | Refills: 0 | DISCHARGE

## 2024-10-10 RX ORDER — FUROSEMIDE 10 MG/ML
20 INJECTION INTRAVENOUS DAILY
Refills: 0 | Status: DISCONTINUED | OUTPATIENT
Start: 2024-10-11 | End: 2024-10-10

## 2024-10-10 RX ORDER — WARFARIN SODIUM 6 MG
0 TABLET ORAL
Qty: 0 | Refills: 0 | DISCHARGE

## 2024-10-10 RX ADMIN — FUROSEMIDE 20 MILLIGRAM(S): 10 INJECTION INTRAVENOUS at 13:01

## 2024-10-10 RX ADMIN — PREDNISONE 40 MILLIGRAM(S): 5 TABLET ORAL at 05:49

## 2024-10-10 RX ADMIN — Medication 25 MILLIGRAM(S): at 05:50

## 2024-10-10 RX ADMIN — Medication 25 MILLIGRAM(S): at 16:51

## 2024-10-10 RX ADMIN — FUROSEMIDE 20 MILLIGRAM(S): 10 INJECTION INTRAVENOUS at 05:48

## 2024-10-10 RX ADMIN — PANTOPRAZOLE SODIUM 40 MILLIGRAM(S): 40 TABLET, DELAYED RELEASE ORAL at 05:50

## 2024-10-10 RX ADMIN — CHLORHEXIDINE GLUCONATE ORAL RINSE 1 APPLICATION(S): 1.2 SOLUTION DENTAL at 05:52

## 2024-10-10 RX ADMIN — Medication 200 MILLIGRAM(S): at 11:05

## 2024-10-10 NOTE — PROGRESS NOTE ADULT - NUTRITIONAL ASSESSMENT
This patient has been assessed with a concern for Malnutrition and has been determined to have a diagnosis/diagnoses of Severe protein-calorie malnutrition.    This patient is being managed with:   Diet Renal Restrictions-  For patients receiving Renal Replacement - No Protein Restr No Conc K No Conc Phos Low Sodium  Soft and Bite Sized (SOFTBTSZ)  Entered: Oct  8 2024  3:16PM    The following pending diet order is being considered for treatment of Severe protein-calorie malnutrition:  Diet Soft and Bite Sized-  Vin(7 Gm Arginine/7 Gm Glut/1.2 Gm HMB     Qty per Day:  2  Entered: Oct  3 2024  8:27PM  
This patient has been assessed with a concern for Malnutrition and has been determined to have a diagnosis/diagnoses of Severe protein-calorie malnutrition.    This patient is being managed with:   Diet Soft and Bite Sized-  Entered: Oct  3 2024  9:31AM    The following pending diet order is being considered for treatment of Severe protein-calorie malnutrition:  Diet Soft and Bite Sized-  Vin(7 Gm Arginine/7 Gm Glut/1.2 Gm HMB     Qty per Day:  2  Entered: Oct  3 2024  8:27PM  
This patient has been assessed with a concern for Malnutrition and has been determined to have a diagnosis/diagnoses of Severe protein-calorie malnutrition.    This patient is being managed with:   Diet Soft and Bite Sized-  Entered: Oct  3 2024  9:31AM    The following pending diet order is being considered for treatment of Severe protein-calorie malnutrition:  Diet Soft and Bite Sized-  Vin(7 Gm Arginine/7 Gm Glut/1.2 Gm HMB     Qty per Day:  2  Entered: Oct  3 2024  8:27PM  
This patient has been assessed with a concern for Malnutrition and has been determined to have a diagnosis/diagnoses of Severe protein-calorie malnutrition.    This patient is being managed with:   Diet Soft and Bite Sized-  No Concentrated Potassium  Entered: Oct  5 2024  1:52PM    The following pending diet order is being considered for treatment of Severe protein-calorie malnutrition:  Diet Soft and Bite Sized-  Vin(7 Gm Arginine/7 Gm Glut/1.2 Gm HMB     Qty per Day:  2  Entered: Oct  3 2024  8:27PM  
This patient has been assessed with a concern for Malnutrition and has been determined to have a diagnosis/diagnoses of Severe protein-calorie malnutrition.    This patient is being managed with:   Diet Soft and Bite Sized-  No Concentrated Potassium  Entered: Oct  5 2024  1:52PM    The following pending diet order is being considered for treatment of Severe protein-calorie malnutrition:  Diet Soft and Bite Sized-  Vin(7 Gm Arginine/7 Gm Glut/1.2 Gm HMB     Qty per Day:  2  Entered: Oct  3 2024  8:27PM  
This patient has been assessed with a concern for Malnutrition and has been determined to have a diagnosis/diagnoses of Severe protein-calorie malnutrition.    This patient is being managed with:   Diet Renal Restrictions-  For patients receiving Renal Replacement - No Protein Restr No Conc K No Conc Phos Low Sodium  Soft and Bite Sized (SOFTBTSZ)  Entered: Oct  8 2024  3:16PM    The following pending diet order is being considered for treatment of Severe protein-calorie malnutrition:  Diet Soft and Bite Sized-  Vin(7 Gm Arginine/7 Gm Glut/1.2 Gm HMB     Qty per Day:  2  Entered: Oct  3 2024  8:27PM  
This patient has been assessed with a concern for Malnutrition and has been determined to have a diagnosis/diagnoses of Severe protein-calorie malnutrition.    This patient is being managed with:   Diet Soft and Bite Sized-  No Concentrated Potassium  Entered: Oct  5 2024  1:52PM    The following pending diet order is being considered for treatment of Severe protein-calorie malnutrition:  Diet Soft and Bite Sized-  Vin(7 Gm Arginine/7 Gm Glut/1.2 Gm HMB     Qty per Day:  2  Entered: Oct  3 2024  8:27PM

## 2024-10-10 NOTE — PROGRESS NOTE ADULT - PROVIDER SPECIALTY LIST ADULT
Hospitalist
Internal Medicine
Internal Medicine
Pulmonology
Hospitalist
MICU

## 2024-10-10 NOTE — DISCHARGE NOTE NURSING/CASE MANAGEMENT/SOCIAL WORK - PATIENT PORTAL LINK FT
You can access the FollowMyHealth Patient Portal offered by [facility] by registering at the following website: [email or web address]. By joining [facility]’s FollowMyHealth portal, you will also be able to view your health information using other applications (apps) compatible with our system.

## 2024-10-10 NOTE — PROGRESS NOTE ADULT - SUBJECTIVE AND OBJECTIVE BOX
JOEY KNAPP  [facility] 4I 023 B ([facility] 4I)        Patient was evaluated and examined  by bedside, no complaints, plan to d/c to [facility] when bed available.        REVIEW OF SYSTEMS:  please see pertinent positives mentioned above, all other 12 ROS negative      T(C): , Max: 36.9 (10-09-24 @ 20:40)  HR: 79 (10-10-24 @ 05:09)  BP: 119/77 (10-10-24 @ 05:09)  RR: 17 (10-10-24 @ 05:09)  SpO2: 92% (10-10-24 @ 05:09)  CAPILLARY BLOOD GLUCOSE          PHYSICAL EXAM:  General: NAD, patient is laying comfortably in bed  HEENT: NCAT  Lungs: Diminished sounds at anterior lung fields  CVS: RRR  Abdomen: non-distended  Extremities: no edema        LAB  CBC  Date: 10-09-24 @ 06:30  Mean cell Baxerptmpl28.1  Mean cell Hemoglobin Conc30.7  Mean cell Volum 88.3  Platelet count-Automate 326  RBC Count 4.72  Red Cell Distrib Width19.3  WBC Count23.14  % Albumin, Urine--  Hematocrit 41.7  Hemoglobin 12.8  CBC  Date: 10-08-24 @ 06:58  Mean cell Opaqixjohz04.3  Mean cell Hemoglobin Conc31.3  Mean cell Volum 87.2  Platelet count-Automate 328  RBC Count 4.62  Red Cell Distrib Width18.7  WBC Count20.03  % Albumin, Urine--  Hematocrit 40.3  Hemoglobin 12.6  CBC  Date: 10-07-24 @ 06:08  Mean cell Tucqxysfom42.0  Mean cell Hemoglobin Conc31.8  Mean cell Volum 88.0  Platelet count-Automate 277  RBC Count 4.15  Red Cell Distrib Width18.6  WBC Count14.04  % Albumin, Urine--  Hematocrit 36.5  Hemoglobin 11.6  CBC  Date: 10-06-24 @ 05:53  Mean cell Fuxxtnqfad91.9  Mean cell Hemoglobin Conc30.6  Mean cell Volum 87.9  Platelet count-Automate 270  RBC Count 4.39  Red Cell Distrib Width19.0  WBC Count12.71  % Albumin, Urine--  Hematocrit 38.6  Hemoglobin 11.8  CBC  Date: 10-05-24 @ 05:52  Mean cell Xhizrxukfp57.7  Mean cell Hemoglobin Conc31.3  Mean cell Volum 85.4  Platelet count-Automate 242  RBC Count 4.12  Red Cell Distrib Width18.8  WBC Count11.16  % Albumin, Urine--  Hematocrit 35.2  Hemoglobin 11.0  CBC  Date: 10-04-24 @ 05:17  Mean cell Tbtxrtrpbh77.6  Mean cell Hemoglobin Conc30.8  Mean cell Volum 86.4  Platelet count-Automate 155  RBC Count 3.98  Red Cell Distrib Width18.9  WBC Count5.90  % Albumin, Urine--  Hematocrit 34.4  Hemoglobin 10.6    [facility]  10-09-24 @ 06:30  Blood Gas Arterial-Calcium,Ionized--  Blood Urea Nitrogen, Serum 55 mg/dL[H] [10 - 20]  Carbon Dioxide, Serum29 mmol/L [17 - 32]  Chloride, Serum98 mmol/L [98 - 110]  Creatinie, Serum0.9 mg/dL [0.7 - 1.5]  Glucose, Yzszg580 mg/dL[H] [70 - 99]  Potassium, Serum4.7 mmol/L [3.5 - 5.0]  Sodium, Serum 135 mmol/L [135 - 146]  [facility]  10-08-24 @ 06:58  Blood Gas Arterial-Calcium,Ionized--  Blood Urea Nitrogen, Serum 54 mg/dL[H] [10 - 20]  Carbon Dioxide, Serum28 mmol/L [17 - 32]  Chloride, Serum98 mmol/L [98 - 110]  Creatinie, Serum0.9 mg/dL [0.7 - 1.5]  Glucose, Cjjgj138 mg/dL[H] [70 - 99]  Potassium, Serum4.8 mmol/L [3.5 - 5.0]  Sodium, Serum 135 mmol/L [135 - 146]  [facility]  10-07-24 @ 06:08  Blood Gas Arterial-Calcium,Ionized--  Blood Urea Nitrogen, Serum 60 mg/dL[H] [10 - 20]  Carbon Dioxide, Serum28 mmol/L [17 - 32]  Chloride, Serum98 mmol/L [98 - 110]  Creatinie, Serum0.9 mg/dL [0.7 - 1.5]  Glucose, Pcltx170 mg/dL[H] [70 - 99]  Potassium, Serum4.6 mmol/L [3.5 - 5.0]  Sodium, Serum 138 mmol/L [135 - 146]  [facility]  10-06-24 @ 05:53  Blood Gas Arterial-Calcium,Ionized--  Blood Urea Nitrogen, Serum 59 mg/dL[H] [10 - 20]  Carbon Dioxide, Serum29 mmol/L [17 - 32]  Chloride, Serum98 mmol/L [98 - 110]  Creatinie, Serum1.0 mg/dL [0.7 - 1.5]  Glucose, Xkvht783 mg/dL[H] [70 - 99]  Potassium, Serum4.0 mmol/L [3.5 - 5.0]  Sodium, Serum 136 mmol/L [135 - 146]        PT/INR - ( 09 Oct 2024 06:30 )   PT: >40.00 sec;   INR: 3.81 ratio         PTT - ( 09 Oct 2024 06:30 )  PTT:35.1 sec      Microbiology:    Culture - Blood (collected 10-02-24 @ 22:42)  Source: .Blood BLOOD  Final Report (10-08-24 @ 17:00):    No growth at 5 days    Culture - Blood (collected 10-02-24 @ 22:42)  Source: .Blood BLOOD  Final Report (10-08-24 @ 17:00):    No growth at 5 days        RADIOLOGY & ADDITIONAL TESTS:        Medications:  allopurinol 200 milliGRAM(s) Oral daily  chlorhexidine 2% Cloths 1 Application(s) Topical <User Schedule>  furosemide   Injectable 20 milliGRAM(s) IV Push two times a day  metoprolol tartrate 25 milliGRAM(s) Oral every 12 hours  pantoprazole    Tablet 40 milliGRAM(s) Oral before breakfast  polyethylene glycol 3350 17 Gram(s) Oral at bedtime  predniSONE   Tablet 40 milliGRAM(s) Oral daily  senna 2 Tablet(s) Oral at bedtime        Assessment and Plan:  Ms Knapp is an 84 BRANDO w a PMH of prothrombin mutation (on coumadin goal INR 2.5-3.5)m HTN, Anemia, DVT presenting to the ED w acute hypoxic respiratory failure     #acute hypoxic resp failure secondary to acute on chronic decompensated HF   - discussed w ID Dr Kang  -patient afebrile, low suspicion of infection  -DC Abx as discussed w ID   -ground glass opacities - started on iv steroids solumedrol 60mg IV BID for possible ILD on 10/3-10/8, started oral prednisone 40mg daily on 10/9, recommended taper plan 40mg daily 10/9-10/12, 20mg daily 10/13-10/16 and then 10mg 10/17-10/20 and stop. Will also need GI PPx while on steroids. No need for PJP PPx at this time. If stable from a respiratory standpoint after oral conversion will plan to d/c on 10/10.   -acute on chronic diastolic chf  -severe pulm HTN  -severe TR  -c/w Lasix 20mg IV BID FOR ONE MORE DAY (STOP 10/9)  -team discussed with pulm - no need for PE study - due to CXR opacities and inr supratherapeutic  -off O2 patient desaturates to 88% at rest, does not normally wear O2   -[employer]. Spirometry       #Chronic afib with rvr   #Supratheraputic INR   patient reports not eating much during the 2-3days prior to admission and that she usually eats leafy greens daily. Leafy greens decrease efficacy of coumadin, which as she was not eating them could explain the INR   denies new medication, dose adjustment, illness, fever   - rate controlled now, inr still elevated , monitor off coumadin -->  -f/u am ptt--> will discuss with pharmacy about restarting, likely today. Patient follows with anticoagulation clinic here at   -active type and screen (ordered for AM of 10/9)     #hx of prothrombin mutation /dvt/pe  - coumadin life long - goal 2.5-3.5 per pt, monitor inr daily  still supratherapeutic , no active bleeding  -INR remains elevated at 3.83, follow daily     dvt: scd  diet: soft and bite sized, PER NUTRITION NO CONCENTRATED K given malnutrition   GI: PPI  activity as tolerated  Code: DNR/DNI   Handoff: PT consult, f/u INR PATIENT ANTICIPATED 10/10 , Will need O2 on DC        JOEY KNAPP  [facility] 4I 023 B ([facility]-S 4I)        Patient was evaluated and examined  by bedside, no complaints, plan to d/c to [facility] when bed available.        REVIEW OF SYSTEMS:  please see pertinent positives mentioned above, all other 12 ROS negative      T(C): , Max: 36.9 (10-09-24 @ 20:40)  HR: 79 (10-10-24 @ 05:09)  BP: 119/77 (10-10-24 @ 05:09)  RR: 17 (10-10-24 @ 05:09)  SpO2: 92% (10-10-24 @ 05:09)  CAPILLARY BLOOD GLUCOSE          PHYSICAL EXAM:  General: NAD, patient is laying comfortably in bed  HEENT: NCAT  Lungs: Diminished sounds at anterior lung fields  CVS: RRR  Abdomen: non-distended  Extremities: no edema        LAB  CBC  Date: 10-09-24 @ 06:30  Mean cell Mudtuvaciq24.1  Mean cell Hemoglobin Conc30.7  Mean cell Volum 88.3  Platelet count-Automate 326  RBC Count 4.72  Red Cell Distrib Width19.3  WBC Count23.14  % Albumin, Urine--  Hematocrit 41.7  Hemoglobin 12.8  CBC  Date: 10-08-24 @ 06:58  Mean cell Sxvzsvvafk64.3  Mean cell Hemoglobin Conc31.3  Mean cell Volum 87.2  Platelet count-Automate 328  RBC Count 4.62  Red Cell Distrib Width18.7  WBC Count20.03  % Albumin, Urine--  Hematocrit 40.3  Hemoglobin 12.6  CBC  Date: 10-07-24 @ 06:08  Mean cell Riroasspav29.0  Mean cell Hemoglobin Conc31.8  Mean cell Volum 88.0  Platelet count-Automate 277  RBC Count 4.15  Red Cell Distrib Width18.6  WBC Count14.04  % Albumin, Urine--  Hematocrit 36.5  Hemoglobin 11.6  CBC  Date: 10-06-24 @ 05:53  Mean cell Jkieldovbq84.9  Mean cell Hemoglobin Conc30.6  Mean cell Volum 87.9  Platelet count-Automate 270  RBC Count 4.39  Red Cell Distrib Width19.0  WBC Count12.71  % Albumin, Urine--  Hematocrit 38.6  Hemoglobin 11.8  CBC  Date: 10-05-24 @ 05:52  Mean cell Amhbgypeyi00.7  Mean cell Hemoglobin Conc31.3  Mean cell Volum 85.4  Platelet count-Automate 242  RBC Count 4.12  Red Cell Distrib Width18.8  WBC Count11.16  % Albumin, Urine--  Hematocrit 35.2  Hemoglobin 11.0  CBC  Date: 10-04-24 @ 05:17  Mean cell Rjftopzwer06.6  Mean cell Hemoglobin Conc30.8  Mean cell Volum 86.4  Platelet count-Automate 155  RBC Count 3.98  Red Cell Distrib Width18.9  WBC Count5.90  % Albumin, Urine--  Hematocrit 34.4  Hemoglobin 10.6    [facility]  10-09-24 @ 06:30  Blood Gas Arterial-Calcium,Ionized--  Blood Urea Nitrogen, Serum 55 mg/dL[H] [10 - 20]  Carbon Dioxide, Serum29 mmol/L [17 - 32]  Chloride, Serum98 mmol/L [98 - 110]  Creatinie, Serum0.9 mg/dL [0.7 - 1.5]  Glucose, Luedq802 mg/dL[H] [70 - 99]  Potassium, Serum4.7 mmol/L [3.5 - 5.0]  Sodium, Serum 135 mmol/L [135 - 146]  [facility]  10-08-24 @ 06:58  Blood Gas Arterial-Calcium,Ionized--  Blood Urea Nitrogen, Serum 54 mg/dL[H] [10 - 20]  Carbon Dioxide, Serum28 mmol/L [17 - 32]  Chloride, Serum98 mmol/L [98 - 110]  Creatinie, Serum0.9 mg/dL [0.7 - 1.5]  Glucose, Rddme615 mg/dL[H] [70 - 99]  Potassium, Serum4.8 mmol/L [3.5 - 5.0]  Sodium, Serum 135 mmol/L [135 - 146]  [facility]  10-07-24 @ 06:08  Blood Gas Arterial-Calcium,Ionized--  Blood Urea Nitrogen, Serum 60 mg/dL[H] [10 - 20]  Carbon Dioxide, Serum28 mmol/L [17 - 32]  Chloride, Serum98 mmol/L [98 - 110]  Creatinie, Serum0.9 mg/dL [0.7 - 1.5]  Glucose, Jbohx185 mg/dL[H] [70 - 99]  Potassium, Serum4.6 mmol/L [3.5 - 5.0]  Sodium, Serum 138 mmol/L [135 - 146]  [facility]  10-06-24 @ 05:53  Blood Gas Arterial-Calcium,Ionized--  Blood Urea Nitrogen, Serum 59 mg/dL[H] [10 - 20]  Carbon Dioxide, Serum29 mmol/L [17 - 32]  Chloride, Serum98 mmol/L [98 - 110]  Creatinie, Serum1.0 mg/dL [0.7 - 1.5]  Glucose, Bpayv303 mg/dL[H] [70 - 99]  Potassium, Serum4.0 mmol/L [3.5 - 5.0]  Sodium, Serum 136 mmol/L [135 - 146]        PT/INR - ( 09 Oct 2024 06:30 )   PT: >40.00 sec;   INR: 3.81 ratio         PTT - ( 09 Oct 2024 06:30 )  PTT:35.1 sec      Microbiology:    Culture - Blood (collected 10-02-24 @ 22:42)  Source: .Blood BLOOD  Final Report (10-08-24 @ 17:00):    No growth at 5 days    Culture - Blood (collected 10-02-24 @ 22:42)  Source: .Blood BLOOD  Final Report (10-08-24 @ 17:00):    No growth at 5 days        RADIOLOGY & ADDITIONAL TESTS:        Medications:  allopurinol 200 milliGRAM(s) Oral daily  chlorhexidine 2% Cloths 1 Application(s) Topical <User Schedule>  furosemide   Injectable 20 milliGRAM(s) IV Push two times a day  metoprolol tartrate 25 milliGRAM(s) Oral every 12 hours  pantoprazole    Tablet 40 milliGRAM(s) Oral before breakfast  polyethylene glycol 3350 17 Gram(s) Oral at bedtime  predniSONE   Tablet 40 milliGRAM(s) Oral daily  senna 2 Tablet(s) Oral at bedtime        Assessment and Plan:  Ms Knapp is an 84 BRANDO w a PMH of prothrombin mutation (on coumadin goal INR 2.5-3.5)m HTN, Anemia, DVT presenting to the ED w acute hypoxic respiratory failure     #acute hypoxic resp failure secondary to acute on chronic decompensated HF   - discussed w ID Dr Kang  -patient afebrile, low suspicion of infection  -DC Abx as discussed w ID   -ground glass opacities - started on iv steroids solumedrol 60mg IV BID for possible ILD on 10/3-10/8, started oral prednisone 40mg daily on 10/9, recommended taper plan 40mg daily 10/9-10/12, 20mg daily 10/13-10/16 and then 10mg 10/17-10/20 and stop. Will also need GI PPx while on steroids. No need for PJP PPx at this time. If stable from a respiratory standpoint after oral conversion will plan to d/c on 10/10.   -acute on chronic diastolic chf  -severe pulm HTN  -severe TR  -c/w Lasix 20mg IV BID FOR ONE MORE DAY (STOP 10/9)  -team discussed with pulm - no need for PE study - due to CXR opacities and inr supratherapeutic  -off O2 patient desaturates to 88% at rest, does not normally wear O2   -[employer]. Spirometry       #Chronic afib with rvr   #Supratheraputic INR   patient reports not eating much during the 2-3days prior to admission and that she usually eats leafy greens daily. Leafy greens decrease efficacy of coumadin, which as she was not eating them could explain the INR   denies new medication, dose adjustment, illness, fever   - rate controlled now, inr still elevated , monitor off coumadin -->  -f/u am INR-->3.75 recommend daily INR until <3.5. Discussed with patient's provider at the anticoagulation clinic; when INR <3.5 can restart home coumadin (Tu/We/Fri 1mg, rest of the days 2mg) and recheck INR in 72 hours from initiation. Goal 2.5-3.5. Patient follows with anticoagulation clinic at [place] (Alyssa Hwang NP) phone number [phone]  -active type and screen (ordered for AM of 10/9)     #hx of prothrombin mutation /dvt/pe  - coumadin life long - goal 2.5-3.5 per pt, monitor inr daily  still supratherapeutic , no active bleeding  -INR remains elevated at 3.75, follow daily     dvt: scd  diet: soft and bite sized, PER NUTRITION NO CONCENTRATED K given malnutrition   GI: PPI  activity as tolerated  Code: DNR/DNI   Handoff: PT consult, f/u INR PATIENT ANTICIPATED 10/10 , Will need O2 on DC

## 2024-10-10 NOTE — DISCHARGE NOTE NURSING/CASE MANAGEMENT/SOCIAL WORK - NSDCVIVACCINE_GEN_ALL_CORE_FT
Tdap; 21-Aug-2022 14:03; Kecia Cedillo (RN); [employer]; [id number] (Exp. Date: 22-Sep-2024); IntraMuscular; Deltoid Left.; 0.5 milliLiter(s); VIS (VIS Published: 09-May-2013, VIS Presented: 21-Aug-2022);

## 2024-10-11 RX ORDER — PREDNISONE 5 MG/1
4 TABLET ORAL
Qty: 28 | Refills: 0
Start: 2024-10-11 | End: 2024-10-22

## 2024-10-16 DIAGNOSIS — I27.20 PULMONARY HYPERTENSION, UNSPECIFIED: ICD-10-CM

## 2024-10-16 DIAGNOSIS — E43 UNSPECIFIED SEVERE PROTEIN-CALORIE MALNUTRITION: ICD-10-CM

## 2024-10-16 DIAGNOSIS — Z11.52 ENCOUNTER FOR SCREENING FOR COVID-19: ICD-10-CM

## 2024-10-16 DIAGNOSIS — I08.1 RHEUMATIC DISORDERS OF BOTH MITRAL AND TRICUSPID VALVES: ICD-10-CM

## 2024-10-16 DIAGNOSIS — Z66 DO NOT RESUSCITATE: ICD-10-CM

## 2024-10-16 DIAGNOSIS — T38.0X5A ADVERSE EFFECT OF GLUCOCORTICOIDS AND SYNTHETIC ANALOGUES, INITIAL ENCOUNTER: ICD-10-CM

## 2024-10-16 DIAGNOSIS — J18.9 PNEUMONIA, UNSPECIFIED ORGANISM: ICD-10-CM

## 2024-10-16 DIAGNOSIS — Z88.0 ALLERGY STATUS TO PENICILLIN: ICD-10-CM

## 2024-10-16 DIAGNOSIS — I48.20 CHRONIC ATRIAL FIBRILLATION, UNSPECIFIED: ICD-10-CM

## 2024-10-16 DIAGNOSIS — Z79.01 LONG TERM (CURRENT) USE OF ANTICOAGULANTS: ICD-10-CM

## 2024-10-16 DIAGNOSIS — D68.52 PROTHROMBIN GENE MUTATION: ICD-10-CM

## 2024-10-16 DIAGNOSIS — J96.01 ACUTE RESPIRATORY FAILURE WITH HYPOXIA: ICD-10-CM

## 2024-10-16 DIAGNOSIS — Z90.710 ACQUIRED ABSENCE OF BOTH CERVIX AND UTERUS: ICD-10-CM

## 2024-10-16 DIAGNOSIS — E83.42 HYPOMAGNESEMIA: ICD-10-CM

## 2024-10-16 DIAGNOSIS — B35.1 TINEA UNGUIUM: ICD-10-CM

## 2024-10-16 DIAGNOSIS — Z86.718 PERSONAL HISTORY OF OTHER VENOUS THROMBOSIS AND EMBOLISM: ICD-10-CM

## 2024-10-16 DIAGNOSIS — Z90.49 ACQUIRED ABSENCE OF OTHER SPECIFIED PARTS OF DIGESTIVE TRACT: ICD-10-CM

## 2024-10-16 DIAGNOSIS — Z86.711 PERSONAL HISTORY OF PULMONARY EMBOLISM: ICD-10-CM

## 2024-10-16 DIAGNOSIS — I50.33 ACUTE ON CHRONIC DIASTOLIC (CONGESTIVE) HEART FAILURE: ICD-10-CM

## 2024-10-16 DIAGNOSIS — L60.0 INGROWING NAIL: ICD-10-CM

## 2024-10-16 DIAGNOSIS — I11.0 HYPERTENSIVE HEART DISEASE WITH HEART FAILURE: ICD-10-CM

## 2024-12-07 NOTE — DISCHARGE NOTE NURSING/CASE MANAGEMENT/SOCIAL WORK - NSDCPETBCESMAN_GEN_ALL_CORE
If you are a smoker, it is important for your health to stop smoking. Please be aware that second hand smoke is also harmful. Negative Screen

## 2024-12-26 ENCOUNTER — OUTPATIENT (OUTPATIENT)
Dept: OUTPATIENT SERVICES | Facility: HOSPITAL | Age: 84
LOS: 1 days | End: 2024-12-26
Payer: COMMERCIAL

## 2024-12-26 ENCOUNTER — APPOINTMENT (OUTPATIENT)
Dept: MEDICATION MANAGEMENT | Facility: CLINIC | Age: 84
End: 2024-12-26

## 2024-12-26 DIAGNOSIS — I48.91 UNSPECIFIED ATRIAL FIBRILLATION: ICD-10-CM

## 2024-12-26 DIAGNOSIS — Z90.49 ACQUIRED ABSENCE OF OTHER SPECIFIED PARTS OF DIGESTIVE TRACT: Chronic | ICD-10-CM

## 2024-12-26 DIAGNOSIS — Z90.89 ACQUIRED ABSENCE OF OTHER ORGANS: Chronic | ICD-10-CM

## 2024-12-26 DIAGNOSIS — Z90.710 ACQUIRED ABSENCE OF BOTH CERVIX AND UTERUS: Chronic | ICD-10-CM

## 2024-12-26 DIAGNOSIS — Z79.01 LONG TERM (CURRENT) USE OF ANTICOAGULANTS: ICD-10-CM

## 2024-12-26 LAB
INR PPP: 2.1 RATIO
QUALITY CONTROL: YES

## 2024-12-26 PROCEDURE — G0463: CPT

## 2024-12-30 ENCOUNTER — APPOINTMENT (OUTPATIENT)
Dept: MEDICATION MANAGEMENT | Facility: CLINIC | Age: 84
End: 2024-12-30

## 2024-12-30 ENCOUNTER — OUTPATIENT (OUTPATIENT)
Dept: OUTPATIENT SERVICES | Facility: HOSPITAL | Age: 84
LOS: 1 days | End: 2024-12-30
Payer: COMMERCIAL

## 2024-12-30 DIAGNOSIS — Z79.01 LONG TERM (CURRENT) USE OF ANTICOAGULANTS: ICD-10-CM

## 2024-12-30 DIAGNOSIS — Z90.49 ACQUIRED ABSENCE OF OTHER SPECIFIED PARTS OF DIGESTIVE TRACT: Chronic | ICD-10-CM

## 2024-12-30 DIAGNOSIS — I48.91 UNSPECIFIED ATRIAL FIBRILLATION: ICD-10-CM

## 2024-12-30 DIAGNOSIS — Z90.710 ACQUIRED ABSENCE OF BOTH CERVIX AND UTERUS: Chronic | ICD-10-CM

## 2024-12-30 DIAGNOSIS — Z90.89 ACQUIRED ABSENCE OF OTHER ORGANS: Chronic | ICD-10-CM

## 2024-12-30 LAB
INR PPP: 3.4 RATIO
QUALITY CONTROL: YES

## 2024-12-30 PROCEDURE — G0463: CPT

## 2025-01-05 NOTE — PROGRESS NOTE ADULT - ASSESSMENT
83F PMHx DVT/PE and pA-Fib on Coumadin, recent h/o necrotizing pneumonia 2/2 strep pneumo resulting in septic shocking requiring ICU, h/o anemia requiring transfusions, HTN, HLD, gout, h/o cholecystectomy presents to the ED for chest pain.      A/P   # CAP with risk of gram negative pneumonia/Nodular opacities/ SIRS POA ( no evidence of sepsis)/ recent h/o of necrotizing pneumonia 2/2 strep pneumo  - consulted by ID, recommendations noted:   - continue vancomycin -- decrease to 750 mg q 12 hours   - continue cefepime -- increase to 1g q 8 hours   - check procalcitonin   - check MRSA nares   -- if negative, stop vancomycin   - check urine legionella and urine strep antigen   - afebrile, satting well on RA  - patient has refused work up such as bronchoscopy in the past  - Robitussin-DM 10ml q8hrs for 3 days  - Sodium Chloride 0.9% Nebs q6hrs   - pt is comfortable on RA now, afebrile     #MAGDALENO/ Elevated Lactate  - resolved after IV hydration   - lactic acidosis resolved too   - s/p 2L LR Bolus in ED  - monitor BUN/cr and urine output     # h/o DVT/PE  - CTA is negative for PE  - INR is slightly above theraputic range   - will f/u INR in AM and c/w Coumadin as per INR  - pt refused NOACs     #p-AFib/ HTN/ HLD  - c/w Coumadin as per INR   - c/w Metoprolol ER 100mg as Metoprolol Tartrate 50mg BID  - HCTZ 25mg held on admission     #Gout  - c/w Allopurinol 100mg daily    #DVT ppx: on Coumadin   #GI ppx: PPI PO daily  #Diet: regular, DASH/TLC  #Activity: AAT    #Progress Note Handoff  Pending (specify):  decrease  Vancomycin to 750 mg q 12 hours , increase cefepime to 1g q 8 hours ,check procalcitonin ,check MRSA nares,  if negative, stop vancomycin ,check urine legionella and urine strep antigen, hold Coumadin tonight, check INR in AM and resume Coumadin, change diet to low fiber, start Probiotics, OOB to chair, PT/rehab eval , d/c SQ Heparin    Family discussion: I spoke with pt, she agreed with a plan of care   Disposition: Home___/SNF___/Other________/Unknown at this time____x ____
83F PMHx DVT/PE and pA-Fib on Coumadin, recent h/o necrotizing pneumonia 2/2 strep pneumo resulting in septic shocking requiring ICU, h/o anemia requiring transfusions, HTN, HLD, gout, h/o cholecystectomy presents to the ED for chest pain.      A/P   # CAP with risk of gram negative pneumonia/Nodular opacities/ SIRS POA ( no evidence of sepsis)/ recent h/o of necrotizing pneumonia 2/2 strep pneumo  - consulted by ID, recommendations noted:   - continue vancomycin  750 mg q 12 hours, check nasal swab for MRSA if negative d/c Vanc   - continue cefepime  1g q 8 hours   -  procalcitonin level noted   - f/u  urine legionella and urine strep antigen   - afebrile, satting well on RA  - patient has refused work up such as bronchoscopy in the past  - Robitussin-DM 10ml q8hrs for 3 days  - Sodium Chloride 0.9% Nebs q6hrs   - pt is comfortable on RA now, afebrile     # Diarrhea  - likely due to side effect of Abx  - GI PCR is negative, may give Imodium  - low fiber diet, c/w probiotics  - monitor and replete electrolytes     #MAGDALENO/ Elevated Lactate  - resolved after IV hydration   - lactic acidosis resolved too   - s/p 2L LR Bolus in ED  - monitor BUN/cr and urine output     # h/o DVT/PE  - CTA is negative for PE  - give Coumadin 1 mg tonight , as per INR   - pt refused NOACs     #p-AFib/ HTN/ HLD  - c/w Coumadin as per INR   - c/w Metoprolol ER 100mg as Metoprolol Tartrate 50mg BID  - HCTZ 25mg held on admission     #Gout  - c/w Allopurinol 100mg daily    #DVT ppx: on Coumadin   #GI ppx: PPI PO daily  #Diet: regular, DASH/TLC  #Activity: AAT    #Progress Note Handoff  Pending (specify):  check MRSA nares,  if negative, stop vancomycin, f/u  urine legionella and urine strep antigen, give  Coumadin 1 mg  tonight, check INR in AM, replete potassium and Mg, OOB to chair, PT/rehab eval    Family discussion: I spoke with pt, she agreed with a plan of care   Disposition: Home___/SNF___/Other________/Unknown at this time____x ____
83F PMHx DVT/PE and pA-Fib on Coumadin, recent h/o necrotizing pneumonia 2/2 strep pneumo resulting in septic shocking requiring ICU, h/o anemia requiring transfusions, HTN, HLD, gout, h/o cholecystectomy presents to the ED for chest pain.      A/P   # CAP with risk of gram negative pneumonia/Nodular opacities/ SIRS POA ( no evidence of sepsis)/ recent h/o of necrotizing pneumonia 2/2 strep pneumo  - consulted by ID, recommendations noted:   - continue levofloxacin 750 mg daily (end date 9/23)   - will need repeat CXR in 4 weeks   - afebrile, satting well on RA  - patient has refused work up such as bronchoscopy in the past  - Robitussin-DM 10ml q8hrs for 3 days  - Sodium Chloride 0.9% Nebs q6hrs   - pt is comfortable on RA now, afebrile     # Diarrhea  - resolved   - likely due to side effect of Abx  - GI PCR is negative, Imodium PRN   - low fiber diet, c/w probiotics  - monitor and replete electrolytes     #MAGDALENO/ Elevated Lactate  - resolved after IV hydration   - lactic acidosis resolved too   - s/p 2L LR Bolus in ED  - monitor BUN/cr and urine output     # h/o DVT/PE  - CTA is negative for PE  - c/w  Coumadin as per INR   - pt refused NOACs     #p-AFib/ HTN/ HLD  - c/w Coumadin as per INR   - c/w Metoprolol ER 100mg as Metoprolol Tartrate 50mg BID  - HCTZ 25mg held on admission     #Gout  - c/w Allopurinol 100mg daily    #DVT ppx: on Coumadin   #GI ppx: PPI PO daily  #Diet: regular, DASH/TLC  #Activity: AAT    #Progress Note Handoff  Pending (specify):  pt is stable for discharge home today, will reinstated home care services ( discussed with )    Family discussion: I spoke with pt, she agreed with a plan of care   Disposition: Home___/SNF___/Other________/Unknown at this time____x ____
83F PMHx DVT/PE and pA-Fib on Coumadin, recent h/o necrotizing pneumonia 2/2 strep pneumo resulting in septic shocking requiring ICU, h/o anemia requiring transfusions, HTN, HLD, gout, h/o cholecystectomy presents to the ED for chest pain.      A/P   # CAP with risk of gram negative pneumonia/Nodular opacities/ SIRS POA ( no evidence of sepsis)/ recent h/o of necrotizing pneumonia 2/2 strep pneumo  - consulted by ID, recommendations noted:   - continue vancomycin -- decrease to 750 mg q 12 hours   - continue cefepime -- increase to 1g q 8 hours   - check procalcitonin   - check MRSA nares   -- if negative, stop vancomycin   - check urine legionella and urine strep antigen   - afebrile, satting well on RA  - patient has refused work up such as bronchoscopy in the past  - Robitussin-DM 10ml q8hrs for 3 days  - pt is comfortable on RA now, afebrile   - Incentive spirometry    #MAGDALENO/ Elevated Lactate  - resolved after IV hydration   - lactic acidosis resolved too   - s/p 2L LR Bolus in ED  - monitor BUN/cr and urine output   - Replete potassium    # h/o DVT/PE  - CTA is negative for PE  - INR is slightly above theraputic range   - will f/u INR in AM and c/w Coumadin as per INR  - pt out of bed to chair    #p-AFib/ HTN/ HLD  - c/w Coumadin as per INR   - c/w Metoprolol ER 100mg as Metoprolol Tartrate 50mg BID  - HCTZ 25mg held on admission     #Gout  - c/w Allopurinol 100mg daily    #DVT ppx: on Coumadin   #GI ppx: PPI PO daily  #Diet: regular, DASH/TLC  #Activity: AAT  
  ASSESSMENT  83F PMHx DVT/PE and pA-Fib on Coumadin, recent h/o necrotizing pneumonia 2/2 strep pneumo resulting in septic shocking requiring ICU, h/o anemia requiring transfusions, HTN, HLD, gout and h/o cholecystectomy presents to the ED for chest pain.    IMPRESSION  #Severe Sepsis present on admission  #CAP with risk of gram negative pneumonia/Nodular opacities  -  CT Angio Chest PE Protocol w/ IV Cont (09.16.23 @ 11:29): IMPRESSION: 1.  No pulmonary embolus. 2.  Since March 29, 2023, new and increased patchy bilateral nodular  opacities, which may be infectious/inflammatory or neoplastic in etiology. 3.  Chronic bilateral mosaic attenuation, interlobular septal thickening  and fibrotic changes. 4.  Unchanged bulky lower cervical and thoracic lymphadenopathy.    #Elevated T bili    #Hx of RLL pneumonia with  Strep pneumoniaebacteremia  - Blood Cx 3/27/2023 +    - Blood Cx 3/29 NG    #Atrial Fibrillation on coumadin  #Obesity BMI (kg/m2): 28.3, 28.3  #Abx allergy: No Known Allergies        RECOMMENDATIONS  - continue levofloxacin 750 mg daily (end date 9/23)   - will need repeat CXR in 4 weeks     Please call or message on Microsoft Teams if with any questions.  Spectra [phone]  
Patient/Caregiver provided printed discharge information.

## 2025-01-06 ENCOUNTER — APPOINTMENT (OUTPATIENT)
Dept: MEDICATION MANAGEMENT | Facility: CLINIC | Age: 85
End: 2025-01-06

## 2025-01-06 ENCOUNTER — OUTPATIENT (OUTPATIENT)
Dept: OUTPATIENT SERVICES | Facility: HOSPITAL | Age: 85
LOS: 1 days | End: 2025-01-06
Payer: COMMERCIAL

## 2025-01-06 DIAGNOSIS — I48.91 UNSPECIFIED ATRIAL FIBRILLATION: ICD-10-CM

## 2025-01-06 DIAGNOSIS — Z90.49 ACQUIRED ABSENCE OF OTHER SPECIFIED PARTS OF DIGESTIVE TRACT: Chronic | ICD-10-CM

## 2025-01-06 DIAGNOSIS — Z79.01 LONG TERM (CURRENT) USE OF ANTICOAGULANTS: ICD-10-CM

## 2025-01-06 DIAGNOSIS — Z90.89 ACQUIRED ABSENCE OF OTHER ORGANS: Chronic | ICD-10-CM

## 2025-01-06 DIAGNOSIS — Z90.710 ACQUIRED ABSENCE OF BOTH CERVIX AND UTERUS: Chronic | ICD-10-CM

## 2025-01-06 LAB
INR PPP: 2.7 RATIO
QUALITY CONTROL: YES

## 2025-01-06 PROCEDURE — 98012 SYNCH AUDIO-ONLY EST SF 10: CPT

## 2025-01-17 ENCOUNTER — OUTPATIENT (OUTPATIENT)
Dept: OUTPATIENT SERVICES | Facility: HOSPITAL | Age: 85
LOS: 1 days | End: 2025-01-17
Payer: COMMERCIAL

## 2025-01-17 ENCOUNTER — APPOINTMENT (OUTPATIENT)
Dept: MEDICATION MANAGEMENT | Facility: CLINIC | Age: 85
End: 2025-01-17

## 2025-01-17 DIAGNOSIS — Z79.01 LONG TERM (CURRENT) USE OF ANTICOAGULANTS: ICD-10-CM

## 2025-01-17 DIAGNOSIS — Z90.89 ACQUIRED ABSENCE OF OTHER ORGANS: Chronic | ICD-10-CM

## 2025-01-17 DIAGNOSIS — Z90.49 ACQUIRED ABSENCE OF OTHER SPECIFIED PARTS OF DIGESTIVE TRACT: Chronic | ICD-10-CM

## 2025-01-17 DIAGNOSIS — I48.91 UNSPECIFIED ATRIAL FIBRILLATION: ICD-10-CM

## 2025-01-17 DIAGNOSIS — Z90.710 ACQUIRED ABSENCE OF BOTH CERVIX AND UTERUS: Chronic | ICD-10-CM

## 2025-01-17 LAB
INR PPP: 3.3 RATIO
QUALITY CONTROL: YES

## 2025-01-17 PROCEDURE — 98012 SYNCH AUDIO-ONLY EST SF 10: CPT

## 2025-01-22 NOTE — PHYSICAL THERAPY INITIAL EVALUATION ADULT - ASSISTIVE DEVICE FOR TRANSFER: SIT/STAND, REHAB EVAL
Thank you for visiting [facility] [place]!    Your next infusion is scheduled for:1/29/2025 at 330pm    [facility]  [phone]    Due to our current patient volumes, if you arrive more than 15 minutes late to clinic, we may not be able to accommodate you and ask you to reschedule your child’s appointment.   Side Effects  The following is a list of some common side effects from this medicine. Please speak with your doctor about what you should do if you experience these or other side effects.  lack of energy and tiredness  headaches  reaction at the area of the injection (pain, redness, swelling)    Call your doctor or get medical help right away if you notice more serious side effects.    A few people may have an allergic reaction to this medicine. Symptoms can include difficulty breathing, skin rash, itching, swelling, nausea, or severe dizziness. If you notice any of these symptoms, seek medical help quickly.    rolling walker

## 2025-01-29 ENCOUNTER — APPOINTMENT (OUTPATIENT)
Dept: MEDICATION MANAGEMENT | Facility: CLINIC | Age: 85
End: 2025-01-29

## 2025-01-29 ENCOUNTER — OUTPATIENT (OUTPATIENT)
Dept: OUTPATIENT SERVICES | Facility: HOSPITAL | Age: 85
LOS: 1 days | End: 2025-01-29
Payer: COMMERCIAL

## 2025-01-29 DIAGNOSIS — Z90.710 ACQUIRED ABSENCE OF BOTH CERVIX AND UTERUS: Chronic | ICD-10-CM

## 2025-01-29 DIAGNOSIS — Z90.49 ACQUIRED ABSENCE OF OTHER SPECIFIED PARTS OF DIGESTIVE TRACT: Chronic | ICD-10-CM

## 2025-01-29 DIAGNOSIS — Z90.89 ACQUIRED ABSENCE OF OTHER ORGANS: Chronic | ICD-10-CM

## 2025-01-29 DIAGNOSIS — Z79.01 LONG TERM (CURRENT) USE OF ANTICOAGULANTS: ICD-10-CM

## 2025-01-29 DIAGNOSIS — I48.91 UNSPECIFIED ATRIAL FIBRILLATION: ICD-10-CM

## 2025-01-29 LAB
INR PPP: 2.4 RATIO
QUALITY CONTROL: YES

## 2025-01-29 PROCEDURE — 98012 SYNCH AUDIO-ONLY EST SF 10: CPT

## 2025-02-10 ENCOUNTER — APPOINTMENT (OUTPATIENT)
Dept: MEDICATION MANAGEMENT | Facility: CLINIC | Age: 85
End: 2025-02-10

## 2025-02-10 ENCOUNTER — OUTPATIENT (OUTPATIENT)
Dept: OUTPATIENT SERVICES | Facility: HOSPITAL | Age: 85
LOS: 1 days | End: 2025-02-10
Payer: MEDICARE

## 2025-02-10 DIAGNOSIS — Z79.01 LONG TERM (CURRENT) USE OF ANTICOAGULANTS: ICD-10-CM

## 2025-02-10 DIAGNOSIS — Z90.49 ACQUIRED ABSENCE OF OTHER SPECIFIED PARTS OF DIGESTIVE TRACT: Chronic | ICD-10-CM

## 2025-02-10 DIAGNOSIS — Z90.89 ACQUIRED ABSENCE OF OTHER ORGANS: Chronic | ICD-10-CM

## 2025-02-10 DIAGNOSIS — I48.91 UNSPECIFIED ATRIAL FIBRILLATION: ICD-10-CM

## 2025-02-10 DIAGNOSIS — Z90.710 ACQUIRED ABSENCE OF BOTH CERVIX AND UTERUS: Chronic | ICD-10-CM

## 2025-02-10 LAB
INR PPP: 2 RATIO
QUALITY CONTROL: YES

## 2025-02-10 PROCEDURE — 98012 SYNCH AUDIO-ONLY EST SF 10: CPT

## 2025-02-18 ENCOUNTER — APPOINTMENT (OUTPATIENT)
Dept: MEDICATION MANAGEMENT | Facility: CLINIC | Age: 85
End: 2025-02-18

## 2025-02-18 ENCOUNTER — OUTPATIENT (OUTPATIENT)
Dept: OUTPATIENT SERVICES | Facility: HOSPITAL | Age: 85
LOS: 1 days | End: 2025-02-18
Payer: MEDICARE

## 2025-02-18 DIAGNOSIS — I48.91 UNSPECIFIED ATRIAL FIBRILLATION: ICD-10-CM

## 2025-02-18 DIAGNOSIS — Z90.49 ACQUIRED ABSENCE OF OTHER SPECIFIED PARTS OF DIGESTIVE TRACT: Chronic | ICD-10-CM

## 2025-02-18 DIAGNOSIS — Z79.01 LONG TERM (CURRENT) USE OF ANTICOAGULANTS: ICD-10-CM

## 2025-02-18 DIAGNOSIS — Z90.89 ACQUIRED ABSENCE OF OTHER ORGANS: Chronic | ICD-10-CM

## 2025-02-18 LAB
INR PPP: 2.5 RATIO
QUALITY CONTROL: YES

## 2025-02-18 PROCEDURE — 98012 SYNCH AUDIO-ONLY EST SF 10: CPT

## 2025-02-24 ENCOUNTER — APPOINTMENT (OUTPATIENT)
Dept: MEDICATION MANAGEMENT | Facility: CLINIC | Age: 85
End: 2025-02-24

## 2025-02-24 ENCOUNTER — OUTPATIENT (OUTPATIENT)
Dept: OUTPATIENT SERVICES | Facility: HOSPITAL | Age: 85
LOS: 1 days | End: 2025-02-24
Payer: MEDICARE

## 2025-02-24 DIAGNOSIS — I48.91 UNSPECIFIED ATRIAL FIBRILLATION: ICD-10-CM

## 2025-02-24 DIAGNOSIS — Z90.710 ACQUIRED ABSENCE OF BOTH CERVIX AND UTERUS: Chronic | ICD-10-CM

## 2025-02-24 DIAGNOSIS — Z90.89 ACQUIRED ABSENCE OF OTHER ORGANS: Chronic | ICD-10-CM

## 2025-02-24 DIAGNOSIS — Z90.49 ACQUIRED ABSENCE OF OTHER SPECIFIED PARTS OF DIGESTIVE TRACT: Chronic | ICD-10-CM

## 2025-02-24 DIAGNOSIS — Z79.01 LONG TERM (CURRENT) USE OF ANTICOAGULANTS: ICD-10-CM

## 2025-02-24 LAB
INR PPP: 2.7 RATIO
QUALITY CONTROL: YES

## 2025-02-24 PROCEDURE — 98012 SYNCH AUDIO-ONLY EST SF 10: CPT

## 2025-03-05 ENCOUNTER — OUTPATIENT (OUTPATIENT)
Dept: OUTPATIENT SERVICES | Facility: HOSPITAL | Age: 85
LOS: 1 days | End: 2025-03-05
Payer: MEDICARE

## 2025-03-05 ENCOUNTER — APPOINTMENT (OUTPATIENT)
Dept: MEDICATION MANAGEMENT | Facility: CLINIC | Age: 85
End: 2025-03-05

## 2025-03-05 DIAGNOSIS — I48.91 UNSPECIFIED ATRIAL FIBRILLATION: ICD-10-CM

## 2025-03-05 DIAGNOSIS — Z90.710 ACQUIRED ABSENCE OF BOTH CERVIX AND UTERUS: Chronic | ICD-10-CM

## 2025-03-05 DIAGNOSIS — Z79.01 LONG TERM (CURRENT) USE OF ANTICOAGULANTS: ICD-10-CM

## 2025-03-05 DIAGNOSIS — Z90.49 ACQUIRED ABSENCE OF OTHER SPECIFIED PARTS OF DIGESTIVE TRACT: Chronic | ICD-10-CM

## 2025-03-05 DIAGNOSIS — Z90.89 ACQUIRED ABSENCE OF OTHER ORGANS: Chronic | ICD-10-CM

## 2025-03-05 LAB
INR PPP: 2.9 RATIO
QUALITY CONTROL: YES

## 2025-03-05 PROCEDURE — 98012 SYNCH AUDIO-ONLY EST SF 10: CPT

## 2025-03-19 ENCOUNTER — OUTPATIENT (OUTPATIENT)
Dept: OUTPATIENT SERVICES | Facility: HOSPITAL | Age: 85
LOS: 1 days | End: 2025-03-19
Payer: MEDICARE

## 2025-03-19 ENCOUNTER — APPOINTMENT (OUTPATIENT)
Dept: MEDICATION MANAGEMENT | Facility: CLINIC | Age: 85
End: 2025-03-19

## 2025-03-19 DIAGNOSIS — Z79.01 LONG TERM (CURRENT) USE OF ANTICOAGULANTS: ICD-10-CM

## 2025-03-19 DIAGNOSIS — I48.91 UNSPECIFIED ATRIAL FIBRILLATION: ICD-10-CM

## 2025-03-19 DIAGNOSIS — Z90.89 ACQUIRED ABSENCE OF OTHER ORGANS: Chronic | ICD-10-CM

## 2025-03-19 DIAGNOSIS — Z90.49 ACQUIRED ABSENCE OF OTHER SPECIFIED PARTS OF DIGESTIVE TRACT: Chronic | ICD-10-CM

## 2025-03-19 LAB
INR PPP: 2.6 RATIO
QUALITY CONTROL: YES

## 2025-03-19 PROCEDURE — 98012 SYNCH AUDIO-ONLY EST SF 10: CPT

## 2025-04-02 ENCOUNTER — APPOINTMENT (OUTPATIENT)
Dept: MEDICATION MANAGEMENT | Facility: CLINIC | Age: 85
End: 2025-04-02

## 2025-04-02 ENCOUNTER — OUTPATIENT (OUTPATIENT)
Dept: OUTPATIENT SERVICES | Facility: HOSPITAL | Age: 85
LOS: 1 days | End: 2025-04-02
Payer: MEDICARE

## 2025-04-02 DIAGNOSIS — Z90.49 ACQUIRED ABSENCE OF OTHER SPECIFIED PARTS OF DIGESTIVE TRACT: Chronic | ICD-10-CM

## 2025-04-02 DIAGNOSIS — Z79.01 LONG TERM (CURRENT) USE OF ANTICOAGULANTS: ICD-10-CM

## 2025-04-02 DIAGNOSIS — I48.91 UNSPECIFIED ATRIAL FIBRILLATION: ICD-10-CM

## 2025-04-02 DIAGNOSIS — Z90.710 ACQUIRED ABSENCE OF BOTH CERVIX AND UTERUS: Chronic | ICD-10-CM

## 2025-04-02 DIAGNOSIS — Z90.89 ACQUIRED ABSENCE OF OTHER ORGANS: Chronic | ICD-10-CM

## 2025-04-02 LAB
INR PPP: 2.8 RATIO
QUALITY CONTROL: YES

## 2025-04-02 PROCEDURE — 98012 SYNCH AUDIO-ONLY EST SF 10: CPT

## 2025-04-23 ENCOUNTER — APPOINTMENT (OUTPATIENT)
Dept: MEDICATION MANAGEMENT | Facility: CLINIC | Age: 85
End: 2025-04-23

## 2025-04-23 ENCOUNTER — OUTPATIENT (OUTPATIENT)
Dept: OUTPATIENT SERVICES | Facility: HOSPITAL | Age: 85
LOS: 1 days | End: 2025-04-23
Payer: MEDICARE

## 2025-04-23 DIAGNOSIS — I48.91 UNSPECIFIED ATRIAL FIBRILLATION: ICD-10-CM

## 2025-04-23 DIAGNOSIS — Z90.710 ACQUIRED ABSENCE OF BOTH CERVIX AND UTERUS: Chronic | ICD-10-CM

## 2025-04-23 DIAGNOSIS — Z90.89 ACQUIRED ABSENCE OF OTHER ORGANS: Chronic | ICD-10-CM

## 2025-04-23 DIAGNOSIS — Z79.01 LONG TERM (CURRENT) USE OF ANTICOAGULANTS: ICD-10-CM

## 2025-04-23 DIAGNOSIS — Z90.49 ACQUIRED ABSENCE OF OTHER SPECIFIED PARTS OF DIGESTIVE TRACT: Chronic | ICD-10-CM

## 2025-04-23 LAB
INR PPP: 2.9 RATIO
QUALITY CONTROL: YES

## 2025-04-23 PROCEDURE — 98012 SYNCH AUDIO-ONLY EST SF 10: CPT

## 2025-05-13 ENCOUNTER — OUTPATIENT (OUTPATIENT)
Dept: OUTPATIENT SERVICES | Facility: HOSPITAL | Age: 85
LOS: 1 days | End: 2025-05-13
Payer: MEDICARE

## 2025-05-13 ENCOUNTER — APPOINTMENT (OUTPATIENT)
Dept: MEDICATION MANAGEMENT | Facility: CLINIC | Age: 85
End: 2025-05-13

## 2025-05-13 DIAGNOSIS — Z79.01 LONG TERM (CURRENT) USE OF ANTICOAGULANTS: ICD-10-CM

## 2025-05-13 DIAGNOSIS — I48.91 UNSPECIFIED ATRIAL FIBRILLATION: ICD-10-CM

## 2025-05-13 DIAGNOSIS — Z90.49 ACQUIRED ABSENCE OF OTHER SPECIFIED PARTS OF DIGESTIVE TRACT: Chronic | ICD-10-CM

## 2025-05-13 DIAGNOSIS — Z90.89 ACQUIRED ABSENCE OF OTHER ORGANS: Chronic | ICD-10-CM

## 2025-05-13 DIAGNOSIS — Z90.710 ACQUIRED ABSENCE OF BOTH CERVIX AND UTERUS: Chronic | ICD-10-CM

## 2025-05-13 LAB
INR PPP: 3.4 RATIO
QUALITY CONTROL: YES

## 2025-05-13 PROCEDURE — 98012 SYNCH AUDIO-ONLY EST SF 10: CPT

## 2025-06-01 NOTE — PATIENT PROFILE ADULT - FUNCTIONAL ASSESSMENT - BASIC MOBILITY 4.
Continue scheduled neb treatments  Respiratory protocol  Wean oxygen as tolerated  See plan as above   2 = A lot of assistance

## 2025-06-02 ENCOUNTER — OUTPATIENT (OUTPATIENT)
Dept: OUTPATIENT SERVICES | Facility: HOSPITAL | Age: 85
LOS: 1 days | End: 2025-06-02
Payer: MEDICARE

## 2025-06-02 ENCOUNTER — APPOINTMENT (OUTPATIENT)
Dept: MEDICATION MANAGEMENT | Facility: CLINIC | Age: 85
End: 2025-06-02

## 2025-06-02 DIAGNOSIS — I48.91 UNSPECIFIED ATRIAL FIBRILLATION: ICD-10-CM

## 2025-06-02 DIAGNOSIS — Z79.01 LONG TERM (CURRENT) USE OF ANTICOAGULANTS: ICD-10-CM

## 2025-06-02 DIAGNOSIS — Z90.49 ACQUIRED ABSENCE OF OTHER SPECIFIED PARTS OF DIGESTIVE TRACT: Chronic | ICD-10-CM

## 2025-06-02 DIAGNOSIS — Z90.710 ACQUIRED ABSENCE OF BOTH CERVIX AND UTERUS: Chronic | ICD-10-CM

## 2025-06-02 DIAGNOSIS — Z90.89 ACQUIRED ABSENCE OF OTHER ORGANS: Chronic | ICD-10-CM

## 2025-06-02 LAB
INR PPP: 3.3 RATIO
QUALITY CONTROL: YES

## 2025-06-02 PROCEDURE — 98012 SYNCH AUDIO-ONLY EST SF 10: CPT

## 2025-06-23 ENCOUNTER — APPOINTMENT (OUTPATIENT)
Dept: MEDICATION MANAGEMENT | Facility: CLINIC | Age: 85
End: 2025-06-23

## 2025-06-23 ENCOUNTER — OUTPATIENT (OUTPATIENT)
Dept: OUTPATIENT SERVICES | Facility: HOSPITAL | Age: 85
LOS: 1 days | End: 2025-06-23
Payer: MEDICARE

## 2025-06-23 DIAGNOSIS — Z79.01 LONG TERM (CURRENT) USE OF ANTICOAGULANTS: ICD-10-CM

## 2025-06-23 DIAGNOSIS — Z90.710 ACQUIRED ABSENCE OF BOTH CERVIX AND UTERUS: Chronic | ICD-10-CM

## 2025-06-23 DIAGNOSIS — I48.91 UNSPECIFIED ATRIAL FIBRILLATION: ICD-10-CM

## 2025-06-23 DIAGNOSIS — Z90.49 ACQUIRED ABSENCE OF OTHER SPECIFIED PARTS OF DIGESTIVE TRACT: Chronic | ICD-10-CM

## 2025-06-23 DIAGNOSIS — Z90.89 ACQUIRED ABSENCE OF OTHER ORGANS: Chronic | ICD-10-CM

## 2025-06-23 LAB
INR PPP: 4.2 RATIO
QUALITY CONTROL: YES

## 2025-06-23 PROCEDURE — 98012 SYNCH AUDIO-ONLY EST SF 10: CPT

## 2025-06-25 NOTE — ED PROVIDER NOTE - MDM ORDERS SUBMITTED SELECTION
PRINCIPAL DISCHARGE DIAGNOSIS  Diagnosis: Cellulitis  Assessment and Plan of Treatment: You presented to the hospital with pain and reddness of your right lower leg and a painful callus on the base of your right foot. Imaging suggested cellulitis. You were treated with IV antibiotics for cellulitits. Your pain and redness improved. You were evaluated by podiatry for right foot callus, they recommended that you follow up outpatient with podiatry clinic.   Please continue taking your antibiotics as prescribed until 7/1/25 and follow up outpatient with your PCP and podiatry     Medications

## 2025-07-02 ENCOUNTER — OUTPATIENT (OUTPATIENT)
Dept: OUTPATIENT SERVICES | Facility: HOSPITAL | Age: 85
LOS: 1 days | End: 2025-07-02
Payer: MEDICARE

## 2025-07-02 ENCOUNTER — APPOINTMENT (OUTPATIENT)
Dept: MEDICATION MANAGEMENT | Facility: CLINIC | Age: 85
End: 2025-07-02

## 2025-07-02 DIAGNOSIS — Z90.49 ACQUIRED ABSENCE OF OTHER SPECIFIED PARTS OF DIGESTIVE TRACT: Chronic | ICD-10-CM

## 2025-07-02 DIAGNOSIS — Z79.01 LONG TERM (CURRENT) USE OF ANTICOAGULANTS: ICD-10-CM

## 2025-07-02 DIAGNOSIS — Z90.710 ACQUIRED ABSENCE OF BOTH CERVIX AND UTERUS: Chronic | ICD-10-CM

## 2025-07-02 DIAGNOSIS — I48.91 UNSPECIFIED ATRIAL FIBRILLATION: ICD-10-CM

## 2025-07-02 DIAGNOSIS — Z90.89 ACQUIRED ABSENCE OF OTHER ORGANS: Chronic | ICD-10-CM

## 2025-07-02 LAB
INR PPP: 3.8 RATIO
QUALITY CONTROL: YES

## 2025-07-02 PROCEDURE — 98012 SYNCH AUDIO-ONLY EST SF 10: CPT

## 2025-07-02 RX ORDER — WARFARIN 2 MG/1
2 TABLET ORAL
Qty: 90 | Refills: 3 | Status: ACTIVE | COMMUNITY
Start: 2025-07-02 | End: 1900-01-01

## 2025-07-09 ENCOUNTER — APPOINTMENT (OUTPATIENT)
Dept: MEDICATION MANAGEMENT | Facility: CLINIC | Age: 85
End: 2025-07-09

## 2025-07-09 ENCOUNTER — OUTPATIENT (OUTPATIENT)
Dept: OUTPATIENT SERVICES | Facility: HOSPITAL | Age: 85
LOS: 1 days | End: 2025-07-09
Payer: MEDICARE

## 2025-07-09 DIAGNOSIS — Z90.89 ACQUIRED ABSENCE OF OTHER ORGANS: Chronic | ICD-10-CM

## 2025-07-09 DIAGNOSIS — I48.91 UNSPECIFIED ATRIAL FIBRILLATION: ICD-10-CM

## 2025-07-09 DIAGNOSIS — Z90.710 ACQUIRED ABSENCE OF BOTH CERVIX AND UTERUS: Chronic | ICD-10-CM

## 2025-07-09 DIAGNOSIS — Z79.01 LONG TERM (CURRENT) USE OF ANTICOAGULANTS: ICD-10-CM

## 2025-07-09 DIAGNOSIS — Z90.49 ACQUIRED ABSENCE OF OTHER SPECIFIED PARTS OF DIGESTIVE TRACT: Chronic | ICD-10-CM

## 2025-07-09 LAB — INR PPP: 4.1 RATIO

## 2025-07-09 PROCEDURE — 98012 SYNCH AUDIO-ONLY EST SF 10: CPT

## 2025-07-16 ENCOUNTER — OUTPATIENT (OUTPATIENT)
Dept: OUTPATIENT SERVICES | Facility: HOSPITAL | Age: 85
LOS: 1 days | End: 2025-07-16
Payer: MEDICARE

## 2025-07-16 ENCOUNTER — APPOINTMENT (OUTPATIENT)
Dept: MEDICATION MANAGEMENT | Facility: CLINIC | Age: 85
End: 2025-07-16

## 2025-07-16 DIAGNOSIS — I48.91 UNSPECIFIED ATRIAL FIBRILLATION: ICD-10-CM

## 2025-07-16 DIAGNOSIS — Z90.49 ACQUIRED ABSENCE OF OTHER SPECIFIED PARTS OF DIGESTIVE TRACT: Chronic | ICD-10-CM

## 2025-07-16 DIAGNOSIS — Z90.710 ACQUIRED ABSENCE OF BOTH CERVIX AND UTERUS: Chronic | ICD-10-CM

## 2025-07-16 DIAGNOSIS — Z79.01 LONG TERM (CURRENT) USE OF ANTICOAGULANTS: ICD-10-CM

## 2025-07-16 DIAGNOSIS — Z90.89 ACQUIRED ABSENCE OF OTHER ORGANS: Chronic | ICD-10-CM

## 2025-07-16 LAB
INR PPP: 3.2 RATIO
QUALITY CONTROL: YES

## 2025-07-16 PROCEDURE — 98012 SYNCH AUDIO-ONLY EST SF 10: CPT

## 2025-07-23 ENCOUNTER — OUTPATIENT (OUTPATIENT)
Dept: OUTPATIENT SERVICES | Facility: HOSPITAL | Age: 85
LOS: 1 days | End: 2025-07-23
Payer: MEDICARE

## 2025-07-23 ENCOUNTER — APPOINTMENT (OUTPATIENT)
Dept: MEDICATION MANAGEMENT | Facility: CLINIC | Age: 85
End: 2025-07-23

## 2025-07-23 DIAGNOSIS — Z90.49 ACQUIRED ABSENCE OF OTHER SPECIFIED PARTS OF DIGESTIVE TRACT: Chronic | ICD-10-CM

## 2025-07-23 DIAGNOSIS — I48.91 UNSPECIFIED ATRIAL FIBRILLATION: ICD-10-CM

## 2025-07-23 DIAGNOSIS — Z79.01 LONG TERM (CURRENT) USE OF ANTICOAGULANTS: ICD-10-CM

## 2025-07-23 LAB
INR PPP: 3.5 RATIO
QUALITY CONTROL: YES

## 2025-07-23 PROCEDURE — 98012 SYNCH AUDIO-ONLY EST SF 10: CPT

## 2025-07-30 ENCOUNTER — OUTPATIENT (OUTPATIENT)
Dept: OUTPATIENT SERVICES | Facility: HOSPITAL | Age: 85
LOS: 1 days | End: 2025-07-30
Payer: MEDICARE

## 2025-07-30 ENCOUNTER — APPOINTMENT (OUTPATIENT)
Dept: MEDICATION MANAGEMENT | Facility: CLINIC | Age: 85
End: 2025-07-30

## 2025-07-30 DIAGNOSIS — Z90.49 ACQUIRED ABSENCE OF OTHER SPECIFIED PARTS OF DIGESTIVE TRACT: Chronic | ICD-10-CM

## 2025-07-30 DIAGNOSIS — Z90.710 ACQUIRED ABSENCE OF BOTH CERVIX AND UTERUS: Chronic | ICD-10-CM

## 2025-07-30 DIAGNOSIS — Z79.01 LONG TERM (CURRENT) USE OF ANTICOAGULANTS: ICD-10-CM

## 2025-07-30 DIAGNOSIS — I48.91 UNSPECIFIED ATRIAL FIBRILLATION: ICD-10-CM

## 2025-07-30 DIAGNOSIS — Z90.89 ACQUIRED ABSENCE OF OTHER ORGANS: Chronic | ICD-10-CM

## 2025-07-30 LAB
INR PPP: 4.1 RATIO
QUALITY CONTROL: YES

## 2025-07-30 PROCEDURE — 98012 SYNCH AUDIO-ONLY EST SF 10: CPT

## 2025-08-06 ENCOUNTER — OUTPATIENT (OUTPATIENT)
Dept: OUTPATIENT SERVICES | Facility: HOSPITAL | Age: 85
LOS: 1 days | End: 2025-08-06
Payer: MEDICARE

## 2025-08-06 ENCOUNTER — APPOINTMENT (OUTPATIENT)
Dept: MEDICATION MANAGEMENT | Facility: CLINIC | Age: 85
End: 2025-08-06

## 2025-08-06 DIAGNOSIS — I48.91 UNSPECIFIED ATRIAL FIBRILLATION: ICD-10-CM

## 2025-08-06 DIAGNOSIS — Z90.710 ACQUIRED ABSENCE OF BOTH CERVIX AND UTERUS: Chronic | ICD-10-CM

## 2025-08-06 DIAGNOSIS — Z79.01 LONG TERM (CURRENT) USE OF ANTICOAGULANTS: ICD-10-CM

## 2025-08-06 DIAGNOSIS — Z90.49 ACQUIRED ABSENCE OF OTHER SPECIFIED PARTS OF DIGESTIVE TRACT: Chronic | ICD-10-CM

## 2025-08-06 DIAGNOSIS — Z90.89 ACQUIRED ABSENCE OF OTHER ORGANS: Chronic | ICD-10-CM

## 2025-08-06 LAB
INR PPP: 3.6 RATIO
QUALITY CONTROL: YES

## 2025-08-06 PROCEDURE — 98012 SYNCH AUDIO-ONLY EST SF 10: CPT

## 2025-08-13 ENCOUNTER — OUTPATIENT (OUTPATIENT)
Dept: OUTPATIENT SERVICES | Facility: HOSPITAL | Age: 85
LOS: 1 days | End: 2025-08-13
Payer: MEDICARE

## 2025-08-13 ENCOUNTER — APPOINTMENT (OUTPATIENT)
Dept: MEDICATION MANAGEMENT | Facility: CLINIC | Age: 85
End: 2025-08-13

## 2025-08-13 DIAGNOSIS — Z79.01 LONG TERM (CURRENT) USE OF ANTICOAGULANTS: ICD-10-CM

## 2025-08-13 DIAGNOSIS — Z90.49 ACQUIRED ABSENCE OF OTHER SPECIFIED PARTS OF DIGESTIVE TRACT: Chronic | ICD-10-CM

## 2025-08-13 DIAGNOSIS — Z90.710 ACQUIRED ABSENCE OF BOTH CERVIX AND UTERUS: Chronic | ICD-10-CM

## 2025-08-13 DIAGNOSIS — Z90.89 ACQUIRED ABSENCE OF OTHER ORGANS: Chronic | ICD-10-CM

## 2025-08-13 DIAGNOSIS — I48.91 UNSPECIFIED ATRIAL FIBRILLATION: ICD-10-CM

## 2025-08-13 LAB
INR PPP: 3.4 RATIO
QUALITY CONTROL: YES

## 2025-08-13 PROCEDURE — 98012 SYNCH AUDIO-ONLY EST SF 10: CPT

## 2025-08-27 ENCOUNTER — APPOINTMENT (OUTPATIENT)
Dept: MEDICATION MANAGEMENT | Facility: CLINIC | Age: 85
End: 2025-08-27

## 2025-08-27 ENCOUNTER — OUTPATIENT (OUTPATIENT)
Dept: OUTPATIENT SERVICES | Facility: HOSPITAL | Age: 85
LOS: 1 days | End: 2025-08-27
Payer: MEDICARE

## 2025-08-27 DIAGNOSIS — Z79.01 LONG TERM (CURRENT) USE OF ANTICOAGULANTS: ICD-10-CM

## 2025-08-27 DIAGNOSIS — I48.91 UNSPECIFIED ATRIAL FIBRILLATION: ICD-10-CM

## 2025-08-27 DIAGNOSIS — Z90.710 ACQUIRED ABSENCE OF BOTH CERVIX AND UTERUS: Chronic | ICD-10-CM

## 2025-08-27 LAB
INR PPP: 3.7 RATIO
QUALITY CONTROL: YES

## 2025-08-27 PROCEDURE — 98012 SYNCH AUDIO-ONLY EST SF 10: CPT

## 2025-09-04 ENCOUNTER — APPOINTMENT (OUTPATIENT)
Dept: MEDICATION MANAGEMENT | Facility: CLINIC | Age: 85
End: 2025-09-04

## 2025-09-04 ENCOUNTER — OUTPATIENT (OUTPATIENT)
Dept: OUTPATIENT SERVICES | Facility: HOSPITAL | Age: 85
LOS: 1 days | End: 2025-09-04
Payer: MEDICARE

## 2025-09-04 DIAGNOSIS — I48.91 UNSPECIFIED ATRIAL FIBRILLATION: ICD-10-CM

## 2025-09-04 DIAGNOSIS — Z90.49 ACQUIRED ABSENCE OF OTHER SPECIFIED PARTS OF DIGESTIVE TRACT: Chronic | ICD-10-CM

## 2025-09-04 DIAGNOSIS — Z79.01 LONG TERM (CURRENT) USE OF ANTICOAGULANTS: ICD-10-CM

## 2025-09-04 LAB
INR PPP: 2.8 RATIO
QUALITY CONTROL: YES

## 2025-09-04 PROCEDURE — 98012 SYNCH AUDIO-ONLY EST SF 10: CPT

## 2025-09-18 ENCOUNTER — APPOINTMENT (OUTPATIENT)
Dept: MEDICATION MANAGEMENT | Facility: CLINIC | Age: 85
End: 2025-09-18

## 2025-09-18 LAB
INR PPP: 4 RATIO
QUALITY CONTROL: YES